# Patient Record
Sex: MALE | Race: WHITE | NOT HISPANIC OR LATINO | Employment: FULL TIME | URBAN - METROPOLITAN AREA
[De-identification: names, ages, dates, MRNs, and addresses within clinical notes are randomized per-mention and may not be internally consistent; named-entity substitution may affect disease eponyms.]

---

## 2017-01-12 RX ORDER — PRAVASTATIN SODIUM 40 MG/1
TABLET ORAL
Qty: 30 TABLET | Refills: 11 | Status: SHIPPED | OUTPATIENT
Start: 2017-01-12 | End: 2017-07-17 | Stop reason: SDUPTHER

## 2017-02-13 RX ORDER — BENAZEPRIL HYDROCHLORIDE 40 MG/1
TABLET ORAL
Qty: 30 TABLET | Refills: 5 | Status: SHIPPED | OUTPATIENT
Start: 2017-02-13 | End: 2017-07-17 | Stop reason: SDUPTHER

## 2017-04-25 ENCOUNTER — LAB VISIT (OUTPATIENT)
Dept: LAB | Facility: HOSPITAL | Age: 67
End: 2017-04-25
Attending: UROLOGY
Payer: MEDICARE

## 2017-04-25 DIAGNOSIS — R97.20 ELEVATED PSA: ICD-10-CM

## 2017-04-25 LAB — COMPLEXED PSA SERPL-MCNC: 6.1 NG/ML

## 2017-04-25 PROCEDURE — 36415 COLL VENOUS BLD VENIPUNCTURE: CPT

## 2017-04-25 PROCEDURE — 84153 ASSAY OF PSA TOTAL: CPT

## 2017-05-03 ENCOUNTER — OFFICE VISIT (OUTPATIENT)
Dept: UROLOGY | Facility: CLINIC | Age: 67
End: 2017-05-03
Payer: MEDICARE

## 2017-05-03 VITALS
HEIGHT: 72 IN | DIASTOLIC BLOOD PRESSURE: 80 MMHG | HEART RATE: 70 BPM | BODY MASS INDEX: 25.33 KG/M2 | SYSTOLIC BLOOD PRESSURE: 121 MMHG | TEMPERATURE: 98 F | WEIGHT: 187 LBS

## 2017-05-03 DIAGNOSIS — R97.20 ELEVATED PSA: Primary | ICD-10-CM

## 2017-05-03 LAB
BILIRUB SERPL-MCNC: ABNORMAL MG/DL
BLOOD URINE, POC: ABNORMAL
COLOR, POC UA: ABNORMAL
GLUCOSE UR QL STRIP: ABNORMAL
KETONES UR QL STRIP: ABNORMAL
LEUKOCYTE ESTERASE URINE, POC: ABNORMAL
NITRITE, POC UA: ABNORMAL
PH, POC UA: 5
PROTEIN, POC: ABNORMAL
SPECIFIC GRAVITY, POC UA: 1.02
UROBILINOGEN, POC UA: ABNORMAL

## 2017-05-03 PROCEDURE — 1160F RVW MEDS BY RX/DR IN RCRD: CPT | Mod: S$GLB,,, | Performed by: UROLOGY

## 2017-05-03 PROCEDURE — 99999 PR PBB SHADOW E&M-EST. PATIENT-LVL III: CPT | Mod: PBBFAC,,, | Performed by: UROLOGY

## 2017-05-03 PROCEDURE — 99213 OFFICE O/P EST LOW 20 MIN: CPT | Mod: 25,S$GLB,, | Performed by: UROLOGY

## 2017-05-03 PROCEDURE — 3074F SYST BP LT 130 MM HG: CPT | Mod: S$GLB,,, | Performed by: UROLOGY

## 2017-05-03 PROCEDURE — 3079F DIAST BP 80-89 MM HG: CPT | Mod: S$GLB,,, | Performed by: UROLOGY

## 2017-05-03 PROCEDURE — 81002 URINALYSIS NONAUTO W/O SCOPE: CPT | Mod: S$GLB,,, | Performed by: UROLOGY

## 2017-05-03 NOTE — MR AVS SNAPSHOT
[facility]  [address]  [place] [address]  Phone: [phone]                  Kulwinder Hogue JrAlex   5/3/2017 8:00 AM   Office Visit    Description:  Male : 1950   Provider:  Nguyễn Mancia MD   Department:  [facility]           Reason for Visit     6 mth recheck           Diagnoses this Visit        Comments    Elevated PSA    -  Primary            To Do List           Future Appointments        Provider Department Dept Phone    7/10/2017 10:00 AM [facility] [phone]    2017 8:00 AM [facility] [phone]    2017 7:40 AM Henrique Lugo MD [facility] [phone]      Goals (5 Years of Data)     None      [facility]     [facility] -  Assistance  Unless otherwise directed by your provider, please contact [facility], our nurse care line that is available for  assistance.     Registered nurses in the [facility] provide: appointment scheduling, clinical advisement, health education, and other advisory services.  Call: [phone] (toll free)               Medications           Message regarding Medications     Verify the changes and/or additions to your medication regime listed below are the same as discussed with your clinician today.  If any of these changes or additions are incorrect, please notify your healthcare provider.        STOP taking these medications     fluticasone (FLONASE) 50 mcg/actuation nasal spray 2 sprays by Each Nare route once daily.           Verify that the below list of medications is an accurate representation of the medications you are currently taking.  If none reported, the list may be blank. If incorrect, please contact your healthcare provider. Carry this list with you in case of emergency.           Current Medications     amlodipine (NORVASC) 10 MG tablet take 1 tablet by mouth every  evening    aspirin 81 mg Tab Take 81 mg by mouth once daily. Every day    benazepril (LOTENSIN) 40 MG tablet take 1 tablet by mouth once daily    carvedilol (COREG) 12.5 MG tablet Take 1 tablet (12.5 mg total) by mouth 2 (two) times daily with meals.    fish oil-omega-3 fatty acids 300-1,000 mg capsule Take 2 g by mouth once daily.    hydrochlorothiazide (HYDRODIURIL) 12.5 MG Tab Take 1 tablet (12.5 mg total) by mouth once daily.    multivitamin capsule Take 1 capsule by mouth once daily.      omeprazole (PRILOSEC) 20 MG capsule Take 1 capsule (20 mg total) by mouth once daily.    pravastatin (PRAVACHOL) 40 MG tablet take 1 tablet by mouth once daily           Clinical Reference Information           Your Vitals Were     BP Pulse Temp Height Weight BMI    121/80 (BP Location: Right arm, Patient Position: Sitting, BP Method: Automatic) 70 97.9 °F (36.6 °C) (Oral) 6' (1.829 m) 84.8 kg (187 lb) 25.36 kg/m2      Blood Pressure          Most Recent Value    BP  121/80      Allergies as of 5/3/2017     Percocet [Oxycodone-acetaminophen]      Immunizations Administered on Date of Encounter - 5/3/2017     None      Orders Placed During Today's Visit      Normal Orders This Visit    POCT URINE DIPSTICK WITHOUT MICROSCOPE          5/3/2017  8:27 AM - Marianela Barnett MA      Component Results     Component    Color, UA    yellow clear    Spec Grav UA    1.020    pH, UA    5    WBC, UA    trace    Nitrite, UA    n    Protein    trace    Glucose, UA    n    Ketones, UA    n    Urobilinogen, UA    n    Bilirubin    n    Blood, UA    trace            Language Assistance Services     ATTENTION: Language assistance services are available, free of charge. Please call [phone].      ATENCIÓN: Si habla español, tiene a jerry disposición servicios gratuitos de asistencia lingüística. Llame al [phone].     SRINIVASA Ý: N?u b?n nói Ti?ng Vi?t, có các d?ch v? h? tr? ngôn ng? mi?n phí dành cho b?n. G?i s? [phone].         [facility] complies with applicable Federal civil rights laws and does not discriminate on the basis of race, color, national origin, age, disability, or sex.

## 2017-05-03 NOTE — PROGRESS NOTES
OFFICE NOTE    CHIEF COMPLAINT:  Elevated PSA.    HISTORY OF PRESENT ILLNESS:  Mr. Hogue is a 66-year-old male who in 2016,   underwent a transrectal prostate ultrasound and biopsies and cystoscopy.  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  The biopsies done last year failed to show   evidence of any carcinoma and we recommended to keep testing his PSA and the   last PSA was performed on 04/25/2017 at 6.1.  The patient has been informed of   that finding and looking back at his PSA levels, we can see that this is a   steady raise since seven months ago was 5.2, a year ago was 4.2, and now is 6.1.    I suggested that we probably need to proceed with an MRI of the prostate to   see with that imaging modality, we can see any focal lesions that we can target   for biopsy if the PSA keeps elevating.  The patient agreed to proceed with that.    He is going to undergo DIS in [place], [place] and I am going to call him   with the information of the result.  The patient is free of any other symptoms   at the present time and his urinalysis today is within normal limits.  All the   questions were answered at his satisfaction.  He left the office in satisfactory   condition. The next step is to review the MRI and make a disposition for more   biopsies or not.      EAGLE/GABRIEL  dd: 05/03/2017 13:27:31 (CDT)  td: 05/04/2017 00:07:39 (CDT)  Doc ID   #[id number]  Job ID #[id number]    CC:

## 2017-05-18 ENCOUNTER — TELEPHONE (OUTPATIENT)
Dept: CARDIOLOGY | Facility: CLINIC | Age: 67
End: 2017-05-18

## 2017-05-18 NOTE — TELEPHONE ENCOUNTER
----- Message from Sintia De Leon sent at 5/18/2017 10:21 AM CDT -----  Contact: Tim/[facility]  Please call Tim at [phone]. Need to know if patient can be given capsules instead of tablets of Hydrochlorothiazide 12.5 mg? Last seen Dr Vital 08/08/16    Thank you

## 2017-05-18 NOTE — TELEPHONE ENCOUNTER
Tim Pharmacist at [facility] said that the HCTZ 12.5 mg tablets are on backorder but they do have the 12.5 mg capsules,asked if this would be ok to dispense.Instructed pharmacist that this is fine as long as the pt is ok with it.He reports understanding of these instructions.

## 2017-05-31 ENCOUNTER — TELEPHONE (OUTPATIENT)
Dept: UROLOGY | Facility: CLINIC | Age: 67
End: 2017-05-31

## 2017-05-31 DIAGNOSIS — R97.20 ELEVATED PSA: Primary | ICD-10-CM

## 2017-05-31 RX ORDER — SULFAMETHOXAZOLE AND TRIMETHOPRIM 800; 160 MG/1; MG/1
1 TABLET ORAL DAILY
Qty: 30 TABLET | Refills: 5 | Status: SHIPPED | OUTPATIENT
Start: 2017-05-31 | End: 2017-06-20

## 2017-05-31 NOTE — TELEPHONE ENCOUNTER
Prostate MRI results received from ANDREA JONES reviewed and recommends patient take daily antibiotics for 6 months, then repeat PSA.  Patient advised.

## 2017-06-23 ENCOUNTER — OFFICE VISIT (OUTPATIENT)
Dept: GASTROENTEROLOGY | Facility: CLINIC | Age: 67
End: 2017-06-23
Payer: MEDICARE

## 2017-06-23 VITALS
DIASTOLIC BLOOD PRESSURE: 75 MMHG | BODY MASS INDEX: 25.38 KG/M2 | HEART RATE: 70 BPM | SYSTOLIC BLOOD PRESSURE: 120 MMHG | RESPIRATION RATE: 20 BRPM | HEIGHT: 72 IN | WEIGHT: 187.38 LBS

## 2017-06-23 DIAGNOSIS — Z86.010 HISTORY OF COLON POLYPS: ICD-10-CM

## 2017-06-23 DIAGNOSIS — K21.9 GASTROESOPHAGEAL REFLUX DISEASE WITHOUT ESOPHAGITIS: Primary | ICD-10-CM

## 2017-06-23 PROCEDURE — 99999 PR PBB SHADOW E&M-EST. PATIENT-LVL III: CPT | Mod: PBBFAC,,, | Performed by: INTERNAL MEDICINE

## 2017-06-23 PROCEDURE — 99214 OFFICE O/P EST MOD 30 MIN: CPT | Mod: S$GLB,,, | Performed by: INTERNAL MEDICINE

## 2017-06-23 NOTE — PROGRESS NOTES
[facility]    CC: GERD    HPI 66 y.o. male is here to follow up for moderate GERD associated with heartburn and indigestion.  Symptoms controlled adequately on omeprazole 20 mg daily and with food trigger avoidance.  No dysphagia or GI bleeding.  EGD in 2013 with a small hiatal hernia - no esophagitis.      Past Medical History:   Diagnosis Date    Elevated PSA     Hyperlipemia     Hypertension     Wears glasses          Review of Systems  General ROS: negative for - chills, fever or weight loss  Cardiovascular ROS: no chest pain or dyspnea on exertion  Gastrointestinal ROS: +GERD; no abdominal pain or nausea    Physical Examination  /75   Pulse 70   Resp 20   Ht 6' (1.829 m)   Wt 85 kg (187 lb 6.3 oz)   BMI 25.41 kg/m²   General appearance: alert, cooperative, no distress  HENT: Normocephalic, atraumatic, neck symmetrical, no nasal discharge   Lungs: clear to auscultation bilaterally, symmetric chest wall expansion bilaterally  Heart: regular rate and rhythm without rub; no displacement of the PMI   Abdomen: soft, NT ND BS present  Extremities: extremities symmetric; no clubbing, cyanosis, or edema    Labs:  H/H 15/43    Assessment:   1. GERD  2. History of colon polyps    Plan:  1. Continue omeprazole 20 mg daily  2. Repeat colonoscopy for surveillance due 3/2018    Jim Carvajal MD  [facility]  [address], [address]  [place] [address]  Office: [phone]  Fax: [phone]

## 2017-07-10 ENCOUNTER — LAB VISIT (OUTPATIENT)
Dept: LAB | Facility: HOSPITAL | Age: 67
End: 2017-07-10
Attending: FAMILY MEDICINE
Payer: MEDICARE

## 2017-07-10 DIAGNOSIS — E78.5 HYPERLIPIDEMIA, UNSPECIFIED HYPERLIPIDEMIA TYPE: Chronic | ICD-10-CM

## 2017-07-10 DIAGNOSIS — I10 ESSENTIAL HYPERTENSION: ICD-10-CM

## 2017-07-10 LAB
ALBUMIN SERPL BCP-MCNC: 3.6 G/DL
ALP SERPL-CCNC: 69 U/L
ALT SERPL W/O P-5'-P-CCNC: 28 U/L
ANION GAP SERPL CALC-SCNC: 9 MMOL/L
AST SERPL-CCNC: 28 U/L
BASOPHILS # BLD AUTO: 0.02 K/UL
BASOPHILS NFR BLD: 0.4 %
BILIRUB SERPL-MCNC: 1.4 MG/DL
BUN SERPL-MCNC: 16 MG/DL
CALCIUM SERPL-MCNC: 8.6 MG/DL
CHLORIDE SERPL-SCNC: 102 MMOL/L
CO2 SERPL-SCNC: 28 MMOL/L
CREAT SERPL-MCNC: 1.2 MG/DL
DIFFERENTIAL METHOD: ABNORMAL
EOSINOPHIL # BLD AUTO: 0.2 K/UL
EOSINOPHIL NFR BLD: 4.4 %
ERYTHROCYTE [DISTWIDTH] IN BLOOD BY AUTOMATED COUNT: 12.9 %
EST. GFR  (AFRICAN AMERICAN): >60 ML/MIN/1.73 M^2
EST. GFR  (NON AFRICAN AMERICAN): >60 ML/MIN/1.73 M^2
GLUCOSE SERPL-MCNC: 99 MG/DL
HCT VFR BLD AUTO: 42.7 %
HGB BLD-MCNC: 15 G/DL
LYMPHOCYTES # BLD AUTO: 1.8 K/UL
LYMPHOCYTES NFR BLD: 34.2 %
MCH RBC QN AUTO: 33.5 PG
MCHC RBC AUTO-ENTMCNC: 35.1 %
MCV RBC AUTO: 95 FL
MONOCYTES # BLD AUTO: 0.4 K/UL
MONOCYTES NFR BLD: 7.6 %
NEUTROPHILS # BLD AUTO: 2.8 K/UL
NEUTROPHILS NFR BLD: 53.2 %
PLATELET # BLD AUTO: 245 K/UL
PMV BLD AUTO: 9 FL
POTASSIUM SERPL-SCNC: 3.9 MMOL/L
PROT SERPL-MCNC: 6.6 G/DL
RBC # BLD AUTO: 4.48 M/UL
SODIUM SERPL-SCNC: 139 MMOL/L
WBC # BLD AUTO: 5.26 K/UL

## 2017-07-10 PROCEDURE — 36415 COLL VENOUS BLD VENIPUNCTURE: CPT | Mod: PO

## 2017-07-10 PROCEDURE — 85025 COMPLETE CBC W/AUTO DIFF WBC: CPT

## 2017-07-10 PROCEDURE — 80053 COMPREHEN METABOLIC PANEL: CPT

## 2017-07-10 RX ORDER — CARVEDILOL 12.5 MG/1
TABLET ORAL
Qty: 180 TABLET | Refills: 3 | Status: SHIPPED | OUTPATIENT
Start: 2017-07-10 | End: 2017-12-22 | Stop reason: SDUPTHER

## 2017-07-14 ENCOUNTER — DOCUMENTATION ONLY (OUTPATIENT)
Dept: FAMILY MEDICINE | Facility: CLINIC | Age: 67
End: 2017-07-14

## 2017-07-17 ENCOUNTER — OFFICE VISIT (OUTPATIENT)
Dept: FAMILY MEDICINE | Facility: CLINIC | Age: 67
End: 2017-07-17
Payer: MEDICARE

## 2017-07-17 VITALS
SYSTOLIC BLOOD PRESSURE: 113 MMHG | BODY MASS INDEX: 25.56 KG/M2 | HEART RATE: 69 BPM | WEIGHT: 188.69 LBS | HEIGHT: 72 IN | DIASTOLIC BLOOD PRESSURE: 72 MMHG | TEMPERATURE: 98 F

## 2017-07-17 DIAGNOSIS — D70.4 CYCLICAL NEUTROPENIA: Chronic | ICD-10-CM

## 2017-07-17 DIAGNOSIS — E78.5 HYPERLIPIDEMIA, UNSPECIFIED HYPERLIPIDEMIA TYPE: Chronic | ICD-10-CM

## 2017-07-17 DIAGNOSIS — I10 ESSENTIAL HYPERTENSION: Primary | ICD-10-CM

## 2017-07-17 DIAGNOSIS — E83.51 HYPOCALCEMIA: ICD-10-CM

## 2017-07-17 PROCEDURE — 99999 PR PBB SHADOW E&M-EST. PATIENT-LVL III: CPT | Mod: PBBFAC,,, | Performed by: FAMILY MEDICINE

## 2017-07-17 PROCEDURE — 99214 OFFICE O/P EST MOD 30 MIN: CPT | Mod: S$GLB,,, | Performed by: FAMILY MEDICINE

## 2017-07-17 RX ORDER — PRAVASTATIN SODIUM 40 MG/1
40 TABLET ORAL DAILY
Qty: 30 TABLET | Refills: 11 | Status: SHIPPED | OUTPATIENT
Start: 2017-07-17 | End: 2018-08-15 | Stop reason: SDUPTHER

## 2017-07-17 RX ORDER — BENAZEPRIL HYDROCHLORIDE 40 MG/1
40 TABLET ORAL DAILY
Qty: 30 TABLET | Refills: 5 | Status: SHIPPED | OUTPATIENT
Start: 2017-07-17 | End: 2017-12-22 | Stop reason: SDUPTHER

## 2017-07-17 RX ORDER — HYDROCHLOROTHIAZIDE 12.5 MG/1
12.5 TABLET ORAL DAILY
Qty: 90 TABLET | Refills: 3 | Status: SHIPPED | OUTPATIENT
Start: 2017-07-17 | End: 2018-05-16 | Stop reason: SDUPTHER

## 2017-07-17 NOTE — PROGRESS NOTES
Subjective:       Patient ID: Kulwinder Hogue Jr. is a 66 y.o. male.    Chief Complaint: Hypertension    Patient Active Problem List:     Hyperlipemia     Encounter for colorectal cancer screening     HBP (high blood pressure)     Essential hypertension     Hyperlipidemia     Liposarcoma     Abnormal PSA     Cyclical neutropenia          Hypertension   This is a chronic problem. The problem has been resolved since onset. The problem is controlled. Pertinent negatives include no anxiety, blurred vision, chest pain, headaches, malaise/fatigue, neck pain, orthopnea, palpitations, peripheral edema, PND, shortness of breath or sweats. Risk factors for coronary artery disease include stress (alcohol). Past treatments include diuretics, calcium channel blockers, beta blockers and lifestyle changes. The current treatment provides significant improvement. Compliance problems include exercise.  erectyle dis.     Review of Systems   Constitutional: Negative.  Negative for activity change, appetite change, chills, diaphoresis, fatigue, fever, malaise/fatigue and unexpected weight change.   HENT: Negative.  Negative for congestion, drooling, ear discharge, ear pain, hearing loss, mouth sores, nosebleeds, postnasal drip, rhinorrhea, sinus pressure, sore throat, tinnitus, trouble swallowing and voice change.    Eyes: Negative.  Negative for blurred vision, pain, discharge, redness, itching and visual disturbance.   Respiratory: Negative.  Negative for apnea, cough, choking, chest tightness and shortness of breath.    Cardiovascular: Negative.  Negative for chest pain, palpitations, orthopnea, leg swelling and PND.   Gastrointestinal: Negative.  Negative for abdominal distention, abdominal pain and anal bleeding.   Endocrine: Negative.  Negative for cold intolerance, heat intolerance, polydipsia, polyphagia and polyuria.   Genitourinary: Negative.  Negative for difficulty urinating, dysuria, enuresis, flank pain, frequency,  hematuria, scrotal swelling, testicular pain and urgency.   Musculoskeletal: Negative.  Negative for arthralgias, back pain, gait problem, neck pain and neck stiffness.   Skin: Negative.  Negative for color change, pallor and rash.   Allergic/Immunologic: Negative.  Negative for environmental allergies and food allergies.   Neurological: Negative.  Negative for dizziness, tremors, syncope, facial asymmetry, speech difficulty, light-headedness, numbness and headaches.   Hematological: Negative for adenopathy. Does not bruise/bleed easily.   Psychiatric/Behavioral: Negative.  Negative for agitation, behavioral problems, confusion, decreased concentration, dysphoric mood and hallucinations. The patient is not hyperactive.        Objective:      Physical Exam   Constitutional: He is oriented to person, place, and time. He appears well-developed and well-nourished. No distress.   HENT:   Head: Normocephalic and atraumatic.   Right Ear: External ear normal.   Left Ear: External ear normal.   Nose: Nose normal.   Mouth/Throat: Oropharynx is clear and moist. No oropharyngeal exudate.   Eyes: Conjunctivae and EOM are normal. Pupils are equal, round, and reactive to light. Right eye exhibits no discharge. Left eye exhibits no discharge. No scleral icterus.   Neck: Normal range of motion. Neck supple. No JVD present. No tracheal deviation present. No thyromegaly present.   Cardiovascular: Normal rate, normal heart sounds and intact distal pulses.    No murmur heard.  Pulmonary/Chest: Effort normal and breath sounds normal. No respiratory distress. He has no wheezes. He has no rales.   Abdominal: Soft. Bowel sounds are normal. He exhibits no distension and no mass. There is no tenderness. There is no rebound and no guarding.   Musculoskeletal: He exhibits no edema or tenderness.          Lymphadenopathy:     He has no cervical adenopathy.   Neurological: He is alert and oriented to person, place, and time. No cranial nerve  deficit. Coordination normal.   Skin: Skin is warm and dry. No rash noted. He is not diaphoretic. No erythema. No pallor.   Psychiatric: He has a normal mood and affect. His behavior is normal. Judgment and thought content normal.   Vitals reviewed.    chronic prostatitis under Dr Mancia,  Mild Erectile Dysf.  Assessment:       1. Essential hypertension    2. Hyperlipidemia, unspecified hyperlipidemia type    3. Cyclical neutropenia    4. Hypocalcemia        Plan:       Essential hypertension  -     benazepril (LOTENSIN) 40 MG tablet; Take 1 tablet (40 mg total) by mouth once daily.  Dispense: 30 tablet; Refill: 5  -     hydrochlorothiazide (HYDRODIURIL) 12.5 MG Tab; Take 1 tablet (12.5 mg total) by mouth once daily.  Dispense: 90 tablet; Refill: 3  -     pravastatin (PRAVACHOL) 40 MG tablet; Take 1 tablet (40 mg total) by mouth once daily.  Dispense: 30 tablet; Refill: 11  -     Ambulatory referral to Cardiology  -     Comprehensive metabolic panel; Future; Expected date: 07/17/2017    Hyperlipidemia, unspecified hyperlipidemia type  -     pravastatin (PRAVACHOL) 40 MG tablet; Take 1 tablet (40 mg total) by mouth once daily.  Dispense: 30 tablet; Refill: 11  -     Lipid panel; Future; Expected date: 07/17/2017    Cyclical neutropenia  -     CBC auto differential; Future; Expected date: 07/17/2017    Hypocalcemia  -     PTH, intact; Future; Expected date: 07/17/2017  -     Calcium, ionized; Future; Expected date: 07/17/2017      Patient readiness: acceptance and barriers:readiness    During the course of the visit the patient was educated and counseled about the following:     Hypertension:   Screening for causes of secondary hypertension: GFR (chronic kidney disease).  Dietary sodium restriction.  Regular aerobic exercise.  Obesity:   General weight loss/lifestyle modification strategies discussed (elicit support from others; identify saboteurs; non-food rewards, etc).    Goals: Hypertension: Reduce Blood  Pressure    Did patient meet goals/outcomes: Yes    The following self management tools provided: blood pressure log  excercise log    Patient Instructions (the written plan) was given to the patient/family.     Time spent with patient: 30 minutes

## 2017-07-17 NOTE — PATIENT INSTRUCTIONS
Established High Blood Pressure    High blood pressure (hypertension) is a chronic disease. Often health care providers dont know what causes it. But it can be caused by certain health conditions and medicines.  If you have high blood pressure, you may not have any symptoms. If you do have symptoms, they may include headache, dizziness, changes in your vision, chest pain, and shortness of breath. But even without symptoms, high blood pressure thats not treated raises your risk for heart attack and stroke. High blood pressure is a serious health risk and shouldnt be ignored.  A blood pressure reading is made up of two numbers: a higher number over a lower number. The top number is the systolic pressure. The bottom number is the diastolic pressure. A normal blood pressure is less than 120 over less than 80.  High blood pressure is when either the top number is 140 or higher, or the bottom number is 90 or higher. This must be the result when taking your blood pressure a number of times. The blood pressures between normal and high are called prehypertension.  Home care  If you have high blood pressure, you should do what is listed below to lower your blood pressure. If you are taking medicines for high blood pressure, these methods may reduce or end your need for medicines in the future.  · Begin a weight-loss program if you are overweight.  · Cut back on how much salt you get in your diet. Heres how to do this:  ¨ Dont eat foods that have a lot of salt. These include olives, pickles, smoked meats, and salted potato chips.  ¨ Dont add salt to your food at the table.  ¨ Use only small amounts of salt when cooking.  · Begin an exercise program. Talk with your health care provider about the type of exercise program that would be best for you. It doesn't have to be hard. Even brisk walking for 20 minutes 3 times a week is a good form of exercise.  · Dont take medicines that have heart stimulants. This includes many  cold and sinus decongestant pills and sprays, as well as diet pills. Check the warnings about hypertension on the label. Stimulants such as amphetamine or cocaine could be lethal for someone with high blood pressure. Never take these.  · Limit how much caffeine you get in your diet. Switch to caffeine-free products.  · Stop smoking. If you are a long-time smoker, this can be hard. Enroll in a stop-smoking program to make it more likely that you will quit for good.  · Learn how to handle stress. This is an important part of any program to lower blood pressure. Learn about relaxation methods like meditation, yoga, or biofeedback.  · If your provider prescribed medicines, take them exactly as directed. Missing doses may cause your blood pressure get out of control.  · Consider buying an automatic blood pressure machine. You can get one of these at most pharmacies. Use this to watch your blood pressure at home. Give the results to your provider.  Follow-up care  You will need to make regular visits to your health care provider. This is to check your blood pressure and to make changes to your medicines. Make a follow-up appointment as directed.  When to seek medical advice  Call your health care provider right away if any of these occur:  · Chest pain or shortness of breath  · Severe headache  · Throbbing or rushing sound in the ears  · Nosebleed  · Sudden severe pain in your belly (abdomen)  · Extreme drowsiness, confusion, or fainting  · Dizziness or dizziness with a spinning sensation (vertigo)  · Weakness of an arm or leg or one side of the face  · You have problems speaking or seeing   Date Last Reviewed: 11/25/2014  © [address]. [address] [place] [address], [place] [address]. All rights reserved. This information is not intended as a substitute for professional medical care. Always follow your healthcare professional's instructions.

## 2017-08-01 ENCOUNTER — TELEPHONE (OUTPATIENT)
Dept: FAMILY MEDICINE | Facility: CLINIC | Age: 67
End: 2017-08-01

## 2017-08-01 NOTE — TELEPHONE ENCOUNTER
1. He could RTC for ear irrigation!  2.Use Debrox ( Wax softener)_ apply 4 drops x 3 a day for the next 2 days then use a water pick carefully.  3.Or appointment with ENT.

## 2017-08-01 NOTE — TELEPHONE ENCOUNTER
Spoke to patient who states at his last office visit Dr. Lugo mentioned he had a build up of wax in his left ear. States nothing was done regarding wax build up at that visit. Requesting to know if he should see ENT or should he schedule an appointment in office regarding this. Please advise.

## 2017-08-01 NOTE — TELEPHONE ENCOUNTER
----- Message from Danteemeka Dwayne sent at 8/1/2017  4:13 PM CDT -----  Contact: Patient  Patient states that he would like to talk to Dr Lugo about the wax that is in his ears knowing that it does need cleaned out but had more questions.  Can you please call the patient back at [phone].  Thank you

## 2017-08-02 NOTE — TELEPHONE ENCOUNTER
Patient notified of recommendations regarding ear wax build up. Appointment scheduled for 8-3-17. Patient agreed to appointment date and time.

## 2017-08-03 ENCOUNTER — OFFICE VISIT (OUTPATIENT)
Dept: FAMILY MEDICINE | Facility: CLINIC | Age: 67
End: 2017-08-03
Payer: MEDICARE

## 2017-08-03 VITALS
WEIGHT: 183.44 LBS | BODY MASS INDEX: 24.85 KG/M2 | TEMPERATURE: 98 F | HEIGHT: 72 IN | SYSTOLIC BLOOD PRESSURE: 115 MMHG | DIASTOLIC BLOOD PRESSURE: 70 MMHG | HEART RATE: 63 BPM

## 2017-08-03 DIAGNOSIS — I10 ESSENTIAL HYPERTENSION: Primary | ICD-10-CM

## 2017-08-03 DIAGNOSIS — H61.21 EXCESSIVE CERUMEN IN RIGHT EAR CANAL: Chronic | ICD-10-CM

## 2017-08-03 PROCEDURE — 99212 OFFICE O/P EST SF 10 MIN: CPT | Mod: S$GLB,,, | Performed by: FAMILY MEDICINE

## 2017-08-03 PROCEDURE — 99999 PR PBB SHADOW E&M-EST. PATIENT-LVL III: CPT | Mod: PBBFAC,,, | Performed by: FAMILY MEDICINE

## 2017-08-03 PROCEDURE — 3008F BODY MASS INDEX DOCD: CPT | Mod: S$GLB,,, | Performed by: FAMILY MEDICINE

## 2017-08-03 NOTE — PATIENT INSTRUCTIONS
Impacted Earwax  Impacted earwax is a buildup of the natural wax in the ear (cerumen). Impacted earwax is very common. It can cause symptoms such as hearing loss. It can also stop a doctor doing an exam of your ear.  Understanding earwax  Tiny glands in your ear make substances that combine with dead skin cells to form earwax. Earwax helps protect your ear canal from water, dirt, infection, and injury. Over time, earwax travels from the inner part of your ear canal to the entrance of the canal. Then it falls away naturally. But in some cases, it cant travel to the entrance of the canal. This may be because of a health condition or objects put in the ear. With age, earwax tends to become harder and less fluid. Older adults are more likely to have problems with earwax buildup.  What causes impacted earwax?  Earwax can build up because of many health conditions. Some cause a physical blockage. Others cause too much earwax to be made. Health conditions that can cause earwax buildup include:  · Bony blockage in the ear (osteoma or exostoses)  · Infections, such as swimmers ear (external otitis)  · Skin disease, such as eczema  · Autoimmune diseases, such as lupus  · A narrowed ear canal from birth, chronic inflammation, or injury  · Too much earwax because of injury  · Too much earwax because of  water in the ear canal  Objects repeatedly placed in the ear can also cause impacted earwax. For example, putting cotton swabs in the ear may push the wax deeper into the ear. Over time, this may cause blockage. Hearing aids, swimming plugs, and swim molds can cause the same problem when used again and again.  In some cases, the cause of impacted earwax is not known.  Symptoms of impacted earwax  Excess earwax usually does not cause any symptoms, unless there is a large amount of buildup. Then it may cause symptoms such as:  · Hearing loss  · Earache  · Sense of ear fullness  · Itching in the ear  · Dizziness  · Ringing in  the ears  · Cough  Treatment for impacted earwax  If you dont have symptoms, you may not need treatment. Often the earwax goes away on its own with time. If you have symptoms, you may have 1 or more treatments such as:  · Ear drops. These help to soften the earwax. This helps it leave the ear over time.  · Rinsing (irrigation) of the ear canal with water. This is done in a doctors office.  · Removal of the earwax with small tools. This is also done in a doctors office.  In rare cases, some treatments for earwax removal may cause complications such as:  · Swimmers ear (otitis external)  · Earache  · Short-term hearing loss  · Dizziness  · Water trapped in the ear canal  · Hole in the eardrum  · Ringing in the ears  · Bleeding from the ear  Talk with your health care provider about which risks apply most to you.  Dont use these at home  Health care providers do not advise use of ear candles or ear vacuum kits. These methods are not shown to work.   Preventing impacted earwax  You may not be able to prevent impacted earwax if you have a health condition that causes it, such as eczema. In other cases, you may be able to prevent earwax buildup by:  · Using ear drops once a week  · Having routine cleaning of the ear about every 6 months  · Not using cotton swabs in the ear  When to call the health care provider  Call your health care provider right away if you have severe symptoms after earwax removal. These may include bleeding or severe ear pain.   Date Last Reviewed: 3/19/2015  © 1393-4630 [employer]. [address] [place] [address], [place] [address]. All rights reserved. This information is not intended as a substitute for professional medical care. Always follow your healthcare professional's instructions.

## 2017-08-03 NOTE — PROGRESS NOTES
Subjective:       Patient ID: Kulwinder Hogue Jr. is a 66 y.o. male.    Chief Complaint: ear wax build up    Otalgia    There is pain in both ears. This is a new problem. The current episode started 1 to 4 weeks ago. The problem occurs every few hours. The problem has been unchanged. There has been no fever. The pain is at a severity of 2/10. The pain is mild. Pertinent negatives include no abdominal pain, coughing, diarrhea, drainage, ear discharge, headaches, hearing loss, neck pain, rash, rhinorrhea, sore throat or vomiting. He has tried nothing for the symptoms. There is no history of a chronic ear infection or hearing loss.     Review of Systems   HENT: Positive for ear pain. Negative for ear discharge, hearing loss, rhinorrhea and sore throat.    Respiratory: Negative for cough.    Gastrointestinal: Negative for abdominal pain, diarrhea and vomiting.   Musculoskeletal: Negative for neck pain.   Skin: Negative for rash.   Neurological: Negative for headaches.       Objective:      Physical Exam   Constitutional: He is oriented to person, place, and time. He appears well-developed and well-nourished. No distress.   HENT:   Head: Normocephalic and atraumatic.   Right Ear: External ear normal.   Left Ear: External ear normal.   Nose: Nose normal.   Mouth/Throat: Oropharynx is clear and moist. No oropharyngeal exudate.   Eyes: Conjunctivae and EOM are normal. Pupils are equal, round, and reactive to light. Right eye exhibits no discharge. Left eye exhibits no discharge. No scleral icterus.   Neck: Normal range of motion. Neck supple. No JVD present. No tracheal deviation present. No thyromegaly present.   Cardiovascular: Normal rate, normal heart sounds and intact distal pulses.    No murmur heard.  Pulmonary/Chest: Effort normal and breath sounds normal. No respiratory distress. He has no wheezes. He has no rales.   Abdominal: Soft. Bowel sounds are normal. He exhibits no distension and no mass. There is no  tenderness. There is no rebound and no guarding.   Musculoskeletal: He exhibits no edema or tenderness.          Lymphadenopathy:     He has no cervical adenopathy.   Neurological: He is alert and oriented to person, place, and time. No cranial nerve deficit. Coordination normal.   Skin: Skin is warm and dry. No rash noted. He is not diaphoretic. No erythema. No pallor.   Psychiatric: He has a normal mood and affect. His behavior is normal. Judgment and thought content normal.   Vitals reviewed.      Assessment:       1. Essential hypertension    2. Excessive cerumen in right ear canal        Plan:       Essential hypertension    Excessive cerumen in right ear canal      Patient readiness: acceptance and barriers:readiness    During the course of the visit the patient was educated and counseled about the following:     Hypertension:   Dietary sodium restriction.    Goals: Hypertension: Reduce Blood Pressure    Did patient meet goals/outcomes: Yes    The following self management tools provided: blood pressure log  excercise log    Patient Instructions (the written plan) was given to the patient/family.     Time spent with patient: 30 minutes

## 2017-08-07 ENCOUNTER — OFFICE VISIT (OUTPATIENT)
Dept: PODIATRY | Facility: CLINIC | Age: 67
End: 2017-08-07
Payer: MEDICARE

## 2017-08-07 VITALS — BODY MASS INDEX: 25.05 KG/M2 | HEIGHT: 72 IN | WEIGHT: 184.94 LBS

## 2017-08-07 DIAGNOSIS — L60.0 INGROWN NAIL: Primary | ICD-10-CM

## 2017-08-07 DIAGNOSIS — L84 CORN OR CALLUS: ICD-10-CM

## 2017-08-07 DIAGNOSIS — M79.675 TOE PAIN, BILATERAL: ICD-10-CM

## 2017-08-07 DIAGNOSIS — M79.674 TOE PAIN, BILATERAL: ICD-10-CM

## 2017-08-07 PROCEDURE — 99999 PR PBB SHADOW E&M-EST. PATIENT-LVL III: CPT | Mod: PBBFAC,,, | Performed by: PODIATRIST

## 2017-08-07 PROCEDURE — 17999 UNLISTD PX SKN MUC MEMB SUBQ: CPT | Mod: CSM,S$GLB,, | Performed by: PODIATRIST

## 2017-08-07 PROCEDURE — 99499 UNLISTED E&M SERVICE: CPT | Mod: S$GLB,,, | Performed by: PODIATRIST

## 2017-10-03 DIAGNOSIS — I10 ESSENTIAL HYPERTENSION: ICD-10-CM

## 2017-10-03 RX ORDER — AMLODIPINE BESYLATE 10 MG/1
TABLET ORAL
Qty: 90 TABLET | Refills: 3 | Status: SHIPPED | OUTPATIENT
Start: 2017-10-03 | End: 2018-10-08 | Stop reason: SDUPTHER

## 2017-10-18 ENCOUNTER — PATIENT MESSAGE (OUTPATIENT)
Dept: PODIATRY | Facility: CLINIC | Age: 67
End: 2017-10-18

## 2017-10-18 ENCOUNTER — TELEPHONE (OUTPATIENT)
Dept: PODIATRY | Facility: CLINIC | Age: 67
End: 2017-10-18

## 2017-10-18 NOTE — TELEPHONE ENCOUNTER
----- Message from Edel Petersen sent at 10/18/2017 11:06 AM CDT -----  Contact: self   Placed call to pod, patient missed a call from your office. Please call back at [phone] (vhwx)

## 2017-10-26 ENCOUNTER — OFFICE VISIT (OUTPATIENT)
Dept: PODIATRY | Facility: CLINIC | Age: 67
End: 2017-10-26
Payer: MEDICARE

## 2017-10-26 VITALS — WEIGHT: 186.31 LBS | HEIGHT: 72 IN | BODY MASS INDEX: 25.24 KG/M2

## 2017-10-26 DIAGNOSIS — L60.0 INGROWN NAIL: Primary | ICD-10-CM

## 2017-10-26 DIAGNOSIS — M79.675 TOE PAIN, BILATERAL: ICD-10-CM

## 2017-10-26 DIAGNOSIS — M79.674 TOE PAIN, BILATERAL: ICD-10-CM

## 2017-10-26 PROCEDURE — 99499 UNLISTED E&M SERVICE: CPT | Mod: S$GLB,,, | Performed by: PODIATRIST

## 2017-10-26 PROCEDURE — 99999 PR PBB SHADOW E&M-EST. PATIENT-LVL III: CPT | Mod: PBBFAC,,, | Performed by: PODIATRIST

## 2017-10-26 PROCEDURE — 11750 EXCISION NAIL&NAIL MATRIX: CPT | Mod: T5,S$GLB,, | Performed by: PODIATRIST

## 2017-10-26 RX ORDER — SULFAMETHOXAZOLE AND TRIMETHOPRIM 800; 160 MG/1; MG/1
TABLET ORAL
COMMUNITY
Start: 2017-10-25 | End: 2017-12-22

## 2017-10-26 RX ORDER — FLUOCINONIDE 0.5 MG/G
1 CREAM TOPICAL 2 TIMES DAILY
Refills: 0 | COMMUNITY
Start: 2017-09-18 | End: 2017-12-22

## 2017-10-26 RX ORDER — HYDROCHLOROTHIAZIDE 12.5 MG/1
CAPSULE ORAL
Refills: 0 | COMMUNITY
Start: 2017-08-14 | End: 2017-11-09 | Stop reason: SDUPTHER

## 2017-10-26 RX ORDER — TOBRAMYCIN 3 MG/ML
2 SOLUTION/ DROPS OPHTHALMIC 2 TIMES DAILY
Qty: 5 ML | Refills: 3 | Status: SHIPPED | OUTPATIENT
Start: 2017-10-26 | End: 2017-11-05

## 2017-10-26 NOTE — PROGRESS NOTES
Subjective:      Patient ID: Kulwinder Hogue Jr. is a 67 y.o. male.    Chief Complaint: Ingrown Toenail (procedure)    Kulwinder is a 67 y.o. male who presents to the clinic for evaluation and treatment of high risk feet. Kulwinder has a past medical history of Elevated PSA; Hyperlipemia; Hypertension; and Wears glasses. The patient's chief complaint is long, thick toenails with pain at skin borders both hallux and callus both big toes also.  Both conditions, Gradual onset, worsening over past several weeks, aggravated by increased weight bearing, shoe gear, pressure.  Periodic debridement helps symptoms.  Denies trauma, signs infection.    PCP: Henrique Lugo MD    Date Last Seen by PCP:   Chief Complaint   Patient presents with    Ingrown Toenail     procedure         Current shoe gear:  Affected Foot: Casual shoes     Unaffected Foot: Casual shoes    Last encounter in this department: Visit date not found    No results found for: HGBA1C      Review of Systems   Constitution: Negative for chills, diaphoresis, fever, malaise/fatigue and night sweats.   Cardiovascular: Negative for claudication, cyanosis, leg swelling and syncope.   Skin: Positive for nail changes. Negative for color change, dry skin, rash and unusual hair distribution.   Musculoskeletal: Negative for falls, joint pain, joint swelling, muscle cramps, muscle weakness and stiffness.   Gastrointestinal: Negative for constipation, diarrhea, nausea and vomiting.   Neurological: Negative for brief paralysis, disturbances in coordination, focal weakness, numbness, paresthesias, sensory change and tremors.           Objective:      Physical Exam   Constitutional: He appears well-developed and well-nourished. He is cooperative. No distress.   Cardiovascular:   Pulses:       Popliteal pulses are 2+ on the right side, and 2+ on the left side.        Dorsalis pedis pulses are 2+ on the right side, and 2+ on the left side.        Posterior tibial pulses are 2+ on the  right side, and 2+ on the left side.   Capillary refill 3 seconds all toes/distal feet, all toes/both feet warm to touch.      Negative lymphadenopathy bilateral popliteal fossa and tarsal tunnel.      Negavie lower extremity edema bilateral.     Musculoskeletal:        Right ankle: Normal. He exhibits normal range of motion, no swelling, no ecchymosis, no deformity, no laceration and normal pulse. Achilles tendon normal. Achilles tendon exhibits no pain, no defect and normal Aparicio's test results.   Normal angle, base, station of gait. All ten toes without clubbing, cyanosis, or signs of ischemia.  No pain to palpation bilateral lower extremities.  Range of motion, stability, muscle strength, and muscle tone normal bilateral feet and legs.     Lymphadenopathy: No inguinal adenopathy noted on the right or left side.   Negative lymphadenopathy bilateral popliteal fossa and tarsal tunnel.   Neurological: He is alert. He has normal strength. He displays no atrophy and no tremor. No sensory deficit. He exhibits normal muscle tone. He displays no seizure activity. Gait normal.   Reflex Scores:       Patellar reflexes are 2+ on the right side and 2+ on the left side.       Achilles reflexes are 2+ on the right side and 2+ on the left side.  Negative tinel sign to percussion sural, superficial peroneal, deep peroneal, saphenous, and posterior tibial nerves right and left ankles and feet.     Skin: Skin is warm, dry and intact. No abrasion, no bruising, no burn, no ecchymosis, no laceration, no lesion and no rash noted. He is not diaphoretic. No cyanosis or erythema. No pallor. Nails show no clubbing.     Visible and palpable ingrowth of toenail lateral and medial border right and left hallux with pain to palpation,  without ulceration, drainage, pus, tracking, fluctuance, malodor, or cardinal signs infection.        Otherwise, Skin is normal age and health appropriate color, turgor, texture, and temperature bilateral  lower extremities without ulceration, hyperpigmentation, discoloration, masses nodules or cords palpated.  No ecchymosis, erythema, edema, or cardinal signs of infection bilateral lower extremities.               Assessment:       No diagnosis found.      Plan:       There are no diagnoses linked to this encounter.  I counseled the patient on his conditions, their implications and medical management.      PREOPERATIVE DIAGNOSIS Ingrown toenail, medial and lateral borders right and left hallux.    POSTOPERATIVE DIAGNOSIS: same    NAME OF THE PROCEDURE: Permanent matrixectomy, medial and lateral borders of   the right and left hallux.     SURGEON: Dr. Ashu Church.   No surgical assist.     ESTIMATED BLOOD LOSS: Minimal, being less than 1 mL.     HEMOSTASIS: Anatomic dissection, direct pressure manually.     ANESTHESIA: 1% lidocaine plain.     PROCEDURE IN DETAIL: After that time-out procedure and all the patient   identifiers and site markings being in agreement, I anesthetized the surgical toe(s) with a total of 3 mL of 1% lidocaine plain per digit. After verifying anesthesia, I   used a spatula  to undermine the medial and lateral borders of the right and left  hallux, bring them from their soft tissue   attachments. I used an English Anvil nail splitter to split the nail and   propagated the split into the nail matrix of the medial and lateral side   approximately 3 mm central from the offending borders with a sterile #15 blade. Using a   curved sterile hemostat to remove the offending portions of nail from the offending portions of the digit and discarded them into medical waste red bag .     At this time, the medial and lateral most portions of the nail matrix of the   right and left hallux and permanently destroyed with three consecutive 30-second   applications of 90% phenol, which was then neutralized with isopropyl alcohol   and rinsed with sterile normal saline, blotted dry and dressed with a thin  layer   of antibiotic cream and a dry sterile dressing of 4 x 4s, Isaac and light   compression with Coban.     The patient was dispensed a surgical shoe today   and a prescription for tobramycin drops 0.3% for twice daily application to the   wound and keep it covered at all times. Follow in this office in two weeks for   reevaluation, sooner p.r.n. if he experiences fever, chills, night sweats,   nausea, vomiting, redness, pain out of proportion, drainage, pus or malodor of the surgical toe.              Return in about 2 weeks (around 11/9/2017) for both hallux both borders matrixectomy post op.

## 2017-11-09 ENCOUNTER — TELEPHONE (OUTPATIENT)
Dept: PODIATRY | Facility: CLINIC | Age: 67
End: 2017-11-09

## 2017-11-09 ENCOUNTER — OFFICE VISIT (OUTPATIENT)
Dept: CARDIOLOGY | Facility: CLINIC | Age: 67
End: 2017-11-09
Payer: MEDICARE

## 2017-11-09 VITALS
RESPIRATION RATE: 16 BRPM | DIASTOLIC BLOOD PRESSURE: 89 MMHG | HEIGHT: 72 IN | OXYGEN SATURATION: 99 % | BODY MASS INDEX: 25.53 KG/M2 | WEIGHT: 188.5 LBS | SYSTOLIC BLOOD PRESSURE: 145 MMHG | HEART RATE: 67 BPM

## 2017-11-09 DIAGNOSIS — E78.2 MIXED HYPERLIPIDEMIA: Primary | ICD-10-CM

## 2017-11-09 DIAGNOSIS — I10 ESSENTIAL HYPERTENSION: ICD-10-CM

## 2017-11-09 DIAGNOSIS — I10 ESSENTIAL HYPERTENSION: Primary | ICD-10-CM

## 2017-11-09 PROCEDURE — 99999 PR PBB SHADOW E&M-EST. PATIENT-LVL IV: CPT | Mod: PBBFAC,,, | Performed by: INTERNAL MEDICINE

## 2017-11-09 PROCEDURE — 93000 ELECTROCARDIOGRAM COMPLETE: CPT | Mod: S$GLB,,, | Performed by: INTERNAL MEDICINE

## 2017-11-09 PROCEDURE — 99215 OFFICE O/P EST HI 40 MIN: CPT | Mod: S$GLB,,, | Performed by: INTERNAL MEDICINE

## 2017-11-09 NOTE — LETTER
November 9, 2017      Henrique Lugo MD  [address]  [place] [address]           [facility]  [address]  [place] [address]  Phone: [phone]          Patient: Kulwinder Hogue Jr.   MR Number: [id number]   YOB: 1950   Date of Visit: 11/9/2017       Dear Dr. Henrique Lugo:    Thank you for referring Kulwinder Hogue to me for evaluation. Attached you will find relevant portions of my assessment and plan of care.    If you have questions, please do not hesitate to call me. I look forward to following Kulwinder Hogue along with you.    Sincerely,    Harley Villatoro MD    Enclosure  CC:  No Recipients    If you would like to receive this communication electronically, please contact [email or web address] or [phone] to request more information on [employer] access.    For providers and/or their staff who would like to refer a patient to [facility], please contact us through our one-stop-shop provider referral line, [facility], at [phone].    If you feel you have received this communication in error or would no longer like to receive these types of communications, please e-mail [email or web address]

## 2017-11-09 NOTE — PATIENT INSTRUCTIONS
Continue current medication regimen  Pt to keep log of blood pressure/heart rate and bring in next visit  Follow up in 6 months

## 2017-11-09 NOTE — PROGRESS NOTES
[facility]    CC:   Chief Complaint   Patient presents with    Consult     Dr. Lugo for HTN       Patient ID: Kulwinder Hogue Jr. is a 67 y.o. male with a past medical history of HTN, HLD, who presents for an initial appointment.  Pertinent history/events are as follows:     -Pt followed by Dr. Vital at [facility] in the past.    Per Dr. Vtial's note on 8/8/2016:  He was referred to me by Dr. Rocky Khoury.    He has had hypertension for at least 10 years.    He is taking medicine for lipids over 10 years.      There is no actual history of heart problems. He has concerns about his coronary risk.    He has no symptoms that would suggest angina, congestive heart failure, dysrhythmia, claudication, or syncope.    There is no family history of coronary disease. He is not a diabetic. He is not a smoker.    He does have some concerns about some ankle swelling at the end of the day; he is on his feet all day and is taking amlodipine 10 mg daily. He now is seeing Dr. Lugo for PCP in [place] who changed his amlodipine to qpm and this has largely resolved the problem  Home bp's mostly >140 -I have reviewed a blood pressure log and he is usually well controlled.     At [facility] 2014 passed out - bp too low.  A similar episode occurred on August 6, 2016 after doing lots of yard work in the heat and humidity.     Results of study 8-7-13:  Your calcium score is 0 which puts you in the 10th (lowest) percentile for your gender and age bracket.     He works on [employer] for [employer].    HPI:  Mr. Hogue reports doing well.  He has no chest pain, SOB, LE edema, palpitations, TIA symptoms or syncope.  Staying active by mowing his grass regularly and walking.  EKG today shows normal sinus rhythm with no ischemic ST/T wave changes.  Pt reports taking all medications as prescribed with no issues.  Blood pressure today is 145/89.  Pt states blood pressures usually 120's systolic at home.      Past Medical  History:   Diagnosis Date    Elevated PSA     Hyperlipemia     Hypertension     Wears glasses      Past Surgical History:   Procedure Laterality Date    COLONOSCOPY W/ POLYPECTOMY  3/2013    Tubular adenoma    CYSTOSCOPY  10/25/2016    Dr. Mancia    LIPOMA RESECTION  8-    lipoma posterior neck    PROSTATE BIOPSY  10/25/2016    Dr. Mancia    VASECTOMY       Social History     Social History    Marital status:      Spouse name: N/A    Number of children: N/A    Years of education: N/A     Occupational History    Not on file.     Social History Main Topics    Smoking status: Never Smoker    Smokeless tobacco: Never Used    Alcohol use Yes      Comment: WEEKEND    Drug use: No    Sexual activity: Not on file     Other Topics Concern    Not on file     Social History Narrative    No narrative on file     Family History   Problem Relation Age of Onset    Cancer Mother      breast    Kidney cancer Neg Hx     Prostate cancer Neg Hx        Review of patient's allergies indicates:   Allergen Reactions    Percocet [oxycodone-acetaminophen] Itching       Medication List with Changes/Refills   Current Medications    AMLODIPINE (NORVASC) 10 MG TABLET    take 1 tablet by mouth every evening    ASPIRIN 81 MG TAB    Take 81 mg by mouth once daily. Every day    BENAZEPRIL (LOTENSIN) 40 MG TABLET    Take 1 tablet (40 mg total) by mouth once daily.    CARVEDILOL (COREG) 12.5 MG TABLET    take 1 tablet by mouth twice a day with food    FISH OIL-OMEGA-3 FATTY ACIDS 300-1,000 MG CAPSULE    Take 2 g by mouth once daily.    FLUARIX QUAD 5206-1690, PF, 60 MCG (15 MCG X 4)/0.5 ML VACCINE    inject 0.5 milliliter intramuscularly    FLUOCINONIDE 0.05% (LIDEX) 0.05 % CREAM    1 application 2 (two) times daily. Apply to affected area    HYDROCHLOROTHIAZIDE (HYDRODIURIL) 12.5 MG TAB    Take 1 tablet (12.5 mg total) by mouth once daily.    MULTIVITAMIN CAPSULE    Take 1 capsule by mouth once daily.       OMEPRAZOLE (PRILOSEC) 20 MG CAPSULE    Take 1 capsule (20 mg total) by mouth once daily.    PRAVASTATIN (PRAVACHOL) 40 MG TABLET    Take 1 tablet (40 mg total) by mouth once daily.    SULFAMETHOXAZOLE-TRIMETHOPRIM 800-160MG (BACTRIM DS) 800-160 MG TAB       Discontinued Medications    HYDROCHLOROTHIAZIDE (MICROZIDE) 12.5 MG CAPSULE           Review of Systems  Constitution: Denies chills, fever, and sweats.  HENT: Denies headaches or blurry vision.  Cardiovascular: Denies chest pain or irregular heart beat.  Respiratory: Denies cough or shortness of breath.  Gastrointestinal: Denies abdominal pain, nausea, or vomiting.  Musculoskeletal: Denies muscle cramps.  Neurological: Denies dizziness or focal weakness.  Psychiatric/Behavioral: Normal mental status.  Hematologic/Lymphatic: Denies bleeding problem or easy bruising/bleeding.  Skin: Denies rash or suspicious lesions    Physical Examination  BP (!) 145/89 (BP Location: Right arm, Patient Position: Sitting)   Pulse 67   Resp 16   Ht 6' (1.829 m)   Wt 85.5 kg (188 lb 7.9 oz)   SpO2 99%   BMI 25.56 kg/m²     Constitutional: No acute distress, conversant  HEENT: Sclera anicteric, Pupils equal, round and reactive to light, extraocular motions intact, Oropharynx clear  Neck: No JVD, no carotid bruits  Cardiovascular: regular rate and rhythm, no murmur, rubs or gallops, normal S1/S2  Pulmonary: Clear to auscultation bilaterally  Abdominal: Abdomen soft, nontender, nondistended, positive bowel sounds  Extremities: No lower extremity edema,   Pulses:  Carotid pulses are 2+ on the right side, and 2+ on the left side.  Radial pulses are 2+ on the right side, and 2+ on the left side.   Femoral pulses are 2+ on the right side, and 2+ on the left side.  Skin: No ecchymosis, erythema, or ulcers  Psych: Alert and oriented x 3, appropriate affect  Neuro: CNII-XII intact, no focal deficits    Labs:  Most Recent Data  CBC:   Lab Results   Component Value Date    WBC 5.26  07/10/2017    HGB 15.0 07/10/2017    HCT 42.7 07/10/2017     07/10/2017    MCV 95 07/10/2017    RDW 12.9 07/10/2017     BMP:   Lab Results   Component Value Date     07/10/2017    K 3.9 07/10/2017     07/10/2017    CO2 28 07/10/2017    BUN 16 07/10/2017    CREATININE 1.2 07/10/2017    GLU 99 07/10/2017    CALCIUM 8.6 (L) 07/10/2017     LFTS;   Lab Results   Component Value Date    PROT 6.6 07/10/2017    ALBUMIN 3.6 07/10/2017    BILITOT 1.4 (H) 07/10/2017    AST 28 07/10/2017    ALKPHOS 69 07/10/2017    ALT 28 07/10/2017     COAGS: No results found for: INR, PROTIME, PTT  FLP:   Lab Results   Component Value Date    CHOL 159 11/16/2016    HDL 55 11/16/2016    LDLCALC 89.0 11/16/2016    TRIG 75 11/16/2016    CHOLHDL 34.6 11/16/2016       EKG 11/9/2017:  Normal sinus rhythm  No ischemic ST/T wave changes    Coronary Calcium Score 8/7/2013:  TEST DESCRIPTION   [place] RISK SCORE  Based on standard risk factors, your estimated risk for developing a coronary heart disease event in the next 10 years is 16%,  indicating a relatively intermediate level of risk. A Risk Score of 16% means that 16 of 100 people with this level of risk   will have a heart attack in the next 10 years.     C-T CORONARY CALCIUM SCORE    Your calcium score is 0 which puts you in the 10th (lowest) percentile for your gender and age bracket.  This score indicates no identifiable atherosclerotic plaque and a very low coronary heart disease risk. The examination was negative. This would suggest an absence for significant coronary heart disease.    CONCLUSIONS   RECOMMENDATIONS BASED ON [place] RISK SCORE  Intermediate level of coronary heart disease risk based on standard risk factors. Follow up with physician to address risk factor control.      Assessment/Plan:  Kulwinder Castroloud  is a 67 y.o. male with a past medical history of HTN, HLD, who presents for an initial appointment.  Pt is doing well with no symptoms at this  time.     1. HTN- Pt to keep log of blood pressure/heart rate and bring in next visit.  Continue current medication regimen.      2. HLD- Continue pravastatin 40 mg daily and fish oil.  Monitor lipids and LFT's    Follow up in 6 months    Total duration of face to face visit time 30 minutes.  Total time spent counseling greater than fifty percent of total visit time.  Counseling included discussion regarding imaging findings, diagnosis, possibilities, treatment options, risks and benefits.  The patient had many questions regarding the options and long-term effects.    Harley Villatoro MD, PhD  Interventional Cardiology

## 2017-11-09 NOTE — TELEPHONE ENCOUNTER
----- Message from Renuka Mcwilliams sent at 11/9/2017  8:20 AM CST -----  Contact: self  Call placed to pod. Returning your call. Please call back at [phone] (axrw)

## 2017-11-22 ENCOUNTER — OFFICE VISIT (OUTPATIENT)
Dept: PODIATRY | Facility: CLINIC | Age: 67
End: 2017-11-22
Payer: MEDICARE

## 2017-11-22 VITALS — WEIGHT: 188 LBS | BODY MASS INDEX: 25.47 KG/M2 | HEIGHT: 72 IN

## 2017-11-22 DIAGNOSIS — Z98.890 POST-OPERATIVE STATE: Primary | ICD-10-CM

## 2017-11-22 PROCEDURE — 99499 UNLISTED E&M SERVICE: CPT | Mod: S$GLB,,, | Performed by: PODIATRIST

## 2017-11-22 PROCEDURE — 99999 PR PBB SHADOW E&M-EST. PATIENT-LVL III: CPT | Mod: PBBFAC,,, | Performed by: PODIATRIST

## 2017-11-22 NOTE — PROGRESS NOTES
Subjective:      Patient ID: Kulwinder Hogue Jr. is a 67 y.o. male.    Chief Complaint: Follow-up (bilateral nails)    2 week s/p matrixectomy medial and lateral borders right and left hallux dressing with band aids daily and using topical antiboitics.  No current pain or signs infection both hallux.    Review of Systems   Constitution: Negative for chills, diaphoresis, fever, malaise/fatigue and night sweats.   Cardiovascular: Negative for claudication, cyanosis, leg swelling and syncope.   Skin: Positive for nail changes. Negative for color change, dry skin, rash, suspicious lesions and unusual hair distribution.   Musculoskeletal: Negative for falls, joint pain, joint swelling, muscle cramps, muscle weakness and stiffness.   Gastrointestinal: Negative for constipation, diarrhea, nausea and vomiting.   Neurological: Negative for brief paralysis, disturbances in coordination, focal weakness, numbness, paresthesias, sensory change and tremors.           Objective:      Physical Exam   Constitutional: He appears well-developed and well-nourished. He is cooperative. No distress.   Cardiovascular:   Pulses:       Popliteal pulses are 2+ on the right side, and 2+ on the left side.        Dorsalis pedis pulses are 2+ on the right side, and 2+ on the left side.        Posterior tibial pulses are 2+ on the right side, and 2+ on the left side.   Capillary refill 3 seconds all toes/distal feet, all toes/both feet warm to touch.      Negative lymphadenopathy bilateral popliteal fossa and tarsal tunnel.      Negavie lower extremity edema bilateral.     Musculoskeletal:        Right ankle: Normal. He exhibits normal range of motion, no swelling, no ecchymosis, no deformity, no laceration and normal pulse. Achilles tendon normal. Achilles tendon exhibits no pain, no defect and normal Aparicio's test results.   Normal angle, base, station of gait. All ten toes without clubbing, cyanosis, or signs of ischemia.  No pain to  palpation bilateral lower extremities.  Range of motion, stability, muscle strength, and muscle tone normal bilateral feet and legs.     Lymphadenopathy: No inguinal adenopathy noted on the right or left side.   Negative lymphadenopathy bilateral popliteal fossa and tarsal tunnel.   Neurological: He is alert. He has normal strength. He displays no atrophy and no tremor. No sensory deficit. He exhibits normal muscle tone. He displays no seizure activity. Gait normal.   Reflex Scores:       Patellar reflexes are 2+ on the right side and 2+ on the left side.       Achilles reflexes are 2+ on the right side and 2+ on the left side.  Negative tinel sign to percussion sural, superficial peroneal, deep peroneal, saphenous, and posterior tibial nerves right and left ankles and feet.     Skin: Skin is warm, dry and intact. No abrasion, no bruising, no burn, no ecchymosis, no laceration, no lesion and no rash noted. He is not diaphoretic. No cyanosis or erythema. No pallor. Nails show no clubbing.     Wounds both sides of both hallux pink, granular,  without drainage, pus, tracking, fluctuance, malodor, or cardinal signs infection.         Otherwise, Skin is normal age and health appropriate color, turgor, texture, and temperature bilateral lower extremities without ulceration, hyperpigmentation, discoloration, masses nodules or cords palpated.  No ecchymosis, erythema, edema, or cardinal signs of infection bilateral lower extremities.               Assessment:       Encounter Diagnosis   Name Primary?    Post-operative state Yes         Plan:       Kulwinder was seen today for follow-up.    Diagnoses and all orders for this visit:    Post-operative state      I counseled the patient on his conditions, their implications and medical management.        Discussed conservative treatment with shoes of adequate dimensions, material, and style to alleviate symptoms and delay or prevent future surgical intervention.       continue  band aids and topical antibiotic until completely healed.    Return if symptoms worsen or fail to improve.

## 2017-12-06 ENCOUNTER — PATIENT MESSAGE (OUTPATIENT)
Dept: PODIATRY | Facility: CLINIC | Age: 67
End: 2017-12-06

## 2017-12-07 ENCOUNTER — OFFICE VISIT (OUTPATIENT)
Dept: PODIATRY | Facility: CLINIC | Age: 67
End: 2017-12-07
Payer: MEDICARE

## 2017-12-07 VITALS — WEIGHT: 188.5 LBS | BODY MASS INDEX: 25.53 KG/M2 | HEIGHT: 72 IN

## 2017-12-07 DIAGNOSIS — L84 CORN OR CALLUS: Primary | ICD-10-CM

## 2017-12-07 DIAGNOSIS — Z98.890 POST-OPERATIVE STATE: ICD-10-CM

## 2017-12-07 PROCEDURE — 99999 PR PBB SHADOW E&M-EST. PATIENT-LVL III: CPT | Mod: PBBFAC,,, | Performed by: PODIATRIST

## 2017-12-07 PROCEDURE — 99212 OFFICE O/P EST SF 10 MIN: CPT | Mod: S$GLB,,, | Performed by: PODIATRIST

## 2017-12-07 NOTE — PROGRESS NOTES
Subjective:      Patient ID: Kulwinder Hogue Jr. is a 67 y.o. male.    Chief Complaint: Foot Pain (right)    2 week s/p matrixectomy medial and lateral borders right and left hallux dressing with band aids daily and using topical antiboitics.  No current pain or signs infection both hallux.    Review of Systems   Constitution: Negative for chills, diaphoresis, fever, malaise/fatigue and night sweats.   Cardiovascular: Negative for claudication, cyanosis, leg swelling and syncope.   Skin: Positive for nail changes. Negative for color change, dry skin, rash, suspicious lesions and unusual hair distribution.   Musculoskeletal: Negative for falls, joint pain, joint swelling, muscle cramps, muscle weakness and stiffness.   Gastrointestinal: Negative for constipation, diarrhea, nausea and vomiting.   Neurological: Negative for brief paralysis, disturbances in coordination, focal weakness, numbness, paresthesias, sensory change and tremors.           Objective:      Physical Exam   Constitutional: He appears well-developed and well-nourished. He is cooperative. No distress.   Cardiovascular:   Pulses:       Popliteal pulses are 2+ on the right side, and 2+ on the left side.        Dorsalis pedis pulses are 2+ on the right side, and 2+ on the left side.        Posterior tibial pulses are 2+ on the right side, and 2+ on the left side.   Capillary refill 3 seconds all toes/distal feet, all toes/both feet warm to touch.      Negative lymphadenopathy bilateral popliteal fossa and tarsal tunnel.      Negavie lower extremity edema bilateral.     Musculoskeletal:        Right ankle: Normal. He exhibits normal range of motion, no swelling, no ecchymosis, no deformity, no laceration and normal pulse. Achilles tendon normal. Achilles tendon exhibits no pain, no defect and normal Aparicio's test results.   Normal angle, base, station of gait. All ten toes without clubbing, cyanosis, or signs of ischemia.  No pain to palpation  bilateral lower extremities.  Range of motion, stability, muscle strength, and muscle tone normal bilateral feet and legs.     Lymphadenopathy: No inguinal adenopathy noted on the right or left side.   Negative lymphadenopathy bilateral popliteal fossa and tarsal tunnel.   Neurological: He is alert. He has normal strength. He displays no atrophy and no tremor. No sensory deficit. He exhibits normal muscle tone. He displays no seizure activity. Gait normal.   Reflex Scores:       Patellar reflexes are 2+ on the right side and 2+ on the left side.       Achilles reflexes are 2+ on the right side and 2+ on the left side.  Negative tinel sign to percussion sural, superficial peroneal, deep peroneal, saphenous, and posterior tibial nerves right and left ankles and feet.     Skin: Skin is warm, dry and intact. No abrasion, no bruising, no burn, no ecchymosis, no laceration, no lesion and no rash noted. He is not diaphoretic. No cyanosis or erythema. No pallor. Nails show no clubbing.     Wounds both sides of both hallux pink, granular,  without drainage, pus, tracking, fluctuance, malodor, or cardinal signs infection.         Otherwise, Skin is normal age and health appropriate color, turgor, texture, and temperature bilateral lower extremities without ulceration, hyperpigmentation, discoloration, masses nodules or cords palpated.  No ecchymosis, erythema, edema, or cardinal signs of infection bilateral lower extremities.    Hyperkeratosis noted to right plantar hallux. No purulent drainage noted              Assessment:       Encounter Diagnoses   Name Primary?    Corn or callus Yes    Post-operative state          Plan:       Kulwinder was seen today for foot pain.    Diagnoses and all orders for this visit:    Corn or callus    Post-operative state      I counseled the patient on his conditions, their implications and medical management.      Discussed conservative treatment with shoes of adequate dimensions, material,  and style to alleviate symptoms and delay or prevent future surgical intervention.       Adequate healing noted to B/L hallux B/L border s/p ingrown toenail removal    India Robins DPM PGY-3

## 2017-12-15 ENCOUNTER — LAB VISIT (OUTPATIENT)
Dept: LAB | Facility: HOSPITAL | Age: 67
End: 2017-12-15
Attending: FAMILY MEDICINE
Payer: MEDICARE

## 2017-12-15 DIAGNOSIS — D70.4 CYCLICAL NEUTROPENIA: Chronic | ICD-10-CM

## 2017-12-15 DIAGNOSIS — I10 ESSENTIAL HYPERTENSION: ICD-10-CM

## 2017-12-15 DIAGNOSIS — E78.5 HYPERLIPIDEMIA, UNSPECIFIED HYPERLIPIDEMIA TYPE: Chronic | ICD-10-CM

## 2017-12-15 DIAGNOSIS — E83.51 HYPOCALCEMIA: ICD-10-CM

## 2017-12-15 DIAGNOSIS — R97.20 ELEVATED PSA: ICD-10-CM

## 2017-12-15 LAB
ALBUMIN SERPL BCP-MCNC: 3.7 G/DL
ALP SERPL-CCNC: 69 U/L
ALT SERPL W/O P-5'-P-CCNC: 29 U/L
ANION GAP SERPL CALC-SCNC: 8 MMOL/L
AST SERPL-CCNC: 31 U/L
BASOPHILS # BLD AUTO: 0.02 K/UL
BASOPHILS NFR BLD: 0.4 %
BILIRUB SERPL-MCNC: 2.1 MG/DL
BUN SERPL-MCNC: 14 MG/DL
CA-I BLDV-SCNC: 1.22 MMOL/L
CALCIUM SERPL-MCNC: 9.2 MG/DL
CHLORIDE SERPL-SCNC: 104 MMOL/L
CHOLEST SERPL-MCNC: 157 MG/DL
CHOLEST/HDLC SERPL: 3.1 {RATIO}
CO2 SERPL-SCNC: 30 MMOL/L
COMPLEXED PSA SERPL-MCNC: 4.3 NG/ML
CREAT SERPL-MCNC: 0.9 MG/DL
DIFFERENTIAL METHOD: ABNORMAL
EOSINOPHIL # BLD AUTO: 0.2 K/UL
EOSINOPHIL NFR BLD: 4.7 %
ERYTHROCYTE [DISTWIDTH] IN BLOOD BY AUTOMATED COUNT: 12.4 %
EST. GFR  (AFRICAN AMERICAN): >60 ML/MIN/1.73 M^2
EST. GFR  (NON AFRICAN AMERICAN): >60 ML/MIN/1.73 M^2
GLUCOSE SERPL-MCNC: 101 MG/DL
HCT VFR BLD AUTO: 45.9 %
HDLC SERPL-MCNC: 51 MG/DL
HDLC SERPL: 32.5 %
HGB BLD-MCNC: 15.9 G/DL
IMM GRANULOCYTES # BLD AUTO: 0.01 K/UL
IMM GRANULOCYTES NFR BLD AUTO: 0.2 %
LDLC SERPL CALC-MCNC: 91.8 MG/DL
LYMPHOCYTES # BLD AUTO: 1.7 K/UL
LYMPHOCYTES NFR BLD: 32.9 %
MCH RBC QN AUTO: 33.5 PG
MCHC RBC AUTO-ENTMCNC: 34.6 G/DL
MCV RBC AUTO: 97 FL
MONOCYTES # BLD AUTO: 0.5 K/UL
MONOCYTES NFR BLD: 8.8 %
NEUTROPHILS # BLD AUTO: 2.7 K/UL
NEUTROPHILS NFR BLD: 53 %
NONHDLC SERPL-MCNC: 106 MG/DL
NRBC BLD-RTO: 0 /100 WBC
PLATELET # BLD AUTO: 252 K/UL
PMV BLD AUTO: 9.1 FL
POTASSIUM SERPL-SCNC: 4.3 MMOL/L
PROT SERPL-MCNC: 7 G/DL
PTH-INTACT SERPL-MCNC: 48 PG/ML
RBC # BLD AUTO: 4.75 M/UL
SODIUM SERPL-SCNC: 142 MMOL/L
TRIGL SERPL-MCNC: 71 MG/DL
WBC # BLD AUTO: 5.1 K/UL

## 2017-12-15 PROCEDURE — 82330 ASSAY OF CALCIUM: CPT

## 2017-12-15 PROCEDURE — 80053 COMPREHEN METABOLIC PANEL: CPT

## 2017-12-15 PROCEDURE — 84153 ASSAY OF PSA TOTAL: CPT

## 2017-12-15 PROCEDURE — 36415 COLL VENOUS BLD VENIPUNCTURE: CPT | Mod: PO

## 2017-12-15 PROCEDURE — 80061 LIPID PANEL: CPT

## 2017-12-15 PROCEDURE — 85025 COMPLETE CBC W/AUTO DIFF WBC: CPT

## 2017-12-15 PROCEDURE — 83970 ASSAY OF PARATHORMONE: CPT

## 2017-12-21 DIAGNOSIS — R97.20 ELEVATED PSA: Primary | ICD-10-CM

## 2017-12-22 ENCOUNTER — OFFICE VISIT (OUTPATIENT)
Dept: FAMILY MEDICINE | Facility: CLINIC | Age: 67
End: 2017-12-22
Payer: MEDICARE

## 2017-12-22 VITALS
DIASTOLIC BLOOD PRESSURE: 74 MMHG | WEIGHT: 191.56 LBS | HEIGHT: 72 IN | TEMPERATURE: 99 F | BODY MASS INDEX: 25.95 KG/M2 | SYSTOLIC BLOOD PRESSURE: 118 MMHG | HEART RATE: 72 BPM

## 2017-12-22 DIAGNOSIS — I15.0 RENOVASCULAR HYPERTENSION: Primary | ICD-10-CM

## 2017-12-22 DIAGNOSIS — I10 ESSENTIAL HYPERTENSION: ICD-10-CM

## 2017-12-22 PROCEDURE — 99214 OFFICE O/P EST MOD 30 MIN: CPT | Mod: S$GLB,,, | Performed by: FAMILY MEDICINE

## 2017-12-22 PROCEDURE — 99999 PR PBB SHADOW E&M-EST. PATIENT-LVL III: CPT | Mod: PBBFAC,,, | Performed by: FAMILY MEDICINE

## 2017-12-22 RX ORDER — BENAZEPRIL HYDROCHLORIDE 40 MG/1
40 TABLET ORAL DAILY
Qty: 30 TABLET | Refills: 5 | Status: SHIPPED | OUTPATIENT
Start: 2017-12-22 | End: 2018-07-19 | Stop reason: SDUPTHER

## 2017-12-22 RX ORDER — CARVEDILOL 25 MG/1
25 TABLET ORAL 2 TIMES DAILY WITH MEALS
Qty: 60 TABLET | Refills: 3 | Status: SHIPPED | OUTPATIENT
Start: 2017-12-22 | End: 2018-02-02 | Stop reason: SDUPTHER

## 2017-12-22 NOTE — PROGRESS NOTES
Subjective:       Patient ID: Kulwinder Hogue Jr. is a 67 y.o. male.    Chief Complaint: Hypertension    Hypertension   This is a chronic problem. The current episode started more than 1 year ago. The problem has been waxing and waning since onset. The problem is uncontrolled. Pertinent negatives include no chest pain, headaches, neck pain or palpitations.     Review of Systems   Constitutional: Negative for activity change and unexpected weight change.   HENT: Negative for hearing loss, rhinorrhea and trouble swallowing.    Eyes: Negative for discharge and visual disturbance.   Respiratory: Negative.  Negative for chest tightness and wheezing.    Cardiovascular: Negative for chest pain and palpitations.   Gastrointestinal: Negative for blood in stool, constipation, diarrhea and vomiting.   Endocrine: Negative for polydipsia and polyuria.   Genitourinary: Negative for difficulty urinating, hematuria and urgency.   Musculoskeletal: Negative for arthralgias, joint swelling and neck pain.   Neurological: Negative for weakness and headaches.   Psychiatric/Behavioral: Negative for confusion and dysphoric mood.       Patient Active Problem List   Diagnosis    Hyperlipemia    Encounter for colorectal cancer screening    HBP (high blood pressure)    Essential hypertension    Hyperlipidemia    Liposarcoma    Abnormal PSA    Cyclical neutropenia    Excessive cerumen in right ear canal       Objective:      Physical Exam   Constitutional: He is oriented to person, place, and time. He appears well-developed and well-nourished.   Cardiovascular: Normal rate, regular rhythm and normal heart sounds.    Pulmonary/Chest: Effort normal and breath sounds normal.   Musculoskeletal: He exhibits no edema.   Neurological: He is alert and oriented to person, place, and time.   Skin: Skin is warm and dry.   Psychiatric: He has a normal mood and affect.   Nursing note and vitals reviewed.      Lab Results   Component Value Date     WBC 5.10 12/15/2017    HGB 15.9 12/15/2017    HCT 45.9 12/15/2017     12/15/2017    CHOL 157 12/15/2017    TRIG 71 12/15/2017    HDL 51 12/15/2017    ALT 29 12/15/2017    AST 31 12/15/2017     12/15/2017    K 4.3 12/15/2017     12/15/2017    CREATININE 0.9 12/15/2017    BUN 14 12/15/2017    CO2 30 (H) 12/15/2017    PSA 4.5 (H) 04/16/2016     The 10-year ASCVD risk score (Fabrizio LAWLER Jr., et al., 2013) is: 11.7%    Values used to calculate the score:      Age: 67 years      Sex: Male      Is Non- : No      Diabetic: No      Tobacco smoker: No      Systolic Blood Pressure: 118 mmHg      Is BP treated: Yes      HDL Cholesterol: 51 mg/dL      Total Cholesterol: 157 mg/dL    Assessment:       1. Renovascular hypertension    2. Essential hypertension        Plan:       Renovascular hypertension  -     US Retroperitoneal Complete; Future; Expected date: 12/22/2017  -     2D Echo w/ Color Flow Doppler; Future  -     carvedilol (COREG) 25 MG tablet; Take 1 tablet (25 mg total) by mouth 2 (two) times daily with meals.  Dispense: 60 tablet; Refill: 3    Essential hypertension      Patient readiness: acceptance and barriers:none and readiness    During the course of the visit the patient was educated and counseled about the following:     Hypertension:   Screening for causes of secondary hypertension: GFR (chronic kidney disease) and renal artery doppler (renovascular hypertension).  Dietary sodium restriction.  Regular aerobic exercise.  Check blood pressures daily and record.  Follow up: 6 weeks and as needed.  Obesity:   General weight loss/lifestyle modification strategies discussed (elicit support from others; identify saboteurs; non-food rewards, etc).  Regular aerobic exercise program discussed.    Goals: Hypertension: Reduce Blood Pressure and Obesity: Reduce calorie intake and BMI    Did patient meet goals/outcomes: No    The following self management tools provided: blood  pressure log  excercise log    Patient Instructions (the written plan) was given to the patient/family.     Time spent with patient: 30 minutes    Barriers to medications present (no )    Adverse reactions to current medications (no)    Over the counter medications reviewed (No)        This has been fully explained to the patient, who indicates understanding.

## 2017-12-22 NOTE — PATIENT INSTRUCTIONS
Aerobic Exercise for a Healthy Heart  Exercise is a lot more than an energy booster and a stress reliever. It also strengthens your heart muscle, lowers your blood pressure and cholesterol, and burns calories. It can also improve your resting muscle tone, and your mood.     Remember, some activity is better than none.    Choose an aerobic activity  Choose an activity that makes your heart and lungs work harder than they do when you rest or walk normally. This aerobic exercise can improve the way your heart and other muscles use oxygen. Make it fun by exercising with a friend and choosing an activity you enjoy. Here are some ideas:  · Walking  · Swimming  · Bicycling  · Stair climbing  · Dancing  · Jogging  · Gardening  Exercise regularly  If you havent been exercising regularly,  get your doctors OK first. Then start slowly.  Here are some tips:  · Begin exercising 3 times a week for 5 to 10 minutes at a time.  · When you feel comfortable, add a few minutes each session.  · Slowly build up to exercising 3 to 4 times each week. Each session should last for 40 minutes, on average, and involve moderate- to vigorous-intensity physical activity.  · If you have been given nitroglycerin, be sure to carry it when you exercise.  · If you get chest pain (angina) when youre exercising, stop what youre doing, take your nitroglycerin, and call your doctor.  Date Last Reviewed: 6/2/2016 © 2000-2017 [employer]. [address] [place] [address]. All rights reserved. This information is not intended as a substitute for professional medical care. Always follow your healthcare professional's instructions.        Risk Factors for Heart Disease  A risk factor is something that increases your chance of having heart disease. Heart disease (also called coronary artery disease) involves damage to the heart arteries. These blood vessels need to work well to provide the oxygen your heart needs to pump blood to  "the rest of your body. Things like smoking or high cholesterol levels can damage arteries. You cant control some risk factors, such as age and a family history of heart disease. But there are many things you can control to reduce your risk for heart disease.    Unhealthy cholesterol levels  Cholesterol is a fat-like substance in your blood. It can build up along the artery walls. This is called plaque. Over time, plaque narrows the arteries and reduces blood flow to your heart or brain. If a blood clot forms or a piece of the plaque breaks off, it can completely block the artery and cause a heart attack or stroke. Your risk of heart disease goes up if you have high levels of LDL ("bad") cholesterol or triglycerides (another fatty substance that can build up). Youre also at risk if you have low HDL cholesterol ("good") cholesterol. HDL helps clear the bad cholesterol away. You're at risk if you have:  HDL cholesterol 50 mg/dL or lower; LDL cholesterol 100 mg/dL; or triglycerides of 150 mg/dL or higher.  Smoking  This is the most important risk factor you can change. Smoking causes inflammation and damages the smooth muscle that lines the arteries making them less flexible. It also raises your blood pressure, causing further damage to the artery lining. Smoking also increases your risk that your blood may clot, block an artery, and cause a heart attack or stroke. Smoking also damages your lungs, which affects the delivery of oxygen to the body. If you smoke, you are 2 to 4 times more likely to develop coronary artery disease. If you smoke, it's never too late to help your heart. Ask your doctor about nicotine replacement products and smoking cessation support.  High blood pressure  High blood pressure occurs when blood pushes too hard against artery walls. This causes damage to the artery walls and the formation of scar tissue as it heals. This makes the arteries stiff and weak. Plaque sticks to the scarred tissue " narrowing and hardening the arteries. High blood pressure also causes your heart to work harder to get blood out to the body. High blood pressure raises your risk of heart attack, also known as acute myocardial infarction, or AMI, and especially stroke. The brain tissue is especially sensitive to high blood pressure damage. You're at risk if your blood pressure is 120/80 or higher.  Negative emotions  Chronic stress, pent-up anger, and other negative emotions have been linked to heart disease. This occurs because stress increases the levels of a hormone that increase the demand on your heart. Over time, these emotions could raise your heart disease risk.  Metabolic syndrome  This is caused by a combination of certain risk factors. It puts you at extra high risk of heart disease, stroke, and diabetes. You have metabolic syndrome if you have 3 or more of the following: low HDL cholesterol; high triglycerides; high blood pressure; high blood sugar; extra weight around the waist.  Diabetes  Diabetes occurs when you have high levels of sugar (glucose) in your blood. This can damage arteries if not kept under control. Having diabetes also makes you more likely to have a silent heart attack--one without any symptoms.  Excess weight  Excess weight makes other risk factors, such as diabetes, more likely. Excess weight around the waist or stomach increases your heart disease risk the most.  Lack of physical activity  When youre not active, youre more likely to develop diabetes, high blood pressure, high cholesterol levels, and excess weight.     Most people with heart disease have more than one risk factor.   Date Last Reviewed: 3/28/2016  © 2362-4227 [employer]. [address] [place] [address], [place] [address]. All rights reserved. This information is not intended as a substitute for professional medical care. Always follow your healthcare professional's instructions.        Living with High Blood Pressure and  Kidney Disease  By lowering high blood pressure, you can reduce the amount of damage to your kidneys, and help slow any progression of kidney disease. Visit your healthcare provider as scheduled and follow the tips below.    Eat right  To control blood pressure and kidney disease:  · Limit salt (sodium) intake. Your sodium limit is 1,500 mg a day. Sodium makes up 40% of table salt, which is also called sodium chloride. So 1,500 mg of sodium equals 3,800 mg of salt. It's very important to read and understand this difference when reading food labels. The following guide will make it easy to understand how much sodium is in different amounts of salt:  ¨ 1/4 teaspoon salt = 600 mg sodium  ¨ 1/2 teaspoon salt = 1,200 mg sodium  ¨ 3/4 teaspoon salt = 1,800 mg sodium  ¨ 1 teaspoon salt = 2,400 mg sodium  · Cook with spices and herbs instead of salt.  · Eat fresh foods instead of canned or processed ones.  · Eat less fat. Avoid fats that come from animal sources.  · Choose low-fat dairy foods.  You may also need to  · Eat less protein.  · Drink less fluid.  · Eat foods that are low in phosphorus and potassium.  Stay active  Regular activity helps reduce high blood pressure. For best results:  · Talk with your healthcare provider before starting a fitness program. Your provider may be able to suggest activities that will help you feel your best.  · Ask your healthcare provider how often you should exercise and for how long.  · Try to exercise at the same time each day.  Date Last Reviewed: 2/1/2017  © 9930-0721 [employer]. [address] [place] [address], [place] [address]. All rights reserved. This information is not intended as a substitute for professional medical care. Always follow your healthcare professional's instructions.

## 2017-12-26 ENCOUNTER — TELEPHONE (OUTPATIENT)
Dept: FAMILY MEDICINE | Facility: CLINIC | Age: 67
End: 2017-12-26

## 2017-12-26 NOTE — TELEPHONE ENCOUNTER
Patient stated that his blood pressure medication had been increased at his Friday appointment ,he states that he had increased the medication as advised too, however it made him feel so bad that he has returned back to the old directions of the medication , states that he feel better but not back to 100 yet, please advise.

## 2017-12-26 NOTE — TELEPHONE ENCOUNTER
----- Message from Corrie Byrnes sent at 12/26/2017  9:59 AM CST -----  Contact: patient  Patient, Kulwinder Hogue, [phone] is calling.  Patient would like a callback regarding some effects he is having with the new medication he was given on Friday (carevodal).  Please advise.  Thanks!

## 2017-12-29 DIAGNOSIS — E80.6 HYPERBILIRUBINEMIA: Primary | ICD-10-CM

## 2018-01-03 ENCOUNTER — HOSPITAL ENCOUNTER (OUTPATIENT)
Dept: RADIOLOGY | Facility: CLINIC | Age: 68
Discharge: HOME | End: 2018-01-03
Attending: FAMILY MEDICINE
Payer: MEDICARE

## 2018-01-03 ENCOUNTER — CLINICAL SUPPORT (OUTPATIENT)
Dept: CARDIOLOGY | Facility: CLINIC | Age: 68
End: 2018-01-03
Attending: FAMILY MEDICINE
Payer: MEDICARE

## 2018-01-03 DIAGNOSIS — I15.0 RENOVASCULAR HYPERTENSION: ICD-10-CM

## 2018-01-03 LAB
AORTIC VALVE REGURGITATION: NORMAL
DIASTOLIC DYSFUNCTION: NO
ESTIMATED PA SYSTOLIC PRESSURE: 19.97
RETIRED EF AND QEF - SEE NOTES: 61 (ref 55–65)
TRICUSPID VALVE REGURGITATION: NORMAL

## 2018-01-03 PROCEDURE — 76770 US EXAM ABDO BACK WALL COMP: CPT | Mod: TC,PO

## 2018-01-03 PROCEDURE — 76770 US EXAM ABDO BACK WALL COMP: CPT | Mod: 26,,, | Performed by: RADIOLOGY

## 2018-01-03 PROCEDURE — 93306 TTE W/DOPPLER COMPLETE: CPT | Mod: S$GLB,,, | Performed by: INTERNAL MEDICINE

## 2018-01-06 ENCOUNTER — LAB VISIT (OUTPATIENT)
Dept: LAB | Facility: HOSPITAL | Age: 68
End: 2018-01-06
Attending: PHYSICIAN ASSISTANT
Payer: MEDICARE

## 2018-01-06 DIAGNOSIS — E80.6 HYPERBILIRUBINEMIA: ICD-10-CM

## 2018-01-06 LAB
BILIRUB DIRECT SERPL-MCNC: 0.7 MG/DL
BILIRUB SERPL-MCNC: 2.8 MG/DL

## 2018-01-06 PROCEDURE — 36415 COLL VENOUS BLD VENIPUNCTURE: CPT | Mod: PO

## 2018-01-06 PROCEDURE — 82247 BILIRUBIN TOTAL: CPT

## 2018-01-06 PROCEDURE — 82248 BILIRUBIN DIRECT: CPT

## 2018-01-10 ENCOUNTER — PATIENT MESSAGE (OUTPATIENT)
Dept: ADMINISTRATIVE | Facility: OTHER | Age: 68
End: 2018-01-10

## 2018-02-02 ENCOUNTER — OFFICE VISIT (OUTPATIENT)
Dept: FAMILY MEDICINE | Facility: CLINIC | Age: 68
End: 2018-02-02
Payer: MEDICARE

## 2018-02-02 ENCOUNTER — PATIENT MESSAGE (OUTPATIENT)
Dept: ADMINISTRATIVE | Facility: OTHER | Age: 68
End: 2018-02-02

## 2018-02-02 VITALS
SYSTOLIC BLOOD PRESSURE: 128 MMHG | TEMPERATURE: 98 F | WEIGHT: 189.38 LBS | HEIGHT: 72 IN | DIASTOLIC BLOOD PRESSURE: 75 MMHG | HEART RATE: 69 BPM | BODY MASS INDEX: 25.65 KG/M2

## 2018-02-02 DIAGNOSIS — I15.0 RENOVASCULAR HYPERTENSION: Primary | ICD-10-CM

## 2018-02-02 DIAGNOSIS — R12 HEARTBURN: ICD-10-CM

## 2018-02-02 PROCEDURE — 99999 PR PBB SHADOW E&M-EST. PATIENT-LVL IV: CPT | Mod: PBBFAC,,, | Performed by: NURSE PRACTITIONER

## 2018-02-02 PROCEDURE — 99213 OFFICE O/P EST LOW 20 MIN: CPT | Mod: S$GLB,,, | Performed by: NURSE PRACTITIONER

## 2018-02-02 PROCEDURE — 3008F BODY MASS INDEX DOCD: CPT | Mod: S$GLB,,, | Performed by: NURSE PRACTITIONER

## 2018-02-02 PROCEDURE — 1159F MED LIST DOCD IN RCRD: CPT | Mod: S$GLB,,, | Performed by: NURSE PRACTITIONER

## 2018-02-02 PROCEDURE — 1126F AMNT PAIN NOTED NONE PRSNT: CPT | Mod: S$GLB,,, | Performed by: NURSE PRACTITIONER

## 2018-02-02 RX ORDER — CARVEDILOL 25 MG/1
12.5 TABLET ORAL 2 TIMES DAILY WITH MEALS
Start: 2018-02-02 | End: 2018-07-05 | Stop reason: SDUPTHER

## 2018-02-02 NOTE — PROGRESS NOTES
Subjective:       Patient ID: Kulwinder Hogue Jr. is a 67 y.o. male.    Chief Complaint: No chief complaint on file.    Mr. Hogue presents to the clinic today for hypertension, follow up for results.  He had a normal echo.  He had renal ultrasound which shows multiple cysts and enlarged prostate.  Blood pressure is controlled on current medication.  No new complaints.  He wants to know if he should continue omeprazole.  He only takes this as needed occasionally before a greasy meal.      Review of Systems   Constitutional: Negative for activity change and unexpected weight change.   HENT: Negative for hearing loss, rhinorrhea and trouble swallowing.    Eyes: Negative for discharge and visual disturbance.   Respiratory: Negative for chest tightness and wheezing.    Cardiovascular: Negative for chest pain and palpitations.   Gastrointestinal: Negative for blood in stool, constipation, diarrhea and vomiting.   Endocrine: Negative for polydipsia and polyuria.   Genitourinary: Negative for difficulty urinating, hematuria and urgency.   Musculoskeletal: Negative for arthralgias, joint swelling and neck pain.   Neurological: Negative for weakness and headaches.   Psychiatric/Behavioral: Negative for confusion and dysphoric mood.       Objective:      Physical Exam   Constitutional: He is oriented to person, place, and time. He appears well-developed and well-nourished. No distress.   HENT:   Head: Normocephalic and atraumatic.   Right Ear: External ear normal.   Left Ear: External ear normal.   Mouth/Throat: Oropharynx is clear and moist. No oropharyngeal exudate.   Eyes: Pupils are equal, round, and reactive to light. Right eye exhibits no discharge. Left eye exhibits no discharge.   Neck: Neck supple.   Cardiovascular: Normal rate and regular rhythm.    Pulmonary/Chest: Effort normal and breath sounds normal.   Abdominal: Soft.   Neurological: He is alert and oriented to person, place, and time. Coordination normal.    Skin: Skin is warm and dry.   Psychiatric: He has a normal mood and affect. His behavior is normal. Thought content normal.   Vitals reviewed.          Current Outpatient Prescriptions:     amlodipine (NORVASC) 10 MG tablet, take 1 tablet by mouth every evening, Disp: 90 tablet, Rfl: 3    aspirin 81 mg Tab, Take 81 mg by mouth once daily. Every day, Disp: , Rfl:     benazepril (LOTENSIN) 40 MG tablet, Take 1 tablet (40 mg total) by mouth once daily., Disp: 30 tablet, Rfl: 5    carvedilol (COREG) 25 MG tablet, Take 0.5 tablets (12.5 mg total) by mouth 2 (two) times daily with meals., Disp: , Rfl:     fish oil-omega-3 fatty acids 300-1,000 mg capsule, Take 2 g by mouth once daily., Disp: , Rfl:     hydrochlorothiazide (HYDRODIURIL) 12.5 MG Tab, Take 1 tablet (12.5 mg total) by mouth once daily., Disp: 90 tablet, Rfl: 3    multivitamin capsule, Take 1 capsule by mouth once daily.  , Disp: , Rfl:     pravastatin (PRAVACHOL) 40 MG tablet, Take 1 tablet (40 mg total) by mouth once daily., Disp: 30 tablet, Rfl: 11    ranitidine (ZANTAC) 150 MG tablet, Take 1 tablet (150 mg total) by mouth 2 (two) times daily as needed for Heartburn., Disp: 60 tablet, Rfl: 11  Assessment:       1. Renovascular hypertension    2. Heartburn        Plan:       Renovascular hypertension  Continue current medication.  Will send Dr. Mancia a message to see if he needs a visit with Urology for cysts.  -     carvedilol (COREG) 25 MG tablet; Take 0.5 tablets (12.5 mg total) by mouth 2 (two) times daily with meals.    Heartburn  Avoid lying down until 3 hours after eating.  Avoid alcohol, caffeine, peppermint.  Greasy foods will aggravate symptoms.  -     ranitidine (ZANTAC) 150 MG tablet; Take 1 tablet (150 mg total) by mouth 2 (two) times daily as needed for Heartburn.  Dispense: 60 tablet; Refill: 11      Patient readiness: acceptance and barriers:none    During the course of the visit the patient was educated and counseled about the  following:     Hypertension:   Medication: no change.    Goals: Hypertension: Reduce Blood Pressure    Did patient meet goals/outcomes: Yes    The following self management tools provided: declined    Patient Instructions (the written plan) was given to the patient/family.     Time spent with patient: 15 minutes    Barriers to medications present (no )    Adverse reactions to current medications (no)    Over the counter medications reviewed (Yes)

## 2018-02-02 NOTE — PATIENT INSTRUCTIONS
Medicines for Acid Reflux  Your healthcare provider has told you that you have acid reflux. This condition causes stomach acid to wash up into your throat. For most people, acid reflux is troubling but not dangerous. But left untreated, acid reflux sometimes damages the esophagus. Medicines can help control acid reflux and limit your risk of future problems.  Medicines for acid reflux  Your healthcare provider may prescribe medicine to help treat your acid reflux. Medicine will be based on your symptoms and any test results. Your provider will explain how to take your medicine. You will also be told about possible side effects.  Reducing stomach acid  Your provider may suggest antacids that you can buy over the counter. Antacids can give fast relief. Or you may be told to take a type of medicine called H2 blockers. These are available over the counter and by prescription (for higher doses).  Blocking stomach acid  In more severe cases, your healthcare provider may suggest stronger medicines such as proton pump inhibitors (PPIs). These keep the stomach from making acid. They are often prescribed for long-term use.  Other medicines  In some cases medicines to reduce or block stomach acid may not work. Then you may be switched to another type of medicine that helps your stomach empty better.     Date Last Reviewed: 10/1/2016  © 3420-5044 [employer]. [address] [place] [address], [place] [address]. All rights reserved. This information is not intended as a substitute for professional medical care. Always follow your healthcare professional's instructions.

## 2018-02-05 ENCOUNTER — PATIENT OUTREACH (OUTPATIENT)
Dept: OTHER | Facility: OTHER | Age: 68
End: 2018-02-05

## 2018-02-05 NOTE — LETTER
Karen Alberto, PharmD  [address] [place] [address]     Dear Kulwinder Hogue Jr.,    Welcome to the [facility]!         My name is Karen Alberto PharmD and I am your dedicated Digital Medicine clinician.  As an expert in medication management, I will help ensure that the medications you are taking continue to provide you with the intended benefits.      I am Gómez Zayas and I will be your health  for the duration of the program.  My  job is to help you identify lifestyle changes to improve your blood pressure control.  We will talk about nutrition, exercise, and other ways that you may be able to adjust your current habits to better your health. Together, we will work to improve your overall health and encourage you to meet your goals for a healthier lifestyle.    What we expect from YOU:    You will need to take blood pressure readings multiple times a week and no less than one reading per week.   It is important that you take your measurements at different times during the day, when possible.     What you should expect from your Digital Medicine Care Team:   We will provide you with education about high blood pressure, including lifestyle changes that could help you to control your blood pressure.   We will review your weekly readings and provide you with monthly blood pressure progress reports after you have been in the program for more than 30 days.   We will send monthly progress reports on your blood pressure control to your physician so they can follow along with your progress as well.    You will be able to reach me by phone at [phone] or through your [facility] account by clicking my name under Care Team on the right side of the home screen.    I look forward to working with you to achieve your blood pressure goals!    Sincerely,    Karen Alberto PharmD  Your personal clinician    Please visit  [email or web address] to learn more about high blood pressure and what you can do lower your blood pressure.                                                                                           Kulwinder Hogue Jr.  [id number] [facility] [address]

## 2018-02-05 NOTE — PROGRESS NOTES
Mr. Kulwinder Hogue Jr. is a 67 y.o. male who is newly enrolled in the [facility].     The following information was reviewed/updated:  Preferred pharmacy   [facility] [facility] - [place], [place] - [address]  [facility] [address]  Phone: [phone] Fax: [phone]    Patient prefers a 90 days supply    Review of patient's allergies indicates:   Allergen Reactions    Percocet [oxycodone-acetaminophen] Itching     Current Outpatient Prescriptions on File Prior to Visit   Medication Sig Dispense Refill    amlodipine (NORVASC) 10 MG tablet take 1 tablet by mouth every evening 90 tablet 3    aspirin 81 mg Tab Take 81 mg by mouth once daily. Every day      benazepril (LOTENSIN) 40 MG tablet Take 1 tablet (40 mg total) by mouth once daily. 30 tablet 5    carvedilol (COREG) 25 MG tablet Take 0.5 tablets (12.5 mg total) by mouth 2 (two) times daily with meals.      fish oil-omega-3 fatty acids 300-1,000 mg capsule Take 2 g by mouth once daily.      hydrochlorothiazide (HYDRODIURIL) 12.5 MG Tab Take 1 tablet (12.5 mg total) by mouth once daily. 90 tablet 3    multivitamin capsule Take 1 capsule by mouth once daily.        pravastatin (PRAVACHOL) 40 MG tablet Take 1 tablet (40 mg total) by mouth once daily. 30 tablet 11    ranitidine (ZANTAC) 150 MG tablet Take 1 tablet (150 mg total) by mouth 2 (two) times daily as needed for Heartburn. 60 tablet 11     No current facility-administered medications on file prior to visit.        GINO screening results for this patient suggest a high likelihood of sleep apnea, which can contribute to hypertension. Patient has not been previously diagnosed with sleep apnea and is interested in referral at this time.    Henrique Lugo MD indicated he would like to see patient / have patient  referred to a specialist for further evaluation.     Reviewed non-pharmacologic therapies and impact on BP:    1.  Low-sodium diet- 11 mmHg reduction 2-4 weeks. I have reviewed D.A.S.[facility] diet sodium restrictions (<2000mg/day) Does not add table salt and tries to avoid processed foods  2. Exercise- 7 mmHg reduction 4-12 weeks. I have recommended patient engage in exercise as tolerated at least 30 minutes 5x per week to improve cardiovascular health.  Has access to a gym through medicare but has not yet joined. Walks 30 minutes daily in the morning when weather is warmer (limited exercise recently)  3. Alcohol intake- 3 mmHg reduction 4-12 weeks. I have discussed with patient the maximum recommended number of 2 drink(s) per day for men. Alcohol intake in moderation (typically does not drink during the week. Usually weekend intake is limited to 2 drinks/day)    Explained that we expect patient to obtain several blood pressures per week at random times of day.   Our goal is to get  BP to consistently below 130/80mmHg and make the process convenient so patient can avoid extra trips to the office. Getting your blood pressure below 130/80mmHg (definition of control) will reduce your risk for heart attack, kidney failure, stroke and death (as well as kidney failure, eye disease, & dementia).     Patient is not meeting the goal already.   When asked what the patient thinks is causing BP to be elevated, he states: stress     Instructed patient not to allow anyone else to use phone and BP cuff.   I'm not available for emergencies. Patient will call [facility] on-call ([phone] or [phone]) or [phone] if needed.     Discussed appropriate BP measuring technique:  Before taking your blood pressure  Find a quiet place. You will need to listen for your heartbeat.  Roll up the sleeve on your left arm or remove any tight-sleeved clothing, if needed. (It's best to take your blood pressure from your left arm if you are right-handed.You can use the other arm if you have been told by your health care provider to do so.)  Rest in a chair next to a  table for 5 to 10 minutes. (Your left arm should rest comfortably at heart level.)  Sit up straight with your back against the chair, legs uncrossed and on the ground.  Rest your forearm on the table with the palm of your hand facing up.  You should not talk, read the newspaper, or watch television during this process.      Patient and I agreed that he will continue to monitor blood pressure and sodium intake, and continue to remain adherent to medications.     I will plan to follow-up with the patient in 3 weeks.   Emailed patient link to [facility] webpage and my contact information in case he has any questions.       Last 5 Patient Entered Readings                                      Current 30 Day Average: 140/87     Recent Readings 2/5/2018 2/4/2018 2/3/2018 2/2/2018    SBP (mmHg) 137 139 144 139    DBP (mmHg) 86 87 89 85    Pulse 65 66 68 66          Patient has carvedilol 12.5mg tablet that he has continued taking 1 tablet BID.

## 2018-02-07 ENCOUNTER — PATIENT OUTREACH (OUTPATIENT)
Dept: OTHER | Facility: OTHER | Age: 68
End: 2018-02-07

## 2018-02-26 ENCOUNTER — PATIENT OUTREACH (OUTPATIENT)
Dept: OTHER | Facility: OTHER | Age: 68
End: 2018-02-26

## 2018-02-26 NOTE — PROGRESS NOTES
Last 5 Patient Entered Readings                                      Current 30 Day Average: 126/82     Recent Readings 2/26/2018 2/25/2018 2/24/2018 2/23/2018 2/23/2018    SBP (mmHg) 124 117 110 120 138    DBP (mmHg) 84 74 77 78 94    Pulse 65 62 69 65 71          Patient's BP average is slightly above goal of <130/80.  BP average improved from last contact.    Patient denies s/s of hypotension (lightheadedness, dizziness, nausea, fatigue) associated with low readings. Instructed patient to inform me if this occurs, patient confirms understanding.      Patient denies s/s of hypertension (SOB, CP, severe headaches, changes in vision) associated with high readings. Instructed patient to go to the ED if BP > 180/110 and accompanied by hypertensive s/s, patient confirms understanding.    Will continue to monitor regularly. Will follow up in 4-6 weeks, sooner if BP begins to trend upward or downward.    Patient has my contact information and knows to call with any concerns or clinical changes.     Current HTN regimen:  Hypertension Medications             amlodipine (NORVASC) 10 MG tablet take 1 tablet by mouth every evening    benazepril (LOTENSIN) 40 MG tablet Take 1 tablet (40 mg total) by mouth once daily.    carvedilol (COREG) 12.5 MG tablet Take 1 tablet (12.5 mg total) by mouth 2 (two) times daily with meals.    hydrochlorothiazide (HYDRODIURIL) 12.5 MG Tab Take 1 tablet (12.5 mg total) by mouth once daily.        Patient relates higher BP readings to those taken after commute to work which is stressful.    4/9 - Health  recently spoke with patient. Push back follow up 1 week. Consider increasing HCTZ if DBP remains elevated on follow up.

## 2018-03-13 ENCOUNTER — PATIENT OUTREACH (OUTPATIENT)
Dept: OTHER | Facility: OTHER | Age: 68
End: 2018-03-13

## 2018-03-13 NOTE — PROGRESS NOTES
Last 5 Patient Entered Readings                                      Current 30 Day Average: 126/83     Recent Readings 3/13/2018 3/12/2018 3/5/2018 3/5/2018 3/3/2018    SBP (mmHg) 133 125 138 142 135    DBP (mmHg) 88 88 87 93 90    Pulse 67 68 64 67 64        3/13-LVM. Follow up attempt. Will call again on 3/20.     Hypertension Digital Medicine Program (HDMP): Health  Follow Up    Lifestyle Modifications:    1.Low sodium diet: no Patient reports his diet slipped as he was out of town. He states he will make more of an effort to eat healthier now that he has returned.    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 Patient states he plans on increasing his activity by doing yard work now that weather is warming up.    3.Hypotension/Hypertension symptoms: no Patient report no abnormal signs or symptoms with BP medication.   Frequency/Alleviating factors/Precipitating factors, etc.     4.Patient has been compliant with the medication regimen.     Follow up with Mr. Kulwinder Hogue Jr. completed. No further questions or concerns. I will follow up in a few weeks to assess progress.

## 2018-03-26 ENCOUNTER — TELEPHONE (OUTPATIENT)
Dept: PODIATRY | Facility: CLINIC | Age: 68
End: 2018-03-26

## 2018-03-26 NOTE — TELEPHONE ENCOUNTER
----- Message from Oneal Lei sent at 3/26/2018  9:32 AM CDT -----  Contact: Patient  Kulwinder, [phone]. Calling to speak with staff regarding some issues he's been having with his feet. Says he's not sure if it's related to his ingrown toe nails that had been removed. Please advise. Thanks.

## 2018-03-26 NOTE — TELEPHONE ENCOUNTER
C/O  Foot Pain, right, swelling. Explained that he would need to be examined to determine the reason. Appointment scheduled for 03/28/2018 3:15pm

## 2018-03-28 ENCOUNTER — OFFICE VISIT (OUTPATIENT)
Dept: PODIATRY | Facility: CLINIC | Age: 68
End: 2018-03-28
Payer: MEDICARE

## 2018-03-28 VITALS — BODY MASS INDEX: 25.24 KG/M2 | HEIGHT: 72 IN | WEIGHT: 186.31 LBS

## 2018-03-28 DIAGNOSIS — M25.80 SESAMOIDITIS: ICD-10-CM

## 2018-03-28 DIAGNOSIS — M24.573 EQUINUS CONTRACTURE OF ANKLE: ICD-10-CM

## 2018-03-28 DIAGNOSIS — M77.9 CAPSULITIS: Primary | ICD-10-CM

## 2018-03-28 DIAGNOSIS — M79.671 FOOT PAIN, RIGHT: ICD-10-CM

## 2018-03-28 PROCEDURE — 99999 PR PBB SHADOW E&M-EST. PATIENT-LVL III: CPT | Mod: PBBFAC,,, | Performed by: PODIATRIST

## 2018-03-28 PROCEDURE — 99213 OFFICE O/P EST LOW 20 MIN: CPT | Mod: 25,S$GLB,, | Performed by: PODIATRIST

## 2018-03-28 PROCEDURE — 29540 STRAPPING ANKLE &/FOOT: CPT | Mod: RT,S$GLB,, | Performed by: PODIATRIST

## 2018-03-28 RX ORDER — LIDOCAINE HYDROCHLORIDE 20 MG/ML
JELLY TOPICAL
Qty: 30 ML | Refills: 2 | Status: SHIPPED | OUTPATIENT
Start: 2018-03-28 | End: 2018-06-26

## 2018-03-28 NOTE — PROGRESS NOTES
Subjective:      Patient ID: Kulwinder Hogue Jr. is a 67 y.o. male.    Chief Complaint: Foot Pain (shooting burning pain bilateral ) and Foot Swelling (right foot only )    Deep aching sometimes sharp pain and fullness right 1st mtpj.  Gradual onset, worsening over past several weeks, aggravated by increased weight bearing, shoe gear, pressure.  No previous medical treatment.  OTC pain med not helping.  Denies known trauma, surgery right foot aside from ingrown nail.      Review of Systems   Constitution: Negative for chills, diaphoresis, fever, malaise/fatigue and night sweats.   Cardiovascular: Negative for claudication, cyanosis, leg swelling and syncope.   Skin: Negative for color change, dry skin, nail changes, rash, suspicious lesions and unusual hair distribution.   Musculoskeletal: Positive for joint pain and joint swelling. Negative for falls, muscle cramps, muscle weakness and stiffness.   Gastrointestinal: Negative for constipation, diarrhea, nausea and vomiting.   Neurological: Negative for brief paralysis, disturbances in coordination, focal weakness, numbness, paresthesias, sensory change and tremors.           Objective:      Physical Exam   Constitutional: He is oriented to person, place, and time. He appears well-developed and well-nourished. He is cooperative. No distress.   Cardiovascular:   Pulses:       Popliteal pulses are 2+ on the right side, and 2+ on the left side.        Dorsalis pedis pulses are 2+ on the right side, and 2+ on the left side.        Posterior tibial pulses are 2+ on the right side, and 2+ on the left side.   Capillary refill 3 seconds all toes/distal feet, all toes/both feet warm to touch.      Negative lymphadenopathy bilateral popliteal fossa and tarsal tunnel.      Negavie lower extremity edema bilateral.     Musculoskeletal:        Right ankle: He exhibits normal range of motion, no swelling, no ecchymosis, no deformity, no laceration and normal pulse. Achilles tendon  normal. Achilles tendon exhibits no pain, no defect and normal Aparicio's test results.   Pain to palpation inferior mtpj 1 right without evidence of trauma or infection.    Ankle dorsiflexion decreased at <10 degrees bilateral with moderate increase with knee flexion bilateral.    Otherwise, Normal angle, base, station of gait. All ten toes without clubbing, cyanosis, or signs of ischemia.  No pain to palpation bilateral lower extremities.  Range of motion, stability, muscle strength, and muscle tone normal bilateral feet and legs.       Lymphadenopathy: No inguinal adenopathy noted on the right or left side.   Negative lymphadenopathy bilateral popliteal fossa and tarsal tunnel.    Negative lymphangitic streaking bilateral feet/ankles/legs.   Neurological: He is alert and oriented to person, place, and time. He has normal strength. He displays no atrophy and no tremor. No sensory deficit. He exhibits normal muscle tone. Gait normal.   Reflex Scores:       Patellar reflexes are 2+ on the right side and 2+ on the left side.       Achilles reflexes are 2+ on the right side and 2+ on the left side.  Negative tinel sign to percussion sural, superficial peroneal, deep peroneal, saphenous, and posterior tibial nerves right and left ankles and feet.      Negative moulder sign/click bilateral all intermetatarsal spaces.     Skin: Skin is warm, dry and intact. Capillary refill takes 2 to 3 seconds. No abrasion, no bruising, no burn, no ecchymosis, no laceration, no lesion and no rash noted. He is not diaphoretic. No cyanosis or erythema. No pallor. Nails show no clubbing.     Skin is normal age and health appropriate color, turgor, texture, and temperature bilateral lower extremities without ulceration, hyperpigmentation, discoloration, masses nodules or cords palpated.  No ecchymosis, erythema, edema, or cardinal signs of infection bilateral lower extremities.     Psychiatric: He has a normal mood and affect.              Assessment:       Encounter Diagnoses   Name Primary?    Capsulitis Yes    Sesamoiditis     Foot pain, right     Equinus contracture of ankle          Plan:       Kulwinder was seen today for foot pain and foot swelling.    Diagnoses and all orders for this visit:    Capsulitis  -     X-Ray Foot Complete Right; Future    Sesamoiditis  -     X-Ray Foot Complete Right; Future    Foot pain, right  -     X-Ray Foot Complete Right; Future    Equinus contracture of ankle  -     X-Ray Foot Complete Right; Future    Other orders  -     lidocaine HCL 2% (XYLOCAINE) 2 % jelly; Apply topically as needed. Apply topically once nightly to affected part of foot/feet.      I counseled the patient on his conditions, their implications and medical management.    Patient will stretch the tendo achilles complex three times daily as demonstrated in the office.  Literature was dispensed illustrating proper stretching technique.    I applied a plantar rest strapping to the patient's right foot to offload symptomatic area, support the arch, and relieve pain.    Patient will obtain over the counter arch supports and wear them in shoes whenever possible.  Athletic shoes intended for walking or running are usually best.    The patient was advised that NSAID-type medications have two very important potential side effects: gastrointestinal irritation including hemorrhage and renal injuries. He was asked to take the medication with food and to stop if he experiences any GI upset. I asked him to call for vomiting, abdominal pain or black/bloody stools. The patient expresses understanding of these issues and questions were answered.    Discussed conservative treatment with shoes of adequate dimensions, material, and style to alleviate symptoms and delay or prevent surgical intervention.    Rx lidocaine gel, xrays.          Follow-up in about 1 month (around 4/28/2018).

## 2018-03-29 ENCOUNTER — HOSPITAL ENCOUNTER (OUTPATIENT)
Dept: RADIOLOGY | Facility: CLINIC | Age: 68
Discharge: HOME | End: 2018-03-29
Attending: PODIATRIST
Payer: MEDICARE

## 2018-03-29 DIAGNOSIS — M77.9 CAPSULITIS: ICD-10-CM

## 2018-03-29 DIAGNOSIS — M25.80 SESAMOIDITIS: ICD-10-CM

## 2018-03-29 DIAGNOSIS — M24.573 EQUINUS CONTRACTURE OF ANKLE: ICD-10-CM

## 2018-03-29 DIAGNOSIS — M79.671 FOOT PAIN, RIGHT: ICD-10-CM

## 2018-03-29 PROCEDURE — 73630 X-RAY EXAM OF FOOT: CPT | Mod: TC,FY,PO,RT

## 2018-03-29 PROCEDURE — 73630 X-RAY EXAM OF FOOT: CPT | Mod: 26,RT,S$GLB, | Performed by: RADIOLOGY

## 2018-04-04 ENCOUNTER — PATIENT OUTREACH (OUTPATIENT)
Dept: OTHER | Facility: OTHER | Age: 68
End: 2018-04-04

## 2018-04-04 NOTE — PROGRESS NOTES
Last 5 Patient Entered Readings                                      Current 30 Day Average: 132/85     Recent Readings 4/4/2018 4/2/2018 3/31/2018 3/30/2018 3/28/2018    SBP (mmHg) 127 142 138 124 129    DBP (mmHg) 85 88 83 85 84    Pulse 58 63 58 65 64        Hypertension Digital Medicine Program (HDMP): Health  Follow Up    Lifestyle Modifications:    1.Low sodium diet: no Patient reports drinking plenty of water. He states focusing on portion control by not eating that much. Patient reports he was treating himself to ham and turkey on Easter weekend. Patient states trying his best to decrease to 1-2 beers at night throughout the week. He states recently consuming crawfish. Patient reports consuming chocolate during the holiday weekend.  He reports wanting to return to a healthier diet soon. Encouraged patient to monitor the food labels. Will discuss a diet goal upon next encounter.    2.Physical activity: yes Patient reports not achieving as much exercise as he should due to recent food issues. 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 Encouraged patient to continue maintaining his high activity level.      3.Hypotension/Hypertension symptoms: no Patient reports no abnormal signs or symptoms with BP medication.   Frequency/Alleviating factors/Precipitating factors, etc.     4.Patient has been compliant with the medication regimen.     Follow up with Mr. Kulwinder Hogue Jr. completed. No further questions or concerns. I will follow up in a few weeks to assess progress.

## 2018-04-16 ENCOUNTER — TELEPHONE (OUTPATIENT)
Dept: GASTROENTEROLOGY | Facility: CLINIC | Age: 68
End: 2018-04-16

## 2018-04-16 ENCOUNTER — PATIENT OUTREACH (OUTPATIENT)
Dept: OTHER | Facility: OTHER | Age: 68
End: 2018-04-16

## 2018-04-16 NOTE — TELEPHONE ENCOUNTER
----- Message from Julio Ponce sent at 4/16/2018  2:24 PM CDT -----  Contact: self   Patient want to speak with a nurse regarding scheduling colonoscopy please call back at [phone]

## 2018-04-16 NOTE — PROGRESS NOTES
Last 5 Patient Entered Readings                                      Current 30 Day Average: 129/83     Recent Readings 4/16/2018 4/14/2018 4/13/2018 4/12/2018 4/11/2018    SBP (mmHg) 123 123 124 132 120    DBP (mmHg) 77 81 77 81 87    Pulse 64 61 63 61 70          Patient's BP average is slightly above goal of <130/80.     Patient denies s/s of hypotension (lightheadedness, dizziness, nausea, fatigue) associated with low readings. Instructed patient to inform me if this occurs, patient confirms understanding.      Patient denies s/s of hypertension (SOB, CP, severe headaches, changes in vision) associated with high readings. Instructed patient to go to the ED if BP > 180/110 and accompanied by hypertensive s/s, patient confirms understanding.    Will continue to monitor regularly. Will follow up in ~ 4 weeks, sooner if BP begins to trend upward or downward.    Patient has my contact information and knows to call with any concerns or clinical changes.     Current HTN regimen:  Hypertension Medications             amlodipine (NORVASC) 10 MG tablet take 1 tablet by mouth every evening    benazepril (LOTENSIN) 40 MG tablet Take 1 tablet (40 mg total) by mouth once daily.    carvedilol (COREG) 25 MG tablet Take 0.5 tablets (12.5 mg total) by mouth 2 (two) times daily with meals.    hydrochlorothiazide (HYDRODIURIL) 12.5 MG Tab Take 1 tablet (12.5 mg total) by mouth once daily.        DBP remains slightly above goal but improved since health  outreach. Patient does not add salt to foods but has eaten lower-sodium foods since [facility]. Encouraged continued awareness of sodium intake.

## 2018-04-18 ENCOUNTER — PATIENT OUTREACH (OUTPATIENT)
Dept: OTHER | Facility: OTHER | Age: 68
End: 2018-04-18

## 2018-04-18 DIAGNOSIS — Z86.010 HISTORY OF COLON POLYPS: Primary | ICD-10-CM

## 2018-04-18 NOTE — PROGRESS NOTES
Last 5 Patient Entered Readings                                      Current 30 Day Average: 128/82     Recent Readings 4/18/2018 4/17/2018 4/16/2018 4/14/2018 4/13/2018    SBP (mmHg) 132 106 123 123 124    DBP (mmHg) 82 68 77 81 77    Pulse 66 63 64 61 63          Patient's BP average is above goal of <130/80.     Patient denies s/s of hypotension (lightheadedness, dizziness, nausea, fatigue) associated with low readings. Instructed patient to inform me if this occurs, patient confirms understanding.      Patient denies s/s of hypertension (SOB, CP, severe headaches, changes in vision) associated with high readings. Instructed patient to go to the ED if BP > 180/110 and accompanied by hypertensive s/s, patient confirms understanding.    Will continue to monitor regularly. Will follow up in 2-3 weeks, sooner if BP begins to trend upward or downward.    Patient has my contact information and knows to call with any concerns or clinical changes.     Current HTN regimen:  Hypertension Medications             amlodipine (NORVASC) 10 MG tablet take 1 tablet by mouth every evening    benazepril (LOTENSIN) 40 MG tablet Take 1 tablet (40 mg total) by mouth once daily.    carvedilol (COREG) 25 MG tablet Take 0.5 tablets (12.5 mg total) by mouth 2 (two) times daily with meals.    hydrochlorothiazide (HYDRODIURIL) 12.5 MG Tab Take 1 tablet (12.5 mg total) by mouth once daily.        Received call from patient regarding lower BP reading 4/17; he denies s/s of hypotension. States he took melatonin 4/16 evening and slept better than usual.

## 2018-04-25 ENCOUNTER — PATIENT OUTREACH (OUTPATIENT)
Dept: OTHER | Facility: OTHER | Age: 68
End: 2018-04-25

## 2018-04-25 NOTE — PROGRESS NOTES
Last 5 Patient Entered Readings                                      Current 30 Day Average: 129/82     Recent Readings 4/25/2018 4/24/2018 4/23/2018 4/21/2018 4/19/2018    SBP (mmHg) 128 135 137 129 135    DBP (mmHg) 81 86 82 83 87    Pulse 62 65 68 61 62        4/25-LVM. Follow up attempt. BP well controlled.    Hypertension Digital Medicine Program (HDMP): Health  Follow Up    Lifestyle Modifications:    1.Low sodium diet: yes Patient states eating most meals at home. Patient reports he has increased his fruit intake. Patient states consuming oatmeal and fruits in the morning for breakfast. He states eating foods at work. Patient states consuming red beans and rice at work. He states being aware they may add salt to red beans. Patient states consuming salads often. Patient reports consuming baked chicken and tuna at home. Patient states he may skip dinner if he gets off work too late (i.e. After 7pm). Patient reports consuming grapes if he misses dinner. Encouraged patient to continue his low-sodium diet.     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     3.Hypotension/Hypertension symptoms: no   Frequency/Alleviating factors/Precipitating factors, etc.     4.Patient has been compliant with the medication regimen.     Follow up with Mr. Kulwinder Hogue Jr. completed. No further questions or concerns. I will follow up in a few weeks to assess progress.

## 2018-05-01 ENCOUNTER — TELEPHONE (OUTPATIENT)
Dept: GASTROENTEROLOGY | Facility: CLINIC | Age: 68
End: 2018-05-01

## 2018-05-01 NOTE — TELEPHONE ENCOUNTER
----- Message from Kathryn Hung sent at 5/1/2018  9:23 AM CDT -----  Please call patient in regards to colonoscopy questions he has for tomorrow, [phone]

## 2018-05-02 ENCOUNTER — HOSPITAL ENCOUNTER (OUTPATIENT)
Facility: HOSPITAL | Age: 68
Discharge: HOME | End: 2018-05-02
Attending: INTERNAL MEDICINE | Admitting: INTERNAL MEDICINE
Payer: MEDICARE

## 2018-05-02 ENCOUNTER — ANESTHESIA EVENT (OUTPATIENT)
Dept: ENDOSCOPY | Facility: HOSPITAL | Age: 68
End: 2018-05-02
Payer: MEDICARE

## 2018-05-02 ENCOUNTER — SURGERY (OUTPATIENT)
Age: 68
End: 2018-05-02

## 2018-05-02 ENCOUNTER — ANESTHESIA (OUTPATIENT)
Dept: ENDOSCOPY | Facility: HOSPITAL | Age: 68
End: 2018-05-02
Payer: MEDICARE

## 2018-05-02 DIAGNOSIS — K63.5 POLYP OF COLON, UNSPECIFIED PART OF COLON, UNSPECIFIED TYPE: Primary | ICD-10-CM

## 2018-05-02 DIAGNOSIS — K57.30 DIVERTICULOSIS OF LARGE INTESTINE WITHOUT HEMORRHAGE: ICD-10-CM

## 2018-05-02 DIAGNOSIS — Z86.010 HX OF COLONIC POLYPS: ICD-10-CM

## 2018-05-02 DIAGNOSIS — K64.8 INTERNAL HEMORRHOIDS: ICD-10-CM

## 2018-05-02 PROBLEM — Z86.0100 HX OF COLONIC POLYPS: Status: ACTIVE | Noted: 2018-05-02

## 2018-05-02 PROCEDURE — 27201089 HC SNARE, DISP (ANY): Performed by: INTERNAL MEDICINE

## 2018-05-02 PROCEDURE — D9220A PRA ANESTHESIA: Mod: PT,ANES,, | Performed by: ANESTHESIOLOGY

## 2018-05-02 PROCEDURE — 37000008 HC ANESTHESIA 1ST 15 MINUTES: Performed by: INTERNAL MEDICINE

## 2018-05-02 PROCEDURE — D9220A PRA ANESTHESIA: Mod: PT,CRNA,, | Performed by: NURSE ANESTHETIST, CERTIFIED REGISTERED

## 2018-05-02 PROCEDURE — 63600175 PHARM REV CODE 636 W HCPCS: Performed by: NURSE ANESTHETIST, CERTIFIED REGISTERED

## 2018-05-02 PROCEDURE — 45385 COLONOSCOPY W/LESION REMOVAL: CPT | Mod: PT,,, | Performed by: INTERNAL MEDICINE

## 2018-05-02 PROCEDURE — 88305 TISSUE EXAM BY PATHOLOGIST: CPT | Performed by: PATHOLOGY

## 2018-05-02 PROCEDURE — 25000003 PHARM REV CODE 250: Performed by: INTERNAL MEDICINE

## 2018-05-02 PROCEDURE — 37000009 HC ANESTHESIA EA ADD 15 MINS: Performed by: INTERNAL MEDICINE

## 2018-05-02 PROCEDURE — 88305 TISSUE EXAM BY PATHOLOGIST: CPT | Mod: 26,,, | Performed by: PATHOLOGY

## 2018-05-02 PROCEDURE — 45385 COLONOSCOPY W/LESION REMOVAL: CPT | Performed by: INTERNAL MEDICINE

## 2018-05-02 RX ORDER — SODIUM CHLORIDE 9 MG/ML
INJECTION, SOLUTION INTRAVENOUS CONTINUOUS
Status: DISCONTINUED | OUTPATIENT
Start: 2018-05-02 | End: 2018-05-02 | Stop reason: HOSPADM

## 2018-05-02 RX ORDER — PROPOFOL 10 MG/ML
VIAL (ML) INTRAVENOUS
Status: DISCONTINUED | OUTPATIENT
Start: 2018-05-02 | End: 2018-05-02

## 2018-05-02 RX ADMIN — PROPOFOL 30 MG: 10 INJECTION, EMULSION INTRAVENOUS at 07:05

## 2018-05-02 RX ADMIN — PROPOFOL 120 MG: 10 INJECTION, EMULSION INTRAVENOUS at 07:05

## 2018-05-02 RX ADMIN — SODIUM CHLORIDE: 0.9 INJECTION, SOLUTION INTRAVENOUS at 07:05

## 2018-05-02 NOTE — OR NURSING
Have you had a colonoscopy LESS THAN 3 years ago?   * If YES, answer these questions*:   NO    1. Did patient have a prior colonic polyp in a previous surveillance/diagnostic colonoscopy and is 18 years or older on date of encounter?     YES   2. Documentation of < 3 year interval since the patients last colonoscopy due to medical reasons (eg., last colonoscopy incomplete, last colonoscopy had inadequate prep, piecemeal removal of adenomas, or last colonoscopy found > 10 adenomas) ?   LAST COLONOSCOPY 2013

## 2018-05-02 NOTE — ANESTHESIA POSTPROCEDURE EVALUATION
Anesthesia Post Evaluation    Patient: Kulwinder Hogue JrAlex    Procedure(s) Performed: Procedure(s) (LRB):  COLONOSCOPY (N/A)    Final Anesthesia Type: general  Patient location during evaluation: PACU  Patient participation: Yes- Able to Participate  Level of consciousness: awake and alert and oriented  Post-procedure vital signs: reviewed and stable  Pain management: adequate  Airway patency: patent  PONV status at discharge: No PONV  Anesthetic complications: no      Cardiovascular status: blood pressure returned to baseline  Respiratory status: unassisted, spontaneous ventilation and room air  Hydration status: euvolemic  Follow-up not needed.        Visit Vitals  BP (!) 87/55   Pulse 61   Temp 36.3 °C (97.3 °F)   Resp 18   Ht 6' (1.829 m)   Wt 80.7 kg (178 lb)   SpO2 (!) 94%   BMI 24.14 kg/m²       Pain/Roxane Score: Pain Assessment Performed: Yes (5/2/2018  8:01 AM)  Presence of Pain: denies (5/2/2018  7:01 AM)  Roxane Score: 8 (5/2/2018  8:11 AM)

## 2018-05-02 NOTE — PROVATION PATIENT INSTRUCTIONS
Discharge Summary/Instructions after an Endoscopic Procedure  Patient Name: Kulwinder Rehman  Patient MRN: [id number]  Patient YOB: 1950  Wednesday, May 02, 2018  Joe Cruz MD  RESTRICTIONS:  During your procedure today, you received medications for sedation.  These   medications may affect your judgment, balance and coordination.  Therefore,   for 24 hours, you have the following restrictions:   - DO NOT drive a car, operate machinery, make legal/financial decisions,   sign important papers or drink alcohol.    ACTIVITY:  The following day: return to full activity including work, except no heavy   lifting, straining or running for 3 days if polyps were removed.  DIET:  Eat and drink normally unless instructed otherwise.     TREATMENT FOR COMMON SIDE EFFECTS:  - Mild abdominal pain, nausea, belching, bloating or excessive gas:  rest,   eat lightly and use a heating pad.  - Sore Throat: treat with throat lozenges and/or gargle with warm salt   water.  - Because air was used during the procedure, expelling large amounts of air   from your rectum or belching is normal.  - If a bowel prep was taken, you may not have a bowel movement for 1-3 days.    This is normal.  SYMPTOMS TO WATCH FOR AND REPORT TO YOUR PHYSICIAN:  1. Abdominal pain or bloating, other than gas cramps.  2. Chest pain.  3. Back pain.  4. Signs of infection such as: chills or fever occurring within 24 hours   after the procedure.  5. Rectal bleeding, which would show as bright red, maroon, or black stools.   (A tablespoon of blood from the rectum is not serious, especially if   hemorrhoids are present.)  6. Vomiting.  7. Weakness or dizziness.  GO DIRECTLY TO THE NEAREST EMERGENCY ROOM IF YOU HAVE ANY OF THE FOLLOWING:      Difficulty breathing              Chills and/or fever over 101 F   Persistent vomiting and/or vomiting blood   Severe abdominal pain   Severe chest pain   Black, tarry stools   Bleeding- more than one  tablespoon   Any other symptom or condition that you feel may need urgent attention  Your doctor recommends these additional instructions:  If any biopsies were taken, your doctors clinic will contact you in 1 to 2   weeks with any results.  - Patient has a contact number available for emergencies.  The signs and   symptoms of potential delayed complications were discussed with the   patient.  Return to normal activities tomorrow.  Written discharge   instructions were provided to the patient.   - High fiber diet.   - Continue present medications.   - Await pathology results.   - Repeat colonoscopy in 5 years for surveillance.   - Discharge patient to home (ambulatory).   - Return to my office PRN.  For questions, problems or results please call your physician - Joe Cruz MD at Work:  [phone].  [facility], EMERGENCY ROOM PHONE NUMBER: [phone]  IF A COMPLICATION OR EMERGENCY SITUATION ARISES AND YOU ARE UNABLE TO REACH   YOUR PHYSICIAN - GO DIRECTLY TO THE EMERGENCY ROOM.  Joe Cruz MD  5/2/2018 8:01:58 AM  This report has been verified and signed electronically.

## 2018-05-02 NOTE — H&P
CC: History of adenoma - last scope 5 years ago    67 year old male with above. States that symptoms are absent, no alleviating/exacerbating factors. No family history of CA. Positive personal history of polyps. No bleeding or weight loss.     ROS:  No headache, no fever/chills, no chest pain/SOB, no nausea/vomiting/diarrhea/constipation/GI bleeding/abdominal pain, no dysuria/hematuria.    VSSAF   Exam:   Alert and oriented x 3; no apparent distress   PERRLA, sclera anicteric  CV: Regular rate/rhythm, normal PMI   Lungs: Clear bilaterally with no wheeze/rales   Abdomen: Soft, NT/ND, normal bowel sounds   Ext: No cyanosis, clubbing     Impression:   As above    Plan:   Proceed with endoscopy. Further recs to follow.

## 2018-05-02 NOTE — DISCHARGE INSTRUCTIONS
Discharge Instructions: Eating a High-Fiber Diet  Your health care provider has prescribed a high-fiber diet for you. Fiber is what gives strength and structure to plants. Most grains, beans, vegetables, and fruits contain fiber. Foods rich in fiber are often low in calories and fat, but they fill you up more. These foods may also reduce the risk of certain health problems.  There are two types of fiber:  · Insoluble fiber. This is found in whole grains, cereals, and certain fruits and vegetables (such as apple skins, corn, and beans). Insoluble fiber is made up mainly of plant cell walls. It may prevent constipation and reduce the risk of certain types of cancer.  · Soluble fiber. This type of fiber is found in oats, beans, nuts, and certain fruits and vegetables (such as strawberries and peas). Soluble fiber turns to gel in the digestive system, slowing the movement of the digestive tract. It helps control blood sugar levels and can reduce cholesterol, which may help lower the risk of heart disease. Soluble fiber can also help control appetite.     Home care  · Know how much fiber you need a day. The recommended daily amount of fiber is 25 grams for women and 38 grams for men. After age 50, daily fiber needs drop to 21 grams for women and 30 grams for men.  · Ask your doctor about a fiber supplement. (Always take fiber supplements with a large glass of water.)  · Keep track of how much fiber you eat.  · Eat a variety of foods high in fiber.  · Learn to read and understand food labels.  · Ask your healthcare provider how much water you should be drinking.  · Look for these high-fiber foods:  ¨ Whole-grain breads and cereals  § 6 ounces a day give you about 18 grams of fiber (1 ounce is equal to 1 slice of bread, 1 cup of dry cereal, or 1/2 cup of cooked rice).  § Include wheat and oat bran cereals, whole-wheat muffins or toast, and corn tortillas in your meals.  ¨ Fruits   § 2 cups a day give you about 8 grams of  fiber.  § Apples, oranges, strawberries, pears, and bananas are good sources.  § Fruit juice does not contain as much fiber as the fruit it was made from.  ¨ Vegetables  § 2½ cups a day give you about 11 grams of fiber. Add asparagus, carrots, broccoli, peas, and corn to your meals.  ¨ Legumes  § 1/4 cup a day (in place of meat) gives you about 4 grams of fiber. Try navy beans, lentils, chickpeas, and soybeans.  ¨ Seeds   § A small handful of seeds gives you about 3 grams of fiber. Try sunflower seeds.  Follow-up  Make a follow-up appointment with a nutritionist as directed by our staff.  Date Last Reviewed: 6/1/2015 © 2000-2017 [employer]. [address] [place] [address]. All rights reserved. This information is not intended as a substitute for professional medical care. Always follow your healthcare professional's instructions.        Diverticulosis    Diverticulosis means that small pouches have formed in the wall of your large intestine (colon). Most often, this problem causes no symptoms and is common as people age. But the pouches in the colon are at risk of becoming infected. When this happens, the condition is called diverticulitis. Although most people with diverticulosis never develop diverticulitis, it is still not uncommon. Rectal bleeding can also occur and in less common situations, a type of colon inflammation called colitis.  While most people do not have symptoms, some people with diverticulosis may have:  · Abdominal cramps and pain  · Bloating  · Constipation  · Change in bowel habits  Causes  The exact cause of diverticulosis (and diverticulitis) has not been proved, but a few things are associated with the condition:  · Low-fiber diet  · Constipation  · Lack of exercise  Your healthcare provider will talk with you about how to manage your condition. Diet changes may be all that are needed to help control diverticulosis and prevent progression to diverticulitis. If you  develop diverticulitis, you will likely need other treatments.  Home care  You may be told to take fiber supplements daily. Fiber adds bulk to the stool so that it passes through the colon more easily. Stool softeners may be recommended. You may also be given medications for pain relief. Be sure to take all medications as directed.  In the past, people were told to avoid corn, nuts, and seeds. This is no longer necessary.  Follow these guidelines when caring for yourself at home:  · Eat unprocessed foods that are high in fiber. Whole grains, fruits, and vegetables are good choices.  · Drink 6 to 8 glasses of water every day unless your healthcare provider has you limit how much fluid you should have.  · Watch for changes in your bowel movements. Tell your provider if you notice any changes.  · Begin an exercise program. Ask your provider how to get started. Generally, walking is the best.  · Get plenty of rest and sleep.  Follow-up care  Follow up with your healthcare provider, or as advised. Regular visits may be needed to check on your health. Sometimes special procedures such as colonoscopy, are needed after an episode of diverticulitis or blooding. Be sure to keep all your appointments.  If a stool sample was taken, or cultures were done, you should be told if they are positive, or if your treatment needs to be changed. You can call as directed for the results.  If X-rays were done, a radiologist will look at them. You will be told if there is a change in your treatment.  If antibiotics were prescribed, be sure to finish them all.  When to seek medical advice  Call your healthcare provider right away if any of these occur:  · Fever of 100.4°F (38°C) or higher, or as directed by your healthcare provider  · Severe cramps in the lower left side of the abdomen or pain that is getting worse  · Tenderness in the lower left side of the abdomen or worsening pain throughout the abdomen  · Diarrhea or constipation that  doesn't get better within 24 hours  · Nausea and vomiting  · Bleeding from the rectum  Call 911  Call emergency services if any of the following occur:  · Trouble breathing  · Confusion  · Very drowsy or trouble awakening  · Fainting or loss of consciousness  · Rapid heart rate  · Chest pain  Date Last Reviewed: 12/30/2015 © 2000-2017 [employer]. [address] [place] [address], [place] [address]. All rights reserved. This information is not intended as a substitute for professional medical care. Always follow your healthcare professional's instructions.        Hemorrhoids    Hemorrhoids are swollen and inflamed veins inside the rectum and near the anus. The rectum is the last several inches of the colon. The anus is the passage between the rectum and the outside of the body.  Causes  The veins can become swollen due to increased pressure in them. This is most often caused by:  · Chronic constipation or diarrhea  · Straining when having a bowel movement  · Sitting too long on the toilet  · A low-fiber diet  · Pregnancy  Symptoms  · Bleeding from the rectum (this may be noticeable after bowel movements)  · Lump near the anus  · Itching around the anus  · Pain around the anus  There are different types of hemorrhoids. Depending on the type you have and the severity, you may be able to treat yourself at home. In some cases, a procedure may be the best treatment option. Your healthcare provider can tell you more about this, if needed.  Home care  General care  · To get relief from pain or itching, try:  ¨ Topical products. Your healthcare provider may prescribe or recommend creams, ointments, or pads that can be applied to the hemorrhoid. Use these exactly as directed.  ¨ Medicines. Your healthcare provider may recommend stool softeners, suppositories, or laxatives to help manage constipation. Use these exactly as directed.  ¨ Sitz baths. A sitz bath involves sitting in a few inches of warm bath water. Be careful  not to make the water so hot that you burn yourself--test it before sitting in it. Soak for about 10 to 15 minutes a few times a day. This may help relieve pain.  Tips to help prevent hemorrhoids  · Eat more fiber. Fiber adds bulk to stool and absorbs water as it moves through your colon. This makes stool softer and easier to pass.  ¨ Increase the fiber in your diet with more fiber-rich foods. These include fresh fruit, vegetables, and whole grains.  ¨ Take a fiber supplement or bulking agent, if advised to by your provider. These include products such as psyllium or methylcellulose.  · Drink plenty of water, if directed to by your provider. This can help keep stool soft.  · Be more active. Frequent exercise aids digestion and helps prevent constipation. It may also help make bowel movements more regular.  · Dont strain during bowel movements. This can make hemorrhoids more likely. Also, dont sit on the toilet for long periods of time.  Follow-up care  Follow up with your healthcare provider, or as advised. If a culture or imaging tests were done, you will be notified of the results when they are ready. This may take a few days or longer.  When to seek medical advice  Call your healthcare provider right away if any of these occur:  · Increased bleeding from the rectum  · Increased pain around the rectum or anus  · Weakness or dizziness  Call 911  Call 911 or return to the emergency department right away if any of these occur:  · Trouble breathing or swallowing  · Fainting or loss of consciousness  · Unusually fast heart rate  · Vomiting blood  · Large amounts of blood in stool  Date Last Reviewed: 6/22/2015 © 2000-2017 [employer]. [address] [place] [address], [place] [address]. All rights reserved. This information is not intended as a substitute for professional medical care. Always follow your healthcare professional's instructions.        Understanding Colon and Rectal Polyps    The colon (also  called the large intestine) is a muscular tube that forms the last part of the digestive tract. It absorbs water and stores food waste. The colon is about 4 to 6 feet long. The rectum is the last 6 inches of the colon. The colon and rectum have a smooth lining composed of millions of cells. Changes in these cells can lead to growths in the colon that can become cancerous and should be removed. Multiple tests are available to screen for colon cancer, but the colonoscopy is the most recommended test. During colonoscopy, these polyps can be removed. How often you need this test depends on many things including your condition, your family history, symptoms, and what the findings were at the previous colonoscopy.   When the colon lining changes  Changes that happen in the cells that line the colon or rectum can lead to growths called polyps. Over a period of years, polyps can turn cancerous. Removing polyps early may prevent cancer from ever forming.  Polyps  Polyps are fleshy clumps of tissue that form on the lining of the colon or rectum. Small polyps are usually benign (not cancerous). However, over time, cells in a polyp can change and become cancerous. Certain types of polyps known as adenomatous polyps are premalignant. The risk for invasive cancer increases with the size of the polyp and certain cell and gene features. This means that they can become cancerous if they're not removed. Hyperplastic polyps are benign. They can grow quite large and not turn cancerous.   Cancer  Almost all colorectal cancers start when polyp cells begin growing abnormally. As a cancerous tumor grows, it may involve more and more of the colon or rectum. In time, cancer can also grow beyond the colon or rectum and spread to nearby organs or to glands called lymph nodes. The cells can also travel to other parts of the body. This is known as metastasis. The earlier a cancerous tumor is removed, the better the chance of preventing its  spread.    Date Last Reviewed: 8/1/2016  © 4559-1724 [employer]. [address] [place] [address], [place] [address]. All rights reserved. This information is not intended as a substitute for professional medical care. Always follow your healthcare professional's instructions.        Colonoscopy     A camera attached to a flexible tube with a viewing lens is used to take video pictures.     Colonoscopy is a test to view the inside of your lower digestive tract (colon and rectum). Sometimes it can show the last part of the small intestine (ileum). During the test, small pieces of tissue may be removed for testing. This is called a biopsy. Small growths, such as polyps, may also be removed.   Why is colonoscopy done?  The test is done to help look for colon cancer. And it can help find the source of abdominal pain, bleeding, and changes in bowel habits. It may be needed once a year, depending on factors such as your:  · Age  · Health history  · Family health history  · Symptoms  · Results from any prior colonoscopy  Risks and possible complications  These include:  · Bleeding               · A puncture or tear in the colon   · Risks of anesthesia  · A cancer lesion not being seen  Getting ready   To prepare for the test:  · Talk with your healthcare provider about the risks of the test (see below). Also ask your healthcare provider about alternatives to the test.  · Tell your healthcare provider about any medicines you take. Also tell him or her about any health conditions you may have.  · Make sure your rectum and colon are empty for the test. Follow the diet and bowel prep instructions exactly. If you dont, the test may need to be rescheduled.  · Plan for a friend or family member to drive you home after the test.     Colonoscopy provides an inside view of the entire colon.     You may discuss the results with your doctor right away or at a future visit.  During the test   The test is usually done in the  hospital on an outpatient basis. This means you go home the same day. The procedure takes about 30 minutes. During that time:  · You are given relaxing (sedating) medicine through an IV line. You may be drowsy, or fully asleep.  · The healthcare provider will first give you a physical exam to check for anal and rectal problems.  · Then the anus is lubricated and the scope inserted.  · If you are awake, you may have a feeling similar to needing to have a bowel movement. You may also feel pressure as air is pumped into the colon. Its OK to pass gas during the procedure.  · Biopsy, polyp removal, or other treatments may be done during the test.  After the test   You may have gas right after the test. It can help to try to pass it to help prevent later bloating. Your healthcare provider may discuss the results with you right away. Or you may need to schedule a follow-up visit to talk about the results. After the test, you can go back to your normal eating and other activities. You may be tired from the sedation and need to rest for a few hours.  Date Last Reviewed: 11/1/2016  © 2859-7851 [employer]. [address] [place] [address], [place] [address]. All rights reserved. This information is not intended as a substitute for professional medical care. Always follow your healthcare professional's instructions.

## 2018-05-02 NOTE — TRANSFER OF CARE
Anesthesia Transfer of Care Note    Patient: Kulwinder Hogue Jr.    Procedure(s) Performed: Procedure(s) (LRB):  COLONOSCOPY (N/A)    Patient location: GI    Anesthesia Type: general    Transport from OR: Transported from OR on 2-3 L/min O2 by NC with adequate spontaneous ventilation    Post pain: adequate analgesia    Post assessment: no apparent anesthetic complications    Post vital signs: stable    Level of consciousness: awake    Complications: none    Transfer of care protocol was followed      Last vitals:   Visit Vitals  /81 (BP Location: Left arm, Patient Position: Lying)   Pulse 69   Temp 36.2 °C (97.1 °F) (Oral)   Resp 18   Ht 6' (1.829 m)   Wt 80.7 kg (178 lb)   SpO2 99%   BMI 24.14 kg/m²

## 2018-05-03 VITALS
HEIGHT: 72 IN | TEMPERATURE: 98 F | BODY MASS INDEX: 24.11 KG/M2 | SYSTOLIC BLOOD PRESSURE: 102 MMHG | WEIGHT: 178 LBS | DIASTOLIC BLOOD PRESSURE: 62 MMHG | RESPIRATION RATE: 16 BRPM | HEART RATE: 58 BPM | OXYGEN SATURATION: 98 %

## 2018-05-09 ENCOUNTER — PATIENT MESSAGE (OUTPATIENT)
Dept: GASTROENTEROLOGY | Facility: CLINIC | Age: 68
End: 2018-05-09

## 2018-05-14 ENCOUNTER — PATIENT OUTREACH (OUTPATIENT)
Dept: OTHER | Facility: OTHER | Age: 68
End: 2018-05-14

## 2018-05-14 DIAGNOSIS — I10 ESSENTIAL HYPERTENSION: ICD-10-CM

## 2018-05-14 NOTE — PROGRESS NOTES
Last 5 Patient Entered Readings                                      Current 30 Day Average: 127/81     Recent Readings 5/14/2018 5/13/2018 5/11/2018 5/10/2018 5/9/2018    SBP (mmHg) 127 112 136 129 123    DBP (mmHg) 80 71 86 85 77    Pulse 63 64 59 62 62          Patient's BP average is slightly above goal of <130/80.     Patient denies s/s of hypotension (lightheadedness, dizziness, nausea, fatigue) associated with low readings. Instructed patient to inform me if this occurs, patient confirms understanding.      Patient denies s/s of hypertension (SOB, CP, severe headaches, changes in vision) associated with high readings. Instructed patient to go to the ED if BP > 180/110 and accompanied by hypertensive s/s, patient confirms understanding.    Will continue to monitor regularly. Will follow up in 3-4 weeks, sooner if BP begins to trend upward or downward.    Patient has my contact information and knows to call with any concerns or clinical changes.     Current HTN regimen:  Hypertension Medications             amlodipine (NORVASC) 10 MG tablet take 1 tablet by mouth every evening    benazepril (LOTENSIN) 40 MG tablet Take 1 tablet (40 mg total) by mouth once daily.    carvedilol (COREG) 25 MG tablet Take 0.5 tablets (12.5 mg total) by mouth 2 (two) times daily with meals.    hydrochlorothiazide (HYDRODIURIL) 12.5 MG Tab Take 1 tablet (12.5 mg total) by mouth once daily.

## 2018-05-16 RX ORDER — HYDROCHLOROTHIAZIDE 12.5 MG/1
12.5 TABLET ORAL DAILY
Qty: 90 TABLET | Refills: 1 | Status: SHIPPED | OUTPATIENT
Start: 2018-05-16 | End: 2018-06-26 | Stop reason: ALTCHOICE

## 2018-05-23 ENCOUNTER — PATIENT OUTREACH (OUTPATIENT)
Dept: OTHER | Facility: OTHER | Age: 68
End: 2018-05-23

## 2018-05-23 NOTE — PROGRESS NOTES
"Last 5 Patient Entered Readings                                      Current 30 Day Average: 127/81     Recent Readings 5/23/2018 5/22/2018 5/20/2018 5/18/2018 5/17/2018    SBP (mmHg) 131 139 113 106 140    DBP (mmHg) 81 81 77 75 84    Pulse 62 64 66 61 60        Very short encounter. Patient states everything is pretty much the same.     Encounter also consisted of patient asking about an advertisement on local news stations concerning "the benefits of eating all your meals within six hours after waking up." Encouraged patient to be mindful of what information is given over the news. Informed patient every persons, body is different. He states he understands my statement.     [facility]: Health  Follow Up    Lifestyle Modifications:    1.Dietary Modifications (Sodium intake <2,000mg/day, food labels, dining out): Patient reports maintaining the same diet since previous encounter. He states not adding salt to meals. Patient reports restricting his crawfish. He states staying away from salty meals.     2.Physical Activity: He states maintaining the same level of activity since previous encounter.     3.Medication Therapy: Patient has been compliant with the medication regimen.    4.Patient has the following medication side effects/concerns:   (Frequency/Alleviating factors/Precipitating factors, etc.)     Follow up with Mr. Kulwinder Hogue Jr. completed. No further questions or concerns. Will continue follow up to achieve health goals.  "

## 2018-06-01 ENCOUNTER — PATIENT MESSAGE (OUTPATIENT)
Dept: UROLOGY | Facility: CLINIC | Age: 68
End: 2018-06-01

## 2018-06-11 ENCOUNTER — PATIENT OUTREACH (OUTPATIENT)
Dept: OTHER | Facility: OTHER | Age: 68
End: 2018-06-11

## 2018-06-11 NOTE — PROGRESS NOTES
Last 5 Patient Entered Readings                                      Current 30 Day Average: 126/81     Recent Readings 6/11/2018 6/10/2018 6/9/2018 6/8/2018 6/6/2018    SBP (mmHg) 121 123 128 122 112    DBP (mmHg) 78 81 80 81 78    Pulse 60 59 60 67 65        Sent [facility] message to follow up with Mr. Kulwinder Hogue Jr..    Per 30 day average, blood pressure is slightly above goal at 126/81 mmHg. Encouraged adherence to low sodium diet and physical activity guidelines. Advised patient to call or message with questions or concerns. Continue to monitor.

## 2018-06-15 ENCOUNTER — LAB VISIT (OUTPATIENT)
Dept: LAB | Facility: HOSPITAL | Age: 68
End: 2018-06-15
Attending: UROLOGY
Payer: MEDICARE

## 2018-06-15 DIAGNOSIS — R97.20 ELEVATED PSA: ICD-10-CM

## 2018-06-15 LAB — COMPLEXED PSA SERPL-MCNC: 5.4 NG/ML

## 2018-06-15 PROCEDURE — 36415 COLL VENOUS BLD VENIPUNCTURE: CPT | Mod: PO

## 2018-06-15 PROCEDURE — 84153 ASSAY OF PSA TOTAL: CPT

## 2018-06-19 ENCOUNTER — TELEPHONE (OUTPATIENT)
Dept: FAMILY MEDICINE | Facility: CLINIC | Age: 68
End: 2018-06-19

## 2018-06-19 NOTE — TELEPHONE ENCOUNTER
Called pt regarding below message. Pt states he had labs drawn on 6/15/18. Pt thought he had orders placed per Dr. Lugo. Informed pt the only orders placed were per Dr. Mancia. Informed pt that he does have a scheduled appt with Dr. Lugo 6/26/18 and labs will likely be ordered at that visit. Pt verbalized understanding with no further questions.     ----- Message from Ana María Calloway sent at 6/19/2018  2:13 PM CDT -----  Contact: PT  Type:  Test Results    Who Called:  Kulwinder   Name of Test (Lab/Mammo/Etc):  lab  Date of Test:  6/15  Ordering Provider:  williams  Where the test was performed:  [facility]  Best Call Back Number:    Additional Information:

## 2018-06-21 ENCOUNTER — PATIENT OUTREACH (OUTPATIENT)
Dept: OTHER | Facility: OTHER | Age: 68
End: 2018-06-21

## 2018-06-21 NOTE — PROGRESS NOTES
Last 5 Patient Entered Readings                                      Current 30 Day Average: 127/81     Recent Readings 6/18/2018 6/15/2018 6/11/2018 6/10/2018 6/9/2018    SBP (mmHg) 126 114 121 123 128    DBP (mmHg) 80 77 78 81 80    Pulse 63 62 60 59 60        6/21-patient answered stating he was driving and will return call when he is available.

## 2018-06-21 NOTE — PROGRESS NOTES
"Last 5 Patient Entered Readings                                      Current 30 Day Average: 127/81     Recent Readings 6/18/2018 6/15/2018 6/11/2018 6/10/2018 6/9/2018    SBP (mmHg) 126 114 121 123 128    DBP (mmHg) 80 77 78 81 80    Pulse 63 62 60 59 60        Encounter also consisted of recent prostate DR appointment. Patient also discussed his upcoming PCP appointment.     Patient asked about alkaline water and it's anticarcinogenic properties. Encouraged patient to discuss at upcoming PCP appointment.      Digital Medicine: Health  Follow Up    Lifestyle Modifications:    1.Dietary Modifications (Sodium intake <2,000mg/day, food labels, dining out): Patient reports consuming a salad for lunch. He states he diet is "fine overall." He states consuming avocado and blueberries in his oatmeal for breakfast. He states consuming water often. Encouraged patient to continue pursuing a low-sodium diet and read food labels.     2.Physical Activity: Patient reports walking at least every other day for a couple of miles and cutting grass on the weekend. Encouraged patient to continue his current exercise regimen.     3.Medication Therapy: Patient has been compliant with the medication regimen.    4.Patient has the following medication side effects/concerns:   (Frequency/Alleviating factors/Precipitating factors, etc.)     Follow up with Mr. Kulwinder Hogue Jr. completed. No further questions or concerns. Will continue follow up to achieve health goals.  "

## 2018-06-21 NOTE — PROGRESS NOTES
Last 5 Patient Entered Readings                                      Current 30 Day Average: 127/81     Recent Readings 6/18/2018 6/15/2018 6/11/2018 6/10/2018 6/9/2018    SBP (mmHg) 126 114 121 123 128    DBP (mmHg) 80 77 78 81 80    Pulse 63 62 60 59 60        6/21-Returned patient VM left on HC phone.

## 2018-06-26 ENCOUNTER — OFFICE VISIT (OUTPATIENT)
Dept: FAMILY MEDICINE | Facility: CLINIC | Age: 68
End: 2018-06-26
Payer: MEDICARE

## 2018-06-26 VITALS
DIASTOLIC BLOOD PRESSURE: 69 MMHG | SYSTOLIC BLOOD PRESSURE: 104 MMHG | HEIGHT: 72 IN | WEIGHT: 180.75 LBS | HEART RATE: 66 BPM | TEMPERATURE: 98 F | BODY MASS INDEX: 24.48 KG/M2

## 2018-06-26 DIAGNOSIS — E78.2 HYPERLIPIDEMIA, MIXED: Primary | ICD-10-CM

## 2018-06-26 DIAGNOSIS — Z00.00 ANNUAL PHYSICAL EXAM: ICD-10-CM

## 2018-06-26 DIAGNOSIS — I10 ESSENTIAL HYPERTENSION: ICD-10-CM

## 2018-06-26 PROCEDURE — 3078F DIAST BP <80 MM HG: CPT | Mod: CPTII,S$GLB,, | Performed by: FAMILY MEDICINE

## 2018-06-26 PROCEDURE — 99999 PR PBB SHADOW E&M-EST. PATIENT-LVL III: CPT | Mod: PBBFAC,,, | Performed by: FAMILY MEDICINE

## 2018-06-26 PROCEDURE — 99214 OFFICE O/P EST MOD 30 MIN: CPT | Mod: S$GLB,,, | Performed by: FAMILY MEDICINE

## 2018-06-26 PROCEDURE — 3074F SYST BP LT 130 MM HG: CPT | Mod: CPTII,S$GLB,, | Performed by: FAMILY MEDICINE

## 2018-06-26 NOTE — PROGRESS NOTES
Subjective:       Patient ID: Kulwinder Hogue Jr. is a 67 y.o. male.    Chief Complaint: Hypertension    Since systolic below 100 and should decreased medications.      Hypertension   This is a chronic problem. The problem has been resolved since onset. The problem is controlled. Pertinent negatives include no anxiety, chest pain, headaches, neck pain or palpitations. There are no associated agents to hypertension. Risk factors for coronary artery disease include male gender and obesity. Past treatments include beta blockers, diuretics and ACE inhibitors. There are no compliance problems.  Hypertensive end-organ damage includes CAD/MI.     Review of Systems   Constitutional: Negative for activity change and unexpected weight change.   HENT: Negative for hearing loss, rhinorrhea and trouble swallowing.    Eyes: Negative for discharge and visual disturbance.   Respiratory: Negative for chest tightness and wheezing.    Cardiovascular: Negative for chest pain and palpitations.   Gastrointestinal: Negative for blood in stool, constipation, diarrhea and vomiting.   Endocrine: Negative for polydipsia and polyuria.   Genitourinary: Negative for difficulty urinating, hematuria and urgency.   Musculoskeletal: Negative for arthralgias, joint swelling and neck pain.   Neurological: Negative for weakness and headaches.   Psychiatric/Behavioral: Negative for confusion and dysphoric mood.       Patient Active Problem List   Diagnosis    Hyperlipemia    HBP (high blood pressure)    Essential hypertension    Hyperlipidemia    Liposarcoma    Abnormal PSA    Cyclical neutropenia    Excessive cerumen in right ear canal    Hx of colonic polyps       Objective:      Physical Exam   Constitutional: He is oriented to person, place, and time. He appears well-developed and well-nourished.   Cardiovascular: Normal rate, regular rhythm and normal heart sounds.    Pulmonary/Chest: Effort normal and breath sounds normal.    Musculoskeletal: He exhibits no edema.   Neurological: He is alert and oriented to person, place, and time.   Skin: Skin is warm and dry.   Psychiatric: He has a normal mood and affect.   Nursing note and vitals reviewed.      Lab Results   Component Value Date    WBC 5.10 12/15/2017    HGB 15.9 12/15/2017    HCT 45.9 12/15/2017     12/15/2017    CHOL 157 12/15/2017    TRIG 71 12/15/2017    HDL 51 12/15/2017    ALT 29 12/15/2017    AST 31 12/15/2017     12/15/2017    K 4.3 12/15/2017     12/15/2017    CREATININE 0.9 12/15/2017    BUN 14 12/15/2017    CO2 30 (H) 12/15/2017    PSA 4.5 (H) 04/16/2016     The 10-year ASCVD risk score (Fabrizio LAWLER Jr., et al., 2013) is: 9.5%    Values used to calculate the score:      Age: 67 years      Sex: Male      Is Non- : No      Diabetic: No      Tobacco smoker: No      Systolic Blood Pressure: 104 mmHg      Is BP treated: Yes      HDL Cholesterol: 51 mg/dL      Total Cholesterol: 157 mg/dL    Assessment:       1. Hyperlipidemia, mixed    2. Essential hypertension    3. Annual physical exam        Plan:       Hyperlipidemia, mixed  -     Lipid panel; Future; Expected date: 06/26/2018  -     Comprehensive metabolic panel; Future; Expected date: 06/26/2018  -     CBC auto differential; Future; Expected date: 06/26/2018    Essential hypertension    Annual physical exam  -     Hepatitis C antibody; Future; Expected date: 06/26/2018      Patient readiness: acceptance and barriers:readiness    During the course of the visit the patient was educated and counseled about the following:     Hypertension:   Regular aerobic exercise.  Check blood pressures daily and record.  Obesity:   General weight loss/lifestyle modification strategies discussed (elicit support from others; identify saboteurs; non-food rewards, etc).    Goals: Hypertension: Reduce Blood Pressure    Did patient meet goals/outcomes: Yes    The following self management tools provided:  blood pressure log    Patient Instructions (the written plan) was given to the patient/family.     Time spent with patient: 30 minutes    Barriers to medications present (no )    Adverse reactions to current medications (no)    Over the counter medications reviewed (Yes)        30-minute visit. 20 minutes spent counseling patient on diet, exercise, and weight loss.  This has been fully explained to the patient, who indicates understanding.

## 2018-06-26 NOTE — PATIENT INSTRUCTIONS
Exercise for a Healthier Heart  You may wonder how you can improve the health of your heart. If youre thinking about exercise, youre on the right track. You dont need to become an athlete, but you do need a certain amount of brisk exercise to help strengthen your heart. If you have been diagnosed with a heart condition, your doctor may recommend exercise to help stabilize your condition. To help make exercise a habit, choose safe, fun activities.     Exercise with a friend. When activity is fun, you're more likely to stick with it.     Be sure to check with your healthcare provider before starting an exercise program.   Why exercise?  Exercising regularly offers many healthy rewards. It can help you do all of the following:  · Improve your blood cholesterol level to help prevent further heart trouble  · Lower your blood pressure to help prevent a stroke or heart attack  · Control diabetes, or reduce your risk of getting this disease  · Improve your heart and lung function  · Reach and maintain a healthy weight  · Make your muscles stronger and more limber so you can stay active  · Prevent falls and fractures by slowing the loss of bone mass (osteoporosis)  · Manage stress better  · Reduce your blood pressure  · Improve your sense of self and your body image  Exercise tips  Ease into your routine. Set small goals. Then build on them.  Exercise on most days. Aim for a total of 150 or more minutes of moderate to  vigorous intensity activity each week. Consider 40 minutes, 3 to 4 times a week. For best results, activity should last for 40 minutes on average. It is OK to work up to the 40 minute period over time. Examples of moderate-intensity activity is walking 1 mile in 15 minutes or 30 to 45 minutes of yard work.  Step up your daily activity level. Along with your exercise program, try being more active throughout the day. Walk instead of drive. Do more household tasks or yard work.  Choose one or more  activities you enjoy. Walking is one of the easiest things you can do. You can also try swimming, riding a bike, dancing, or taking an exercise class.  Stop exercising and call your doctor if you:  · Have chest pain or feel dizzy or lightheaded  · Feel burning, tightness, pressure, or heaviness in your chest, neck, shoulders, back, or arms  · Have unusual shortness of breath  · Have increased joint or muscle pain  · Have palpitations or an irregular heartbeat   Date Last Reviewed: 5/1/2016 © 2000-2017 [employer]. [address] [place] [id number]. All rights reserved. This information is not intended as a substitute for professional medical care. Always follow your healthcare professional's instructions.        Finasteride (Proscar) tablets  What is this medicine?  FINASTERIDE (fi NINA piotr adam) is used to treat benign prostatic hyperplasia (BPH) in men. This is a condition that causes you to have an enlarged prostate. This medicine helps to control your symptoms, decrease urinary retention, and reduces your risk of needing surgery. When used in combination with certain other medicines, this drug can slow down the progression of your disease.  How should I use this medicine?  Take this medicine by mouth with a glass of water. Follow the directions on the prescription label. You can take this medicine with or without food. Take your doses at regular intervals. Do not take your medicine more often than directed. Do not stop taking except on the advice of your doctor or health care professional.  Talk to your pediatrician regarding the use of this medicine in children. Special care may be needed.  What side effects may I notice from receiving this medicine?  Side effects that you should report to your doctor or health care professional as soon as possible:  · any signs of an allergic reaction like rash, itching, hives or swelling of the lips or face  · changes in breast like lumps, pain or fluids  leaking from the nipple  · pain in the testicles  Side effects that usually do not require medical attention (report to your doctor or health care professional if they continue or are bothersome):  · sexual difficulties like decreased sexual desire or ability to get an erection  · small amount of semen released during sex  What may interact with this medicine?  · saw palmetto or other dietary supplements  What if I miss a dose?  If you miss a dose, take it as soon as you can. If it is almost time for your next dose, take only that dose. Do not take double or extra doses.  Where should I keep my medicine?  Keep out of the reach of children.  Store at room temperature below 30 degrees C (86 degrees F). Protect from light. Keep container tightly closed. Throw away any unused medicine after the expiration date.  What should I tell my health care provider before I take this medicine?  They need to know if you have any of these conditions:  · liver disease  · an unusual or allergic reaction to finasteride, other medicines, foods, dyes, or preservatives  · pregnant or trying to get pregnant  · breast-feeding  What should I watch for while using this medicine?  Do not donate blood while you are taking this medicine. This will prevent giving this medicine to a pregnant female through a blood transfusion. Ask your doctor or health care professional when it is safe to donate blood after you stop taking this medicine.  Women who are pregnant or may get pregnant must not handle broken or crushed finasteride tablets. The active ingredient could harm the unborn baby. If a pregnant woman comes into contact with broken or crushed tablets she should check with her doctor or health care professional. Exposure to whole tablets is not expected to cause harm as long as they are not swallowed.  Contact your doctor or health care professional if your symptoms do not start to get better. You may need to take this medicine for 6 to 12 months  to get the best results.  This medicine can interfere with PSA laboratory tests for prostate cancer. If you are scheduled to have a lab test for prostate cancer, tell your doctor or health care professional that you are taking this medicine.  This medicine may increase your risk of getting some cancers, like breast cancer. Talk with your doctor.  NOTE:This sheet is a summary. It may not cover all possible information. If you have questions about this medicine, talk to your doctor, pharmacist, or health care provider. Copyright© 2017 [employer]

## 2018-07-03 ENCOUNTER — PATIENT OUTREACH (OUTPATIENT)
Dept: OTHER | Facility: OTHER | Age: 68
End: 2018-07-03

## 2018-07-03 DIAGNOSIS — I15.0 RENOVASCULAR HYPERTENSION: ICD-10-CM

## 2018-07-03 NOTE — PROGRESS NOTES
Last 5 Patient Entered Readings                                      Current 30 Day Average: 121/79     Recent Readings 7/1/2018 6/30/2018 6/28/2018 6/25/2018 6/23/2018    SBP (mmHg) 116 113 115 121 114    DBP (mmHg) 75 76 80 76 72    Pulse 56 65 67 62 67        Patient's BP average is controlled based on 2017 ACC/AHA HTN guidelines of goal BP <130/80.      Patient denies frequent s/s of hypotension (lightheadedness, dizziness, nausea, fatigue) associated with low readings. Instructed patient to inform me if this occurs, patient confirms understanding.      Patient denies s/s of hypertension (SOB, CP, severe headaches, changes in vision) associated with high readings. Instructed patient to go to the ED if BP > 180/110 and accompanied by hypertensive s/s, patient confirms understanding.     Patient's health , Gómez Zayas, will be following up every 3-4 weeks. I will continue to monitor regularly and will follow up in 3-4 weeks, sooner if BP begins to trend upward or downward.    Patient has my contact information and knows to call with any concerns or clinical changes.     Current HTN regimen:  Hypertension Medications             amlodipine (NORVASC) 10 MG tablet take 1 tablet by mouth every evening    benazepril (LOTENSIN) 40 MG tablet Take 1 tablet (40 mg total) by mouth once daily.    carvedilol (COREG) 25 MG tablet Take 0.5 tablets (12.5 mg total) by mouth 2 (two) times daily with meals.        Patient reports hctz stopped by Dr. Lugo at 6/26 appointment. Patient reports occasional lightheadedness that has not improved since discontinuation of hctz, he attributes more to his body adjusted to controlled BP readings. BP at office visit 104/69 mmHg. Advised patient to monitor BP 3 times/week.    7/5 - patient reports some fatigue with lower BP reading today. Continue to monitor. He requested refill of carvedilol.

## 2018-07-05 RX ORDER — CARVEDILOL 12.5 MG/1
12.5 TABLET ORAL 2 TIMES DAILY WITH MEALS
Qty: 180 TABLET | Refills: 1 | Status: SHIPPED | OUTPATIENT
Start: 2018-07-05 | End: 2018-12-18 | Stop reason: SDUPTHER

## 2018-07-09 ENCOUNTER — PATIENT MESSAGE (OUTPATIENT)
Dept: ADMINISTRATIVE | Facility: OTHER | Age: 68
End: 2018-07-09

## 2018-07-12 ENCOUNTER — PATIENT OUTREACH (OUTPATIENT)
Dept: OTHER | Facility: OTHER | Age: 68
End: 2018-07-12

## 2018-07-12 NOTE — PROGRESS NOTES
Last 5 Patient Entered Readings                                      Current 30 Day Average: 118/77     Recent Readings 7/10/2018 7/9/2018 7/8/2018 7/6/2018 7/5/2018    SBP (mmHg) 131 119 136 133 92    DBP (mmHg) 85 77 86 85 63    Pulse 63 62 55 63 64        Assessed elevated diastolic readings. He states he might have had something salty or might have been rushing around to get to work, he is not sure.     Digital Medicine: Health  Follow Up    Lifestyle Modifications:    1.Dietary Modifications (Sodium intake <2,000mg/day, food labels, dining out): Patient reports maintaining the same diet since previous encounter but he may treat himself to kimble on Sundays. He states having whole wheat waffle and blueberries for breakfast and salads for lunch often. Encouraged patient to limit the kimble and continue his low-sodium approach.      2.Physical Activity: Patient reports walking 1-2 miles, 3x/week in the morning. He states working on cars and cutting grass often also. Encouraged patient to drink lots of water and be mindful of heat.     3.Medication Therapy: Patient has been compliant with the medication regimen.    4.Patient has the following medication side effects/concerns:   (Frequency/Alleviating factors/Precipitating factors, etc.)     Follow up with Mr. Kulwinder Hogue Jr. completed. No further questions or concerns. Will continue follow up to achieve health goals.

## 2018-07-19 DIAGNOSIS — I10 ESSENTIAL HYPERTENSION: ICD-10-CM

## 2018-07-22 RX ORDER — BENAZEPRIL HYDROCHLORIDE 40 MG/1
TABLET ORAL
Qty: 90 TABLET | Refills: 5 | Status: SHIPPED | OUTPATIENT
Start: 2018-07-22 | End: 2018-12-19 | Stop reason: ALTCHOICE

## 2018-08-06 ENCOUNTER — PATIENT OUTREACH (OUTPATIENT)
Dept: OTHER | Facility: OTHER | Age: 68
End: 2018-08-06

## 2018-08-06 NOTE — PROGRESS NOTES
Last 5 Patient Entered Readings                                      Current 30 Day Average: 124/81     Recent Readings 8/2/2018 8/1/2018 8/1/2018 7/28/2018 7/27/2018    SBP (mmHg) 120 119 146 111 116    DBP (mmHg) 82 80 91 73 82    Pulse 70 65 64 68 69          Patient's BP average is slightly above goal of <130/80.     Patient denies s/s of hypotension (lightheadedness, dizziness, nausea, fatigue) associated with low readings. Instructed patient to inform me if this occurs, patient confirms understanding.      Patient denies s/s of hypertension (SOB, CP, severe headaches, changes in vision) associated with high readings. Instructed patient to go to the ED if BP > 180/110 and accompanied by hypertensive s/s, patient confirms understanding.    Will continue to monitor regularly. Will follow up in 4-6 weeks, sooner if BP begins to trend upward or downward.    Patient has my contact information and knows to call with any concerns or clinical changes.     Current HTN regimen:  Hypertension Medications             amlodipine (NORVASC) 10 MG tablet take 1 tablet by mouth every evening    benazepril (LOTENSIN) 40 MG tablet take 1 tablet by mouth once daily    carvedilol (COREG) 12.5 MG tablet Take 1 tablet (12.5 mg total) by mouth 2 (two) times daily with meals.        Reinforced importance of low sodium diet with prior discontinuation of hctz.

## 2018-08-09 ENCOUNTER — PATIENT OUTREACH (OUTPATIENT)
Dept: OTHER | Facility: OTHER | Age: 68
End: 2018-08-09

## 2018-08-09 NOTE — PROGRESS NOTES
Last 5 Patient Entered Readings                                      Current 30 Day Average: 123/81     Recent Readings 8/8/2018 8/7/2018 8/2/2018 8/1/2018 8/1/2018    SBP (mmHg) 117 131 120 119 146    DBP (mmHg) 79 85 82 80 91    Pulse 59 65 70 65 64          8/9-Patient answered stating he will return call when available later today.

## 2018-08-09 NOTE — PROGRESS NOTES
"Last 5 Patient Entered Readings                                      Current 30 Day Average: 123/81     Recent Readings 8/8/2018 8/7/2018 8/2/2018 8/1/2018 8/1/2018    SBP (mmHg) 117 131 120 119 146    DBP (mmHg) 79 85 82 80 91    Pulse 59 65 70 65 64        When asking patient "is there any goals you would like to work towards in regards to diet and activity" he responds with "no, not really. jail." He states feeling good with what he is doing.     Digital Medicine: Health  Follow Up    Lifestyle Modifications:    1.Dietary Modifications (Sodium intake <2,000mg/day, food labels, dining out): Patient reports maintaining the same diet. He states consuming oatmeal, blueberries and avocado. He states making most meals at home. He states trying to keep everything fresh. Encouraged patient to continue limiting the sodium and reading labels. He states he will try.     2.Physical Activity: Patient reports he is busy with yard work and working on cars frequently. Encouraged patient to continue increasing his activity by accomplishing said hobbies. He states he is trying.     3.Medication Therapy: Patient has been compliant with the medication regimen.    4.Patient has the following medication side effects/concerns:   (Frequency/Alleviating factors/Precipitating factors, etc.)     Follow up with Mr. Kulwinder Hogue Jr. completed. No further questions or concerns. Will continue follow up to achieve health goals.  "

## 2018-08-15 ENCOUNTER — PATIENT OUTREACH (OUTPATIENT)
Dept: OTHER | Facility: OTHER | Age: 68
End: 2018-08-15

## 2018-08-15 DIAGNOSIS — E78.5 HYPERLIPIDEMIA, UNSPECIFIED HYPERLIPIDEMIA TYPE: Chronic | ICD-10-CM

## 2018-08-15 DIAGNOSIS — I10 ESSENTIAL HYPERTENSION: ICD-10-CM

## 2018-08-15 RX ORDER — PRAVASTATIN SODIUM 40 MG/1
40 TABLET ORAL DAILY
Qty: 90 TABLET | Refills: 0 | Status: SHIPPED | OUTPATIENT
Start: 2018-08-15 | End: 2018-11-11 | Stop reason: SDUPTHER

## 2018-08-15 NOTE — PROGRESS NOTES
Last 5 Patient Entered Readings                                      Current 30 Day Average: 121/79     Recent Readings 8/13/2018 8/11/2018 8/8/2018 8/7/2018 8/2/2018    SBP (mmHg) 122 107 117 131 120    DBP (mmHg) 78 72 79 85 82    Pulse 64 63 59 65 70          Returned patient's call. Sent message to Dr. Lugo's staff requesting pravastatin refill.    BP remains at goals. He denies questions or concerns. Follow up in 4-6 weeks, sooner if BP trends up or down. Patient has my contact information.    Hypertension Medications             amlodipine (NORVASC) 10 MG tablet take 1 tablet by mouth every evening    benazepril (LOTENSIN) 40 MG tablet take 1 tablet by mouth once daily    carvedilol (COREG) 12.5 MG tablet Take 1 tablet (12.5 mg total) by mouth 2 (two) times daily with meals.

## 2018-08-15 NOTE — TELEPHONE ENCOUNTER
----- Message from Karen Alberto PharmD sent at 8/15/2018 11:57 AM CDT -----  Patient requesting prescription renewal for pravastatin. 90 day supply. To González in chart (Diane and Chelsey)

## 2018-08-21 ENCOUNTER — TELEPHONE (OUTPATIENT)
Dept: FAMILY MEDICINE | Facility: CLINIC | Age: 68
End: 2018-08-21

## 2018-08-21 DIAGNOSIS — L60.2 ONYCHOGRYPOSIS OF TOENAIL: Primary | ICD-10-CM

## 2018-08-21 DIAGNOSIS — L60.0 ONYCHOMYCOSIS WITH INGROWN TOENAIL: ICD-10-CM

## 2018-08-21 DIAGNOSIS — R97.20 ABNORMAL PSA: ICD-10-CM

## 2018-08-21 DIAGNOSIS — B35.1 ONYCHOMYCOSIS WITH INGROWN TOENAIL: ICD-10-CM

## 2018-08-21 NOTE — TELEPHONE ENCOUNTER
Please see attached message from patient. Patient has 2 concerns.   1. Requesting Urology Referral--advised patient message would be forwarded to Dr. Lugo regarding.     2. Patient complaining of burning sensation to the sole of his feet. States earlier this year he had ingrown toe nails removed by Dr. Church. States burning sensation started shortly thereafter. States he follow up with Dr. Church regarding and was advised ingrown toenail removal was not the cause of this sensation, states he was also advised he was not a diabetic. Patient advised appointment for evaluation was needed. Appointment scheduled for 9-5-18.  Earlier appointment offered and declined by patient due to work schedule.

## 2018-08-21 NOTE — TELEPHONE ENCOUNTER
----- Message from Leonardo Turcios sent at 8/21/2018  3:39 PM CDT -----  Contact: pt  Pt is calling to see if he can get a referral for a Urologist and also wants to talk about the burring sensation at the bottom of his feet,pls call pt back and advise  Call Back#[phone]  Thanks

## 2018-08-28 NOTE — TELEPHONE ENCOUNTER
Podiatry and Urology referrals placed by Dr. Lugo. Patient notified & verbalized understanding. Patient declined to schedule at this time. States he has to look at his calendar first. States he will call back to schedule.

## 2018-08-31 ENCOUNTER — DOCUMENTATION ONLY (OUTPATIENT)
Dept: FAMILY MEDICINE | Facility: CLINIC | Age: 68
End: 2018-08-31

## 2018-08-31 NOTE — PROGRESS NOTES
Pre-Visit Chart Review  For Appointment Scheduled on 9/5/18    Health Maintenance Due   Topic Date Due    Influenza Vaccine  08/01/2018

## 2018-09-05 ENCOUNTER — OFFICE VISIT (OUTPATIENT)
Dept: FAMILY MEDICINE | Facility: CLINIC | Age: 68
End: 2018-09-05
Payer: MEDICARE

## 2018-09-05 VITALS
BODY MASS INDEX: 24.18 KG/M2 | DIASTOLIC BLOOD PRESSURE: 77 MMHG | HEART RATE: 67 BPM | TEMPERATURE: 98 F | SYSTOLIC BLOOD PRESSURE: 121 MMHG | WEIGHT: 178.56 LBS | HEIGHT: 72 IN

## 2018-09-05 DIAGNOSIS — R20.8 BURNING SENSATION OF FEET: Primary | ICD-10-CM

## 2018-09-05 DIAGNOSIS — I10 ESSENTIAL HYPERTENSION: ICD-10-CM

## 2018-09-05 DIAGNOSIS — Z23 FLU VACCINE NEED: ICD-10-CM

## 2018-09-05 PROCEDURE — 99214 OFFICE O/P EST MOD 30 MIN: CPT | Mod: S$PBB,,, | Performed by: PHYSICIAN ASSISTANT

## 2018-09-05 PROCEDURE — 99214 OFFICE O/P EST MOD 30 MIN: CPT | Mod: PBBFAC,25,PO | Performed by: PHYSICIAN ASSISTANT

## 2018-09-05 PROCEDURE — 3074F SYST BP LT 130 MM HG: CPT | Mod: CPTII,,, | Performed by: PHYSICIAN ASSISTANT

## 2018-09-05 PROCEDURE — 1101F PT FALLS ASSESS-DOCD LE1/YR: CPT | Mod: CPTII,,, | Performed by: PHYSICIAN ASSISTANT

## 2018-09-05 PROCEDURE — 3078F DIAST BP <80 MM HG: CPT | Mod: CPTII,,, | Performed by: PHYSICIAN ASSISTANT

## 2018-09-05 PROCEDURE — 99999 PR PBB SHADOW E&M-EST. PATIENT-LVL IV: CPT | Mod: PBBFAC,,, | Performed by: PHYSICIAN ASSISTANT

## 2018-09-05 PROCEDURE — 90662 IIV NO PRSV INCREASED AG IM: CPT | Mod: PBBFAC,PO

## 2018-09-05 NOTE — PROGRESS NOTES
Subjective:       Patient ID: Kulwinder Hogue Jr. is a 67 y.o. male.    Chief Complaint: burning sentation sole both feet (started couples months ago)    Mr. Hogue is a 67 year old male who presents to clinic for evaluation of burning pain in his feet. He reports symptoms have been ongoing for several months and only occur when he is weight bearing. He denies pain is worse with prolonged standing. Worse in the L>R. He denies worsening symptoms at night. He denies numbness or tingling. He denies lower ext weakness, claudication sx, or back pain. He does have hx of plantar fasciitis, but this is stable. He states that symptoms started after he had ingrown toenail removed, but he has been evaluated by podiatry since then and he has no explanation for his symptoms. He does not have hx of hyperglycemia or type 2 diabetes. He is due for flu vaccine today.       Review of Systems   Constitutional: Negative for activity change and unexpected weight change.   HENT: Negative for hearing loss, rhinorrhea and trouble swallowing.    Eyes: Negative for discharge.   Respiratory: Negative for chest tightness and wheezing.    Cardiovascular: Negative for chest pain and palpitations.   Gastrointestinal: Negative for blood in stool, constipation, diarrhea and vomiting.   Endocrine: Negative for polydipsia and polyuria.   Genitourinary: Negative for difficulty urinating, hematuria and urgency.   Musculoskeletal: Negative for arthralgias and joint swelling.   Neurological: Negative for headaches.        Burning sensation of feet   Psychiatric/Behavioral: Negative for confusion and dysphoric mood.       Objective:      Vitals:    09/05/18 0817   BP: 121/77   Pulse: 67   Temp: 98 °F (36.7 °C)     Physical Exam   Constitutional: He is oriented to person, place, and time. He appears well-developed and well-nourished.   HENT:   Head: Normocephalic and atraumatic.   Eyes: Conjunctivae and EOM are normal. Pupils are equal, round, and  reactive to light.   Cardiovascular: Normal rate, regular rhythm, normal heart sounds and intact distal pulses.   Pulses:       Dorsalis pedis pulses are 2+ on the left side.        Posterior tibial pulses are 2+ on the left side.   Pulmonary/Chest: Effort normal and breath sounds normal.   Musculoskeletal:        Right foot: There is normal range of motion and no deformity.        Left foot: There is normal range of motion and no deformity.   Feet:   Right Foot:   Protective Sensation: 5 sites tested. 5 sites sensed.   Skin Integrity: Positive for callus. Negative for ulcer, blister or skin breakdown.   Left Foot:   Protective Sensation: 5 sites tested. 3 sites sensed.   Skin Integrity: Positive for callus. Negative for ulcer, blister or skin breakdown.   Neurological: He is alert and oriented to person, place, and time.   Skin: Skin is warm and dry.       Assessment:       1. Burning sensation of feet    2. Essential hypertension    3. Flu vaccine need        Plan:       Burning sensation of feet       Decreased sensation noted on foot exam today.        Obtain fasting labs below:  -     CBC auto differential; Future; Expected date: 09/05/2018  -     Comprehensive metabolic panel; Future; Expected date: 09/05/2018  -     TSH; Future; Expected date: 09/05/2018  -     Methylmalonic acid, serum; Future; Expected date: 09/05/2018  -     Sedimentation rate; Future; Expected date: 09/05/2018  Recommended referral to Neurology if above work up is unrevealing  Offered gabapentin trial for pain, pt declined.    Essential hypertension       - Controlled on current medication    Flu vaccine need  -     Influenza - High Dose (65+) (PF) (IM)      Patient readiness: acceptance and barriers:none    During the course of the visit the patient was educated and counseled about the following:     Hypertension:   Medication: no change.    Goals: Hypertension: Reduce Blood Pressure    Did patient meet goals/outcomes: No    The  following self management tools provided: declined    Patient Instructions (the written plan) was given to the patient/family.     Time spent with patient: 15 minutes     Follow up with PCP as scheduled

## 2018-09-05 NOTE — PATIENT INSTRUCTIONS
Established High Blood Pressure    High blood pressure (hypertension) is a chronic disease. Often, healthcare providers dont know what causes it. But it can be caused by certain health conditions and medicines.  If you have high blood pressure, you may not have any symptoms. If you do have symptoms, they may include headache, dizziness, changes in your vision, chest pain, and shortness of breath. But even without symptoms, high blood pressure thats not treated raises your risk for heart attack and stroke. High blood pressure is a serious health risk and shouldnt be ignored.  A blood pressure reading is made up of two numbers: a higher number over a lower number. The top number is the systolic pressure. The bottom number is the diastolic pressure. A normal blood pressure is a systolic pressure of  less than 120 over a diastolic pressure of less than 80. You will see your blood pressure readings written together. For example, a person with a systolic pressure of 188 and a diastolic pressure of 78 will have 118/78 written in the medical record.  High blood pressure is when either the top number is 140 or higher, or the bottom number is 90 or higher. This must be the result when taking your blood pressure a number of times. The blood pressures between normal and high are called prehypertension.  Home care  If you have high blood pressure, you should do what is listed below to lower your blood pressure. If you are taking medicines for high blood pressure, these methods may reduce or end your need for medicines in the future.  · Begin a weight-loss program if you are overweight.  · Cut back on how much salt you get in your diet. Heres how to do this:  ¨ Dont eat foods that have a lot of salt. These include olives, pickles, smoked meats, and salted potato chips.  ¨ Dont add salt to your food at the table.  ¨ Use only small amounts of salt when cooking.  · Start an exercise program. Talk with your healthcare  provider about the type of exercise program that would be best for you. It doesn't have to be hard. Even brisk walking for 20 minutes 3 times a week is a good form of exercise.  · Dont take medicines that stimulate the heart. This includes many over-the-counter cold and sinus decongestant pills and sprays, as well as diet pills. Check the warnings about hypertension on the label. Before buying any over-the-counter medicines or supplements, always ask the pharmacist about the product's potential interaction with your high blood pressure and your high blood pressure medicines.  · Stimulants such as amphetamine or cocaine could be deadly for someone with high blood pressure. Never take these.  · Limit how much caffeine you get in your diet. Switch to caffeine-free products.  · Stop smoking. If you are a long-time smoker, this can be hard. Talk to your healthcare provider about medicines and nicotine replacement options to help you. Also, enroll in a stop-smoking program to make it more likely that you will quit for good.  · Learn how to handle stress. This is an important part of any program to lower blood pressure. Learn about relaxation methods like meditation, yoga, or biofeedback.  · If your provider prescribed medicines, take them exactly as directed. Missing doses may cause your blood pressure get out of control.  · If you miss a dose or doses, check with your healthcare provider or pharmacist about what to do.  · Consider buying an automatic blood pressure machine. Ask your provider for a recommendation. You can get one of these at most pharmacies.     The American Heart Association recommends the following guidelines for home blood pressure monitoring:  · Don't smoke or drink coffee for 30 minutes before taking your blood pressure.  · Go to the bathroom before the test.  · Relax for 5 minutes before taking the measurement.  · Sit with your back supported (don't sit on a couch or soft chair); keep your feet on  the floor uncrossed. Place your arm on a solid flat surface (like a table) with the upper part of the arm at heart level. Place the middle of the cuff directly above the eye of the elbow. Check the monitor's instruction manual for an illustration.  · Take multiple readings. When you measure, take 2 to 3 readings one minute apart and record all of the results.  · Take your blood pressure at the same time every day, or as your healthcare provider recommends.  · Record the date, time, and blood pressure reading.  · Take the record with you to your next medical appointment. If your blood pressure monitor has a built-in memory, simply take the monitor with you to your next appointment.  · Call your provider if you have several high readings. Don't be frightened by a single high blood pressure reading, but if you get several high readings, check in with your healthcare provider.  · Note: When blood pressure reaches a systolic (top number) of 180 or higher OR diastolic (bottom number) of 110 or higher, seek emergency medical treatment.  Follow-up care  You will need to see your healthcare provider regularly. This is to check your blood pressure and to make changes to your medicines. Make a follow-up appointment as directed. Bring the record of your home blood pressure readings to the appointment.  When to seek medical advice  Call your healthcare provider right away if any of these occur:  · Blood pressure reaches a systolic (upper number) of 180 or higher OR a diastolic (bottom number) of 110 or higher  · Chest pain or shortness of breath  · Severe headache  · Throbbing or rushing sound in the ears  · Nosebleed  · Sudden severe pain in your belly (abdomen)  · Extreme drowsiness, confusion, or fainting  · Dizziness or spinning sensation (vertigo)  · Weakness of an arm or leg or one side of the face  · You have problems speaking or seeing   Date Last Reviewed: 12/1/2016  © 3773-8668 [employer]. [address] [place] [address]  [place] [address]. All rights reserved. This information is not intended as a substitute for professional medical care. Always follow your healthcare professional's instructions.

## 2018-09-06 ENCOUNTER — PATIENT OUTREACH (OUTPATIENT)
Dept: OTHER | Facility: OTHER | Age: 68
End: 2018-09-06

## 2018-09-06 ENCOUNTER — LAB VISIT (OUTPATIENT)
Dept: LAB | Facility: HOSPITAL | Age: 68
End: 2018-09-06
Attending: PHYSICIAN ASSISTANT
Payer: MEDICARE

## 2018-09-06 DIAGNOSIS — R20.8 BURNING SENSATION OF FEET: ICD-10-CM

## 2018-09-06 LAB
ALBUMIN SERPL BCP-MCNC: 3.9 G/DL
ALP SERPL-CCNC: 86 U/L
ALT SERPL W/O P-5'-P-CCNC: 27 U/L
ANION GAP SERPL CALC-SCNC: 6 MMOL/L
AST SERPL-CCNC: 30 U/L
BASOPHILS # BLD AUTO: 0.03 K/UL
BASOPHILS NFR BLD: 0.5 %
BILIRUB SERPL-MCNC: 2.5 MG/DL
BUN SERPL-MCNC: 13 MG/DL
CALCIUM SERPL-MCNC: 9.1 MG/DL
CHLORIDE SERPL-SCNC: 104 MMOL/L
CO2 SERPL-SCNC: 30 MMOL/L
CREAT SERPL-MCNC: 1 MG/DL
DIFFERENTIAL METHOD: ABNORMAL
EOSINOPHIL # BLD AUTO: 0.2 K/UL
EOSINOPHIL NFR BLD: 4.3 %
ERYTHROCYTE [DISTWIDTH] IN BLOOD BY AUTOMATED COUNT: 12.4 %
ERYTHROCYTE [SEDIMENTATION RATE] IN BLOOD BY WESTERGREN METHOD: 3 MM/HR
EST. GFR  (AFRICAN AMERICAN): >60 ML/MIN/1.73 M^2
EST. GFR  (NON AFRICAN AMERICAN): >60 ML/MIN/1.73 M^2
GLUCOSE SERPL-MCNC: 101 MG/DL
HCT VFR BLD AUTO: 46.8 %
HGB BLD-MCNC: 15.9 G/DL
IMM GRANULOCYTES # BLD AUTO: 0.01 K/UL
IMM GRANULOCYTES NFR BLD AUTO: 0.2 %
LYMPHOCYTES # BLD AUTO: 1.6 K/UL
LYMPHOCYTES NFR BLD: 27.8 %
MCH RBC QN AUTO: 32.8 PG
MCHC RBC AUTO-ENTMCNC: 34 G/DL
MCV RBC AUTO: 97 FL
MONOCYTES # BLD AUTO: 0.6 K/UL
MONOCYTES NFR BLD: 9.9 %
NEUTROPHILS # BLD AUTO: 3.2 K/UL
NEUTROPHILS NFR BLD: 57.3 %
NRBC BLD-RTO: 0 /100 WBC
PLATELET # BLD AUTO: 265 K/UL
PMV BLD AUTO: 9.1 FL
POTASSIUM SERPL-SCNC: 4.6 MMOL/L
PROT SERPL-MCNC: 7 G/DL
RBC # BLD AUTO: 4.85 M/UL
SODIUM SERPL-SCNC: 140 MMOL/L
TSH SERPL DL<=0.005 MIU/L-ACNC: 3.21 UIU/ML
WBC # BLD AUTO: 5.64 K/UL

## 2018-09-06 PROCEDURE — 85025 COMPLETE CBC W/AUTO DIFF WBC: CPT

## 2018-09-06 PROCEDURE — 80053 COMPREHEN METABOLIC PANEL: CPT

## 2018-09-06 PROCEDURE — 85651 RBC SED RATE NONAUTOMATED: CPT | Mod: PO

## 2018-09-06 PROCEDURE — 83921 ORGANIC ACID SINGLE QUANT: CPT

## 2018-09-06 PROCEDURE — 84443 ASSAY THYROID STIM HORMONE: CPT

## 2018-09-06 NOTE — PROGRESS NOTES
"Last 5 Patient Entered Readings                                      Current 30 Day Average: 121/79     Recent Readings 9/2/2018 8/29/2018 8/28/2018 8/28/2018 8/26/2018    SBP (mmHg) 131 113 117 146 115    DBP (mmHg) 81 79 75 83 73    Pulse 65 65 63 61 65        Very brief encounter. Patient reports he is "wrapping it up at work."     Patient reports doing "pretty much the same thing."     Encounter also consisted of patient discussing his burning sensation in feet. He also discussed going out of town next week for 1 week. Placed on hiatus.     BP is well controlled.     [facility]: Health  Follow Up    Lifestyle Modifications:    1.Dietary Modifications (Sodium intake <2,000mg/day, food labels, dining out): Patient states his diet is the same since previous encounter.     2.Physical Activity: Patient reports the physical activity is about the same since previous encounter.      3.Medication Therapy: Patient has been compliant with the medication regimen.    4.Patient has the following medication side effects/concerns:   (Frequency/Alleviating factors/Precipitating factors, etc.)     Follow up with Mr. Kulwinder Hogue Jr. completed. No further questions or concerns. Will continue follow up to achieve health goals.      "

## 2018-09-11 LAB — METHYLMALONATE SERPL-SCNC: 0.25 UMOL/L

## 2018-09-19 ENCOUNTER — PATIENT MESSAGE (OUTPATIENT)
Dept: FAMILY MEDICINE | Facility: CLINIC | Age: 68
End: 2018-09-19

## 2018-09-27 NOTE — PROGRESS NOTES
Last 5 Patient Entered Readings                                      Current 30 Day Average: 115/78     Recent Readings 9/17/2018 9/16/2018 9/9/2018 9/8/2018 9/8/2018    SBP (mmHg) 119 106 117 99 131    DBP (mmHg) 77 76 86 70 81    Pulse 68 67 68 70 65        Received call from patient that he is traveling and does not have BP cuff. Placed on hiatus through 10/5.

## 2018-10-05 ENCOUNTER — PATIENT OUTREACH (OUTPATIENT)
Dept: OTHER | Facility: OTHER | Age: 68
End: 2018-10-05

## 2018-10-05 NOTE — PROGRESS NOTES
Last 5 Patient Entered Readings                                      Current 30 Day Average: 110/77     Recent Readings 9/17/2018 9/16/2018 9/9/2018 9/8/2018 9/8/2018    SBP (mmHg) 119 106 117 99 131    DBP (mmHg) 77 76 86 70 81    Pulse 68 67 68 70 65        Brief encounter. Patient mostly discussed recent trip. Requested patient to sent BP reading when he is able. He states he will.     Will call in 2 weeks to discuss lifestyle factors.     Digital Medicine: Health  Follow Up    Lifestyle Modifications:    1.Dietary Modifications (Sodium intake <2,000mg/day, food labels, dining out): Deferred    2.Physical Activity: Deferred     3.Medication Therapy: Patient has been compliant with the medication regimen.    4.Patient has the following medication side effects/concerns:   (Frequency/Alleviating factors/Precipitating factors, etc.)     Follow up with Mr. Kulwinder Hogue Jr. completed. No further questions or concerns. Will continue to follow up to achieve health goals.

## 2018-10-08 ENCOUNTER — PATIENT OUTREACH (OUTPATIENT)
Dept: OTHER | Facility: OTHER | Age: 68
End: 2018-10-08

## 2018-10-08 DIAGNOSIS — I10 ESSENTIAL HYPERTENSION: ICD-10-CM

## 2018-10-08 RX ORDER — AMLODIPINE BESYLATE 10 MG/1
10 TABLET ORAL NIGHTLY
Qty: 90 TABLET | Refills: 1 | Status: SHIPPED | OUTPATIENT
Start: 2018-10-08 | End: 2019-04-05 | Stop reason: SDUPTHER

## 2018-10-08 NOTE — PROGRESS NOTES
Last 5 Patient Entered Readings                                      Current 30 Day Average: 119/81     Recent Readings 10/7/2018 10/6/2018 9/17/2018 9/16/2018 9/9/2018    SBP (mmHg) 128 142 119 106 117    DBP (mmHg) 83 93 77 76 86    Pulse 64 64 68 67 68          Patient's BP average is slightly above goal of <130/80.     Patient denies s/s of hypotension (lightheadedness, dizziness, nausea, fatigue) associated with low readings. Instructed patient to inform me if this occurs, patient confirms understanding.      Patient denies s/s of hypertension (SOB, CP, severe headaches, changes in vision) associated with high readings. Instructed patient to go to the ED if BP > 180/110 and accompanied by hypertensive s/s, patient confirms understanding.    Will continue to monitor regularly. Will follow up in 4-6 weeks, sooner if BP begins to trend upward or downward.    Patient has my contact information and knows to call with any concerns or clinical changes.     Current HTN regimen:  Hypertension Medications             amlodipine (NORVASC) 10 MG tablet take 1 tablet by mouth every evening    benazepril (LOTENSIN) 40 MG tablet take 1 tablet by mouth once daily    carvedilol (COREG) 12.5 MG tablet Take 1 tablet (12.5 mg total) by mouth 2 (two) times daily with meals.

## 2018-10-10 DIAGNOSIS — R97.20 ELEVATED PSA: Primary | ICD-10-CM

## 2018-10-12 ENCOUNTER — LAB VISIT (OUTPATIENT)
Dept: LAB | Facility: HOSPITAL | Age: 68
End: 2018-10-12
Attending: UROLOGY
Payer: MEDICARE

## 2018-10-12 DIAGNOSIS — R97.20 ELEVATED PSA: ICD-10-CM

## 2018-10-12 LAB
PROSTATE SPECIFIC ANTIGEN, TOTAL: 4.3 NG/ML
PSA FREE MFR SERPL: 21.16 %
PSA FREE SERPL-MCNC: 0.91 NG/ML

## 2018-10-12 PROCEDURE — 36415 COLL VENOUS BLD VENIPUNCTURE: CPT | Mod: PO

## 2018-10-12 PROCEDURE — 84154 ASSAY OF PSA FREE: CPT

## 2018-10-14 NOTE — PROGRESS NOTES
[facility] Urology:    Kulwinder Hogue Jr. is a 68 y.o. male who presents for follow up of elevated psa, transitioning care from Dr Mancia    He was last seen by Dr Mancia on 5/5/17 and was noted to have undergone prostate biopsy and cystoscopy 10/25/16 with 65.7cc gland and evidence of GUILLORY from lateral lobes with slight median lobe and grade 3 trabecs in bladder.   12 core biopsy negative. PSA was 5.2 at that time (up from 4.2 year prior). Repeat on 04/25/2017 was 6.1. He was recommended to have MRI prostate to see if any index lesions to target on biopsy.  PSA was 3.56 in April 2013. As of 8/2014 noted to have been treated in past with Sherry but had since DCed it.  In July 2011 he has pa 5.0 with free psa 13.40% and had prostate biopsy 8 core negative on 7/14/11 with 56.6cc gland and was having nocturia so started on Sherry, which he stopped in July 2012    3T MRI at [facility] 5/25/17:  Prostate size 63 mL.  Intravesical protrusion of the prostate 10 mm.  Peripheral zone:  No index lesion.  Diffuse hyperemia consistent with prostatitis.  No areas of restricted diffusion.  PI-RAD 2  Transition zone:  Circumscribed and capsulated nodules of mixed signal intensity in the background of heterogeneous mildly enlarged central gland corresponding with mild prostatic hyperplasia including findings typical for BPH nodules.  PI-RAD 2  No S VI, no lymphadenopathy, no focal aggressive appearing osseous lesions.    He has not followed up since MRI as above and returns today with repeat psa 6/15/18 of 5.4 and most recent psa on 10/12/18 of 4.3 (21.16% free)    PSA HISTORY   Ref. Range 2/28/2008 08:41 4/2/2009 08:10 4/8/2010 08:35 11/30/2010 08:07 4/8/2011 08:47 1/17/2012 09:36 6/29/2012 09:22 8/14/2013 07:38 3/2/2016 07:39 4/16/2016 09:17   PSA, SCREEN Latest Ref Range: 0.00 - 4.00 ng/mL 2.2 2.8 3.8 4.0 4.3 (H) 3.59 2.11 3.56 4.3 (H) 4.5 (H)      Ref. Range 9/11/2014 07:56 4/21/2016 08:34 10/3/2016 07:46 4/25/2017 08:19 12/15/2017 08:14  6/15/2018 07:23   PSA DIAGNOSTIC Latest Ref Range: 0.00 - 4.00 ng/mL 3.4 4.5 (H) 5.2 (H) 6.1 (H) 4.3 (H) 5.4 (H)     PSA FREE AND TOTAL:   4.3 (21.16% free) - 10/21/18      Was on a diuretic for a while which recently was stopped  When on it, was urinating a lot in the morning after the dose and in evening was better as far as frequency  And now back to routine of urinating at least 1x at night and sometimes x2, even though limits fluid intake after 6pm.  No hesitancy, rare intermittency, + PV dribble. 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  PVR 13cc  Udip trc blood only     AUA SS 4/3 mixed - 2: nocturia, 1: freq, int  Interested in minimally invasive BPH therapy  In following psa as above, took antibiotics for months and came down and went up  All the while, denies any perineal, perirectal, testicular, ejaculatory pain.   Denies any dysuria or hematuria  No Family history  malignancy or prostate cancer  father passed in mid 40s        Past Medical History:   Diagnosis Date    Elevated PSA     Hyperlipemia     Hypertension     Wears glasses        Past Surgical History:   Procedure Laterality Date    COLONOSCOPY N/A 5/2/2018    Procedure: COLONOSCOPY;  Surgeon: Joe Millan MD;  Location: [facility];  Service: Endoscopy;  Laterality: N/A;    COLONOSCOPY N/A 5/2/2018    Performed by [facility]an [facility] at [facility] ENDO    COLONOSCOPY N/A 3/25/2013    Performed by [facility]an [facility] at [facility] ENDO    COLONOSCOPY W/ POLYPECTOMY  3/2013    Tubular adenoma    CYSTOSCOPY  10/25/2016    Dr. Mancia    CYSTOSCOPY N/A 10/25/2016    Performed by Nguyễn Mancia MD at [facility] OR    EGD (ESOPHAGOGASTRODUODENOSCOPY) N/A 8/23/2013    Performed by Jamal Cortez MD at [facility] ENDO    EXCISION, MASS, NECK N/A 8/17/2012    Performed by Randy Jacques MD at [facility] OR    LIPOMA RESECTION  8-    lipoma posterior neck    PROSTATE BIOPSY   10/25/2016    Dr. Mnacia    TRANSRECTAL ULTRASOUND GUIDED PROSTATE BIOPSY Bilateral 10/25/2016    Performed by [facility]n Mancia MD at [facility] OR    VASECTOMY         Family History   Problem Relation Age of Onset    Cancer Mother         breast    Kidney cancer Neg Hx     Prostate cancer Neg Hx        Social History     Socioeconomic History    Marital status:      Spouse name: Not on file    Number of children: Not on file    Years of education: Not on file    Highest education level: Not on file   Social Needs    Financial resource strain: Not on file    Food insecurity - worry: Not on file    Food insecurity - inability: Not on file    Transportation needs - medical: Not on file    Transportation needs - non-medical: Not on file   Occupational History    Not on file   Tobacco Use    Smoking status: Never Smoker    Smokeless tobacco: Never Used   Substance and Sexual Activity    Alcohol use: Yes     Comment: WEEKEND    Drug use: No    Sexual activity: Not on file   Other Topics Concern    Not on file   Social History Narrative    Not on file       Review of patient's allergies indicates:   Allergen Reactions    Percocet [oxycodone-acetaminophen] Itching       Medications Reviewed: see MAR    ROS:    Constitutional: denies fevers, chills, night sweats, fatigue, malaise  Respiratory: negative for cough, shortness of breath, wheezing, dyspnea.  Cardiovascular: + for high blood pressure, negative for chest pain, varicose veins, ankle swelling, palpitations, syncope.  GI: negative for abdominal pain, heartburn, indigestion, nausea, vomiting, constipation, diarrhea, blood in stool.   Urology: as noted above in HPI  Endocrinology: negative for cold intolerance, excessive thirst, not feeling tired/sluggish, no heat intolerance.   Hematology/Lymph: negative for easy bleeding, easy bruising, swollen glands.  Musculoskeletal: negative for back pain, joint pain, joint swelling, neck  pain.  Allergy-Immunology: negative for seasonal allergies, negative for unusual infections.   Skin: negative for boils, breast lumps, hives, itching, rash.   Neurology: negative for, dizziness, headache, tingling/numbness, tremors.   Psych: satisfied with life; negative for, anxiety, depression, suicidal thoughts.     PHYSICAL EXAM:    Vitals:    10/15/18 0947   BP: (!) 146/81   Pulse: 60   Resp: 18   Temp: 98.2 °F (36.8 °C)     Body mass index is 23.92 kg/m². Weight: 80 kg (176 lb 5.9 oz) Height: 6' (182.9 cm)       General: Alert, cooperative, no distress, appears stated age  Head: Normocephalic, without obvious abnormality, atraumatic  Neck: no masses, no thyromegaly, no lymphadenopathy  Eyes: PERRL, conjunctiva/corneas clear  Lungs: Respirations unlabored, normal effort, no accessory muscle use  CV: Warm and well perfused extremities  Abdomen: Soft, non-tender, no CVA tenderness, no hepatosplenomegaly, no hernia  Penis: phallus normal, well cared for, no plaques or lesions.   Scrotum: no cysts, no lesions, no rash, no hydrocele.   Epididymes: normal, nontender, symmetrical, no masses or cysts.   Testes: normal, both descended, no masses.   Urethra: palpably normal with orthotopic meatus of normal size    ALMA: normal sphincter tone, no masses, no hemmorrhoids   PROSTATE: 40g, no nodules, non-tender, symmetrical.   Extremities: Extremities normal, atraumatic, no cyanosis or edema  Skin: Normal color, texture, and turgor, no rashes or lesions  Psych: Appropriate, well oriented, normal affect, normal mood  Neuro: Non-focal    Lab Results   Component Value Date    PSA 4.5 (H) 04/16/2016    PSA 4.3 (H) 03/02/2016    PSA 3.56 08/14/2013       LABS:    Recent Results (from the past 336 hour(s))   PSA, total and free    Collection Time: 10/12/18  7:16 AM   Result Value Ref Range    PSA Total 4.3 (H) 0.00 - 4.00 ng/mL    PSA, Free 0.91 0.01 - 1.50 ng/mL    PSA, Free Pct 21.16 Not established %   POCT URINE DIPSTICK  WITHOUT MICROSCOPE    Collection Time: 10/15/18  9:49 AM   Result Value Ref Range    Color, UA yellow     Spec Grav UA 1.020     pH, UA 5     WBC, UA neg     Nitrite, UA neg     Protein neg     Glucose, UA neg     Ketones, UA neg     Urobilinogen, UA neg     Bilirubin neg     Blood, UA trace          Assessment/Diagnosis:    1. Elevated prostate specific antigen (PSA)  POCT URINE DIPSTICK WITHOUT MICROSCOPE    Miscellaneous Sendout Test Other (Specify) (prostate)   2. BPH with urinary obstruction  Case Request Operating Room: CYSTOSCOPY, ULTRASOUND, RECTAL APPROACH    Place in Outpatient   3. BPH with obstruction/lower urinary tract symptoms         Plans:  I had a long discussion with the patient regarding the natural history of cancer in men as well as when diagnostics are indicated. We also discussed differential for elevated psa which also includes benign enlargement and prostatitis.  We did discuss that an elevated PSA is considered a PSA greater than 4 because statistically 20% of people in this value range are found to have prostate cancer, however we also discussed a bit about PSA velocity and trends and age specific psa elevations.   His PSA has had essentially a stable elevation for a long time with some fluctuation.  He did have a biopsy at the time when his PSA was 5.2 and this biopsy was negative.  He does have a 3 T MRI that is also negative.  He has a prostate volume of approximately 62 g and therefore his PSA density is low and PSA is normal for his prostate volume.  His most recent PSA was 4.3, which is the same PSA he had in 2011.  Given stability of PSA over time which is most likely related to BPH in this patient given normal PSA relative to prostate volume with previous biopsy findings of BPH only as well as MRI findings consistent with this as well, in addition to relatively normal free PSA percentage, no current concern regarding his PSA values.  We did discuss the natural history of prostate  cancer in men with aging in the possibility that some clinically insignificant prostate cancer may be present, though did not advise further procedural investigation at this time.  We did discuss however that his previous prostate biopsy pathology can be sent for genetic analysis to [facility] to have confirm MD X evaluation of MRN a and methylation markers known to be consistent with prostate cancer.  If his confirm MD X is negative, in the setting of stable PSA, negative MRI, negative biopsy, would not pursue, and would continue annual PSA screening    He does however have an enlarged prostate with persistent bothersome lower urinary tract symptoms.  Though symptoms are mild, he is quite bothered by them and is interested in minimally invasive strategies for BPH management, such as Urolift.  We did discuss that prior to such an intervention, would need formal lower urinary tract evaluation, and therefore described in scheduled cystourethroscopy and transrectal ultrasound guided volumetric measurement of prostate for 12/4/18.  We did also discuss other management strategies for BPH such as medical therapy.  He was on Margaret previously in the remote past and did not experience any symptomatic relief and therefore discontinued medications.  As well, some of the decline in PSA around 2012 to 2013 was likely due to the margaret use secondary to presence of 5 alpha reductase inhibitor.    40 min spent in encounter, over half in counseling,   TRUS/cysto scheduled at [facility] on 12/4/18.  We did discuss that if his confirm MD X is positive in any cores, could have further discussion about repeat investigation into his PSA

## 2018-10-15 ENCOUNTER — OFFICE VISIT (OUTPATIENT)
Dept: UROLOGY | Facility: CLINIC | Age: 68
End: 2018-10-15
Payer: MEDICARE

## 2018-10-15 VITALS
WEIGHT: 176.38 LBS | DIASTOLIC BLOOD PRESSURE: 81 MMHG | HEART RATE: 60 BPM | RESPIRATION RATE: 18 BRPM | TEMPERATURE: 98 F | SYSTOLIC BLOOD PRESSURE: 146 MMHG | BODY MASS INDEX: 23.89 KG/M2 | HEIGHT: 72 IN

## 2018-10-15 DIAGNOSIS — N40.1 BPH WITH OBSTRUCTION/LOWER URINARY TRACT SYMPTOMS: ICD-10-CM

## 2018-10-15 DIAGNOSIS — N40.1 BPH WITH URINARY OBSTRUCTION: ICD-10-CM

## 2018-10-15 DIAGNOSIS — R97.20 ELEVATED PROSTATE SPECIFIC ANTIGEN (PSA): Primary | ICD-10-CM

## 2018-10-15 DIAGNOSIS — N13.8 BPH WITH OBSTRUCTION/LOWER URINARY TRACT SYMPTOMS: ICD-10-CM

## 2018-10-15 DIAGNOSIS — N13.8 BPH WITH URINARY OBSTRUCTION: ICD-10-CM

## 2018-10-15 LAB
BILIRUB SERPL-MCNC: ABNORMAL MG/DL
BLOOD URINE, POC: ABNORMAL
COLOR, POC UA: YELLOW
GLUCOSE UR QL STRIP: ABNORMAL
KETONES UR QL STRIP: ABNORMAL
LEUKOCYTE ESTERASE URINE, POC: ABNORMAL
NITRITE, POC UA: ABNORMAL
PH, POC UA: 5
PROTEIN, POC: ABNORMAL
SPECIFIC GRAVITY, POC UA: 1.02
UROBILINOGEN, POC UA: ABNORMAL

## 2018-10-15 PROCEDURE — 81002 URINALYSIS NONAUTO W/O SCOPE: CPT | Mod: PBBFAC,PN | Performed by: UROLOGY

## 2018-10-15 PROCEDURE — 99999 PR PBB SHADOW E&M-EST. PATIENT-LVL IV: CPT | Mod: PBBFAC,,, | Performed by: UROLOGY

## 2018-10-15 PROCEDURE — 3077F SYST BP >= 140 MM HG: CPT | Mod: CPTII,,, | Performed by: UROLOGY

## 2018-10-15 PROCEDURE — 99215 OFFICE O/P EST HI 40 MIN: CPT | Mod: S$PBB,,, | Performed by: UROLOGY

## 2018-10-15 PROCEDURE — 1101F PT FALLS ASSESS-DOCD LE1/YR: CPT | Mod: CPTII,,, | Performed by: UROLOGY

## 2018-10-15 PROCEDURE — 3079F DIAST BP 80-89 MM HG: CPT | Mod: CPTII,,, | Performed by: UROLOGY

## 2018-10-15 PROCEDURE — 99214 OFFICE O/P EST MOD 30 MIN: CPT | Mod: PBBFAC,PN | Performed by: UROLOGY

## 2018-10-15 RX ORDER — LIDOCAINE HYDROCHLORIDE 20 MG/ML
JELLY TOPICAL ONCE
Status: CANCELLED | OUTPATIENT
Start: 2018-10-15 | End: 2018-10-15

## 2018-10-15 NOTE — Clinical Note
Please follow up with confirm [facility]dx that they are processing request as can only do so 2 years from last biopsy and his biopsy we have submitted for is 10/25/16.

## 2018-10-15 NOTE — LETTER
October 20, 2018      Henrique Lugo MD  [address]  [place] [address]           [place] [facility]  [address] [place] [address] [address]  [place] [address]  Phone: [phone]  Fax: [phone]          Patient: Kulwinder Hogue Jr.   MR Number: [id number]   YOB: 1950   Date of Visit: 10/15/2018       Dear Dr. Henrique Lugo:    Thank you for referring Kulwinder Hogue to me for evaluation. Attached you will find relevant portions of my assessment and plan of care.    If you have questions, please do not hesitate to call me. I look forward to following Kulwinder Hogue along with you.    Sincerely,    Jamal Jain MD    Enclosure  CC:  No Recipients    If you would like to receive this communication electronically, please contact [email or web address] or [phone] to request more information on [employer] access.    For providers and/or their staff who would like to refer a patient to [facility], please contact us through our one-stop-shop provider referral line, [facility], at [phone].    If you feel you have received this communication in error or would no longer like to receive these types of communications, please e-mail [email or web address]

## 2018-10-19 ENCOUNTER — PATIENT OUTREACH (OUTPATIENT)
Dept: OTHER | Facility: OTHER | Age: 68
End: 2018-10-19

## 2018-10-19 NOTE — PROGRESS NOTES
Last 5 Patient Entered Readings                                      Current 30 Day Average: 133/86     Recent Readings 10/19/2018 10/17/2018 10/12/2018 10/11/2018 10/7/2018    SBP (mmHg) 137 134 116 142 128    DBP (mmHg) 88 87 83 82 83    Pulse 66 59 65 66 64        Assessed elevated readings. He states having stress here and there. He states gaining weight the previous weeks and diet has not been the best.     Digital Medicine: Health  Follow Up    Lifestyle Modifications:    1.Dietary Modifications (Sodium intake <2,000mg/day, food labels, dining out): He reports he was eating at [employer] stop during vacation. He reports he is trying to return to previous low-sodium diet. He reports returning to his oatmeal with blueberries. He states he will try to increase salad consumption.     2.Physical Activity: He states needing to increase walking. Encouraged patient to increase his walking when able.     3.Medication Therapy: Patient has been compliant with the medication regimen.    4.Patient has the following medication side effects/concerns:   (Frequency/Alleviating factors/Precipitating factors, etc.)     Follow up with Mr. Kulwinder Hogue Jr. completed. No further questions or concerns. Will continue to follow up to achieve health goals.

## 2018-10-26 ENCOUNTER — PATIENT OUTREACH (OUTPATIENT)
Dept: OTHER | Facility: OTHER | Age: 68
End: 2018-10-26

## 2018-10-26 ENCOUNTER — LAB VISIT (OUTPATIENT)
Dept: LAB | Facility: HOSPITAL | Age: 68
End: 2018-10-26
Attending: FAMILY MEDICINE
Payer: MEDICARE

## 2018-10-26 DIAGNOSIS — Z00.00 ANNUAL PHYSICAL EXAM: ICD-10-CM

## 2018-10-26 DIAGNOSIS — E78.2 HYPERLIPIDEMIA, MIXED: ICD-10-CM

## 2018-10-26 LAB
ALBUMIN SERPL BCP-MCNC: 3.9 G/DL
ALP SERPL-CCNC: 95 U/L
ALT SERPL W/O P-5'-P-CCNC: 29 U/L
ANION GAP SERPL CALC-SCNC: 5 MMOL/L
AST SERPL-CCNC: 29 U/L
BASOPHILS # BLD AUTO: 0.02 K/UL
BASOPHILS NFR BLD: 0.3 %
BILIRUB SERPL-MCNC: 2.7 MG/DL
BUN SERPL-MCNC: 9 MG/DL
CALCIUM SERPL-MCNC: 9.3 MG/DL
CHLORIDE SERPL-SCNC: 103 MMOL/L
CHOLEST SERPL-MCNC: 163 MG/DL
CHOLEST/HDLC SERPL: 2.8 {RATIO}
CO2 SERPL-SCNC: 32 MMOL/L
CREAT SERPL-MCNC: 1 MG/DL
DIFFERENTIAL METHOD: ABNORMAL
EOSINOPHIL # BLD AUTO: 0.2 K/UL
EOSINOPHIL NFR BLD: 2.7 %
ERYTHROCYTE [DISTWIDTH] IN BLOOD BY AUTOMATED COUNT: 12.6 %
EST. GFR  (AFRICAN AMERICAN): >60 ML/MIN/1.73 M^2
EST. GFR  (NON AFRICAN AMERICAN): >60 ML/MIN/1.73 M^2
GLUCOSE SERPL-MCNC: 104 MG/DL
HCT VFR BLD AUTO: 49.3 %
HDLC SERPL-MCNC: 58 MG/DL
HDLC SERPL: 35.6 %
HGB BLD-MCNC: 16.6 G/DL
IMM GRANULOCYTES # BLD AUTO: 0.01 K/UL
IMM GRANULOCYTES NFR BLD AUTO: 0.2 %
LDLC SERPL CALC-MCNC: 91.6 MG/DL
LYMPHOCYTES # BLD AUTO: 1.9 K/UL
LYMPHOCYTES NFR BLD: 30.3 %
MCH RBC QN AUTO: 32.6 PG
MCHC RBC AUTO-ENTMCNC: 33.7 G/DL
MCV RBC AUTO: 97 FL
MONOCYTES # BLD AUTO: 0.5 K/UL
MONOCYTES NFR BLD: 7.9 %
NEUTROPHILS # BLD AUTO: 3.6 K/UL
NEUTROPHILS NFR BLD: 58.6 %
NONHDLC SERPL-MCNC: 105 MG/DL
NRBC BLD-RTO: 0 /100 WBC
PLATELET # BLD AUTO: 280 K/UL
PMV BLD AUTO: 9.2 FL
POTASSIUM SERPL-SCNC: 3.7 MMOL/L
PROT SERPL-MCNC: 7.2 G/DL
RBC # BLD AUTO: 5.09 M/UL
SODIUM SERPL-SCNC: 140 MMOL/L
TRIGL SERPL-MCNC: 67 MG/DL
WBC # BLD AUTO: 6.2 K/UL

## 2018-10-26 PROCEDURE — 80053 COMPREHEN METABOLIC PANEL: CPT

## 2018-10-26 PROCEDURE — 36415 COLL VENOUS BLD VENIPUNCTURE: CPT | Mod: PO

## 2018-10-26 PROCEDURE — 80061 LIPID PANEL: CPT

## 2018-10-26 PROCEDURE — 86803 HEPATITIS C AB TEST: CPT

## 2018-10-26 PROCEDURE — 85025 COMPLETE CBC W/AUTO DIFF WBC: CPT

## 2018-10-26 NOTE — PROGRESS NOTES
Last 5 Patient Entered Readings                                      Current 30 Day Average: 133/86     Recent Readings 10/24/2018 10/19/2018 10/17/2018 10/12/2018 10/11/2018    SBP (mmHg) 135 137 134 116 142    DBP (mmHg) 88 88 87 83 82    Pulse 62 66 59 65 66          Patient's BP average is above goal of <130/80.     Patient denies s/s of hypotension (lightheadedness, dizziness, nausea, fatigue) associated with low readings. Instructed patient to inform me if this occurs, patient confirms understanding.      Patient denies s/s of hypertension (SOB, CP, severe headaches, changes in vision) associated with high readings. Instructed patient to go to the ED if BP > 180/110 and accompanied by hypertensive s/s, patient confirms understanding.    Returned patient's call regarding pain medications (hydrocodone-apap) s/p root canal and BP medications. Reviewed no DDIs identified, however, hydrocodone-apap can decrease BP. Reviewed driving precautions and to take as needed only. He verbalized understanding. He confirmed as per health  note that he is trying to resume healthier diet since returning to [facility].    Will continue to monitor regularly. Will follow up in 2-3 weeks, sooner if BP begins to trend upward or downward.    Patient has my contact information and knows to call with any concerns or clinical changes.     Current HTN regimen:  Hypertension Medications             amLODIPine (NORVASC) 10 MG tablet Take 1 tablet (10 mg total) by mouth every evening.    benazepril (LOTENSIN) 40 MG tablet take 1 tablet by mouth once daily    carvedilol (COREG) 12.5 MG tablet Take 1 tablet (12.5 mg total) by mouth 2 (two) times daily with meals.

## 2018-10-29 LAB — HCV AB SERPL QL IA: NEGATIVE

## 2018-11-03 ENCOUNTER — PATIENT MESSAGE (OUTPATIENT)
Dept: FAMILY MEDICINE | Facility: CLINIC | Age: 68
End: 2018-11-03

## 2018-11-03 DIAGNOSIS — R17 CHRONIC UNCONJUGATED HYPERBILIRUBINEMIA: Primary | ICD-10-CM

## 2018-11-03 NOTE — TELEPHONE ENCOUNTER
Since hx of Fatty liver than should have another US and further liver test to r/o autoimmune diseases and gastro/hepatology consult. 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

## 2018-11-05 ENCOUNTER — TELEPHONE (OUTPATIENT)
Dept: FAMILY MEDICINE | Facility: CLINIC | Age: 68
End: 2018-11-05

## 2018-11-05 NOTE — TELEPHONE ENCOUNTER
Patient was given results. But no order for Ultrasound in system.  Patient would like to schedule the ultrasound of the liver. Please put order in.

## 2018-11-11 DIAGNOSIS — E78.5 HYPERLIPIDEMIA, UNSPECIFIED HYPERLIPIDEMIA TYPE: Chronic | ICD-10-CM

## 2018-11-11 DIAGNOSIS — I10 ESSENTIAL HYPERTENSION: ICD-10-CM

## 2018-11-12 ENCOUNTER — PATIENT OUTREACH (OUTPATIENT)
Dept: OTHER | Facility: OTHER | Age: 68
End: 2018-11-12

## 2018-11-12 ENCOUNTER — HOSPITAL ENCOUNTER (OUTPATIENT)
Dept: RADIOLOGY | Facility: CLINIC | Age: 68
Discharge: HOME | End: 2018-11-12
Attending: FAMILY MEDICINE
Payer: MEDICARE

## 2018-11-12 ENCOUNTER — TELEPHONE (OUTPATIENT)
Dept: UROLOGY | Facility: CLINIC | Age: 68
End: 2018-11-12

## 2018-11-12 DIAGNOSIS — R17 CHRONIC UNCONJUGATED HYPERBILIRUBINEMIA: ICD-10-CM

## 2018-11-12 PROCEDURE — 76700 US EXAM ABDOM COMPLETE: CPT | Mod: 26,,, | Performed by: RADIOLOGY

## 2018-11-12 PROCEDURE — 76700 US EXAM ABDOM COMPLETE: CPT | Mod: TC,PO

## 2018-11-12 RX ORDER — PRAVASTATIN SODIUM 40 MG/1
TABLET ORAL
Qty: 90 TABLET | Refills: 0 | Status: SHIPPED | OUTPATIENT
Start: 2018-11-12 | End: 2019-01-21 | Stop reason: SDUPTHER

## 2018-11-12 NOTE — PROGRESS NOTES
Last 5 Patient Entered Readings                                      Current 30 Day Average: 137/87     Recent Readings 11/11/2018 11/10/2018 11/7/2018 11/4/2018 10/31/2018    SBP (mmHg) 148 158 136 136 139    DBP (mmHg) 89 97 86 82 88    Pulse 69 61 69 66 63          Patient's BP average is above goal of <130/80.     Patient denies s/s of hypotension (lightheadedness, dizziness, nausea, fatigue) associated with low readings. Instructed patient to inform me if this occurs, patient confirms understanding.      Patient denies s/s of hypertension (SOB, CP, severe headaches, changes in vision) associated with high readings. Instructed patient to go to the ED if BP > 180/110 and accompanied by hypertensive s/s, patient confirms understanding.    Patient reports 11/10 reading taken soon after waking from nap. Attributes 11/11 reading in part to diet choices and not sleeping well the night before after watching football game. He also has some anxiety about ultrasound and blood work done today. He has not charged his BP cuff recently and agreed to do so. Continue to monitor; consider changing benazepril to valsartan if BP remains elevated on follow up.    Will continue to monitor regularly. Will follow up in 2-3 weeks, sooner if BP begins to trend upward or downward.    Patient has my contact information and knows to call with any concerns or clinical changes.     Current HTN regimen:  Hypertension Medications             amLODIPine (NORVASC) 10 MG tablet Take 1 tablet (10 mg total) by mouth every evening.    benazepril (LOTENSIN) 40 MG tablet take 1 tablet by mouth once daily    carvedilol (COREG) 12.5 MG tablet Take 1 tablet (12.5 mg total) by mouth 2 (two) times daily with meals.

## 2018-11-13 NOTE — TELEPHONE ENCOUNTER
Can notify patient Confirm MD[facility] genetic analysis of prostate biopsy was negative for any methylation markers consistent with prostate cancer. Will proceed as planned with cysto/trus only

## 2018-11-14 NOTE — PROGRESS NOTES
Last 5 Patient Entered Readings                                      Current 30 Day Average: 137/87     Recent Readings 11/11/2018 11/10/2018 11/7/2018 11/4/2018 10/31/2018    SBP (mmHg) 148 158 136 136 139    DBP (mmHg) 89 97 86 82 88    Pulse 69 61 69 66 63          11/14 - Pharmacist spoke to pt on 11/12. Will f/u with pt and introduce myself as his new health  next week so as to not overwhelm with frequent calls.

## 2018-11-20 ENCOUNTER — PATIENT OUTREACH (OUTPATIENT)
Dept: OTHER | Facility: OTHER | Age: 68
End: 2018-11-20

## 2018-11-20 NOTE — PROGRESS NOTES
"Last 5 Patient Entered Readings                                      Current 30 Day Average: 136/86     Recent Readings 11/19/2018 11/17/2018 11/11/2018 11/10/2018 11/7/2018    SBP (mmHg) 133 124 148 158 136    DBP (mmHg) 83 82 89 97 86    Pulse 66 67 69 61 69          Digital Medicine: Health  Follow Up    Lifestyle Modifications:    1.Dietary Modifications (Sodium intake <2,000mg/day, food labels, dining out): Pt reports no changes since last encounter with previous HC. States that he is continuing to "maintain a healthy diet, but is not sure how that is going to go with the holidays." Will send [facility] holiday eating guide to email.    2.Physical Activity: Pt reports no changes in activity since last encounter with previous HC.     3.Medication Therapy: Patient has been compliant with the medication regimen.    4.Patient has the following medication side effects/concerns: None.  (Frequency/Alleviating factors/Precipitating factors, etc.)     Follow up with Mr. Kulwinder Hogue Jr. completed. No further questions or concerns. Will continue to follow up to achieve health goals.    "

## 2018-11-26 ENCOUNTER — PATIENT OUTREACH (OUTPATIENT)
Dept: OTHER | Facility: OTHER | Age: 68
End: 2018-11-26

## 2018-11-26 NOTE — PROGRESS NOTES
Last 5 Patient Entered Readings                                      Current 30 Day Average: 136/85     Recent Readings 11/19/2018 11/17/2018 11/11/2018 11/10/2018 11/7/2018    SBP (mmHg) 133 124 148 158 136    DBP (mmHg) 83 82 89 97 86    Pulse 66 67 69 61 69          Left voicemail. Consider increasing carvedilol, resuming hctz, or changing benazepril to ARB.

## 2018-11-26 NOTE — PROGRESS NOTES
Last 5 Patient Entered Readings                                      Current 30 Day Average: 136/85     Recent Readings 11/19/2018 11/17/2018 11/11/2018 11/10/2018 11/7/2018    SBP (mmHg) 133 124 148 158 136    DBP (mmHg) 83 82 89 97 86    Pulse 66 67 69 61 69        Patient's BP average is above goal of <130/80.     Patient denies s/s of hypotension (lightheadedness, dizziness, nausea, fatigue) associated with low readings. Instructed patient to inform me if this occurs, patient confirms understanding.      Patient denies s/s of hypertension (SOB, CP, severe headaches, changes in vision) associated with high readings. Instructed patient to go to the ED if BP > 180/110 and accompanied by hypertensive s/s, patient confirms understanding.    Patient unable to explain improvement of recent BP readings. Requested at least 3 BP readings per week to better assess control. Consider increasing carvedilol, resuming hctz, or changing benazepril to ARB.    Will continue to monitor regularly. Will follow up in 2-3 weeks, sooner if BP begins to trend upward or downward.    Patient has my contact information and knows to call with any concerns or clinical changes.     Current HTN regimen:  Hypertension Medications             amLODIPine (NORVASC) 10 MG tablet Take 1 tablet (10 mg total) by mouth every evening.    benazepril (LOTENSIN) 40 MG tablet take 1 tablet by mouth once daily    carvedilol (COREG) 12.5 MG tablet Take 1 tablet (12.5 mg total) by mouth 2 (two) times daily with meals.

## 2018-12-04 ENCOUNTER — HOSPITAL ENCOUNTER (OUTPATIENT)
Facility: AMBULARY SURGERY CENTER | Age: 68
Discharge: HOME | End: 2018-12-04
Attending: UROLOGY | Admitting: UROLOGY
Payer: MEDICARE

## 2018-12-04 DIAGNOSIS — N13.8 BPH WITH OBSTRUCTION/LOWER URINARY TRACT SYMPTOMS: ICD-10-CM

## 2018-12-04 DIAGNOSIS — R58 BLEEDING: ICD-10-CM

## 2018-12-04 DIAGNOSIS — N40.1 BPH WITH OBSTRUCTION/LOWER URINARY TRACT SYMPTOMS: Primary | ICD-10-CM

## 2018-12-04 DIAGNOSIS — N40.1 BPH WITH URINARY OBSTRUCTION: ICD-10-CM

## 2018-12-04 DIAGNOSIS — N13.8 BPH WITH URINARY OBSTRUCTION: ICD-10-CM

## 2018-12-04 DIAGNOSIS — N13.8 BPH WITH OBSTRUCTION/LOWER URINARY TRACT SYMPTOMS: Primary | ICD-10-CM

## 2018-12-04 DIAGNOSIS — N40.1 BPH WITH OBSTRUCTION/LOWER URINARY TRACT SYMPTOMS: ICD-10-CM

## 2018-12-04 PROCEDURE — 52000 CYSTOURETHROSCOPY: CPT | Mod: ,,, | Performed by: UROLOGY

## 2018-12-04 PROCEDURE — 52000 CYSTOURETHROSCOPY: CPT | Performed by: UROLOGY

## 2018-12-04 PROCEDURE — 76872 US TRANSRECTAL: CPT | Performed by: UROLOGY

## 2018-12-04 PROCEDURE — 76872 US TRANSRECTAL: CPT | Mod: 26,,, | Performed by: UROLOGY

## 2018-12-04 RX ORDER — WATER 1 ML/ML
IRRIGANT IRRIGATION
Status: DISCONTINUED | OUTPATIENT
Start: 2018-12-04 | End: 2018-12-04 | Stop reason: HOSPADM

## 2018-12-04 RX ORDER — LIDOCAINE HYDROCHLORIDE 20 MG/ML
JELLY TOPICAL
Status: DISCONTINUED
Start: 2018-12-04 | End: 2018-12-04 | Stop reason: HOSPADM

## 2018-12-04 RX ORDER — LIDOCAINE HYDROCHLORIDE 20 MG/ML
JELLY TOPICAL ONCE
Status: COMPLETED | OUTPATIENT
Start: 2018-12-04 | End: 2018-12-04

## 2018-12-04 RX ORDER — CIPROFLOXACIN 500 MG/1
500 TABLET ORAL 2 TIMES DAILY
Qty: 4 TABLET | Refills: 0 | Status: SHIPPED | OUTPATIENT
Start: 2018-12-04 | End: 2018-12-13

## 2018-12-04 RX ORDER — TAMSULOSIN HYDROCHLORIDE 0.4 MG/1
0.4 CAPSULE ORAL DAILY
Qty: 30 CAPSULE | Refills: 11 | Status: SHIPPED | OUTPATIENT
Start: 2018-12-04 | End: 2019-03-19 | Stop reason: ALTCHOICE

## 2018-12-04 RX ADMIN — LIDOCAINE HYDROCHLORIDE 5 ML: 20 JELLY TOPICAL at 02:12

## 2018-12-04 NOTE — H&P
[facility]:     Kulwinder Hogue Jr. is a 68 y.o. male who presents for follow up of elevated psa, transitioning care from Dr Mancia     He was last seen by Dr Mancia on 5/5/17 and was noted to have undergone prostate biopsy and cystoscopy 10/25/16 with 65.7cc gland and evidence of GUILLORY from lateral lobes with slight median lobe and grade 3 trabecs in bladder.   12 core biopsy negative. PSA was 5.2 at that time (up from 4.2 year prior). Repeat on 04/25/2017 was 6.1. He was recommended to have MRI prostate to see if any index lesions to target on biopsy.  PSA was 3.56 in April 2013. As of 8/2014 noted to have been treated in past with Sherry but had since DCed it.  In July 2011 he has pa 5.0 with free psa 13.40% and had prostate biopsy 8 core negative on 7/14/11 with 56.6cc gland and was having nocturia so started on Sherry, which he stopped in July 2012     3T MRI at [facility] 5/25/17:  Prostate size 63 mL.  Intravesical protrusion of the prostate 10 mm.  Peripheral zone:  No index lesion.  Diffuse hyperemia consistent with prostatitis.  No areas of restricted diffusion.  PI-RAD 2  Transition zone:  Circumscribed and capsulated nodules of mixed signal intensity in the background of heterogeneous mildly enlarged central gland corresponding with mild prostatic hyperplasia including findings typical for BPH nodules.  PI-RAD 2  No S VI, no lymphadenopathy, no focal aggressive appearing osseous lesions.     He has not followed up since MRI as above and returns today with repeat psa 6/15/18 of 5.4 and most recent psa on 10/12/18 of 4.3 (21.16% free)     PSA HISTORY    Ref. Range 2/28/2008 08:41 4/2/2009 08:10 4/8/2010 08:35 11/30/2010 08:07 4/8/2011 08:47 1/17/2012 09:36 6/29/2012 09:22 8/14/2013 07:38 3/2/2016 07:39 4/16/2016 09:17   PSA, SCREEN Latest Ref Range: 0.00 - 4.00 ng/mL 2.2 2.8 3.8 4.0 4.3 (H) 3.59 2.11 3.56 4.3 (H) 4.5 (H)        Ref. Range 9/11/2014 07:56 4/21/2016 08:34 10/3/2016 07:46 4/25/2017 08:19 12/15/2017  08:14 6/15/2018 07:23   PSA DIAGNOSTIC Latest Ref Range: 0.00 - 4.00 ng/mL 3.4 4.5 (H) 5.2 (H) 6.1 (H) 4.3 (H) 5.4 (H)      PSA FREE AND TOTAL:   4.3 (21.16% free) - 10/21/18        Was on a diuretic for a while which recently was stopped  When on it, was urinating a lot in the morning after the dose and in evening was better as far as frequency  And now back to routine of urinating at least 1x at night and sometimes x2, even though limits fluid intake after 6pm.  No hesitancy, rare intermittency, + PV dribble. 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  PVR 13cc  Udip trc blood only      AUA SS 4/3 mixed - 2: nocturia, 1: freq, int  Interested in minimally invasive BPH therapy  In following psa as above, took antibiotics for months and came down and went up  All the while, denies any perineal, perirectal, testicular, ejaculatory pain.   Denies any dysuria or hematuria  No Family history  malignancy or prostate cancer  father passed in mid 40s                Past Medical History:   Diagnosis Date    Elevated PSA      Hyperlipemia      Hypertension      Wears glasses                 Past Surgical History:   Procedure Laterality Date    COLONOSCOPY N/A 5/2/2018     Procedure: COLONOSCOPY;  Surgeon: Joe Millan MD;  Location: [facility];  Service: Endoscopy;  Laterality: N/A;    COLONOSCOPY N/A 5/2/2018     Performed by [facility] at [facility]    COLONOSCOPY N/A 3/25/2013     [facility] by [facility] at [facility]    COLONOSCOPY W/ POLYPECTOMY   3/2013     Tubular adenoma    CYSTOSCOPY   10/25/2016     Dr. Mancia    CYSTOSCOPY N/A 10/25/2016     Performed by [facility]ễn [facility]ancia [facility] at [facility] OR    EGD (ESOPHAGOGASTRODUODENOSCOPY) N/A 8/23/2013     Performed by Jamal Cortez MD at [facility] ENDO    EXCISION, MASS, NECK N/A 8/17/2012     Performed by Randy Jacques [facility] at [facility] OR    LIPOMA RESECTION   8-     lipoma  posterior neck    PROSTATE BIOPSY   10/25/2016     Dr. Mancia    TRANSRECTAL ULTRASOUND GUIDED PROSTATE BIOPSY Bilateral 10/25/2016     Performed by [facility]n Mancia MD at [facility] OR    VASECTOMY                   Family History   Problem Relation Age of Onset    Cancer Mother           breast    Kidney cancer Neg Hx      Prostate cancer Neg Hx           Social History               Socioeconomic History    Marital status:        Spouse name: Not on file    Number of children: Not on file    Years of education: Not on file    Highest education level: Not on file   Social Needs    Financial resource strain: Not on file    Food insecurity - worry: Not on file    Food insecurity - inability: Not on file    Transportation needs - medical: Not on file    Transportation needs - non-medical: Not on file   Occupational History    Not on file   Tobacco Use    Smoking status: Never Smoker    Smokeless tobacco: Never Used   Substance and Sexual Activity    Alcohol use: Yes       Comment: WEEKEND    Drug use: No    Sexual activity: Not on file   Other Topics Concern    Not on file   Social History Narrative    Not on file                 Review of patient's allergies indicates:   Allergen Reactions    Percocet [oxycodone-acetaminophen] Itching         Medications Reviewed: see MAR     ROS:     Constitutional: denies fevers, chills, night sweats, fatigue, malaise  Respiratory: negative for cough, shortness of breath, wheezing, dyspnea.  Cardiovascular: + for high blood pressure, negative for chest pain, varicose veins, ankle swelling, palpitations, syncope.  GI: negative for abdominal pain, heartburn, indigestion, nausea, vomiting, constipation, diarrhea, blood in stool.   Urology: as noted above in HPI  Endocrinology: negative for cold intolerance, excessive thirst, not feeling tired/sluggish, no heat intolerance.   Hematology/Lymph: negative for easy bleeding, easy bruising, swollen  glands.  Musculoskeletal: negative for back pain, joint pain, joint swelling, neck pain.  Allergy-Immunology: negative for seasonal allergies, negative for unusual infections.   Skin: negative for boils, breast lumps, hives, itching, rash.   Neurology: negative for, dizziness, headache, tingling/numbness, tremors.   Psych: satisfied with life; negative for, anxiety, depression, suicidal thoughts.      PHYSICAL EXAM:         Vitals:     10/15/18 0947   BP: (!) 146/81   Pulse: 60   Resp: 18   Temp: 98.2 °F (36.8 °C)      Body mass index is 23.92 kg/m². Weight: 80 kg (176 lb 5.9 oz) Height: 6' (182.9 cm)         General: Alert, cooperative, no distress, appears stated age  Head: Normocephalic, without obvious abnormality, atraumatic  Neck: no masses, no thyromegaly, no lymphadenopathy  Eyes: PERRL, conjunctiva/corneas clear  Lungs: Respirations unlabored, normal effort, no accessory muscle use  CV: Warm and well perfused extremities  Abdomen: Soft, non-tender, no CVA tenderness, no hepatosplenomegaly, no hernia  Penis: phallus normal, well cared for, no plaques or lesions.   Scrotum: no cysts, no lesions, no rash, no hydrocele.   Epididymes: normal, nontender, symmetrical, no masses or cysts.   Testes: normal, both descended, no masses.   Urethra: palpably normal with orthotopic meatus of normal size    ALMA: normal sphincter tone, no masses, no hemmorrhoids   PROSTATE: 40g, no nodules, non-tender, symmetrical.   Extremities: Extremities normal, atraumatic, no cyanosis or edema  Skin: Normal color, texture, and turgor, no rashes or lesions  Psych: Appropriate, well oriented, normal affect, normal mood  Neuro: Non-focal           Lab Results   Component Value Date     PSA 4.5 (H) 04/16/2016     PSA 4.3 (H) 03/02/2016     PSA 3.56 08/14/2013         LABS:     Recent Results         Recent Results (from the past 336 hour(s))   PSA, total and free     Collection Time: 10/12/18  7:16 AM   Result Value Ref Range     PSA Total  4.3 (H) 0.00 - 4.00 ng/mL     PSA, Free 0.91 0.01 - 1.50 ng/mL     PSA, Free Pct 21.16 Not established %   POCT URINE DIPSTICK WITHOUT MICROSCOPE     Collection Time: 10/15/18  9:49 AM   Result Value Ref Range     Color, UA yellow       Spec Grav UA 1.020       pH, UA 5       WBC, UA neg       Nitrite, UA neg       Protein neg       Glucose, UA neg       Ketones, UA neg       Urobilinogen, UA neg       Bilirubin neg       Blood, UA trace                 Assessment/Diagnosis:     1. Elevated prostate specific antigen (PSA)  POCT URINE DIPSTICK WITHOUT MICROSCOPE     Miscellaneous Sendout Test Other (Specify) (prostate)   2. BPH with urinary obstruction  Case Request Operating Room: CYSTOSCOPY, ULTRASOUND, RECTAL APPROACH     Place in Outpatient   3. BPH with obstruction/lower urinary tract symptoms            Plans:  I had a long discussion with the patient regarding the natural history of cancer in men as well as when diagnostics are indicated. We also discussed differential for elevated psa which also includes benign enlargement and prostatitis.  We did discuss that an elevated PSA is considered a PSA greater than 4 because statistically 20% of people in this value range are found to have prostate cancer, however we also discussed a bit about PSA velocity and trends and age specific psa elevations.   His PSA has had essentially a stable elevation for a long time with some fluctuation.  He did have a biopsy at the time when his PSA was 5.2 and this biopsy was negative.  He does have a 3 T MRI that is also negative.  He has a prostate volume of approximately 62 g and therefore his PSA density is low and PSA is normal for his prostate volume.  His most recent PSA was 4.3, which is the same PSA he had in 2011.  Given stability of PSA over time which is most likely related to BPH in this patient given normal PSA relative to prostate volume with previous biopsy findings of BPH only as well as MRI findings consistent with  this as well, in addition to relatively normal free PSA percentage, no current concern regarding his PSA values.  We did discuss the natural history of prostate cancer in men with aging in the possibility that some clinically insignificant prostate cancer may be present, though did not advise further procedural investigation at this time.  We did discuss however that his previous prostate biopsy pathology can be sent for genetic analysis to [facility] to have confirm MD X evaluation of MRN a and methylation markers known to be consistent with prostate cancer.  If his confirm MD X is negative, in the setting of stable PSA, negative MRI, negative biopsy, would not pursue, and would continue annual PSA screening     He does however have an enlarged prostate with persistent bothersome lower urinary tract symptoms.  Though symptoms are mild, he is quite bothered by them and is interested in minimally invasive strategies for BPH management, such as Urolift.  We did discuss that prior to such an intervention, would need formal lower urinary tract evaluation, and therefore described in scheduled cystourethroscopy and transrectal ultrasound guided volumetric measurement of prostate for 12/4/18.  We did also discuss other management strategies for BPH such as medical therapy.  He was on Margaret previously in the remote past and did not experience any symptomatic relief and therefore discontinued medications.  As well, some of the decline in PSA around 2012 to 2013 was likely due to the margaret use secondary to presence of 5 alpha reductase inhibitor.     40 min spent in encounter, over half in counseling,   TRUS/cysto scheduled at [facility] on 12/4/18.  We did discuss that if his confirm MD X is positive in any cores, could have further discussion about repeat investigation into his PSA      Confirm mdx negative  Proceed with cysto/trus  Pt is acceptable candidate for procedure at [facility]

## 2018-12-05 ENCOUNTER — TELEPHONE (OUTPATIENT)
Dept: FAMILY MEDICINE | Facility: CLINIC | Age: 68
End: 2018-12-05

## 2018-12-05 VITALS
HEIGHT: 72 IN | RESPIRATION RATE: 20 BRPM | TEMPERATURE: 98 F | HEART RATE: 68 BPM | DIASTOLIC BLOOD PRESSURE: 88 MMHG | SYSTOLIC BLOOD PRESSURE: 134 MMHG | WEIGHT: 176.38 LBS | OXYGEN SATURATION: 100 % | BODY MASS INDEX: 23.89 KG/M2

## 2018-12-05 NOTE — TELEPHONE ENCOUNTER
----- Message from Jamal Jain MD sent at 12/4/2018 10:10 PM CST -----  Regarding: preop clearance  Has routine follow up in 2 weeks  Scheduled for urolift in OR on 2/14/19   Please eval.clear for procedure  If you feel a preop cardiac eval is warranted to clear please arrange

## 2018-12-05 NOTE — TELEPHONE ENCOUNTER
Please see attached message from Dr. Jain. Patient scheduled for Urolift on 2-14-19. Requesting clearance related to procedure. Patient has existing future appointment with Dr. Lugo on 12-13-18. Please advise.

## 2018-12-05 NOTE — OP NOTE
[facility] Operative Report/Brief Discharge Note     Date: 12/4/18     Staff Surgeon: Jamal Jain MD     Pre-Op Diagnosis:   BPH with LUTS     Post-Op Diagnosis: same     Procedure(s) Performed:   1. Cystoscopy, flexible  2. Transrectal ultrasound with volumetric prostate measurement     Specimen(s): none     Anesthesia: Local: 2% xylocaine jelly per urethra (urojet)     Findings:   US: volume 91.5cm3 (H: 44.7mm, W 57.3mm, 68.2mm), ++median lobe, mild PVR  Cysto: Bilateral lateral lobe ingrowth causing significant obstruction especially when viewed from verumontanum, with kissing lobes centrally. Signicant median lobe growing in from posterior bladder neck essentially completely obstructing it with encroachment and obstruction from middle lobe leaving only slit like opening at bladder neck, also seen when withdrawing scope into prostatic urethra  Moderate trabeculations    Estimated Blood Loss: none     Drains: none     Complications: none      Indications for procedure:   Mr Hogue is a 67 yo M with history of elevated psa in 4-5 range with past negative biopsy of which confirm MDX negative and had 3T mri prior to biopsy in 2017 with 66g prostate with 1cm intravesical extension. Most recent psa 4.3 (21.6% free) refractory obstructive LUTS, BPH. Reported only AUA SS 4/3 but subjectively noted more bothersome and expressed interest in BPH interventions. Here today for cysto/trus for further evaluation and recommendations.     Procedure in detail:  After informed consent, the patient was placed in left lateral decubitus position. Transrectal ultrasound probe was passed into the rectum and the prostate was visualized on the screen.  Three-dimensional measurements taken as above with a total prostate volume of 91.5g.  On sagittal view there was moderate median lobe intravesical extension.  Images captured. No ultrasonic abnormalities of prostate visualized.  The ultrasound probe was removed       He was then  placed in supine position and prepped and drapped in standard cystoscopic fashion and 2% xylocaine jelly was instilled into the urethra. A flexible cystoscope was passed into the bladder via the urethra.      Anterior urethra appeared normal without any lesions or narrowings. The prostatic urethra demonstrated significant obstructing ingrowth of bilateral lateral lobes with visual obstruction, especially when viewed from verumontanum.     Bladder otherwise systematically inspected and no mucosal lesions or tumors seen. He did have mild trabeculations and was difficult to flex down to trigone due to median lobe seen to be bulging out from bladder neck. On retroflexion, significant obstruction from median lobe as noted above but no trigonal obstruction and UOs could be seen in orthotopic position with clear efflux.     Patient tolerated the procedure well. No complications      Disposition  Given the significant trilobar obstruction with extensive obstructing median lobe in large 91g prostate, we did discuss that urolift is not an option. We discussed medical management either with alpha blocker alone or combination therapy with 5ari. He is not enthusiastic about taking medication and given his severe anatomic obstruction, is likely to fail. We did discuss further surgical management of his large obstructing prostate with median lobe with transurethral resection of prostate.     We did discuss risks associated with transurethral resection of the prostate including pain, infection, bleeding, incontinence, retrograde ejaculation, stricture, need for a staged procedure, incidental finding of malignancy, postoperative transient urgency and urge incontinence. We also discussed general postop management noting he would need a Carreon catheter for up to 5-7 days.   We discussed the incidence of retrograde ejaculation.  We also discussed about long-term durability, which I noted may see symptomatic benefit for at least 10-15  years, if not life time.  We did have a jacques discussion that without treatment his prostae will only continue to grow and become more obstructing, and there is almost complete obstruction at bladder neck at this time.  We discussed the risks of untreated BPH as well such as further incomplete emptying, urinary retention, renal dysfunction, bladder stones, urinary tract infections.     After extensive discussion of risks and benefits of TURP, all questions were answered and appropriate informed consent was obtained.    TURP scheduled 2/14/19 due to commitments in early 2019 preceding.  Given level of obstruction, and interim, I advised he take flomax in interim to decrease risk of retention    He is due to see Dr Lugo on 12/12/18 and will CC him and his staff pool to evaluate for medical clearance for TURP under general anesthesia. If he feels that cardiac eval/clearance needed prior, will ask that he arrange   Will do PAT with Ucx 2 weeks prior   .     Discharge home today status post uncomplicated procedure as above  Diet - resume home diet  Follow up: TURP 2/14/18  - med clear [place], PAT with Ucx 2 weeks prior  Instructions: drink plenty of water, may see blood in urine, take abx as directed  Meds       Medication List      START taking these medications    ciprofloxacin HCl 500 MG tablet  Commonly known as:  CIPRO  Take 1 tablet (500 mg total) by mouth 2 (two) times daily.     tamsulosin 0.4 mg Cap  Commonly known as:  FLOMAX  Take 1 capsule (0.4 mg total) by mouth once daily.        CONTINUE taking these medications    amLODIPine 10 MG tablet  Commonly known as:  NORVASC  Take 1 tablet (10 mg total) by mouth every evening.     aspirin 81 mg Tab     benazepril 40 MG tablet  Commonly known as:  LOTENSIN  take 1 tablet by mouth once daily     carvedilol 12.5 MG tablet  Commonly known as:  COREG  Take 1 tablet (12.5 mg total) by mouth 2 (two) times daily with meals.     fish oil-omega-3 fatty acids 300-1,000 mg  capsule     multivitamin capsule     pravastatin 40 MG tablet  Commonly known as:  PRAVACHOL  TAKE 1 TABLET(40 MG) BY MOUTH EVERY DAY     ranitidine 150 MG tablet  Commonly known as:  ZANTAC  Take 1 tablet (150 mg total) by mouth 2 (two) times daily as needed for Heartburn.           Where to Get Your Medications      These medications were sent to [id number] - [place] - [address] AT [facility] [address]  [address] [address]    Phone:  [phone]   · ciprofloxacin HCl 500 MG tablet  · tamsulosin 0.4 mg Cap

## 2018-12-11 NOTE — TELEPHONE ENCOUNTER
12/11. Chart review. Labs done last Oct are stable. He has labs pending by Dr Jain in Feb/2019. No other testing before MY visit 12/17.

## 2018-12-13 ENCOUNTER — OFFICE VISIT (OUTPATIENT)
Dept: FAMILY MEDICINE | Facility: CLINIC | Age: 68
End: 2018-12-13
Payer: MEDICARE

## 2018-12-13 VITALS
DIASTOLIC BLOOD PRESSURE: 86 MMHG | BODY MASS INDEX: 24.79 KG/M2 | HEIGHT: 72 IN | TEMPERATURE: 98 F | SYSTOLIC BLOOD PRESSURE: 138 MMHG | WEIGHT: 183 LBS | HEART RATE: 71 BPM

## 2018-12-13 DIAGNOSIS — N40.1 BPH WITH OBSTRUCTION/LOWER URINARY TRACT SYMPTOMS: ICD-10-CM

## 2018-12-13 DIAGNOSIS — E80.4 GILBERT SYNDROME: ICD-10-CM

## 2018-12-13 DIAGNOSIS — N13.8 BPH WITH OBSTRUCTION/LOWER URINARY TRACT SYMPTOMS: ICD-10-CM

## 2018-12-13 DIAGNOSIS — J30.9 CHRONIC ALLERGIC RHINITIS: Primary | ICD-10-CM

## 2018-12-13 DIAGNOSIS — I10 ESSENTIAL HYPERTENSION: ICD-10-CM

## 2018-12-13 DIAGNOSIS — E78.2 MIXED HYPERLIPIDEMIA: ICD-10-CM

## 2018-12-13 PROCEDURE — 3075F SYST BP GE 130 - 139MM HG: CPT | Mod: CPTII,S$GLB,, | Performed by: FAMILY MEDICINE

## 2018-12-13 PROCEDURE — 1101F PT FALLS ASSESS-DOCD LE1/YR: CPT | Mod: CPTII,S$GLB,, | Performed by: FAMILY MEDICINE

## 2018-12-13 PROCEDURE — 99999 PR PBB SHADOW E&M-EST. PATIENT-LVL III: CPT | Mod: PBBFAC,,, | Performed by: FAMILY MEDICINE

## 2018-12-13 PROCEDURE — 3079F DIAST BP 80-89 MM HG: CPT | Mod: CPTII,S$GLB,, | Performed by: FAMILY MEDICINE

## 2018-12-13 PROCEDURE — 99214 OFFICE O/P EST MOD 30 MIN: CPT | Mod: S$GLB,,, | Performed by: FAMILY MEDICINE

## 2018-12-13 RX ORDER — AZELASTINE 1 MG/ML
1 SPRAY, METERED NASAL 2 TIMES DAILY
Qty: 30 ML | Refills: 1 | Status: SHIPPED | OUTPATIENT
Start: 2018-12-13 | End: 2019-01-16 | Stop reason: ALTCHOICE

## 2018-12-13 NOTE — PATIENT INSTRUCTIONS
Low-Salt Diet  This diet removes foods that are high in salt. It also limits the amount of salt you use when cooking. It is most often used for people with high blood pressure, edema (fluid retention), and kidney, liver, or heart disease.  Table salt contains the mineral sodium. Your body needs sodium to work normally. But too much sodium can make your health problems worse. Your healthcare provider is recommending a low-salt (also called low-sodium) diet for you. Your total daily allowance of salt is 1,500 to 2,300 milligrams (mg). It is less than 1 teaspoon of table salt. This means you can have only about 500 to 700 mg of sodium at each meal. People with certain health problems should limit salt intake to the lower end of the recommended range.    When you cook, dont add much salt. If you can cook without using salt, even better. Dont add salt to your food at the table.  When shopping, read food labels. Salt is often called sodium on the label. Choose foods that are salt-free, low salt, or very low salt. Note that foods with reduced salt may not lower your salt intake enough.    Beans, potatoes, and pasta  Ok: Dry beans, split peas, lentils, potatoes, rice, macaroni, pasta, spaghetti without added salt  Avoid: Potato chips, tortilla chips, and similar products  Breads and cereals  Ok: Low-sodium breads, rolls, cereals, and cakes; low-salt crackers, matzo crackers  Avoid: Salted crackers, pretzels, popcorn, Urdu toast, pancakes, muffins  Dairy  Ok: Milk, chocolate milk, hot chocolate mix, low-salt cheeses, and yogurt  Avoid: Processed cheese and cheese spreads; Roquefort, Camembert, and cottage cheese; buttermilk, instant breakfast drink  Desserts  Ok: Ice cream, frozen yogurt, juice bars, gelatin, cookies and pies, sugar, honey, jelly, hard candy  Avoid: Most pies, cakes and cookies prepared or processed with salt; instant pudding  Drinks  Ok: Tea, coffee, fizzy (carbonated) drinks, juices  Avoid: Flavored  coffees, electrolyte replacement drinks, sports drinks  Meats  Ok: All fresh meat, fish, poultry, low-salt tuna, eggs, egg substitute  Avoid: Smoked, pickled, brine-cured, or salted meats and fish. This includes kimble, chipped beef, corned beef, hot dogs, deli meats, ham, kosher meats, salt pork, sausage, canned tuna, salted codfish, smoked salmon, herring, sardines, or anchovies.  Seasonings and spices  Ok: Most seasonings are okay. Good substitutes for salt include: fresh herb blends, hot sauce, lemon, garlic, torres, vinegar, dry mustard, parsley, cilantro, horseradish, tomato paste, regular margarine, mayonnaise, unsalted butter, cream cheese, vegetable oil, cream, low-salt salad dressing and gravy.  Avoid: Regular ketchup, relishes, pickles, soy sauce, teriyaki sauce, Worcestershire sauce, BBQ sauce, tartar sauce, meat tenderizer, chili sauce, regular gravy, regular salad dressing, salted butter  Soups  Ok: Low-salt soups and broths made with allowed foods  Avoid: Bouillon cubes, soups with smoked or salted meats, regular soup and broth  Vegetables  Ok: Most vegetables are okay; also low-salt tomato and vegetable juices  Avoid: Sauerkraut and other brine-soaked vegetables; pickles and other pickled vegetables; tomato juice, olives  Date Last Reviewed: 8/1/2016 © 2000-2017 [employer]. [address] [place] [address]. All rights reserved. This information is not intended as a substitute for professional medical care. Always follow your healthcare professional's instructions.        Transurethral Resection of the Prostate (TURP)     Excess prostate tissue is removed during a TURP to let urine flow freely through the urethra.   TURP is surgery to treat a benign enlargement of the prostate, or BPH (benign prostatic hyperplasia). This surgery removes prostate tissue to relieve pressure on the urethra. This helps relieve symptoms, such as:  · Urinary obstruction  · Frequent  urination  · Decreased urinary stream  TURP is the most common procedure for the treatment of BPH. But certain other procedures also help relieve BPH symptoms. Your health care provider may do one of these instead of TURP. They include TUIP (transurethral incision of the prostate), TUNA (transurethral needle ablation), or laser ablation. If you will have one of these procedures, your healthcare provider can tell you more about it. Your preparation and experience during surgery will be similar to TURP.   Preparing for surgery  Your healthcare provider will tell you how to prepare for your procedure. For instance, you may be asked to stop taking certain medicines a few days before the procedure. You may be asked not to eat or drink anything after the midnight before surgery. Be sure to follow any special instructions youre given.  During the TURP procedure  · You will be given medicine (anesthesia) to keep you from feeling pain during the procedure. It may be given into your spine (epidural). This is not meant to put you to sleep, but it will numb the area where the surgery is being done. In some cases, general anesthesia is used. This is to keep you sleeping throughout the surgery. The anesthesia provider (anesthesiologist or nurse anesthetist) will talk to you about the pain medicine that is best for you.  · The surgeon inserts a cystoscope (a thin, telescope-like device) into your urethra. This device lets him or her see the blocked part of the urethra.  · A cutting device is inserted through the cystoscope. This is used to remove the excess prostate tissue. The cut pieces of tissue collect in the bladder. These pieces are continuously washed away with fluids during the procedure.   · The tissue pieces are sent to the lab to be sure they are free of cancer.   Possible risks and complications of prostate procedures  · Bleeding  · Infection  · Scarring of the urethra  · Retrograde ejaculation  · Erectile dysfunction  (rare)  · Absorption of fluid during the procedure (TURP syndrome)  · Permanent incontinence (very rare)   Retrograde ejaculation  After some surgical treatments, semen may travel into the bladder instead of out of the penis during ejaculation. This side effect is called retrograde ejaculation. As a result, there may be little or no semen when you ejaculate, which can result in infertility. If you are planning to have children, talk to your healthcare provider before having the TURP procedure. Otherwise, this is not harmful to your bladder, and the feeling or orgasm and your erection won't change. Retrograde ejaculation can also be a side effect of certain medicines.  Date Last Reviewed: 1/1/2017  © 0699-3424 [employer]. [address] [place] [address], [place] [address]. All rights reserved. This information is not intended as a substitute for professional medical care. Always follow your healthcare professional's instructions.        Transurethral Resection of the Prostate (TURP): Home Recovery  Take it easy for the first month or so while you heal after transurethral resection of the prostate. During the first few weeks, you may feel burning when you pass urine. You may also feel like you have to urinate often. These sensations will go away. If your urine becomes bright red, it means that the treated area is bleeding. This may happen on and off for a month or so after a TURP. If this occurs, rest and drink plenty of fluids until the bleeding stops.    While you are healing  To help prevent problems during the first month after your surgery, follow these tips:  · Drink plenty of fluids.  · Avoid strenuous exercise.  · Dont lift anything over 10 pounds.  · Avoid sexual activity and strenuous exercise.  · Avoid straining at stool. If you are constipated, take stool softeners or laxatives for a few days.  · Talk to your doctor about when you can return to work.  · Ask your doctor when you can start driving  again.  · Dont sit for more than 60 minutes without getting up.  · Check with your doctor before taking over-the-counter pain relievers. These include aspirin, ibuprofen, and naproxen.  Follow-up care  You will visit your doctor to make sure you are healing without problems. If tests were done on your prostate tissue your doctor will discuss the results with you.     When to call your healthcare provider  Call your healthcare provider right away if any of these occur:  · Youre not able to urinate, or notice a decrease in urine flow  · You have a fever of 100.4°F (38°C) or higher, or as directed by your doctor  · You have severe pain that is not relieved by prescription pain medicine  · You have bleeding that doesnt stop within 12 hours  · You have bleeding with clots, or blood plugs up the catheter. (A little blood in the urine is normal.)  · The catheter falls out   Getting back to sex  BPH and its treatments rarely cause problems with sex. Even if you have retrograde ejaculation, your erection or orgasm shouldnt feel any different than it used to. Retrograde ejaculation can result in infertility, as the semen will not come out of the penis. If you notice any problems with sex, talk to your doctor. Help may be available.  Trouble controlling your urine  Some men will have trouble controlling their urine (urinary incontinence) after this surgery. This may last for a few days, weeks, or months, but it will improve with time. You may also pass your urine more often (urinary frequency), like you did before the surgery. This will also improve as you start to heal.  Date Last Reviewed: 1/1/2017 © 2000-2017 [employer]. [address] [place] [address], [place] [address]. All rights reserved. This information is not intended as a substitute for professional medical care. Always follow your healthcare professional's instructions.        Endoscopic Sinus Surgery  The sinuses are hollow areas formed by the bones of  the face. Normally, a thin layer of mucus drains from the sinuses into the nose. If the drainage path is blocked, problems such as infection can result. Endoscopic sinus surgery can be done to help clear blockages. The surgeon uses a thin, lighted tube (endoscope) that is put into your nose. The tube lets the doctor see and operate inside your nose and sinuses.     Straightening the septum       Removing polyps         Opening the ethmoid sinuses       Clearing the outflow pathway      Straightening the septum  The septum is a piece of cartilage and bone that runs straight down the inside of the nose. It divides the nose into two sides.  A deviated septum is crooked instead of straight. A crooked septum can cause breathing problems. To fix a deviated septum, the doctor reshapes or trims the cartilage and bone. There is enough septum left for the nose to hold its shape. But the air has more space to move in and out of the nose. This improves your breathing.  Removing polyps  Polyps are small growths. They can grow in both the nose and sinuses. The surgeon may use different methods to remove them. Often, the surgeon uses special tools to remove polyps without harming nearby tissues.  Opening the ethmoid sinuses  The ethmoid sinuses are made up of many small air spaces, like a honeycomb. Like the other sinuses, the ethmoids have a lining that makes mucus. In some cases the drainage path is blocked. The doctor may open the thin walls of bone that separate the air spaces. This creates a passage for mucus to drain more easily.  Clearing the major outflow pathway of the sinuses  The osteomeatal complex is a term for a major outflow tract of your sinuses. Similar to a traffic jam, when this area becomes blocked, you may get symptoms in your maxillary, ethmoid, and frontal sinuses. Opening this area is a primary step in most sinus surgeries. The uncinate process is a small piece of bone and tissue in the sinuses. It forms an  outlet for part of the sinuses. If this tissue is swollen (inflamed), it will block drainage of mucus. The doctor may remove the uncinate process so that mucus can drain.  Date Last Reviewed: 10/1/2016  © 0575-1081 [employer]. [address] [place] [address]. All rights reserved. This information is not intended as a substitute for professional medical care. Always follow your healthcare professional's instructions.        Taking Your Blood Pressure  Blood pressure is the force of blood against the artery wall as it moves from the heart through the blood vessels. You can take your own blood pressure reading using a digital monitor. Take your readings the same each time, using the same arm. Take readings as often as your healthcare provider instructs.  About blood pressure monitors  Blood pressure monitors are designed for certain ages and cases. You can find monitors for older adults, for pregnant women, and for children. Make sure the one you choose is the right one for your age and situation.  The American Heart Association recommends an automatic cuff monitor that fits on your upper arm (bicep). The cuff should fit your arm size. A cuff thats too large or too small will not give an accurate reading. Measure around your upper arm to find your size.  Monitors that attach to your finger or wrist are not as accurate as monitors for your upper arm.  Ask your healthcare provider for help in choosing a monitor. Bring your monitor to your next provider visit if you need help in using it the correct way.  The steps below are general instructions for using an automatic digital monitor.  Step 1. Relax    · Take your blood pressure at the same time every day, such as in the morning or evening, or at the time your healthcare provider recommends.  · Wait at least a half-hour after smoking, eating, or exercising. Don't drink coffee, tea, soda, or other caffeinated beverages before checking your blood  pressure.  · Sit comfortably at a table with both feet on the floor. Do not cross your legs or feet. Place the monitor near you.  · Rest for a few minutes before you begin.  Step 2. Wrap the cuff    · Place your arm on the table, palm up. Your arm should be at the level of your heart. Wrap the cuff around your upper arm, just above your elbow. Its best done on bare skin, not over clothing. Most cuffs will indicate where the brachial artery (the blood vessel in the middle of the arm at the inner side of the elbow) should line up with the cuff. Look in your monitor's instruction booklet for an illustration. You can also bring your cuff to your healthcare provider and have them show you how to correctly place the cuff.  Step 3. Inflate the cuff    · Push the button that starts the pump.  · The cuff will tighten, then loosen.  · The numbers will change. When they stop changing, your blood pressure reading will appear.  · Take 2 or 3 readings one minute apart.  Step 4. Write down the results of each reading    · Write down your blood pressure numbers for each reading. Note the date and time. Keep your results in one place, such as a notebook. Even if your monitor has a built-in memory, keep a hard copy of the readings.  · Remove the cuff from your arm. Turn off the machine.  · Bring your blood pressure records with your healthcare providers at each visit.  · If you start a new blood pressure medicine, note the day you started the new medicine. Also note the day if you change the dose of your medicine. This information goes on your blood pressure recording sheet. This will help your healthcare provider monitor how well the medicine changes are working.  · Ask your healthcare provider what numbers should prompt you to call him or her. Also ask what numbers should prompt you to get help right away.  Date Last Reviewed: 11/1/2016  © 4324-9091 [employer]. [address] [place] [address], [place] [address]. All rights  reserved. This information is not intended as a substitute for professional medical care. Always follow your healthcare professional's instructions.

## 2018-12-13 NOTE — PROGRESS NOTES
Subjective:       Patient ID: Kulwinder Hogue Jr. is a 68 y.o. male.    Chief Complaint: Hypertension and Hyperlipidemia    Chronic allergic rhinitis  (primary encounter diagnosis)  Essential hypertension  Mixed hyperlipidemia  Bph with obstruction/lower urinary tract symptoms  Gilbert syndrome      He has been contemplating TURP surgery. He has nocturia and poor response to Alpha 1 blockers.    He has frequent rhinits, nocturnal left sinus congestion. He has loss of smell, but denied pain, nor discharge nor fevers.    He has excellent exercise capacity, no dyspnea. Normal Echo. BP well controlled. Chronic bili, due to Gilbert.    Kidney cyst  Gallbladder polpyps.      Review of Systems   Constitutional: Negative for activity change and unexpected weight change.   HENT: Positive for rhinorrhea. Negative for hearing loss and trouble swallowing.    Eyes: Negative for discharge and visual disturbance.   Respiratory: Negative for chest tightness and wheezing.    Cardiovascular: Negative for chest pain and palpitations.   Gastrointestinal: Negative for blood in stool, constipation, diarrhea and vomiting.   Endocrine: Negative for polydipsia and polyuria.   Genitourinary: Negative for difficulty urinating, hematuria and urgency.   Musculoskeletal: Negative for arthralgias, joint swelling and neck pain.   Neurological: Negative for weakness and headaches.   Psychiatric/Behavioral: Negative for confusion and dysphoric mood.       Patient Active Problem List   Diagnosis    Hyperlipemia    HBP (high blood pressure)    Essential hypertension    Hyperlipidemia    Liposarcoma    Abnormal PSA    Cyclical neutropenia    Excessive cerumen in right ear canal    Hx of colonic polyps    BPH with obstruction/lower urinary tract symptoms       Objective:      Physical Exam   Constitutional: He appears well-developed and well-nourished.   HENT:   Right Ear: Tympanic membrane and ear canal normal.   Left Ear: Tympanic membrane  and ear canal normal.   Nose: Mucosal edema and rhinorrhea present. Right sinus exhibits no maxillary sinus tenderness and no frontal sinus tenderness. Left sinus exhibits no maxillary sinus tenderness and no frontal sinus tenderness.   Mouth/Throat: Posterior oropharyngeal erythema present. No oropharyngeal exudate, posterior oropharyngeal edema or tonsillar abscesses.   Cardiovascular: Normal rate, regular rhythm and normal heart sounds.   Pulmonary/Chest: Effort normal and breath sounds normal.   Skin: Skin is warm and dry.   Psychiatric: He has a normal mood and affect.   Nursing note and vitals reviewed.      Lab Results   Component Value Date    WBC 6.20 10/26/2018    HGB 16.6 10/26/2018    HCT 49.3 10/26/2018     10/26/2018    CHOL 163 10/26/2018    TRIG 67 10/26/2018    HDL 58 10/26/2018    ALT 29 10/26/2018    AST 29 10/26/2018     10/26/2018    K 3.7 10/26/2018     10/26/2018    CREATININE 1.0 10/26/2018    BUN 9 10/26/2018    CO2 32 (H) 10/26/2018    TSH 3.208 09/06/2018    PSA 4.5 (H) 04/16/2016     The 10-year ASCVD risk score ([place] NURIS Jr., et al., 2013) is: 15.8%    Values used to calculate the score:      Age: 68 years      Sex: Male      Is Non- : No      Diabetic: No      Tobacco smoker: No      Systolic Blood Pressure: 138 mmHg      Is BP treated: Yes      HDL Cholesterol: 58 mg/dL      Total Cholesterol: 163 mg/dL    Assessment:       1. Chronic allergic rhinitis    2. Essential hypertension    3. Mixed hyperlipidemia    4. BPH with obstruction/lower urinary tract symptoms    5. Gilbert syndrome        Plan:       Chronic allergic rhinitis  -     azelastine (ASTELIN) 137 mcg (0.1 %) nasal spray; 1 spray (137 mcg total) by Nasal route 2 (two) times daily.  Dispense: 30 mL; Refill: 1    Essential hypertension    Mixed hyperlipidemia    BPH with obstruction/lower urinary tract symptoms    Gilbert syndrome      Low risk for cardiac events. Medical clear for  surgery and anesthesia.

## 2018-12-18 DIAGNOSIS — I15.0 RENOVASCULAR HYPERTENSION: ICD-10-CM

## 2018-12-19 ENCOUNTER — PATIENT OUTREACH (OUTPATIENT)
Dept: OTHER | Facility: OTHER | Age: 68
End: 2018-12-19

## 2018-12-19 DIAGNOSIS — I10 ESSENTIAL HYPERTENSION: Primary | ICD-10-CM

## 2018-12-19 RX ORDER — CANDESARTAN 32 MG/1
32 TABLET ORAL DAILY
Qty: 90 TABLET | Refills: 1 | Status: SHIPPED | OUTPATIENT
Start: 2018-12-19 | End: 2019-01-03

## 2018-12-19 NOTE — PROGRESS NOTES
Last 5 Patient Entered Readings                                      Current 30 Day Average: 128/83     Recent Readings 12/19/2018 12/15/2018 12/12/2018 12/10/2018 12/6/2018    SBP (mmHg) 124 117 139 114 123    DBP (mmHg) 84 83 86 77 81    Pulse 70 67 65 67 65          Patient's BP average is slightly above goal of <130/80.     Patient denies s/s of hypotension (lightheadedness, dizziness, nausea, fatigue) associated with low readings. Instructed patient to inform me if this occurs, patient confirms understanding.      Patient denies s/s of hypertension (SOB, CP, severe headaches, changes in vision) associated with high readings. Instructed patient to go to the ED if BP > 180/110 and accompanied by hypertensive s/s, patient confirms understanding.    Notified patient of recent study linking increased risk of lung cancer with use of an ACE Inhibitor.  Patient would like to switch to an ARB but may complete his supply of benazepril. He understands that he will be taking candesartan 32 mg daily in place of the benazepril when he makes the change.    Will continue to monitor regularly. Will follow up in 4-6 weeks, sooner if BP begins to trend upward or downward.    Patient has my contact information and knows to call with any concerns or clinical changes.     Current HTN regimen:  Hypertension Medications             amLODIPine (NORVASC) 10 MG tablet Take 1 tablet (10 mg total) by mouth every evening.    benazepril (LOTENSIN) 40 MG tablet take 1 tablet by mouth once daily    carvedilol (COREG) 12.5 MG tablet Take 1 tablet (12.5 mg total) by mouth 2 (two) times daily with meals.

## 2018-12-19 NOTE — PROGRESS NOTES
Last 5 Patient Entered Readings                                      Current 30 Day Average: 128/83     Recent Readings 12/19/2018 12/15/2018 12/12/2018 12/10/2018 12/6/2018    SBP (mmHg) 124 117 139 114 123    DBP (mmHg) 84 83 86 77 81    Pulse 70 67 65 67 65          Patient answered but states he will call back. Discuss changing benazepril to ARB (ie. Candesartan 32 mg daily or olmesartan 40 mg daily)

## 2018-12-20 RX ORDER — CARVEDILOL 12.5 MG/1
TABLET ORAL
Qty: 180 TABLET | Refills: 4 | Status: SHIPPED | OUTPATIENT
Start: 2018-12-20 | End: 2019-11-21 | Stop reason: SDUPTHER

## 2018-12-20 NOTE — PROGRESS NOTES
Last 5 Patient Entered Readings                                      Current 30 Day Average: 128/83     Recent Readings 12/19/2018 12/15/2018 12/12/2018 12/10/2018 12/6/2018    SBP (mmHg) 124 117 139 114 123    DBP (mmHg) 84 83 86 77 81    Pulse 70 67 65 67 65          Patient reports large co-pay with candesartan, however, after discussion with pharmacy, may be related to doughnut-hole. He will continue benazepril at this time, and we will re-assess cost of candesartan after Jan 1. He is in agreement with plan.

## 2019-01-03 ENCOUNTER — TELEPHONE (OUTPATIENT)
Dept: FAMILY MEDICINE | Facility: CLINIC | Age: 69
End: 2019-01-03

## 2019-01-03 RX ORDER — OLMESARTAN MEDOXOMIL 40 MG/1
40 TABLET ORAL DAILY
Qty: 90 TABLET | Refills: 1 | Status: SHIPPED | OUTPATIENT
Start: 2019-01-03 | End: 2019-07-02 | Stop reason: SDUPTHER

## 2019-01-03 NOTE — TELEPHONE ENCOUNTER
----- Message from Patti Hurd sent at 1/3/2019 12:16 PM CST -----  Contact: pt  Pt states that he received a call and believes it was a nurse from the office calling concerning US results. Pt can be reached at..[phone]

## 2019-01-03 NOTE — PROGRESS NOTES
I note some minor lab abnormalities that have been stable over time, these are of doubtful clinical significance. US abdomen is fairly stable. Polyps are incidental finding.

## 2019-01-03 NOTE — PROGRESS NOTES
Last 5 Patient Entered Readings                                      Current 30 Day Average: 127/84     Recent Readings 12/29/2018 12/28/2018 12/23/2018 12/22/2018 12/19/2018    SBP (mmHg) 144 125 126 128 124    DBP (mmHg) 87 83 88 86 84    Pulse 64 69 70 65 70          Co-pay $137 for 90 days of candesartan per [facility] at [facility]. Prescription changed to olmesartan 40 mg daily. [facility] message sent to patient to advise.

## 2019-01-03 NOTE — TELEPHONE ENCOUNTER
Called pt regarding below message. Informed of results.  Pt verbalized understanding with no further questions.

## 2019-01-04 ENCOUNTER — PATIENT OUTREACH (OUTPATIENT)
Dept: OTHER | Facility: OTHER | Age: 69
End: 2019-01-04

## 2019-01-04 NOTE — PROGRESS NOTES
Last 5 Patient Entered Readings                                      Current 30 Day Average: 127/84     Recent Readings 12/29/2018 12/28/2018 12/23/2018 12/22/2018 12/19/2018    SBP (mmHg) 144 125 126 128 124    DBP (mmHg) 87 83 88 86 84    Pulse 64 69 70 65 70          Digital Medicine: Health  Follow Up    Lifestyle Modifications:    1.Dietary Modifications (Sodium intake <2,000mg/day, food labels, dining out): Pt states that since the holidays he has been working on getting back to his normal routine and has been monitoring sodium intake, reading nutrition labels and overall just eating healthier. Encouraged to continue.    2.Physical Activity: Pt states that since the holidays he has been working on getting back into his normal routine, but with the bad weather has not been able to resume activity. States that he plans to resume this weekend since the weather will be better. Provided encouragement.    3.Medication Therapy: Patient has been compliant with the medication regimen.    4.Patient has the following medication side effects/concerns: None.   (Frequency/Alleviating factors/Precipitating factors, etc.)     Follow up with Mr. Kulwinder Hogue Jr. completed. No further questions or concerns. Will continue to follow up to achieve health goals.

## 2019-01-16 ENCOUNTER — OFFICE VISIT (OUTPATIENT)
Dept: FAMILY MEDICINE | Facility: CLINIC | Age: 69
End: 2019-01-16
Payer: MEDICARE

## 2019-01-16 ENCOUNTER — DOCUMENTATION ONLY (OUTPATIENT)
Dept: FAMILY MEDICINE | Facility: CLINIC | Age: 69
End: 2019-01-16

## 2019-01-16 VITALS
BODY MASS INDEX: 25.08 KG/M2 | HEART RATE: 84 BPM | SYSTOLIC BLOOD PRESSURE: 129 MMHG | WEIGHT: 185.19 LBS | TEMPERATURE: 98 F | HEIGHT: 72 IN | DIASTOLIC BLOOD PRESSURE: 80 MMHG

## 2019-01-16 DIAGNOSIS — J01.00 ACUTE MAXILLARY SINUSITIS, RECURRENCE NOT SPECIFIED: Primary | ICD-10-CM

## 2019-01-16 DIAGNOSIS — I10 ESSENTIAL HYPERTENSION: ICD-10-CM

## 2019-01-16 PROCEDURE — 99999 PR PBB SHADOW E&M-EST. PATIENT-LVL III: ICD-10-PCS | Mod: PBBFAC,,, | Performed by: PHYSICIAN ASSISTANT

## 2019-01-16 PROCEDURE — 99214 PR OFFICE/OUTPT VISIT, EST, LEVL IV, 30-39 MIN: ICD-10-PCS | Mod: S$GLB,,, | Performed by: PHYSICIAN ASSISTANT

## 2019-01-16 PROCEDURE — 99999 PR PBB SHADOW E&M-EST. PATIENT-LVL III: CPT | Mod: PBBFAC,,, | Performed by: PHYSICIAN ASSISTANT

## 2019-01-16 PROCEDURE — 3079F DIAST BP 80-89 MM HG: CPT | Mod: CPTII,S$GLB,, | Performed by: PHYSICIAN ASSISTANT

## 2019-01-16 PROCEDURE — 1101F PT FALLS ASSESS-DOCD LE1/YR: CPT | Mod: CPTII,S$GLB,, | Performed by: PHYSICIAN ASSISTANT

## 2019-01-16 PROCEDURE — 99214 OFFICE O/P EST MOD 30 MIN: CPT | Mod: S$GLB,,, | Performed by: PHYSICIAN ASSISTANT

## 2019-01-16 PROCEDURE — 1101F PR PT FALLS ASSESS DOC 0-1 FALLS W/OUT INJ PAST YR: ICD-10-PCS | Mod: CPTII,S$GLB,, | Performed by: PHYSICIAN ASSISTANT

## 2019-01-16 PROCEDURE — 3074F PR MOST RECENT SYSTOLIC BLOOD PRESSURE < 130 MM HG: ICD-10-PCS | Mod: CPTII,S$GLB,, | Performed by: PHYSICIAN ASSISTANT

## 2019-01-16 PROCEDURE — 3074F SYST BP LT 130 MM HG: CPT | Mod: CPTII,S$GLB,, | Performed by: PHYSICIAN ASSISTANT

## 2019-01-16 PROCEDURE — 3079F PR MOST RECENT DIASTOLIC BLOOD PRESSURE 80-89 MM HG: ICD-10-PCS | Mod: CPTII,S$GLB,, | Performed by: PHYSICIAN ASSISTANT

## 2019-01-16 RX ORDER — MONTELUKAST SODIUM 10 MG/1
10 TABLET ORAL NIGHTLY
Qty: 30 TABLET | Refills: 0 | Status: SHIPPED | OUTPATIENT
Start: 2019-01-16 | End: 2019-02-08

## 2019-01-16 RX ORDER — CEFDINIR 300 MG/1
300 CAPSULE ORAL 2 TIMES DAILY
Qty: 20 CAPSULE | Refills: 0 | Status: SHIPPED | OUTPATIENT
Start: 2019-01-16 | End: 2019-01-26

## 2019-01-16 RX ORDER — IPRATROPIUM BROMIDE 21 UG/1
2 SPRAY, METERED NASAL 3 TIMES DAILY
Qty: 30 ML | Refills: 0 | Status: SHIPPED | OUTPATIENT
Start: 2019-01-16 | End: 2019-02-10 | Stop reason: SDUPTHER

## 2019-01-16 NOTE — PROGRESS NOTES
Subjective:       Patient ID: Kulwinder Hogue Jr. is a 68 y.o. male.    Chief Complaint: Cough and Head congestion    Sinusitis   This is a new problem. The current episode started yesterday. Maximum temperature: subjective. Associated symptoms include congestion, coughing, headaches, sinus pressure, sneezing and a sore throat. Pertinent negatives include no chills, diaphoresis, ear pain, hoarse voice, neck pain, shortness of breath or swollen glands. (Post-nasal drip) Past treatments include nothing. The treatment provided no relief.     Review of Systems   Constitutional: Negative for chills and diaphoresis.   HENT: Positive for congestion, sinus pressure, sneezing and sore throat. Negative for ear pain and hoarse voice.    Respiratory: Positive for cough. Negative for shortness of breath and wheezing.    Cardiovascular: Negative for chest pain and palpitations.   Musculoskeletal: Negative for neck pain.   Neurological: Positive for headaches.       Objective:      Physical Exam   Constitutional: Vital signs are normal. He appears well-developed and well-nourished. No distress.   HENT:   Head: Normocephalic and atraumatic.   Right Ear: Hearing normal. Tympanic membrane is bulging. Tympanic membrane mobility is abnormal.   Left Ear: Hearing normal. Tympanic membrane is bulging. Tympanic membrane mobility is abnormal.   Nose: Mucosal edema and rhinorrhea present. Right sinus exhibits maxillary sinus tenderness and frontal sinus tenderness. Left sinus exhibits maxillary sinus tenderness and frontal sinus tenderness.   Mouth/Throat: Uvula is midline, oropharynx is clear and moist and mucous membranes are normal.   Cardiovascular: Normal rate, regular rhythm, S1 normal, S2 normal and normal heart sounds. Exam reveals no gallop.   No murmur heard.  Pulses:       Radial pulses are 2+ on the right side, and 2+ on the left side.   <2sec cap refill fingers bilat     Pulmonary/Chest: Effort normal and breath sounds normal.  No respiratory distress. He has no wheezes. He has no rhonchi.   Skin: Skin is warm and dry. He is not diaphoretic.   Appropriate skin turgor   Psychiatric: He has a normal mood and affect. His speech is normal and behavior is normal. Judgment and thought content normal. Cognition and memory are normal.       Assessment:       1. Acute maxillary sinusitis, recurrence not specified    2. Essential hypertension        Plan:       Kulwinder was seen today for cough and head congestion.    Diagnoses and all orders for this visit:    Acute maxillary sinusitis, recurrence not specified  -     montelukast (SINGULAIR) 10 mg tablet; Take 1 tablet (10 mg total) by mouth every evening.  -     cefdinir (OMNICEF) 300 MG capsule; Take 1 capsule (300 mg total) by mouth 2 (two) times daily. for 10 days  -     ipratropium (ATROVENT) 0.03 % nasal spray; 2 sprays by Nasal route 3 (three) times daily.  Take antibiotics with food.  Increase fluid intake.  Call the clinic if symptoms worsen, new symptoms develop or if you are not any better after completion of your antibiotics.        Essential hypertension  Well controlled; no medication changes needed    Patient readiness: acceptance and barriers:none    During the course of the visit the patient was educated and counseled about the following:     Hypertension:   Medication: no change.    Goals: Hypertension: Reduce Blood Pressure    Did patient meet goals/outcomes: No    The following self management tools provided: declined    Patient Instructions (the written plan) was given to the patient/family.     Time spent with patient: 20 minutes    Barriers to medications present (no )    Adverse reactions to current medications (no)    Over the counter medications reviewed (Yes)

## 2019-01-16 NOTE — PROGRESS NOTES
Pre-Visit Chart Review  For Appointment Scheduled on 01/16/19    There are no preventive care reminders to display for this patient.

## 2019-01-21 ENCOUNTER — PATIENT OUTREACH (OUTPATIENT)
Dept: OTHER | Facility: OTHER | Age: 69
End: 2019-01-21

## 2019-01-21 ENCOUNTER — TELEPHONE (OUTPATIENT)
Dept: FAMILY MEDICINE | Facility: CLINIC | Age: 69
End: 2019-01-21

## 2019-01-21 DIAGNOSIS — E78.5 HYPERLIPIDEMIA, UNSPECIFIED HYPERLIPIDEMIA TYPE: Chronic | ICD-10-CM

## 2019-01-21 DIAGNOSIS — I10 ESSENTIAL HYPERTENSION: ICD-10-CM

## 2019-01-21 RX ORDER — PRAVASTATIN SODIUM 40 MG/1
TABLET ORAL
Qty: 90 TABLET | Refills: 0 | Status: SHIPPED | OUTPATIENT
Start: 2019-01-21 | End: 2019-04-24 | Stop reason: SDUPTHER

## 2019-01-21 NOTE — PROGRESS NOTES
Last 5 Patient Entered Readings                                      Current 30 Day Average: 131/86     Recent Readings 1/19/2019 1/13/2019 1/11/2019 1/9/2019 1/6/2019    SBP (mmHg) 123 144 132 119 146    DBP (mmHg) 80 88 90 82 85    Pulse 65 66 69 69 66          HPI:  Called patient to follow up. Patient endorses adherence to medication regimen, reports he is tolerating olmesartan. States he currently has a cold. Patient denies hypotensive s/sx (lightheadedness, dizziness, nausea, fatigue); patient denies hypertensive s/sx (SOB, CP, severe headaches, changes in vision, dizziness, fatigue, confusion, anxiety, nosebleeds).     Assessment:  Reviewed recent readings. Per 2017 ACC/ AHA HTN guidelines (goal of BP < 130/80), current 30-day average needs to be addressed more thoroughly today; recent reading at goal.     Plan:  Continue current medication regimen. I will continue to monitor regularly and will follow-up in 3 to 4 weeks, sooner if blood pressure begins to trend upward or downward.     Current medication regimen:  Hypertension Medications             amLODIPine (NORVASC) 10 MG tablet Take 1 tablet (10 mg total) by mouth every evening.    carvedilol (COREG) 12.5 MG tablet TAKE 1 TABLET BY MOUTH TWICE A DAY WITH MEALS    olmesartan (BENICAR) 40 MG tablet Take 1 tablet (40 mg total) by mouth once daily. (replaces benazepril)          Patient denies having questions or concerns. Patient has my contact information and knows to call with any concerns or clinical changes.

## 2019-01-21 NOTE — TELEPHONE ENCOUNTER
----- Message from Anderson Harding sent at 1/21/2019 10:40 AM CST -----  Contact: Patient  Type:  RX Refill Request    Patient also has a cough and is requesting advice for medication. Please call patient  Who Called:  Patient  Refill or New Rx:  Refill  RX Name and Strength:  pravastatin (PRAVACHOL) 40 MG tablet - TAKE 1 TABLET(40 MG) BY MOUTH EVERY DAY  Is this a 30 day or 90 day RX:  90  Preferred Pharmacy with phone number:    [id number] - [place] - [address] AT [facility] [address]  [address] [place] [address]  Phone: [phone] Fax: [phone]  Local or Mail Order:  Local  Ordering Provider:  Parish Swanson Call Back Number:  [phone]    Patient also has a cough and is requesting advice for medication. Please call patient

## 2019-01-21 NOTE — TELEPHONE ENCOUNTER
Refill request handled in a separate encounter. Patient advised to speak to pharmacist for recommendations of what OTC medication to use for cough. Offered patient an appointment in office, patient declined states he was recently seen in office.

## 2019-01-21 NOTE — TELEPHONE ENCOUNTER
Call placed to pharmacy regarding patient's request for refill of Pravastatin. Advised patient picked up prescription on 11-13-18; not due again until 2-13-19. Patient notified. Verbalized understanding.

## 2019-02-04 ENCOUNTER — PATIENT MESSAGE (OUTPATIENT)
Dept: ADMINISTRATIVE | Facility: OTHER | Age: 69
End: 2019-02-04

## 2019-02-08 ENCOUNTER — HOSPITAL ENCOUNTER (OUTPATIENT)
Dept: PREADMISSION TESTING | Facility: HOSPITAL | Age: 69
Discharge: HOME | End: 2019-02-08
Attending: UROLOGY
Payer: MEDICARE

## 2019-02-08 ENCOUNTER — HOSPITAL ENCOUNTER (OUTPATIENT)
Dept: RADIOLOGY | Facility: HOSPITAL | Age: 69
Discharge: HOME | End: 2019-02-08
Attending: UROLOGY
Payer: MEDICARE

## 2019-02-08 VITALS — WEIGHT: 178 LBS | HEIGHT: 72 IN | BODY MASS INDEX: 24.11 KG/M2

## 2019-02-08 DIAGNOSIS — N13.8 BPH WITH OBSTRUCTION/LOWER URINARY TRACT SYMPTOMS: ICD-10-CM

## 2019-02-08 DIAGNOSIS — N40.1 BPH WITH OBSTRUCTION/LOWER URINARY TRACT SYMPTOMS: ICD-10-CM

## 2019-02-08 DIAGNOSIS — R58 BLEEDING: ICD-10-CM

## 2019-02-08 LAB
ANION GAP SERPL CALC-SCNC: 7 MMOL/L
BASOPHILS # BLD AUTO: 0 K/UL
BASOPHILS NFR BLD: 0.4 %
BILIRUB UR QL STRIP: NEGATIVE
BUN SERPL-MCNC: 13 MG/DL
CALCIUM SERPL-MCNC: 9.4 MG/DL
CHLORIDE SERPL-SCNC: 103 MMOL/L
CLARITY UR: CLEAR
CO2 SERPL-SCNC: 30 MMOL/L
COLOR UR: YELLOW
CREAT SERPL-MCNC: 0.9 MG/DL
DIFFERENTIAL METHOD: ABNORMAL
EOSINOPHIL # BLD AUTO: 0.1 K/UL
EOSINOPHIL NFR BLD: 1.9 %
ERYTHROCYTE [DISTWIDTH] IN BLOOD BY AUTOMATED COUNT: 13.3 %
EST. GFR  (AFRICAN AMERICAN): >60 ML/MIN/1.73 M^2
EST. GFR  (NON AFRICAN AMERICAN): >60 ML/MIN/1.73 M^2
GLUCOSE SERPL-MCNC: 111 MG/DL
GLUCOSE UR QL STRIP: NEGATIVE
HCT VFR BLD AUTO: 46.8 %
HGB BLD-MCNC: 15.8 G/DL
HGB UR QL STRIP: ABNORMAL
INR PPP: 1
KETONES UR QL STRIP: NEGATIVE
LEUKOCYTE ESTERASE UR QL STRIP: NEGATIVE
LYMPHOCYTES # BLD AUTO: 2.2 K/UL
LYMPHOCYTES NFR BLD: 32.4 %
MCH RBC QN AUTO: 32.2 PG
MCHC RBC AUTO-ENTMCNC: 33.8 G/DL
MCV RBC AUTO: 95 FL
MONOCYTES # BLD AUTO: 0.6 K/UL
MONOCYTES NFR BLD: 8.9 %
NEUTROPHILS # BLD AUTO: 3.8 K/UL
NEUTROPHILS NFR BLD: 56.4 %
NITRITE UR QL STRIP: NEGATIVE
PH UR STRIP: 7 [PH] (ref 5–8)
PLATELET # BLD AUTO: 337 K/UL
PMV BLD AUTO: 6.8 FL
POTASSIUM SERPL-SCNC: 4 MMOL/L
PROT UR QL STRIP: ABNORMAL
PROTHROMBIN TIME: 10.2 SEC
RBC # BLD AUTO: 4.9 M/UL
SODIUM SERPL-SCNC: 140 MMOL/L
SP GR UR STRIP: 1.01 (ref 1–1.03)
URN SPEC COLLECT METH UR: ABNORMAL
UROBILINOGEN UR STRIP-ACNC: NEGATIVE EU/DL
WBC # BLD AUTO: 6.7 K/UL

## 2019-02-08 PROCEDURE — 80048 BASIC METABOLIC PNL TOTAL CA: CPT

## 2019-02-08 PROCEDURE — 99900103 DSU ONLY-NO CHARGE-INITIAL HR (STAT)

## 2019-02-08 PROCEDURE — 36415 COLL VENOUS BLD VENIPUNCTURE: CPT

## 2019-02-08 PROCEDURE — 81003 URINALYSIS AUTO W/O SCOPE: CPT

## 2019-02-08 PROCEDURE — 71046 XR CHEST PA AND LATERAL: ICD-10-PCS | Mod: 26,,, | Performed by: RADIOLOGY

## 2019-02-08 PROCEDURE — 71046 X-RAY EXAM CHEST 2 VIEWS: CPT | Mod: 26,,, | Performed by: RADIOLOGY

## 2019-02-08 PROCEDURE — 71046 X-RAY EXAM CHEST 2 VIEWS: CPT | Mod: TC,FY

## 2019-02-08 PROCEDURE — 93010 EKG 12-LEAD: ICD-10-PCS | Mod: ,,, | Performed by: INTERNAL MEDICINE

## 2019-02-08 PROCEDURE — 93010 ELECTROCARDIOGRAM REPORT: CPT | Mod: ,,, | Performed by: INTERNAL MEDICINE

## 2019-02-08 PROCEDURE — 99900104 DSU ONLY-NO CHARGE-EA ADD'L HR (STAT)

## 2019-02-08 PROCEDURE — 87086 URINE CULTURE/COLONY COUNT: CPT

## 2019-02-08 PROCEDURE — 85025 COMPLETE CBC W/AUTO DIFF WBC: CPT

## 2019-02-08 PROCEDURE — 93005 ELECTROCARDIOGRAM TRACING: CPT

## 2019-02-08 PROCEDURE — 85610 PROTHROMBIN TIME: CPT

## 2019-02-08 NOTE — DISCHARGE INSTRUCTIONS
To confirm, Your doctor has instructed you that surgery is scheduled for:     Please report to [facility], Registration the morning of surgery. You must check-in and receive a wristband before going to your procedure.    Pre-Op will call the afternoon prior to surgery between 1:00 and 6:00 PM with the final arrival time.  Phone number: [phone]    PLEASE NOTE:  The surgery schedule has many variables which may affect the time of your surgery case.  Family members should be available if your surgery time changes.  Plan to be here the day of your procedure between 4-6 hours.    MEDICATIONS:  TAKE ONLY THESE MEDICATIONS WITH A SMALL SIP OF WATER THE MORNING OF YOUR PROCEDURE:  Coreg, Flomax, Zantac    DO NOT TAKE THESE MEDICATIONS 5-7 DAYS PRIOR to your procedure or per your surgeon's request: ASPIRIN, ALEVE, ADVIL, IBUPROFEN, FISH OIL VITAMIN E, HERBALS. Last dose 2/7/19  (May take Tylenol)                                        No Benicar AM of surgery    ONLY if you are prescribed any types of blood thinners such as:  Aspirin, Coumadin, Plavix, Pradaxa, Xarelto, Aggrenox, Effient, Eliquis, Savasya, Brilinta, or any other, ask your surgeon whether you should stop taking them and how long before surgery you should stop.  You may also need to verify with the prescribing physician if it is ok to stop your medication.      INSTRUCTIONS IMPORTANT!!  · Do not eat or drink anything between midnight and the time of your procedure- this includes gum, mints, and candy.  · Do not smoke or drink alcoholic beverages 24 hours prior to your procedure.  · Shower the night before AND the morning of your procedure with a Chlorhexidine wash such as Hibiclens or Dial antibacterial soap from the neck down.  Do not get it on your face or in your eyes.  You may use your own shampoo and face wash. This helps your skin to be as bacteria free as possible.    · If you wear contact lenses, dentures, hearing aids or  glasses, bring a container to put them in during surgery and give to a family member for safe keeping.  Please leave all jewelry, piercing's and valuables at home.   · DO NOT remove hair from the surgery site.  Do not shave the incision site unless you are given specific instructions to do so.    · ONLY if you have been diagnosed with sleep apnea please bring your C-PAP machine.  · ONLY if you wear home oxygen please bring your portable oxygen tank the day of your procedure.  · ONLY if you have a history of OPEN HEART SURGERY you will need a clearance from your Cardiologist per Anesthesia.      · ONLY for patients requiring bowel prep, written instructions will be given by your doctor's office.  · ONLY if you have a neuro stimulator, please bring the controller with you the morning of surgery  · ONLY if a type and screen test is needed before surgery, please return:  · If your doctor has scheduled you for an overnight stay, bring a small overnight bag with any personal items you need.  · Make arrangements in advance for transportation home by a responsible adult.  It is not safe to drive a vehicle during the 24 hours after anesthesia.      · Visiting hours are 10:00AM to 8:30PM.  For the safety of all patients, visitors under the age of 12 are not allowed above the first floor of the hospital.    · All [facility] facilities and properties are tobacco free.  Smoking is NOT allowed.       If you have any questions about these instructions, call Pre-Op Admit  Nursing at [phone] or the Pre-Op Day Surgery Unit at [phone].

## 2019-02-10 DIAGNOSIS — J01.00 ACUTE MAXILLARY SINUSITIS, RECURRENCE NOT SPECIFIED: ICD-10-CM

## 2019-02-10 LAB — BACTERIA UR CULT: NORMAL

## 2019-02-10 RX ORDER — IPRATROPIUM BROMIDE 21 UG/1
SPRAY, METERED NASAL
Qty: 90 ML | Refills: 0 | Status: SHIPPED | OUTPATIENT
Start: 2019-02-10 | End: 2019-06-25

## 2019-02-13 ENCOUNTER — ANESTHESIA EVENT (OUTPATIENT)
Dept: SURGERY | Facility: HOSPITAL | Age: 69
End: 2019-02-13
Payer: MEDICARE

## 2019-02-14 ENCOUNTER — ANESTHESIA (OUTPATIENT)
Dept: SURGERY | Facility: HOSPITAL | Age: 69
End: 2019-02-14
Payer: MEDICARE

## 2019-02-14 ENCOUNTER — NURSE TRIAGE (OUTPATIENT)
Dept: ADMINISTRATIVE | Facility: CLINIC | Age: 69
End: 2019-02-14

## 2019-02-14 ENCOUNTER — TELEPHONE (OUTPATIENT)
Dept: UROLOGY | Facility: CLINIC | Age: 69
End: 2019-02-14

## 2019-02-14 ENCOUNTER — HOSPITAL ENCOUNTER (OUTPATIENT)
Facility: HOSPITAL | Age: 69
Discharge: HOME | End: 2019-02-15
Attending: EMERGENCY MEDICINE | Admitting: UROLOGY
Payer: MEDICARE

## 2019-02-14 DIAGNOSIS — R31.9 HEMATURIA: ICD-10-CM

## 2019-02-14 DIAGNOSIS — R33.9 URINARY RETENTION: Primary | ICD-10-CM

## 2019-02-14 LAB
ANION GAP SERPL CALC-SCNC: 8 MMOL/L
BASOPHILS # BLD AUTO: 0 K/UL
BASOPHILS NFR BLD: 0.1 %
BUN SERPL-MCNC: 12 MG/DL
CALCIUM SERPL-MCNC: 8.7 MG/DL
CHLORIDE SERPL-SCNC: 103 MMOL/L
CO2 SERPL-SCNC: 24 MMOL/L
CREAT SERPL-MCNC: 0.9 MG/DL
DIFFERENTIAL METHOD: ABNORMAL
EOSINOPHIL # BLD AUTO: 0 K/UL
EOSINOPHIL NFR BLD: 0 %
ERYTHROCYTE [DISTWIDTH] IN BLOOD BY AUTOMATED COUNT: 13.5 %
EST. GFR  (AFRICAN AMERICAN): >60 ML/MIN/1.73 M^2
EST. GFR  (NON AFRICAN AMERICAN): >60 ML/MIN/1.73 M^2
GLUCOSE SERPL-MCNC: 138 MG/DL
HCT VFR BLD AUTO: 43.6 %
HGB BLD-MCNC: 14.7 G/DL
LYMPHOCYTES # BLD AUTO: 0.8 K/UL
LYMPHOCYTES NFR BLD: 5.9 %
MCH RBC QN AUTO: 32.2 PG
MCHC RBC AUTO-ENTMCNC: 33.8 G/DL
MCV RBC AUTO: 95 FL
MONOCYTES # BLD AUTO: 0.5 K/UL
MONOCYTES NFR BLD: 3.3 %
NEUTROPHILS # BLD AUTO: 12.7 K/UL
NEUTROPHILS NFR BLD: 90.7 %
PLATELET # BLD AUTO: 266 K/UL
PMV BLD AUTO: 6.6 FL
POTASSIUM SERPL-SCNC: 3.6 MMOL/L
RBC # BLD AUTO: 4.58 M/UL
SODIUM SERPL-SCNC: 135 MMOL/L
WBC # BLD AUTO: 14 K/UL

## 2019-02-14 PROCEDURE — G0378 HOSPITAL OBSERVATION PER HR: HCPCS

## 2019-02-14 PROCEDURE — 63600175 PHARM REV CODE 636 W HCPCS: Performed by: NURSE ANESTHETIST, CERTIFIED REGISTERED

## 2019-02-14 PROCEDURE — 96374 THER/PROPH/DIAG INJ IV PUSH: CPT

## 2019-02-14 PROCEDURE — 25000003 PHARM REV CODE 250: Performed by: ANESTHESIOLOGY

## 2019-02-14 PROCEDURE — 99285 EMERGENCY DEPT VISIT HI MDM: CPT | Mod: 25

## 2019-02-14 PROCEDURE — D9220A PRA ANESTHESIA: Mod: ANES,,, | Performed by: ANESTHESIOLOGY

## 2019-02-14 PROCEDURE — 85025 COMPLETE CBC W/AUTO DIFF WBC: CPT

## 2019-02-14 PROCEDURE — D9220A PRA ANESTHESIA: ICD-10-PCS | Mod: ANES,,, | Performed by: ANESTHESIOLOGY

## 2019-02-14 PROCEDURE — 80048 BASIC METABOLIC PNL TOTAL CA: CPT

## 2019-02-14 PROCEDURE — 63600175 PHARM REV CODE 636 W HCPCS: Performed by: UROLOGY

## 2019-02-14 PROCEDURE — 25000003 PHARM REV CODE 250: Performed by: NURSE ANESTHETIST, CERTIFIED REGISTERED

## 2019-02-14 PROCEDURE — 63600175 PHARM REV CODE 636 W HCPCS: Performed by: EMERGENCY MEDICINE

## 2019-02-14 PROCEDURE — D9220A PRA ANESTHESIA: Mod: CRNA,,, | Performed by: NURSE ANESTHETIST, CERTIFIED REGISTERED

## 2019-02-14 PROCEDURE — 36415 COLL VENOUS BLD VENIPUNCTURE: CPT

## 2019-02-14 PROCEDURE — D9220A PRA ANESTHESIA: ICD-10-PCS | Mod: CRNA,,, | Performed by: NURSE ANESTHETIST, CERTIFIED REGISTERED

## 2019-02-14 PROCEDURE — 25000003 PHARM REV CODE 250: Performed by: EMERGENCY MEDICINE

## 2019-02-14 RX ORDER — MIDAZOLAM HYDROCHLORIDE 1 MG/ML
INJECTION, SOLUTION INTRAMUSCULAR; INTRAVENOUS
Status: DISCONTINUED | OUTPATIENT
Start: 2019-02-14 | End: 2019-02-14

## 2019-02-14 RX ORDER — GENTAMICIN SULFATE 80 MG/100ML
INJECTION, SOLUTION INTRAVENOUS
Status: DISCONTINUED | OUTPATIENT
Start: 2019-02-14 | End: 2019-02-14

## 2019-02-14 RX ORDER — ACETAMINOPHEN 10 MG/ML
INJECTION, SOLUTION INTRAVENOUS
Status: DISCONTINUED | OUTPATIENT
Start: 2019-02-14 | End: 2019-02-14

## 2019-02-14 RX ORDER — LIDOCAINE HCL/PF 100 MG/5ML
SYRINGE (ML) INTRAVENOUS
Status: DISCONTINUED | OUTPATIENT
Start: 2019-02-14 | End: 2019-02-14

## 2019-02-14 RX ORDER — LIDOCAINE HYDROCHLORIDE 20 MG/ML
JELLY TOPICAL
Status: DISPENSED
Start: 2019-02-14 | End: 2019-02-15

## 2019-02-14 RX ORDER — PHENYLEPHRINE HYDROCHLORIDE 10 MG/ML
INJECTION INTRAVENOUS
Status: DISCONTINUED | OUTPATIENT
Start: 2019-02-14 | End: 2019-02-14

## 2019-02-14 RX ORDER — ONDANSETRON 2 MG/ML
INJECTION INTRAMUSCULAR; INTRAVENOUS
Status: DISCONTINUED | OUTPATIENT
Start: 2019-02-14 | End: 2019-02-14

## 2019-02-14 RX ORDER — FENTANYL CITRATE 50 UG/ML
INJECTION, SOLUTION INTRAMUSCULAR; INTRAVENOUS
Status: DISCONTINUED | OUTPATIENT
Start: 2019-02-14 | End: 2019-02-14

## 2019-02-14 RX ORDER — FENTANYL CITRATE 50 UG/ML
50 INJECTION, SOLUTION INTRAMUSCULAR; INTRAVENOUS
Status: COMPLETED | OUTPATIENT
Start: 2019-02-14 | End: 2019-02-14

## 2019-02-14 RX ORDER — PROPOFOL 10 MG/ML
VIAL (ML) INTRAVENOUS
Status: DISCONTINUED | OUTPATIENT
Start: 2019-02-14 | End: 2019-02-14

## 2019-02-14 RX ORDER — DEXAMETHASONE SODIUM PHOSPHATE 4 MG/ML
INJECTION, SOLUTION INTRA-ARTICULAR; INTRALESIONAL; INTRAMUSCULAR; INTRAVENOUS; SOFT TISSUE
Status: DISCONTINUED | OUTPATIENT
Start: 2019-02-14 | End: 2019-02-14

## 2019-02-14 RX ORDER — EPHEDRINE SULFATE 50 MG/ML
INJECTION, SOLUTION INTRAVENOUS
Status: DISCONTINUED | OUTPATIENT
Start: 2019-02-14 | End: 2019-02-14

## 2019-02-14 RX ORDER — GLYCOPYRROLATE 0.2 MG/ML
INJECTION INTRAMUSCULAR; INTRAVENOUS
Status: DISCONTINUED | OUTPATIENT
Start: 2019-02-14 | End: 2019-02-14

## 2019-02-14 RX ADMIN — CEFAZOLIN SODIUM 2 G: 2 SOLUTION INTRAVENOUS at 09:02

## 2019-02-14 RX ADMIN — DEXAMETHASONE SODIUM PHOSPHATE 8 MG: 4 INJECTION, SOLUTION INTRAMUSCULAR; INTRAVENOUS at 09:02

## 2019-02-14 RX ADMIN — PROPOFOL 160 MG: 10 INJECTION, EMULSION INTRAVENOUS at 09:02

## 2019-02-14 RX ADMIN — GENTAMICIN SULFATE 80 MG: 80 INJECTION, SOLUTION INTRAVENOUS at 10:02

## 2019-02-14 RX ADMIN — ONDANSETRON 8 MG: 2 INJECTION, SOLUTION INTRAMUSCULAR; INTRAVENOUS at 09:02

## 2019-02-14 RX ADMIN — EPHEDRINE SULFATE 15 MG: 50 INJECTION, SOLUTION INTRAMUSCULAR; INTRAVENOUS; SUBCUTANEOUS at 10:02

## 2019-02-14 RX ADMIN — MIDAZOLAM 2 MG: 1 INJECTION INTRAMUSCULAR; INTRAVENOUS at 09:02

## 2019-02-14 RX ADMIN — FENTANYL CITRATE 50 MCG: 50 INJECTION INTRAMUSCULAR; INTRAVENOUS at 08:02

## 2019-02-14 RX ADMIN — FENTANYL CITRATE 50 MCG: 50 INJECTION, SOLUTION INTRAMUSCULAR; INTRAVENOUS at 09:02

## 2019-02-14 RX ADMIN — PHENYLEPHRINE HYDROCHLORIDE 100 MCG: 10 INJECTION INTRAVENOUS at 10:02

## 2019-02-14 RX ADMIN — EPHEDRINE SULFATE 25 MG: 50 INJECTION, SOLUTION INTRAMUSCULAR; INTRAVENOUS; SUBCUTANEOUS at 10:02

## 2019-02-14 RX ADMIN — EPHEDRINE SULFATE 10 MG: 50 INJECTION, SOLUTION INTRAMUSCULAR; INTRAVENOUS; SUBCUTANEOUS at 10:02

## 2019-02-14 RX ADMIN — ACETAMINOPHEN 1000 MG: 10 INJECTION, SOLUTION INTRAVENOUS at 10:02

## 2019-02-14 RX ADMIN — FENTANYL CITRATE 25 MCG: 50 INJECTION, SOLUTION INTRAMUSCULAR; INTRAVENOUS at 10:02

## 2019-02-14 RX ADMIN — GLYCOPYRROLATE 0.2 MG: 0.2 INJECTION, SOLUTION INTRAMUSCULAR; INTRAVENOUS at 09:02

## 2019-02-14 RX ADMIN — SODIUM CHLORIDE 1000 ML: 0.9 INJECTION, SOLUTION INTRAVENOUS at 10:02

## 2019-02-14 RX ADMIN — SODIUM CHLORIDE, SODIUM LACTATE, POTASSIUM CHLORIDE, AND CALCIUM CHLORIDE: .6; .31; .03; .02 INJECTION, SOLUTION INTRAVENOUS at 10:02

## 2019-02-14 RX ADMIN — LIDOCAINE HYDROCHLORIDE 100 MG: 20 INJECTION, SOLUTION INTRAVENOUS at 09:02

## 2019-02-14 NOTE — ANESTHESIA PREPROCEDURE EVALUATION
02/14/2019  Kulwinder Hogue Jr. is a 68 y.o., male.    Pre-op Assessment    I have reviewed the Patient Summary Reports.     I have reviewed the Nursing Notes.   I have reviewed the Medications.     Review of Systems  Anesthesia Hx:  No problems with previous Anesthesia  Denies Family Hx of Anesthesia complications.    Social:  Non-Smoker, Alcohol Use      Hematology/Oncology:  Hematology Normal        EENT/Dental:   chronic allergic rhinitis   Cardiovascular:   Hypertension Denies CAD.    Denies CABG/stent.  hyperlipidemia    Pulmonary:  Pulmonary Normal    Renal/:   BPH Elevated PSA   Hepatic/GI:  Hepatic/GI Normal    Musculoskeletal:  Musculoskeletal Normal    Neurological:  Neurology Normal    Endocrine:  Endocrine Normal    Dermatological:  Skin Normal    Psych:  Psychiatric Normal           Physical Exam  General:  Well nourished    Airway/Jaw/Neck:  Airway Findings: Mouth Opening: Normal Mallampati: II       Chest/Lungs:  Chest/Lungs Findings: Clear to auscultation, Normal Respiratory Rate     Heart/Vascular:  Heart Findings: Rate: Normal  Rhythm: Regular Rhythm  Sounds: Normal        Mental Status:  Mental Status Findings:  Cooperative, Alert and Oriented         Anesthesia Plan  Type of Anesthesia, risks & benefits discussed:  Anesthesia Type:  general  Patient's Preference:   Intra-op Monitoring Plan: standard ASA monitors  Intra-op Monitoring Plan Comments:   Post Op Pain Control Plan: IV/PO Opioids PRN and multimodal analgesia  Post Op Pain Control Plan Comments:   Induction:   IV  Beta Blocker:  Patient is on a Beta-Blocker and has received one dose within the past 24 hours (No further documentation required).       Informed Consent: Patient understands risks and agrees with Anesthesia plan.  Questions answered. Anesthesia consent signed with patient.  ASA Score: 2     Day of Surgery Review of  History & Physical: I have interviewed and examined the patient. I have reviewed the patient's H&P dated:  There are no significant changes.          Ready For Surgery From Anesthesia Perspective.

## 2019-02-14 NOTE — TELEPHONE ENCOUNTER
----- Message from Ashu Maynard sent at 2/14/2019  4:07 PM CST -----  Type: Needs Medical Advice  URGENT    Who Called:  Ashanti Hogue (Spouse)    Best Call Back Number: [phone]  Additional Information: Caller states that she would like a callback regarding clotting in the patient's bag.

## 2019-02-14 NOTE — TELEPHONE ENCOUNTER
Patient had TURP this morning.  There is a clot in his catheter bag.  Urine looks like Punch, not Skip Aid.    Tube seemed clogged.  Squeezed tube and began to drain after removal of stringy clot.  Catheter is draining at this time.    Advised patient's wife that this is ok.  Keep drinking water.  If catheter is not draining and bladder fills, patient will be uncomfortable.  If not draining got period of time, go to [facility] ED.

## 2019-02-14 NOTE — ANESTHESIA POSTPROCEDURE EVALUATION
Anesthesia Post Evaluation    Patient: Kulwinder Hogue JrAlex    Procedure(s) Performed: Procedure(s) (LRB):  TURP (TRANSURETHRAL RESECTION OF PROSTATE) (N/A)    Final Anesthesia Type: general  Patient location during evaluation: PACU  Patient participation: Yes- Able to Participate  Level of consciousness: awake and alert  Post-procedure vital signs: reviewed and stable  Pain management: adequate  Airway patency: patent  PONV status at discharge: No PONV  Anesthetic complications: no      Cardiovascular status: hemodynamically stable  Respiratory status: unassisted and room air  Hydration status: euvolemic  Follow-up not needed.        Visit Vitals  BP (!) 147/85   Pulse 74   Temp 36.7 °C (98 °F)   Resp 16   Ht 6' (1.829 m)   Wt 80.7 kg (178 lb)   SpO2 97%   BMI 24.14 kg/m²       Pain/Roxane Score: Pain Rating Prior to Med Admin: 4 (2/14/2019  1:48 PM)  Roxane Score: 10 (2/14/2019  1:15 PM)

## 2019-02-14 NOTE — TRANSFER OF CARE
Anesthesia Transfer of Care Note    Patient: Kulwinder Hogue Jr.    Procedure(s) Performed: Procedure(s) (LRB):  TURP (TRANSURETHRAL RESECTION OF PROSTATE) (N/A)    Patient location: PACU    Anesthesia Type: general    Transport from OR: Transported from OR on 2-3 L/min O2 by NC with adequate spontaneous ventilation    Post pain: adequate analgesia    Post assessment: no apparent anesthetic complications and tolerated procedure well    Post vital signs: stable    Level of consciousness: sedated    Nausea/Vomiting: no nausea/vomiting    Complications: none    Transfer of care protocol was followed      Last vitals:   Visit Vitals  BP (!) 140/87 (BP Location: Left arm, Patient Position: Sitting)   Pulse 66   Temp 36.8 °C (98.2 °F) (Skin)   Resp 18   Ht 6' (1.829 m)   Wt 80.7 kg (178 lb)   SpO2 100%   BMI 24.14 kg/m²

## 2019-02-15 VITALS
OXYGEN SATURATION: 99 % | HEART RATE: 73 BPM | HEIGHT: 60 IN | WEIGHT: 192.88 LBS | SYSTOLIC BLOOD PRESSURE: 118 MMHG | DIASTOLIC BLOOD PRESSURE: 72 MMHG | BODY MASS INDEX: 37.87 KG/M2 | TEMPERATURE: 97 F | RESPIRATION RATE: 18 BRPM

## 2019-02-15 LAB
ANION GAP SERPL CALC-SCNC: 6 MMOL/L
BASOPHILS # BLD AUTO: 0 K/UL
BASOPHILS NFR BLD: 0.1 %
BUN SERPL-MCNC: 9 MG/DL
CALCIUM SERPL-MCNC: 7.8 MG/DL
CHLORIDE SERPL-SCNC: 107 MMOL/L
CO2 SERPL-SCNC: 25 MMOL/L
CREAT SERPL-MCNC: 0.8 MG/DL
DIFFERENTIAL METHOD: ABNORMAL
EOSINOPHIL # BLD AUTO: 0 K/UL
EOSINOPHIL NFR BLD: 0.1 %
ERYTHROCYTE [DISTWIDTH] IN BLOOD BY AUTOMATED COUNT: 14 %
EST. GFR  (AFRICAN AMERICAN): >60 ML/MIN/1.73 M^2
EST. GFR  (NON AFRICAN AMERICAN): >60 ML/MIN/1.73 M^2
GLUCOSE SERPL-MCNC: 147 MG/DL
HCT VFR BLD AUTO: 34.8 %
HGB BLD-MCNC: 11.8 G/DL
LYMPHOCYTES # BLD AUTO: 1.3 K/UL
LYMPHOCYTES NFR BLD: 8.9 %
MCH RBC QN AUTO: 32.5 PG
MCHC RBC AUTO-ENTMCNC: 34 G/DL
MCV RBC AUTO: 96 FL
MONOCYTES # BLD AUTO: 1 K/UL
MONOCYTES NFR BLD: 6.8 %
NEUTROPHILS # BLD AUTO: 12 K/UL
NEUTROPHILS NFR BLD: 84.1 %
PLATELET # BLD AUTO: 226 K/UL
PMV BLD AUTO: 7.2 FL
POTASSIUM SERPL-SCNC: 3.7 MMOL/L
RBC # BLD AUTO: 3.63 M/UL
SODIUM SERPL-SCNC: 138 MMOL/L
WBC # BLD AUTO: 14.3 K/UL

## 2019-02-15 PROCEDURE — 25000003 PHARM REV CODE 250: Performed by: UROLOGY

## 2019-02-15 PROCEDURE — S5010 5% DEXTROSE AND 0.45% SALINE: HCPCS | Performed by: UROLOGY

## 2019-02-15 PROCEDURE — 63600175 PHARM REV CODE 636 W HCPCS: Performed by: UROLOGY

## 2019-02-15 PROCEDURE — S0028 INJECTION, FAMOTIDINE, 20 MG: HCPCS | Performed by: UROLOGY

## 2019-02-15 PROCEDURE — 85025 COMPLETE CBC W/AUTO DIFF WBC: CPT

## 2019-02-15 PROCEDURE — G0378 HOSPITAL OBSERVATION PER HR: HCPCS

## 2019-02-15 PROCEDURE — 96375 TX/PRO/DX INJ NEW DRUG ADDON: CPT

## 2019-02-15 PROCEDURE — 36415 COLL VENOUS BLD VENIPUNCTURE: CPT

## 2019-02-15 PROCEDURE — 80048 BASIC METABOLIC PNL TOTAL CA: CPT

## 2019-02-15 RX ORDER — DEXTROSE MONOHYDRATE AND SODIUM CHLORIDE 5; .45 G/100ML; G/100ML
INJECTION, SOLUTION INTRAVENOUS CONTINUOUS
Status: DISCONTINUED | OUTPATIENT
Start: 2019-02-15 | End: 2019-02-15

## 2019-02-15 RX ORDER — ACETAMINOPHEN 325 MG/1
650 TABLET ORAL EVERY 4 HOURS PRN
Status: DISCONTINUED | OUTPATIENT
Start: 2019-02-15 | End: 2019-02-15 | Stop reason: HOSPADM

## 2019-02-15 RX ORDER — MORPHINE SULFATE 2 MG/ML
2 INJECTION, SOLUTION INTRAMUSCULAR; INTRAVENOUS EVERY 4 HOURS PRN
Status: DISCONTINUED | OUTPATIENT
Start: 2019-02-15 | End: 2019-02-15 | Stop reason: HOSPADM

## 2019-02-15 RX ORDER — DOCUSATE SODIUM 100 MG/1
100 CAPSULE, LIQUID FILLED ORAL 2 TIMES DAILY
Refills: 0 | COMMUNITY
Start: 2019-02-15 | End: 2019-08-20

## 2019-02-15 RX ORDER — FAMOTIDINE 10 MG/ML
20 INJECTION INTRAVENOUS 2 TIMES DAILY
Status: DISCONTINUED | OUTPATIENT
Start: 2019-02-15 | End: 2019-02-15 | Stop reason: HOSPADM

## 2019-02-15 RX ORDER — TAMSULOSIN HYDROCHLORIDE 0.4 MG/1
0.4 CAPSULE ORAL NIGHTLY
Status: DISCONTINUED | OUTPATIENT
Start: 2019-02-15 | End: 2019-02-15 | Stop reason: HOSPADM

## 2019-02-15 RX ORDER — DOCUSATE SODIUM 100 MG/1
100 CAPSULE, LIQUID FILLED ORAL 2 TIMES DAILY
Status: DISCONTINUED | OUTPATIENT
Start: 2019-02-15 | End: 2019-02-15 | Stop reason: HOSPADM

## 2019-02-15 RX ORDER — TRAMADOL HYDROCHLORIDE 50 MG/1
50 TABLET ORAL EVERY 6 HOURS PRN
Status: DISCONTINUED | OUTPATIENT
Start: 2019-02-15 | End: 2019-02-15 | Stop reason: HOSPADM

## 2019-02-15 RX ORDER — DIPHENHYDRAMINE HYDROCHLORIDE 50 MG/ML
12.5 INJECTION INTRAMUSCULAR; INTRAVENOUS EVERY 8 HOURS PRN
Status: DISCONTINUED | OUTPATIENT
Start: 2019-02-15 | End: 2019-02-15 | Stop reason: HOSPADM

## 2019-02-15 RX ORDER — GENTAMICIN SULFATE 80 MG/100ML
80 INJECTION, SOLUTION INTRAVENOUS ONCE
Status: COMPLETED | OUTPATIENT
Start: 2019-02-15 | End: 2019-02-15

## 2019-02-15 RX ADMIN — GENTAMICIN SULFATE 80 MG: 80 INJECTION, SOLUTION INTRAVENOUS at 03:02

## 2019-02-15 RX ADMIN — DEXTROSE AND SODIUM CHLORIDE: 5; .45 INJECTION, SOLUTION INTRAVENOUS at 09:02

## 2019-02-15 RX ADMIN — CEFTRIAXONE 1 G: 1 INJECTION, SOLUTION INTRAVENOUS at 09:02

## 2019-02-15 RX ADMIN — FAMOTIDINE 20 MG: 10 INJECTION INTRAVENOUS at 09:02

## 2019-02-15 RX ADMIN — DOCUSATE SODIUM 100 MG: 100 CAPSULE, LIQUID FILLED ORAL at 09:02

## 2019-02-15 RX ADMIN — DEXTROSE AND SODIUM CHLORIDE: 5; .45 INJECTION, SOLUTION INTRAVENOUS at 02:02

## 2019-02-15 RX ADMIN — TAMSULOSIN HYDROCHLORIDE 0.4 MG: 0.4 CAPSULE ORAL at 02:02

## 2019-02-15 NOTE — H&P
[facility] Urology H&P:    Kulwinder Hogue Jr. is a 68 y.o. male who presents for gross hematuria/clot retention s/p TURP    He was last seen by Dr Mancia on 5/5/17 and was noted to have undergone prostate biopsy and cystoscopy 10/25/16 with 65.7cc gland and evidence of GUILLORY from lateral lobes with slight median lobe and grade 3 trabecs in bladder.    3T MRI at [facility] 5/25/17: Prostate size 63 mL.  Intravesical protrusion of the prostate 10 mm.  PIRAD 2 only, BPH, prostatitis   psa on 10/12/18 of 4.3 (21.16% free)  Confirm mdx of biopsy negative  Was on a diuretic for a while which recently was stopped  When on it, was urinating a lot in the morning after the dose and in evening was better as far as frequency  And now back to routine of urinating at least 1x at night and sometimes x2, even though limits fluid intake after 6pm.  No hesitancy, rare intermittency, + PV dribble. No urgency - though residential driving home from [place] has urge to void but can hold it until home.    Cysto/TRUS:  US: volume 91.5cm3 (H: 44.7mm, W 57.3mm, 68.2mm), ++median lobe, mild PVR  Cysto: Bilateral lateral lobe ingrowth causing significant obstruction especially when viewed from verumontanum, with kissing lobes centrally. Signicant median lobe growing in from posterior bladder neck essentially completely obstructing it with encroachment and obstruction from middle lobe leaving only slit like opening at bladder neck, also seen when withdrawing scope into prostatic urethra  Moderate trabeculations    Had TURP in AM 2/13 and case was uncomplicated.  Relatively dry case quite hemostatic at the end with clear urine.  Patient was observed on Carreon catheter traction for 1 hr with clear urine.  Off traction for greater than 1 hr he had clear yellow urine which was just slightly pink tinged at the time of discharge hours later.    He denies straining, attempt a bowel movement, has only gotten in out of his truck.  Did have blood clots in catheter,  ultimatelygot worse his abdomen became distended and he presented to the ER in clot retention.    In ER attempted to manually irrigate through his indwelling 22 Faroese Carreon catheter noting pure blood.  Did changed to a 22 Faroese coude Carreon catheter and once the balloon was taken down to 10 cc after the initial drainage of 700 cc and only irrigating pure blood, was then able to irrigate.  Manual irrigation efforts by nursing staff removed a significant amount of blood clots.    I did then spent approximately 2 hr manually irrigating with sterile water and 60 cc catheter tip syringe is using greater than 10 L of manual irrigation removing a significant clot burden.  Ultimately change the Carreon catheter using aseptic technique to a 24 Faroese coude-tip hematuria 3 way Carreon catheter and started continuous bladder irrigation with continued efforts at manual irrigation until the continuous bladder irrigation was draining, though still blood-tinged.    Past Medical History:   Diagnosis Date    Elevated PSA     Hyperlipemia     Hypertension     Wears glasses        Past Surgical History:   Procedure Laterality Date    COLONOSCOPY N/A 5/2/2018    [facility]    COLONOSCOPY N/A 3/25/2013    [facility] at [facility]    COLONOSCOPY W/ POLYPECTOMY  3/2013    Tubular adenoma    CYSTOSCOPY  10/25/2016    Dr. Mancia    CYSTOSCOPY N/A 12/4/2018    [facility]    CYSTOSCOPY N/A 10/25/2016    Performed by N[facility]ễn [facility]ancia MD at [facility] OR    EGD (ESOPHAGOGASTRODUODENOSCOPY) N/A 8/23/2013    Performed by Jamal Cortez MD at [facility] ENDO    EXCISION, MASS, NECK N/A 8/17/2012    Performed by Randy Jacques [facility] at [facility] OR    LIPOMA RESECTION  8-    lipoma posterior neck    PROSTATE BIOPSY  10/25/2016    Dr. Mancia    TRANSRECTAL ULTRASOUND GUIDED PROSTATE BIOPSY Bilateral 10/25/2016    [facility] OR    ULTRASOUND, RECTAL APPROACH N/A 12/4/2018    [facility] OR    VASECTOMY         Family History   Problem Relation Age of Onset    Cancer Mother         breast    Kidney cancer Neg Hx     Prostate cancer Neg Hx        Social History     Socioeconomic History    Marital status:      Spouse name: Not on file    Number of children: Not on file    Years of education: Not on file    Highest education level: Not on file   Social Needs    Financial resource strain: Not on file    Food insecurity - worry: Not on file    Food insecurity - inability: Not on file    Transportation needs - medical: Not on file    Transportation needs - non-medical: Not on file   Occupational History    Not on file   Tobacco Use    Smoking status: Never Smoker    Smokeless tobacco: Never Used   Substance and Sexual Activity    Alcohol use: Yes     Comment: WEEKEND    Drug use: No    Sexual activity: Not on file   Other Topics Concern    Not on file   Social History Narrative    Not on file       Review of patient's allergies indicates:   Allergen Reactions    Percocet [oxycodone-acetaminophen] Itching       Medications Reviewed: see MAR    ROS:    Constitutional: denies fevers, chills, night sweats, fatigue, malaise  Respiratory: negative for cough, shortness of breath, wheezing, dyspnea.  Cardiovascular: + for high blood pressure, negative for chest pain, varicose veins, ankle swelling, palpitations, syncope.  GI: negative for abdominal pain, heartburn, indigestion, nausea, vomiting, constipation, diarrhea, blood in stool.   Urology: as noted above in HPI  Endocrinology: negative for cold intolerance, excessive thirst, not feeling tired/sluggish, no heat intolerance.   Hematology/Lymph: negative for easy bleeding, easy bruising, swollen glands.  Musculoskeletal: negative for back pain, joint pain, joint swelling, neck pain.  Allergy-Immunology: negative for seasonal  allergies, negative for unusual infections.   Skin: negative for boils, breast lumps, hives, itching, rash.   Neurology: negative for, dizziness, headache, tingling/numbness, tremors.   Psych: satisfied with life; negative for, anxiety, depression, suicidal thoughts.     PHYSICAL EXAM:    Vitals:    02/15/19 0002   BP: (!) 153/89   Pulse: 94   Resp:    Temp:      Body mass index is 24.41 kg/m².       General: Alert, cooperative, no distress, appears stated age  Head: Normocephalic, without obvious abnormality, atraumatic  Neck: no masses, no thyromegaly, no lymphadenopathy  Eyes: PERRL, conjunctiva/corneas clear  Lungs: Respirations unlabored, normal effort, no accessory muscle use  CV: Warm and well perfused extremities  Abdomen: Soft, non-tender, no CVA tenderness, no hepatosplenomegaly, no hernia  :  Circumcised normal phallus with bilaterally descended normal testes with catheter and gross hematuria as above  Extremities: Extremities normal, atraumatic, no cyanosis or edema  Skin: Normal color, texture, and turgor, no rashes or lesions  Psych: Appropriate, well oriented, normal affect, normal mood  Neuro: Non-focal      LABS:    Recent Results (from the past 336 hour(s))   CBC auto differential    Collection Time: 02/08/19  9:06 AM   Result Value Ref Range    WBC 6.70 3.90 - 12.70 K/uL    RBC 4.90 4.60 - 6.20 M/uL    Hemoglobin 15.8 14.0 - 18.0 g/dL    Hematocrit 46.8 40.0 - 54.0 %    MCV 95 82 - 98 fL    MCH 32.2 (H) 27.0 - 31.0 pg    MCHC 33.8 32.0 - 36.0 g/dL    RDW 13.3 11.5 - 14.5 %    Platelets 337 150 - 350 K/uL    MPV 6.8 (L) 9.2 - 12.9 fL    Gran # (ANC) 3.8 1.8 - 7.7 K/uL    Lymph # 2.2 1.0 - 4.8 K/uL    Mono # 0.6 0.3 - 1.0 K/uL    Eos # 0.1 0.0 - 0.5 K/uL    Baso # 0.00 0.00 - 0.20 K/uL    Gran% 56.4 38.0 - 73.0 %    Lymph% 32.4 18.0 - 48.0 %    Mono% 8.9 4.0 - 15.0 %    Eosinophil% 1.9 0.0 - 8.0 %    Basophil% 0.4 0.0 - 1.9 %    Differential Method Automated    Protime-INR    Collection Time:  02/08/19  9:06 AM   Result Value Ref Range    Prothrombin Time 10.2 9.0 - 12.5 sec    INR 1.0 0.8 - 1.2   Basic metabolic panel    Collection Time: 02/08/19  9:06 AM   Result Value Ref Range    Sodium 140 136 - 145 mmol/L    Potassium 4.0 3.5 - 5.1 mmol/L    Chloride 103 95 - 110 mmol/L    CO2 30 (H) 23 - 29 mmol/L    Glucose 111 (H) 70 - 110 mg/dL    BUN, Bld 13 8 - 23 mg/dL    Creatinine 0.9 0.5 - 1.4 mg/dL    Calcium 9.4 8.7 - 10.5 mg/dL    Anion Gap 7 (L) 8 - 16 mmol/L    eGFR if African American >60 >60 mL/min/1.73 m^2    eGFR if non African American >60 >60 mL/min/1.73 m^2   Urine culture    Collection Time: 02/08/19  9:23 AM   Result Value Ref Range    Urine Culture, Routine No significant growth    Urinalysis    Collection Time: 02/08/19  9:23 AM   Result Value Ref Range    Specimen UA Urine, Clean CatchUrine, Illeal LoopUrin     Color, UA Yellow Yellow, Straw, Ciera    Appearance, UA Clear Clear    pH, UA 7.0 5.0 - 8.0    Specific Gravity, UA 1.010 1.005 - 1.030    Protein, UA Trace (A) Negative    Glucose, UA Negative Negative    Ketones, UA Negative Negative    Bilirubin (UA) Negative Negative    Occult Blood UA Trace (A) Negative    Nitrite, UA Negative Negative    Urobilinogen, UA Negative <2.0 EU/dL    Leukocytes, UA Negative Negative   CBC auto differential    Collection Time: 02/14/19 10:12 PM   Result Value Ref Range    WBC 14.00 (H) 3.90 - 12.70 K/uL    RBC 4.58 (L) 4.60 - 6.20 M/uL    Hemoglobin 14.7 14.0 - 18.0 g/dL    Hematocrit 43.6 40.0 - 54.0 %    MCV 95 82 - 98 fL    MCH 32.2 (H) 27.0 - 31.0 pg    MCHC 33.8 32.0 - 36.0 g/dL    RDW 13.5 11.5 - 14.5 %    Platelets 266 150 - 350 K/uL    MPV 6.6 (L) 9.2 - 12.9 fL    Gran # (ANC) 12.7 (H) 1.8 - 7.7 K/uL    Lymph # 0.8 (L) 1.0 - 4.8 K/uL    Mono # 0.5 0.3 - 1.0 K/uL    Eos # 0.0 0.0 - 0.5 K/uL    Baso # 0.00 0.00 - 0.20 K/uL    Gran% 90.7 (H) 38.0 - 73.0 %    Lymph% 5.9 (L) 18.0 - 48.0 %    Mono% 3.3 (L) 4.0 - 15.0 %    Eosinophil% 0.0 0.0 - 8.0 %     Basophil% 0.1 0.0 - 1.9 %    Differential Method Automated    Basic metabolic panel    Collection Time: 02/14/19 10:12 PM   Result Value Ref Range    Sodium 135 (L) 136 - 145 mmol/L    Potassium 3.6 3.5 - 5.1 mmol/L    Chloride 103 95 - 110 mmol/L    CO2 24 23 - 29 mmol/L    Glucose 138 (H) 70 - 110 mg/dL    BUN, Bld 12 8 - 23 mg/dL    Creatinine 0.9 0.5 - 1.4 mg/dL    Calcium 8.7 8.7 - 10.5 mg/dL    Anion Gap 8 8 - 16 mmol/L    eGFR if African American >60 >60 mL/min/1.73 m^2    eGFR if non African American >60 >60 mL/min/1.73 m^2         Assessment/Diagnosis:    Gross hematuria with clot retention, status post TURP    Plans:    Admit for observation on CBI.  Continue IV fluids.  Scheduled and p.r.n. manual irrigation with the CBI.  As urine clears, will attempt to decrease CBI for clamping trial.  Advised p.o. fluid intake at this time, and can eat and drink until 5:00 a.m. and then should remain NPO until further evaluation in case he needs operative management.  Empiric antibiotics will be given due to the significant manipulation.

## 2019-02-15 NOTE — NURSING
Pt arrived to the floor via stretcher accompanied by ER tech.  Pt was AAOx3  w/ 0/10 pain reported.  Pt w/ BCI in place wide open with fruit punch colored urine to DBL bryant catheter bag.  Pt's IVF were initiated, pt and spouse were oriented to the rm and call bell and instructed to please call for assistance.  Bed side rails raised x3 w/ call bell and bedside table within reach.

## 2019-02-15 NOTE — ED NOTES
Bladder irrigation attempted with 400cc sterile water. Multiple blood clots noted during irrigation. Pt requested to pause due to pain. Dr. Johnson at bedside.

## 2019-02-15 NOTE — PLAN OF CARE
Cm completed the assessment at pt's bedside.  Pt is independent in care.  PCP is Dr. Lugo.  Insurance verified as [employer]. Pharmacy is [facility].   Disposition:  Pt will discharged to home with family.  No needs verbalized at this time.       02/15/19 1355   Discharge Assessment   Assessment Type Discharge Planning Assessment   Confirmed/corrected address and phone number on facesheet? Yes   Assessment information obtained from? Patient   Prior to hospitilization cognitive status: Alert/Oriented   Prior to hospitalization functional status: Independent   Current cognitive status: Alert/Oriented   Current Functional Status: Independent   Facility Arrived From: home   Lives With spouse   Is patient able to care for self after discharge? Yes   Who are your caregiver(s) and their phone number(s)? wife- Ashanti - [phone]   Patient's perception of discharge disposition home or selfcare   Readmission Within the Last 30 Days no previous admission in last 30 days   Patient currently being followed by outpatient case management? Yes   If yes, name of outpatient case management following: insurance company assigned oupatient case management   Patient currently receives any other outside agency services? No   Equipment Currently Used at Home none   Do you have any problems affording any of your prescribed medications? No   Is the patient [facility] as [facility]? [facility]  [facility]   Does the patient have transportation home? Yes   Transportation Anticipated car, drives self;family or friend will provide   Dialysis Name and Scheduled days na   Does the patient receive services at the Coumadin Clinic? No   Discharge Plan A Home with family   DME Needed Upon Discharge  none   Patient/Family in Agreement with Plan yes   Does the patient have transportation to healthcare appointments? Yes

## 2019-02-15 NOTE — PROGRESS NOTES
I have evaluated and performed a medical screening assessment on this patient while awaiting a thorough evaluation and treatment. All of the emergency department beds are occupied at this time. When appropriate, laboratory studies will be ordered from triage. The patient has been advised of this process and care plan. Patient complains of decreased drainage into bryant catheter with bloody clots s/p TURP performed earlier today.    Orders pending:  Urinalysis  Disposition: stable

## 2019-02-15 NOTE — TELEPHONE ENCOUNTER
"    Reason for Disposition   Health Information question, no triage required and triager able to answer question    Answer Assessment - Initial Assessment Questions  1. REASON FOR CALL or QUESTION: "What is your reason for calling today?" or "How can I best help you?" or "What question do you have that I can help answer?"      On call back wife stated pt had a TURP today. They emptied his catheter at 1800 and noted a few strings of blood. Feels like he needs to void but it is stinging/burning. Since then no drainage in bag, they have tried repositioning it - he has been drinking "tons" of water per wife but no output so they are taking him to ER    Protocols used: ST INFORMATION ONLY CALL-A-AH      "

## 2019-02-15 NOTE — NURSING
Discharge instructions provided, patient verbalized understanding. PIV removed intact. Carreon education provided, patient demonstrated education back. Personal belongings packed per patient and wife. Transported off floor via wheelchair to personal vehicle home.

## 2019-02-15 NOTE — ED PROVIDER NOTES
"Encounter Date: 2/14/2019    SCRIBE #1 NOTE: I, Josie Quique, am scribing for, and in the presence of, Leroy Johnson MD.       History     Chief Complaint   Patient presents with    Bryant problem     pt had procedure today and now bryant catheter isn't draining; clots present       Time seen by provider: 8:24 PM on 02/14/2019    Kulwinder Hogue Jr. is a 68 y.o. male with HTN and HLD who presents to the ED with an onset of bloody urine output within urine bag. He has a TURP performed by Urologist Dr. Jamal Jain earlier today. The patient reports penile and lower abdominal pain with distention, "like I drink a whole bunch of water." He denies fevers, nausea, vomiting, or any other symptoms at this time. No additional pertinent SHx noted. Percocet drug allergy noted.      The history is provided by the patient.     Review of patient's allergies indicates:   Allergen Reactions    Percocet [oxycodone-acetaminophen] Itching     Past Medical History:   Diagnosis Date    Elevated PSA     Hyperlipemia     Hypertension     Wears glasses      Past Surgical History:   Procedure Laterality Date    COLONOSCOPY N/A 5/2/2018    [facility] ENDO    COLONOSCOPY N/A 3/25/2013    [facility]    COLONOSCOPY W/ POLYPECTOMY  3/2013    Tubular adenoma    CYSTOSCOPY  10/25/2016    Dr. Mancia    CYSTOSCOPY N/A 12/4/2018    [facility]    CYSTOSCOPY N/A 10/25/2016    [facility] at [facility] OR    EGD (ESOPHAGOGASTRODUODENOSCOPY) N/A 8/23/2013    [facility] Cortez [facility] at [facility] ENDO    EXCISION, MASS, NECK N/A 8/17/2012    [facility] Jacques [facility] at [facility] OR    LIPOMA RESECTION  8-    lipoma posterior neck    PROSTATE BIOPSY  10/25/2016    Dr. Mancia    TRANSRECTAL ULTRASOUND GUIDED PROSTATE BIOPSY Bilateral 10/25/2016    [facility] OR    " ULTRASOUND, RECTAL APPROACH N/A 12/4/2018    [facility]    VASECTOMY       Family History   Problem Relation Age of Onset    Cancer Mother         breast    Kidney cancer Neg Hx     Prostate cancer Neg Hx      Social History     Tobacco Use    Smoking status: Never Smoker    Smokeless tobacco: Never Used   Substance Use Topics    Alcohol use: Yes     Comment: WEEKEND    Drug use: No     Review of Systems   Constitutional: Negative for fever.   HENT: Negative for nosebleeds.    Eyes: Negative for visual disturbance.   Respiratory: Negative for cough and shortness of breath.    Cardiovascular: Negative for chest pain and palpitations.   Gastrointestinal: Positive for abdominal distention and abdominal pain (suprapubic). Negative for nausea and vomiting.   Genitourinary: Positive for hematuria and penile pain.   Musculoskeletal: Negative for back pain and neck pain.   Skin: Negative for rash.   Neurological: Negative for seizures, syncope and headaches.     Physical Exam     Initial Vitals [02/14/19 1953]   BP Pulse Resp Temp SpO2   133/84 108 16 97.5 °F (36.4 °C) 99 %      MAP       --         Physical Exam    Nursing note and vitals reviewed.  Constitutional: He appears well-developed and well-nourished. No distress.   HENT:   Head: Normocephalic and atraumatic.   Eyes: Conjunctivae and EOM are normal. Pupils are equal, round, and reactive to light.   Neck: Neck supple.   Cardiovascular: Normal rate, regular rhythm and normal heart sounds. Exam reveals no gallop and no friction rub.    No murmur heard.  Pulmonary/Chest: Breath sounds normal. No respiratory distress. He has no wheezes. He has no rhonchi. He has no rales.   Abdominal: Soft. Bowel sounds are normal. He exhibits distension. There is no tenderness.   Bladder distention.   Genitourinary:   Genitourinary Comments: Carreon catheter in place with straight blood with clots within urine bag.    Musculoskeletal: Normal  range of motion. He exhibits no edema or tenderness.   Neurological: He is alert and oriented to person, place, and time.   Skin: Skin is warm and dry.   Psychiatric: He has a normal mood and affect.       ED Course   Procedures  Labs Reviewed   URINALYSIS, REFLEX TO URINE CULTURE   CBC W/ AUTO DIFFERENTIAL   BASIC METABOLIC PANEL         Imaging Results          US Bladder (In process)                  Medical Decision Making:   History:   Old Medical Records: I decided to obtain old medical records.  Clinical Tests:   Lab Tests: Ordered and Reviewed  Radiological Study: Reviewed and Ordered  ED Management:  Patient headache urinary retention with blood clot in the Carreon.  Urologist at the bedside irrigating currently.  Labs are stable.  Reactive leukocytosis secondary to procedure today.  Vital signs are stable. He does not appear to be septic.  Disposition per Dr. Jain.             Scribe Attestation:   Scribe #1: I performed the above scribed service and the documentation accurately describes the services I performed. I attest to the accuracy of the note.    I, Dr. Leroy Johnson personally performed the services described in this documentation. All medical record entries made by the scribe were at my direction and in my presence.  I have reviewed the chart and agree that the record reflects my personal performance and is accurate and complete. Leroy Johnson MD.  9:24 PM 03/04/2019    DISCLAIMER: This note was prepared with Dragon NaturallySpeaking voice recognition transcription software. Garbled syntax, mangled pronouns, and other bizarre constructions may be attributed to that software system            Clinical Impression:       ICD-10-CM ICD-9-CM   1. Urinary retention R33.9 788.20   2. Hematuria R31.9 599.70                                Leroy Johnson MD  03/04/19 3421

## 2019-02-15 NOTE — PLAN OF CARE
Problem: Fall Injury Risk  Goal: Absence of Fall and Fall-Related Injury  Outcome: Ongoing (interventions implemented as appropriate)  Pt will be free of falls/injury- call for assist out of bed

## 2019-02-15 NOTE — DISCHARGE SUMMARY
[facility]  Urology  Discharge Summary      Patient Name: Kulwinder Hogue Jr.  MRN: [id number]  Admission Date: 2/14/2019  Hospital Length of Stay: 0 days  Discharge Date and Time: 2/15/2019  4:37 PM  Attending Physician: Jamal Jain MD  Discharging Provider: Jamal Jain MD  Primary Care Physician: Henrique Lugo MD    HPI:   67 yo M who had uncomplicated TURP and presented with GH/clot retention.  Manual irrigation effort significant in ER - admitted for obs with CBI    * No surgery found *     Indwelling Lines/Drains at time of discharge:   Lines/Drains/Airways          24fr coude tip hematuria 3-way bryant          Hospital Course   CBI wide open overnight with manual irrigation once.  Titrated a down slightly in the morning with manual irrigation with minimal to no clot  Throughout morning after Bryant placed on traction for 90 min, CBI titrated to off.  Patient tolerated clamping trial in ambulated.  IV fluids discontinued.  Encouraged p.o. hydration.  Patient tolerated clamping trial for multiple hours with p.o. hydration only, and by 3pm rounds, urine was draining a consistently clear light pink.  Patient was fitted with leg bag and ambulated with leg bag and urine remained clear pink and he was discharged home with 3rd port a Bryant catheter plugged in stable condition.      Pending Diagnostic Studies:     None          Final Active Diagnoses:    Diagnosis Date Noted POA    PRINCIPAL PROBLEM:  Hematuria [R31.9] 02/14/2019 Yes      Problems Resolved During this Admission:         Discharged Condition: good    Disposition: Home or Self Care    Follow Up:  Follow-up Information     Jamal Jain MD On 2/18/2019.    Specialty:  Urology  Why:  by 0900 as nurse visit for bryant removal, then MD appt 4-6 weeks - office will arrange  Contact information:  [address] [place] [address] [address]  [place] [address]  [phone]                 Patient Instructions:      Activity as tolerated    Order Comments: See all DC paperwork and instructions from initial discharge  No strenuous activity while bryant in place  Ok to shower  Stay on stool softeners until regular again - no pushing/straining  STAY WELL HYDRATED  Hold aspirin and fish oil 3 days     Medications:  Reconciled Home Medications:      Medication List      START taking these medications    docusate sodium 100 MG capsule  Commonly known as:  COLACE  Take 1 capsule (100 mg total) by mouth 2 (two) times daily.        CHANGE how you take these medications    ipratropium 0.03 % nasal spray  Commonly known as:  ATROVENT  USE 2 SPRAYS IN EACH NOSTRIL THREE TIMES DAILY  What changed:  See the new instructions.        CONTINUE taking these medications    amLODIPine 10 MG tablet  Commonly known as:  NORVASC  Take 1 tablet (10 mg total) by mouth every evening.     aspirin 81 mg Tab  Take 81 mg by mouth once daily. Every day     carvedilol 12.5 MG tablet  Commonly known as:  COREG  TAKE 1 TABLET BY MOUTH TWICE A DAY WITH MEALS     fish oil-omega-3 fatty acids 300-1,000 mg capsule  Take 2 g by mouth once daily.     methylcellulose oral powder  Take 3.4 g by mouth once daily.     multivitamin capsule  Take 1 capsule by mouth once daily.     olmesartan 40 MG tablet  Commonly known as:  BENICAR  Take 1 tablet (40 mg total) by mouth once daily. (replaces benazepril)     pravastatin 40 MG tablet  Commonly known as:  PRAVACHOL  TAKE 1 TABLET(40 MG) BY MOUTH EVERY DAY     * ranitidine 150 MG tablet  Commonly known as:  ZANTAC  Take 150 mg by mouth 2 (two) times daily as needed for Heartburn.     * ranitidine 150 MG tablet  Commonly known as:  ZANTAC  Take 1 tablet (150 mg total) by mouth 2 (two) times daily as needed for Heartburn.     sulfamethoxazole-trimethoprim 800-160mg 800-160 mg Tab  Commonly known as:  BACTRIM DS  Take 1 tablet by mouth 2 (two) times daily. for 5 days     tamsulosin 0.4 mg Cap  Commonly known as:  FLOMAX  Take 1 capsule (0.4 mg total) by  mouth once daily.     traMADol 50 mg tablet  Commonly known as:  ULTRAM  Take 1 tablet (50 mg total) by mouth every 6 (six) hours as needed (pain not relieved by otc agents).         * This list has 2 medication(s) that are the same as other medications prescribed for you. Read the directions carefully, and ask your doctor or other care provider to review them with you.                Time spent on the discharge of patient: 30 minutes    Jamal Jain MD  Urology  [facility]

## 2019-02-15 NOTE — ED NOTES
700cc dark red blood in catheter upon insertion.  Attempt to irrigate unsuccessful.  40cc in and 5 cc out, 40 cc in and 3 cc out with manual irrigation.

## 2019-02-15 NOTE — PLAN OF CARE
02/15/19 1622   Final Note   Assessment Type Final Discharge Note   Anticipated Discharge Disposition Home   Hospital Follow Up  Appt(s) scheduled? Yes

## 2019-02-16 ENCOUNTER — TELEPHONE (OUTPATIENT)
Dept: UROLOGY | Facility: CLINIC | Age: 69
End: 2019-02-16

## 2019-02-16 NOTE — TELEPHONE ENCOUNTER
Checked in on patient via phone  Doing well  Urine clearing up  No drainage issues  Some dark debris at bottom of bag  No BM yet  Advised on stool softeners and continued hydration efforts  Will continue as planned to have bryant removed Monday

## 2019-02-18 ENCOUNTER — HOSPITAL ENCOUNTER (OUTPATIENT)
Dept: RADIOLOGY | Facility: HOSPITAL | Age: 69
Discharge: HOME | End: 2019-02-18
Attending: UROLOGY
Payer: MEDICARE

## 2019-02-18 ENCOUNTER — CLINICAL SUPPORT (OUTPATIENT)
Dept: UROLOGY | Facility: CLINIC | Age: 69
End: 2019-02-18
Payer: MEDICARE

## 2019-02-18 DIAGNOSIS — N40.1 BPH WITH OBSTRUCTION/LOWER URINARY TRACT SYMPTOMS: ICD-10-CM

## 2019-02-18 DIAGNOSIS — R31.0 GROSS HEMATURIA: Primary | ICD-10-CM

## 2019-02-18 DIAGNOSIS — N13.8 BPH WITH OBSTRUCTION/LOWER URINARY TRACT SYMPTOMS: ICD-10-CM

## 2019-02-18 DIAGNOSIS — R31.0 GROSS HEMATURIA: ICD-10-CM

## 2019-02-18 PROCEDURE — 99211 OFF/OP EST MAY X REQ PHY/QHP: CPT | Mod: S$GLB,,, | Performed by: UROLOGY

## 2019-02-18 PROCEDURE — 76857 US BLADDER: ICD-10-PCS | Mod: 26,,, | Performed by: RADIOLOGY

## 2019-02-18 PROCEDURE — 99211 PR OFFICE/OUTPT VISIT, EST, LEVL I: ICD-10-PCS | Mod: S$GLB,,, | Performed by: UROLOGY

## 2019-02-18 PROCEDURE — 76857 US EXAM PELVIC LIMITED: CPT | Mod: TC

## 2019-02-18 PROCEDURE — 76857 US EXAM PELVIC LIMITED: CPT | Mod: 26,,, | Performed by: RADIOLOGY

## 2019-02-18 NOTE — PROGRESS NOTES
Patient here today to have his catheter removed post TURP 2/14 with post bleeding complications.    Urine in catheter bag is bright red/clear.  Patient reports that was not so red last night and this morning. He has seen some dark clot pieces occasionally.  Discussed with Dr. Jain.   Patient will go for STAT bladder ultrasound to be chart checked by MD prior to removing catheter.     Orders from Dr. Jain for FILL AND PULL.    30ml saline removed from catheter balloon.   Bladder filled with 300ml sterile water via catheter.  Catheter removed with no difficulty.   Leg bag contains 50ml of bright red/clear urine.     Patient voided 300ml pink/clear urine into urinal.     Patient sitting on exam table, starts to feel faint.  He has become pale and diaphoretic.    Patient placed in supine position with feet elevated.  Patient has not eaten anything this morning. Given candy to raise BS.   Lying- BP 96/52 Pulse 80  After 10 minutes observation, patient is regaining his coloring.  He is feeling some better.    Lying- /70 Pulse 80  Sitting- /70  Standing- BP 97/68  Patient reports feeling a little lightheaded.  Had patient sit in the chair for a few minutes.    Sitting- 110/74 P 81  Standing- 104/72 P 81  Patient reports that he is feeling better.    He is now discharged with his wife with instructions to call if any concerns.

## 2019-02-18 NOTE — PROGRESS NOTES
"Follow up call:  Patient is urinating well.  He says "it's yanet going everywhere, but seems to be clearing as well."  "

## 2019-02-18 NOTE — PROGRESS NOTES
Last 5 Patient Entered Readings                                      Current 30 Day Average: 126/81     Recent Readings 2/18/2019 2/13/2019 2/10/2019 2/7/2019 2/5/2019    SBP (mmHg) 124 120 121 135 131    DBP (mmHg) 88 80 79 84 72    Pulse 87 72 66 68 71          BP average improved and approaching goal.  Patient recently s/p TURP with bleeding complications.  No medication recommendations for BP at this time.

## 2019-02-22 ENCOUNTER — TELEPHONE (OUTPATIENT)
Dept: UROLOGY | Facility: CLINIC | Age: 69
End: 2019-02-22

## 2019-02-22 NOTE — TELEPHONE ENCOUNTER
----- Message from Josue Davidson sent at 2/22/2019  1:47 PM CST -----  Contact: pt  Type: Needs Medical Advice    Who Called:  pt  Best Call Back Number: [phone]  Additional Information: pt called regarding a procedure he recently had done by dr arreguin and has questions about the recovery

## 2019-02-22 NOTE — TELEPHONE ENCOUNTER
Amount of blood decreasing/clearing.  Still seeing some old black blood pieces.  Saw a red clot today, passed with some pain.  His urine comes out in a spray instead of a flow.    Patient encouraged to drink a lot of water.  Advised that with increased activity, may see some continued bleeding, but keep drinking. Advised that spray may be from blood pieces trying to get out, since he does have a flow at times.   Patient will be going out of town in about a week and is a little concerned about being away.

## 2019-02-26 ENCOUNTER — TELEPHONE (OUTPATIENT)
Dept: UROLOGY | Facility: CLINIC | Age: 69
End: 2019-02-26

## 2019-02-26 NOTE — TELEPHONE ENCOUNTER
----- Message from Kathryn Randhawa sent at 2/26/2019  4:34 PM CST -----  Type: Needs Medical Advice    Who Called:  Patient    Best Call Back Number: [phone] (home) [phone] (work)    Additional Information: Wanted to discuss previous conversation about coming in tomorrow at 8. Please contact to further discuss

## 2019-02-26 NOTE — TELEPHONE ENCOUNTER
----- Message from Darlin Chu sent at 2/26/2019  2:17 PM CST -----  Type: Needs Medical Advice    Who Called:  Patient  Best Call Back Number: [phone]  Additional Information: Had a procedure a few weeks ago and has a few questions for the office. Please call to advise.

## 2019-02-26 NOTE — TELEPHONE ENCOUNTER
Has to urinate every 20 minutes or so.  He still has some bleeding at the start of his flow then clear.  Says when he urinates he has burning the whole flow.   Lightheaded on Monday.  Orthostatic hypotension.    Patient is still taking Flomax.   Please advise.

## 2019-02-27 ENCOUNTER — CLINICAL SUPPORT (OUTPATIENT)
Dept: UROLOGY | Facility: CLINIC | Age: 69
End: 2019-02-27
Payer: MEDICARE

## 2019-02-27 DIAGNOSIS — N13.8 BPH WITH OBSTRUCTION/LOWER URINARY TRACT SYMPTOMS: Primary | ICD-10-CM

## 2019-02-27 DIAGNOSIS — N40.1 BPH WITH OBSTRUCTION/LOWER URINARY TRACT SYMPTOMS: Primary | ICD-10-CM

## 2019-02-27 DIAGNOSIS — R30.0 DYSURIA: ICD-10-CM

## 2019-02-27 DIAGNOSIS — R35.0 URINARY FREQUENCY: ICD-10-CM

## 2019-02-27 LAB
BILIRUB SERPL-MCNC: NEGATIVE MG/DL
BLOOD URINE, POC: ABNORMAL
COLOR, POC UA: ABNORMAL
GLUCOSE UR QL STRIP: NEGATIVE
KETONES UR QL STRIP: NEGATIVE
LEUKOCYTE ESTERASE URINE, POC: ABNORMAL
NITRITE, POC UA: NEGATIVE
PH, POC UA: 7
POC RESIDUAL URINE VOLUME: 81 ML (ref 0–100)
PROTEIN, POC: 100
SPECIFIC GRAVITY, POC UA: 1.01
UROBILINOGEN, POC UA: NEGATIVE

## 2019-02-27 PROCEDURE — 51798 US URINE CAPACITY MEASURE: CPT | Mod: S$GLB,,, | Performed by: UROLOGY

## 2019-02-27 PROCEDURE — 81002 URINALYSIS NONAUTO W/O SCOPE: CPT | Mod: S$GLB,,, | Performed by: UROLOGY

## 2019-02-27 PROCEDURE — 51798 POCT BLADDER SCAN: ICD-10-PCS | Mod: S$GLB,,, | Performed by: UROLOGY

## 2019-02-27 PROCEDURE — 99999 PR PBB SHADOW E&M-EST. PATIENT-LVL I: CPT | Mod: PBBFAC,,,

## 2019-02-27 PROCEDURE — 87086 URINE CULTURE/COLONY COUNT: CPT

## 2019-02-27 PROCEDURE — 81002 POCT URINE DIPSTICK WITHOUT MICROSCOPE: ICD-10-PCS | Mod: S$GLB,,, | Performed by: UROLOGY

## 2019-02-27 PROCEDURE — 99999 PR PBB SHADOW E&M-EST. PATIENT-LVL I: ICD-10-PCS | Mod: PBBFAC,,,

## 2019-02-27 NOTE — PROGRESS NOTES
Patient here with c/o urinary frequency, dysuria, and hematuria post TURP.     PVR 81ml    Urine sent for culture per VORB from Dr. Jain

## 2019-02-27 NOTE — PROGRESS NOTES
Examined patient. No gross evidence of meatal stenosis.  No more spraying of stream.  Patient notes good stream and can urinate farther than he ever could.  He is experiencing postoperative TURP dysuria, penile pain, and hematuria at the initiation of voiding which clears throughout his void.  We did discuss these are all relatively classic symptoms of the healing.  Of TURP within the 1st 4-6 weeks.  Again advised on conservative measures to decrease urgency and frequency.  Offered anticholinergic/OAB med.  He declined at this time.  Will follow-up as planned.  Will chart check urine culture results.

## 2019-02-28 LAB — BACTERIA UR CULT: NO GROWTH

## 2019-03-04 ENCOUNTER — PATIENT OUTREACH (OUTPATIENT)
Dept: OTHER | Facility: OTHER | Age: 69
End: 2019-03-04

## 2019-03-04 NOTE — PROGRESS NOTES
Last 5 Patient Entered Readings                                      Current 30 Day Average: 127/81     Recent Readings 3/3/2019 2/28/2019 2/27/2019 2/25/2019 2/22/2019    SBP (mmHg) 126 136 146 112 123    DBP (mmHg) 80 86 92 73 83    Pulse 68 66 80 78 72          Digital Medicine: Health  Follow Up    Lifestyle Modifications:    1.Dietary Modifications (Sodium intake <2,000mg/day, food labels, dining out): Pt reports getting back on track with reading nutrition labels and working on maintaining a low sodium diet. Reports some deviation with procedure he had in the middle of February, but that he is back to his normal eating pattern for the most part. Encouraged to continue.    2.Physical Activity: Pt states that 2/2 procedure that he has not been as active, but plans to resume when he gets home from his trip next week. Discussed benefits of regular physical activity on BP control and encouraged to follow through with his plan to resume walking as he was before.     3.Medication Therapy: Patient has been compliant with the medication regimen.    4.Patient has the following medication side effects/concerns: None.   (Frequency/Alleviating factors/Precipitating factors, etc.)     Follow up with Mr. Kulwinder Hogue Jr. completed. No further questions or concerns. Will continue to follow up to achieve health goals.

## 2019-03-19 ENCOUNTER — OFFICE VISIT (OUTPATIENT)
Dept: UROLOGY | Facility: CLINIC | Age: 69
End: 2019-03-19
Payer: MEDICARE

## 2019-03-19 VITALS
DIASTOLIC BLOOD PRESSURE: 85 MMHG | BODY MASS INDEX: 24.18 KG/M2 | HEART RATE: 65 BPM | WEIGHT: 178.56 LBS | HEIGHT: 72 IN | RESPIRATION RATE: 18 BRPM | SYSTOLIC BLOOD PRESSURE: 133 MMHG

## 2019-03-19 DIAGNOSIS — N40.0 BPH WITHOUT URINARY OBSTRUCTION: Primary | ICD-10-CM

## 2019-03-19 LAB
BILIRUB SERPL-MCNC: ABNORMAL MG/DL
BLOOD URINE, POC: ABNORMAL
COLOR, POC UA: ABNORMAL
GLUCOSE UR QL STRIP: ABNORMAL
KETONES UR QL STRIP: ABNORMAL
LEUKOCYTE ESTERASE URINE, POC: ABNORMAL
NITRITE, POC UA: ABNORMAL
PH, POC UA: 5.5
PROTEIN, POC: 300
SPECIFIC GRAVITY, POC UA: 1.03
UROBILINOGEN, POC UA: 0.2

## 2019-03-19 PROCEDURE — 99024 PR POST-OP FOLLOW-UP VISIT: ICD-10-PCS | Mod: S$GLB,,, | Performed by: UROLOGY

## 2019-03-19 PROCEDURE — 99999 PR PBB SHADOW E&M-EST. PATIENT-LVL III: ICD-10-PCS | Mod: PBBFAC,,, | Performed by: UROLOGY

## 2019-03-19 PROCEDURE — 81002 URINALYSIS NONAUTO W/O SCOPE: CPT | Mod: S$GLB,,, | Performed by: UROLOGY

## 2019-03-19 PROCEDURE — 81002 POCT URINE DIPSTICK WITHOUT MICROSCOPE: ICD-10-PCS | Mod: S$GLB,,, | Performed by: UROLOGY

## 2019-03-19 PROCEDURE — 99999 PR PBB SHADOW E&M-EST. PATIENT-LVL III: CPT | Mod: PBBFAC,,, | Performed by: UROLOGY

## 2019-03-19 PROCEDURE — 87086 URINE CULTURE/COLONY COUNT: CPT

## 2019-03-19 PROCEDURE — 99024 POSTOP FOLLOW-UP VISIT: CPT | Mod: S$GLB,,, | Performed by: UROLOGY

## 2019-03-19 NOTE — PATIENT INSTRUCTIONS
Discussed conservative measures to control urgency and frequency including   1. avoiding bladder irritants (see below) and increase water intake during day  - especially avoid in evening  - stop fluid intake 2hrs before bed    2. timed voiding - empty on a schedule (approx ~2-3 hours) in spite of need to urinate to get ahead of urge and help retrain bladder    3. dont postponing voiding - dont hold it on purpose     4. bowel regimen as distended bowel has extrinsic compressive effect on bladder.   - any or all of the following in any combination, titrate to soft daily bowel movement without pushing or straining  - colace/stool softener capsule - once to twice daily  - miralax - 1 capful daily to start, can increase to 2x daily (or decrease to 1/2 cap daily)  - increase dietary fibery  - fiber supplements, such as metamucil  - prunes. prune juice    Discussed bladder irritants include coffe (even decaf), tea, alcohol, soda, spicy foods, acidic juices (orange, tomato), vinegar, and artificial sweeteners/sugary beverages.

## 2019-03-20 LAB — BACTERIA UR CULT: NO GROWTH

## 2019-03-29 ENCOUNTER — TELEPHONE (OUTPATIENT)
Dept: UROLOGY | Facility: CLINIC | Age: 69
End: 2019-03-29

## 2019-03-29 NOTE — TELEPHONE ENCOUNTER
----- Message from Yared Greenberg sent at 3/29/2019  9:33 AM CDT -----  Type:  Patient Call Back    Who Called:pt  Does the patient know what this is regarding?: medical advice;wants to speak with margarito;pt did not disclose the issue.  Best Call Back Number:  [phone]  Additional Information:  Please call pt and leave a detailed message if there is no answer.

## 2019-03-29 NOTE — TELEPHONE ENCOUNTER
Woke to urinate during the night.  Flow started then stopped.  Only dribbled.  When straining he passed a hard object.  Did not save this.  May be a kidney stone.  No visible hematuria.    Please advise.

## 2019-03-29 NOTE — TELEPHONE ENCOUNTER
Possible  hows he urinating now that it has passed?  Remain well hydrated.  No evidence of stones on ultrasound in November but its not definitive.  If no further problems, continue as planned  If concern, can get CT to assess for stone presence.

## 2019-03-31 NOTE — PROGRESS NOTES
[facility] Urology Progress Note    Kulwinder Hogue Jr. is a 68 y.o. male who presents for bph fu s/p TURP    Hx patologic BPH since prostate biopsy and cystoscopy 10/25/16 with Dr Mancia: 65.7cc gland and evidence of GUILLORY from lateral lobes with slight median lobe and grade 3 trabecs in bladder.    3T MRI at [facility] 5/25/17: Prostate size 63 mL.  Intravesical protrusion of the prostate 10 mm. PIRAD 2 only, BPH, prostatitis. psa on 10/12/18 of 4.3 (21.16% free). Confirm mdx of biopsy negative    Cysto/TRUS 12/4/18:  US: volume 91.5cm3 (H: 44.7mm, W 57.3mm, 68.2mm), ++median lobe, mild PVR  Cysto: Bilateral lateral lobe ingrowth causing significant obstruction especially when viewed from verumontanum, with kissing lobes centrally. Signicant median lobe growing in from posterior bladder neck essentially completely obstructing it with encroachment and obstruction from middle lobe leaving only slit like opening at bladder neck, also seen when withdrawing scope into prostatic urethra  Moderate trabeculations     TURP 2/13 uncomplicated  Presented with a significant clot retention on 2/15/19 requiring extensive efforts at manual irrigation and overnight observation with continuous bladder irrigation was discharged home the next day.  Past fill and pull voiding trial 2/18/19 with Carreon removal  2/27/19 presented with urinary frequency and dysuria.  PVR 18 cc.  Urine culture negative.  No evidence of meatal stenosis.  Declined OAB meds    He returns today noting:  He is off his Flomax.  DT F 5-6 times, NTF 2-3 times.  Drinks water in the morning.  Urge isn't there to go.  On his feet all day.  Urgency is daytime mostly, and he did recently make it through a concert for a few hours without the need to urinate.  Hematuria.  Last week.  He is back on his aspirin and fish oil.  He is seen occasional blood at the beginning of his stream.  He thinks he may snore in thinks he may need a sleep study.  He has some constipation for which he  is taking Colace.  Also notes curvature with erections for about 1 year.  No pain.  Can get an erection.  Has been curving up 30° approximately.  Urinalysis dipstick has large blood and small leukocytes.  Postvoid residual is 0 cc.    ROS: A comprehensive 10 system review was performed and is negative except as noted above in HPI    PHYSICAL EXAM:    Vitals:    03/19/19 1017   BP: 133/85   Pulse: 65   Resp: 18     Body mass index is 24.22 kg/m². Weight: 81 kg (178 lb 9.2 oz) Height: 6' (182.9 cm)       General: Alert, cooperative, no distress, appears stated age   Head: Normocephalic, without obvious abnormality, atraumatic   Eyes: PERRL, conjunctiva/corneas clear   Lungs: Respirations unlabored   Heart: Warm and well perfused   Abdomen: soft NT ND  : nl circ phallus, no evidence meatal stenosis  Extremities: Extremities normal, atraumatic, no cyanosis or edema   Skin: Skin color, texture, turgor normal, no rashes or lesions   Psych: Appropriate   Neurologic: Non-focal       Recent Results (from the past 336 hour(s))   POCT URINE DIPSTICK WITHOUT MICROSCOPE    Collection Time: 03/19/19 10:20 AM   Result Value Ref Range    Color, UA reddish/cloudy     Spec Grav UA 1.030     pH, UA 5.5     WBC, UA small     Nitrite, UA neg     Protein 300     Glucose, UA neg     Ketones, UA trace     Urobilinogen, UA 0.2     Bilirubin neg     Blood, UA large    Urine culture    Collection Time: 03/19/19 10:55 AM   Result Value Ref Range    Urine Culture, Routine No growth        ASSESSMENT   1. BPH without urinary obstruction  POCT URINE DIPSTICK WITHOUT MICROSCOPE    POCT Bladder Scan    Urine culture       Plan    Continuing to improve status post TURP.  He did have initial setback with clot retention though his hematuria is resolving any is back on his aspirin and his fish oil.  He still has mild residual LUTS but is off of his Flomax, and his voiding is improving.  He is having some post TURP urgency frequency which is classic  upon relief of obstruction and we did review conservative measures to decrease urgency and frequency.  Discussed conservative measures to control urgency and frequency including avoiding bladder irritants, timed voiding, not postponing voiding, and bowel regimen (as distended bowel has extrinsic compressive effect on bladder. Discussed bladder irritants include coffe (even decaf), tea, alcohol, soda, spicy foods, acidic juices (orange, tomato), vinegar, and artificial sweeteners.  Again offered anticholinergics or OAB meds, but he deferred at this time noting theyre improving  Advised limiting fluid intake 2 hr before bed to limit nocturia, and will cc Dr. Lugo to discuss further evaluation for sleep apnea and sleep study  Briefly discussed peroneus disease as well.  Mild curvature which is not preclude intercourse and no ED or pain, will observe at this time.  RTC 3 months

## 2019-04-05 DIAGNOSIS — I10 ESSENTIAL HYPERTENSION: ICD-10-CM

## 2019-04-05 RX ORDER — AMLODIPINE BESYLATE 10 MG/1
TABLET ORAL
Qty: 90 TABLET | Refills: 1 | Status: SHIPPED | OUTPATIENT
Start: 2019-04-05 | End: 2019-09-30 | Stop reason: SDUPTHER

## 2019-04-11 ENCOUNTER — PATIENT OUTREACH (OUTPATIENT)
Dept: OTHER | Facility: OTHER | Age: 69
End: 2019-04-11

## 2019-04-11 NOTE — PROGRESS NOTES
Last 5 Patient Entered Readings                                      Current 30 Day Average: 138/89     Recent Readings 4/7/2019 4/6/2019 4/5/2019 4/3/2019 4/1/2019    SBP (mmHg) 140 122 142 148 140    DBP (mmHg) 84 87 93 93 90    Pulse 64 65 66 68 67          Digital Medicine: Health  Follow Up    Lifestyle Modifications:    1.Dietary Modifications (Sodium intake <2,000mg/day, food labels, dining out): Pt reports no major change in diet. Continues to try to follow a low sodium diet, but states that with recent life stress he may not have been as mindful as he once was. Encouraged to start reading nutrition labels and paying closer attention to sodium intake.    2.Physical Activity: Pt states that he has not resumed his normal activity 2/2 stressful life events. Discussed benefits of regular physical activity for BP control and stress relief. Recommended starting slow and gradually increasing.     3.Medication Therapy: Patient has been compliant with the medication regimen.    4.Patient has the following medication side effects/concerns: Pt attributes upward trend in BP to stress from complications with a procedure he had in February, along with the death of his sister a few weeks ago. Offered condolences and recommended to try mindful breathing to help with stress.  (Frequency/Alleviating factors/Precipitating factors, etc.)     Follow up with . Kulwinder Hogue Jr. completed. No further questions or concerns. Will continue to follow up to achieve health goals.

## 2019-04-14 ENCOUNTER — HOSPITAL ENCOUNTER (EMERGENCY)
Facility: HOSPITAL | Age: 69
Discharge: HOME | End: 2019-04-14
Payer: MEDICARE

## 2019-04-14 VITALS — HEIGHT: 72 IN | WEIGHT: 175 LBS | BODY MASS INDEX: 23.7 KG/M2

## 2019-04-14 LAB
ANION GAP SERPL CALC-SCNC: 8 MMOL/L (ref 8–16)
BACTERIA #/AREA URNS HPF: ABNORMAL /HPF
BASOPHILS # BLD AUTO: 0 K/UL (ref 0–0.2)
BASOPHILS NFR BLD: 0.6 % (ref 0–1.9)
BILIRUB UR QL STRIP: NEGATIVE
BUN SERPL-MCNC: 15 MG/DL (ref 8–23)
CALCIUM SERPL-MCNC: 9.2 MG/DL (ref 8.7–10.5)
CHLORIDE SERPL-SCNC: 106 MMOL/L (ref 95–110)
CLARITY UR: ABNORMAL
CO2 SERPL-SCNC: 27 MMOL/L (ref 23–29)
COLOR UR: YELLOW
CREAT SERPL-MCNC: 1 MG/DL (ref 0.5–1.4)
DIFFERENTIAL METHOD: ABNORMAL
EOSINOPHIL # BLD AUTO: 0.1 K/UL (ref 0–0.5)
EOSINOPHIL NFR BLD: 1.3 % (ref 0–8)
ERYTHROCYTE [DISTWIDTH] IN BLOOD BY AUTOMATED COUNT: 13.4 % (ref 11.5–14.5)
EST. GFR  (AFRICAN AMERICAN): >60 ML/MIN/1.73 M^2
EST. GFR  (NON AFRICAN AMERICAN): >60 ML/MIN/1.73 M^2
GLUCOSE SERPL-MCNC: 100 MG/DL (ref 70–110)
GLUCOSE UR QL STRIP: NEGATIVE
HCT VFR BLD AUTO: 44.1 % (ref 40–54)
HGB BLD-MCNC: 14.9 G/DL (ref 14–18)
HGB UR QL STRIP: ABNORMAL
HYALINE CASTS #/AREA URNS LPF: 6 /LPF
KETONES UR QL STRIP: ABNORMAL
LEUKOCYTE ESTERASE UR QL STRIP: ABNORMAL
LYMPHOCYTES # BLD AUTO: 1.7 K/UL (ref 1–4.8)
LYMPHOCYTES NFR BLD: 20.1 % (ref 18–48)
MCH RBC QN AUTO: 32.1 PG (ref 27–31)
MCHC RBC AUTO-ENTMCNC: 33.7 G/DL (ref 32–36)
MCV RBC AUTO: 95 FL (ref 82–98)
MICROSCOPIC COMMENT: ABNORMAL
MONOCYTES # BLD AUTO: 0.6 K/UL (ref 0.3–1)
MONOCYTES NFR BLD: 7.3 % (ref 4–15)
NEUTROPHILS # BLD AUTO: 5.9 K/UL (ref 1.8–7.7)
NEUTROPHILS NFR BLD: 70.7 % (ref 38–73)
NITRITE UR QL STRIP: NEGATIVE
PH UR STRIP: 7 [PH] (ref 5–8)
PLATELET # BLD AUTO: 273 K/UL (ref 150–350)
PMV BLD AUTO: 7.1 FL (ref 9.2–12.9)
POTASSIUM SERPL-SCNC: 3.6 MMOL/L (ref 3.5–5.1)
PROT UR QL STRIP: ABNORMAL
RBC # BLD AUTO: 4.64 M/UL (ref 4.6–6.2)
RBC #/AREA URNS HPF: 12 /HPF (ref 0–4)
SODIUM SERPL-SCNC: 141 MMOL/L (ref 136–145)
SP GR UR STRIP: 1.02 (ref 1–1.03)
URN SPEC COLLECT METH UR: ABNORMAL
UROBILINOGEN UR STRIP-ACNC: NEGATIVE EU/DL
WBC # BLD AUTO: 8.3 K/UL (ref 3.9–12.7)
WBC #/AREA URNS HPF: >100 /HPF (ref 0–5)

## 2019-04-14 PROCEDURE — 80048 BASIC METABOLIC PNL TOTAL CA: CPT

## 2019-04-14 PROCEDURE — 36415 COLL VENOUS BLD VENIPUNCTURE: CPT

## 2019-04-14 PROCEDURE — 99283 EMERGENCY DEPT VISIT LOW MDM: CPT

## 2019-04-14 PROCEDURE — 81000 URINALYSIS NONAUTO W/SCOPE: CPT

## 2019-04-14 PROCEDURE — 85025 COMPLETE CBC W/AUTO DIFF WBC: CPT

## 2019-04-15 ENCOUNTER — TELEPHONE (OUTPATIENT)
Dept: UROLOGY | Facility: CLINIC | Age: 69
End: 2019-04-15

## 2019-04-15 NOTE — TELEPHONE ENCOUNTER
----- Message from Janina Mahmood sent at 4/15/2019  7:42 AM CDT -----  Type: Needs Medical Advice    Who Called:  Patient  Symptoms (please be specific):  Passed a kidney stone  Best Call Back Number: [phone]  Additional Information: contact to advise when he can bring the stone in      Thank you

## 2019-04-16 ENCOUNTER — APPOINTMENT (OUTPATIENT)
Dept: LAB | Facility: HOSPITAL | Age: 69
End: 2019-04-16
Attending: UROLOGY
Payer: MEDICARE

## 2019-04-16 ENCOUNTER — CLINICAL SUPPORT (OUTPATIENT)
Dept: UROLOGY | Facility: CLINIC | Age: 69
End: 2019-04-16
Payer: MEDICARE

## 2019-04-16 DIAGNOSIS — N20.0 KIDNEY STONES: Primary | ICD-10-CM

## 2019-04-16 PROCEDURE — 82365 CALCULUS SPECTROSCOPY: CPT

## 2019-04-18 LAB
ANNOTATION COMMENT IMP: NORMAL
COMPN STONE: NORMAL
SPECIMEN SOURCE: NORMAL
STONE ANALYSIS IR-IMP: NORMAL

## 2019-04-23 ENCOUNTER — HOSPITAL ENCOUNTER (EMERGENCY)
Facility: HOSPITAL | Age: 69
Discharge: HOME | End: 2019-04-23
Attending: EMERGENCY MEDICINE
Payer: MEDICARE

## 2019-04-23 ENCOUNTER — TELEPHONE (OUTPATIENT)
Dept: UROLOGY | Facility: CLINIC | Age: 69
End: 2019-04-23

## 2019-04-23 VITALS
WEIGHT: 175 LBS | OXYGEN SATURATION: 100 % | TEMPERATURE: 98 F | HEART RATE: 60 BPM | SYSTOLIC BLOOD PRESSURE: 175 MMHG | DIASTOLIC BLOOD PRESSURE: 102 MMHG | HEIGHT: 72 IN | BODY MASS INDEX: 23.7 KG/M2 | RESPIRATION RATE: 20 BRPM

## 2019-04-23 DIAGNOSIS — N21.1 URETHRAL CALCULUS: Primary | ICD-10-CM

## 2019-04-23 LAB
ANION GAP SERPL CALC-SCNC: 6 MMOL/L (ref 8–16)
BACTERIA #/AREA URNS HPF: ABNORMAL /HPF
BILIRUB UR QL STRIP: NEGATIVE
BUN SERPL-MCNC: 9 MG/DL (ref 8–23)
CALCIUM SERPL-MCNC: 9.2 MG/DL (ref 8.7–10.5)
CHLORIDE SERPL-SCNC: 104 MMOL/L (ref 95–110)
CLARITY UR: CLEAR
CO2 SERPL-SCNC: 30 MMOL/L (ref 23–29)
COLOR UR: YELLOW
CREAT SERPL-MCNC: 1 MG/DL (ref 0.5–1.4)
EST. GFR  (AFRICAN AMERICAN): >60 ML/MIN/1.73 M^2
EST. GFR  (NON AFRICAN AMERICAN): >60 ML/MIN/1.73 M^2
GLUCOSE SERPL-MCNC: 105 MG/DL (ref 70–110)
GLUCOSE UR QL STRIP: NEGATIVE
HGB UR QL STRIP: ABNORMAL
HYALINE CASTS #/AREA URNS LPF: 0 /LPF
KETONES UR QL STRIP: NEGATIVE
LEUKOCYTE ESTERASE UR QL STRIP: ABNORMAL
MICROSCOPIC COMMENT: ABNORMAL
NITRITE UR QL STRIP: NEGATIVE
PH UR STRIP: 6 [PH] (ref 5–8)
POTASSIUM SERPL-SCNC: 3.5 MMOL/L (ref 3.5–5.1)
PROT UR QL STRIP: ABNORMAL
RBC #/AREA URNS HPF: 10 /HPF (ref 0–4)
SODIUM SERPL-SCNC: 140 MMOL/L (ref 136–145)
SP GR UR STRIP: 1.02 (ref 1–1.03)
SQUAMOUS #/AREA URNS HPF: 3 /HPF
URN SPEC COLLECT METH UR: ABNORMAL
UROBILINOGEN UR STRIP-ACNC: NEGATIVE EU/DL
WBC #/AREA URNS HPF: >100 /HPF (ref 0–5)

## 2019-04-23 PROCEDURE — 87086 URINE CULTURE/COLONY COUNT: CPT

## 2019-04-23 PROCEDURE — 99284 EMERGENCY DEPT VISIT MOD MDM: CPT

## 2019-04-23 PROCEDURE — 36415 COLL VENOUS BLD VENIPUNCTURE: CPT

## 2019-04-23 PROCEDURE — 81000 URINALYSIS NONAUTO W/SCOPE: CPT

## 2019-04-23 PROCEDURE — 25000003 PHARM REV CODE 250: Performed by: EMERGENCY MEDICINE

## 2019-04-23 PROCEDURE — 80048 BASIC METABOLIC PNL TOTAL CA: CPT

## 2019-04-23 RX ORDER — PHENAZOPYRIDINE HYDROCHLORIDE 200 MG/1
200 TABLET, FILM COATED ORAL 3 TIMES DAILY
Qty: 6 TABLET | Refills: 0 | Status: SHIPPED | OUTPATIENT
Start: 2019-04-23 | End: 2019-04-26 | Stop reason: ALTCHOICE

## 2019-04-23 RX ORDER — TAMSULOSIN HYDROCHLORIDE 0.4 MG/1
0.4 CAPSULE ORAL
Status: COMPLETED | OUTPATIENT
Start: 2019-04-23 | End: 2019-04-23

## 2019-04-23 RX ORDER — TAMSULOSIN HYDROCHLORIDE 0.4 MG/1
0.4 CAPSULE ORAL DAILY
Qty: 10 CAPSULE | Refills: 0 | Status: SHIPPED | OUTPATIENT
Start: 2019-04-23 | End: 2019-06-20

## 2019-04-23 RX ORDER — PHENAZOPYRIDINE HYDROCHLORIDE 100 MG/1
200 TABLET, FILM COATED ORAL
Status: COMPLETED | OUTPATIENT
Start: 2019-04-23 | End: 2019-04-23

## 2019-04-23 RX ADMIN — PHENAZOPYRIDINE HYDROCHLORIDE 200 MG: 100 TABLET ORAL at 06:04

## 2019-04-23 RX ADMIN — TAMSULOSIN HYDROCHLORIDE 0.4 MG: 0.4 CAPSULE ORAL at 06:04

## 2019-04-23 NOTE — TELEPHONE ENCOUNTER
Spoke with patient, states he has kidney stone that feels like it is slowing his urine coming out. States he is letting us know he will go to the ER. Informed we have MD on call who can be consulted for the care, patient verbally understood.

## 2019-04-23 NOTE — TELEPHONE ENCOUNTER
----- Message from Behzad Lezama sent at 4/23/2019 10:42 AM CDT -----  Type: Needs Medical Advice    Who Called:  Patient  Best Call Back Number: [phone] (home) [phone] (work)  Additional Information: Patient would like to speak with office regarding kidney stones

## 2019-04-23 NOTE — ED PROVIDER NOTES
Encounter Date: 4/23/2019    SCRIBE #1 NOTE: Theresa EMMANUEL, eulalia scribing for, and in the presence of, Dr. Jolley.       History     Chief Complaint   Patient presents with    Urinary Retention     Unable to urinate since this am. Recent hx of same problem with stones.     4/23/2019  3:52 PM     The patient is a 68 y.o. male  who presents with difficult urinating. Patient c/o gradual onset of intermittent difficulty urinating which started this morning. Pt states when he urinated 4 hours ago, he noticed two small black stones in the toilet. This is the last time he voided today. Patient complains of constant urgency to urinate. He has mild bladder fullness but denies any burning with urination, blood in urine, fevers, nausea or vomiting. Patient had a similar episode occur 7-8 days ago in which he had an obstructing renal stone. The stone was extracted in the ED and patient was referred to urology. PMHx of elevated PSA, HTN and HLD. SHx of vasectomy, lipoma resection, colonoscopy with polypectomy, prostate biopsy.    The history is provided by the patient.     Review of patient's allergies indicates:   Allergen Reactions    Percocet [oxycodone-acetaminophen] Itching     Past Medical History:   Diagnosis Date    Elevated PSA     Hyperlipemia     Hypertension     Wears glasses      Past Surgical History:   Procedure Laterality Date    COLONOSCOPY N/A 5/2/2018    Performed by [facility]an [facility] at [facility] ENDO    COLONOSCOPY N/A 3/25/2013    Performed by [facility]an [facility] at [facility]    COLONOSCOPY W/ POLYPECTOMY  3/2013    Tubular adenoma    CYSTOSCOPY  10/25/2016    Dr. Mancia    CYSTOSCOPY N/A 12/4/2018    [facility]    CYSTOSCOPY N/A 10/25/2016    Performed by Nguyễn [facility]ancia MD at [facility] OR    EGD (ESOPHAGOGASTRODUODENOSCOPY) N/A 8/23/2013    Performed by Jamal Cortez MD at [facility] ENDO    EXCISION, MASS, NECK N/A 8/17/2012    [facility] Randy MARS  [facility] Georgie [facility] OR    LIPOMA RESECTION  8-    lipoma posterior neck    PROSTATE BIOPSY  10/25/2016    Dr. Mancia    TRANSRECTAL ULTRASOUND GUIDED PROSTATE BIOPSY Bilateral 10/25/2016    Performed [facility]guy[facility]n [facility]ancia [facility] at [facility]    TURP (TRANSURETHRAL RESECTION OF PROSTATE) N/A 2/14/2019    [facility] at [facility] OR    ULTRASOUND, RECTAL APPROACH N/A 12/4/2018    [facility] at [facility] OR    VASECTOMY       Family History   Problem Relation Age of Onset    Cancer Mother         breast    Kidney cancer Neg Hx     Prostate cancer Neg Hx      Social History     Tobacco Use    Smoking status: Never Smoker    Smokeless tobacco: Never Used   Substance Use Topics    Alcohol use: Yes     Comment: WEEKEND    Drug use: No     Review of Systems   Constitutional: Negative for appetite change, chills and fever.   HENT: Negative for congestion, rhinorrhea and sore throat.    Respiratory: Negative for cough and shortness of breath.    Cardiovascular: Negative for chest pain.   Gastrointestinal: Positive for abdominal pain (fullness). Negative for diarrhea, nausea and vomiting.   Genitourinary: Positive for difficulty urinating and urgency. Negative for dysuria and hematuria.   Musculoskeletal: Negative for back pain and myalgias.   Skin: Negative for rash.   Neurological: Negative for weakness and numbness.   Hematological: Does not bruise/bleed easily.       Physical Exam     Initial Vitals [04/23/19 1538]   BP Pulse Resp Temp SpO2   (!) 175/84 64 12 97.5 °F (36.4 °C) 95 %      MAP       --         Physical Exam    Nursing note and vitals reviewed.  Constitutional: He appears well-developed and well-nourished. No distress.   HENT:   Head: Normocephalic and atraumatic.   Mouth/Throat: Mucous membranes are normal.   Eyes: EOM are normal. Pupils are equal, round, and reactive to light.   Neck: Normal range of motion.   Cardiovascular: Normal rate, regular rhythm,  normal heart sounds and intact distal pulses. Exam reveals no gallop and no friction rub.    No murmur heard.  Pulmonary/Chest: Breath sounds normal. He has no wheezes. He has no rhonchi. He has no rales.   Abdominal: Soft. He exhibits no distension. There is no tenderness.   No abdominal fullness.   Musculoskeletal: Normal range of motion. He exhibits no edema.   Neurological: He is alert and oriented to person, place, and time. He has normal strength.   Skin: Skin is dry. No rash noted.   Psychiatric: He has a normal mood and affect.         ED Course   Procedures  Labs Reviewed   URINALYSIS, REFLEX TO URINE CULTURE - Abnormal; Notable for the following components:       Result Value    Protein, UA 1+ (*)     Occult Blood UA 3+ (*)     Leukocytes, UA 3+ (*)     All other components within normal limits    Narrative:     Preferred Collection Type->Urine, Clean Catch   BASIC METABOLIC PANEL - Abnormal; Notable for the following components:    CO2 30 (*)     Anion Gap 6 (*)     All other components within normal limits   URINALYSIS MICROSCOPIC - Abnormal; Notable for the following components:    RBC, UA 10 (*)     WBC, UA >100 (*)     Bacteria, UA Few (*)     All other components within normal limits    Narrative:     Preferred Collection Type->Urine, Clean Catch   CULTURE, URINE   CULTURE, URINE          Imaging Results    None          Medical Decision Making:   History:   Old Medical Records: I decided to obtain old medical records.  Initial Assessment:   Patient is 68-year-old man with a history of urethral calculus who presents emergency department complaining of dribbling and feeling as though he has no obstruction in his urethra.  Initial inspection shows no obstruction at the urethral meatus.  Palpation of the penis revealed no abnormalities or hard masses over the course of urethra.  Laboratory evaluation was performed and patient has normal renal function. Postvoid residual was less than 50 cc excluding  urinary obstruction.  Urinalysis was nitrite negative with few bacteria, greater than 100 white blood cells and 10 RBCs.  Urine culture has been sent.  Discussed with Dr. Jain who recommends initiating Flomax.  I will also place patient on Pyridium for possible bladder irritation.  On re-evaluation patient prior to discharge patient states he was feeling like his penis was swollen and would like me to recheck his urethra.  On re-evaluation I did appreciate white urethral calculus/sediment over the distal penile urethra.  This was removed with sterile for sepsis and patient voided approximately 150 cc of clear yellow urine and felt significantly improved.  Patient is to follow up on Friday and Dr. Jain is office.  Clinical Tests:   Lab Tests: Reviewed and Ordered            Scribe Attestation:   Scribe #1: I performed the above scribed service and the documentation accurately describes the services I performed. I attest to the accuracy of the note.    Attending Attestation:             Attending ED Notes:   6:15 PM - I, Dr. Jolley, removed a small stone from the ureteral meatus. Pt was able to void 150 cc without any difficulty.  I, Shola Calloway, personally performed the services described in this documentation. All medical record entries made by the scribe were at my direction and in my presence.  I have reviewed the chart and agree that the record reflects my personal performance and is accurate and complete. Vega Jolley MD.  7:53 PM 04/23/2019       DISCLAIMER: This note was prepared with [employer] Direct voice recognition transcription software. Garbled syntax, mangled pronouns, and other bizarre constructions may be attributed to that software system.               Clinical Impression:       ICD-10-CM ICD-9-CM   1. Urethral calculus N21.1 594.2         Disposition:   Disposition: Discharged  Condition: Stable                        Vega Jolley MD  04/23/19 1953

## 2019-04-23 NOTE — ED NOTES
"Pt presents to ED POV from home with c/o difficulty urinating. He reports that he last urinated 4 hours ago and noted that he saw "two small black dots" in the toilet. Pt is AAOx4. Skin warm, dry to touch. Respirations even, nonlabored. NAD noted. Pt is calm, cooperative.   "

## 2019-04-24 DIAGNOSIS — I10 ESSENTIAL HYPERTENSION: ICD-10-CM

## 2019-04-24 DIAGNOSIS — E78.5 HYPERLIPIDEMIA, UNSPECIFIED HYPERLIPIDEMIA TYPE: Chronic | ICD-10-CM

## 2019-04-24 RX ORDER — PRAVASTATIN SODIUM 40 MG/1
TABLET ORAL
Qty: 90 TABLET | Refills: 0 | Status: SHIPPED | OUTPATIENT
Start: 2019-04-24 | End: 2019-07-25 | Stop reason: SDUPTHER

## 2019-04-24 NOTE — TELEPHONE ENCOUNTER
AS PER ER NOTE BELOW:  Please call patient and have him added on to see me Friday afternoon. Book onto schedule at 2pm.     Patient is 68-year-old man with a history of urethral calculus who presents emergency department complaining of dribbling and feeling as though he has an obstruction in his urethra.  Initial inspection shows no obstruction at the urethral meatus.  Palpation of the penis revealed no abnormalities or hard masses over the course of urethra.  Laboratory evaluation was performed and patient has normal renal function. Postvoid residual was less than 50 cc excluding urinary obstruction.  Urinalysis was nitrite negative with few bacteria, greater than 100 white blood cells and 10 RBCs.  Urine culture has been sent.  Discussed with Dr. Jain who recommends initiating Flomax.  I will also place patient on Pyridium for possible bladder irritation.  On re-evaluation patient prior to discharge patient states he was feeling like his penis was swollen and would like me to recheck his urethra.  On re-evaluation I did appreciate white urethral calculus/sediment over the distal penile urethra.  This was removed with sterile forceps and patient voided approximately 150 cc of clear yellow urine and felt significantly improved.  Patient is to follow up on Friday and Dr. Jain is office.

## 2019-04-25 LAB — BACTERIA UR CULT: NO GROWTH

## 2019-04-26 ENCOUNTER — OFFICE VISIT (OUTPATIENT)
Dept: UROLOGY | Facility: CLINIC | Age: 69
End: 2019-04-26
Payer: MEDICARE

## 2019-04-26 VITALS
WEIGHT: 182.13 LBS | HEART RATE: 67 BPM | HEIGHT: 72 IN | RESPIRATION RATE: 18 BRPM | DIASTOLIC BLOOD PRESSURE: 96 MMHG | BODY MASS INDEX: 24.67 KG/M2 | SYSTOLIC BLOOD PRESSURE: 146 MMHG

## 2019-04-26 DIAGNOSIS — N20.0 KIDNEY STONES: Primary | ICD-10-CM

## 2019-04-26 DIAGNOSIS — N99.110 POSTPROCEDURAL MALE URETHRAL MEATAL STRICTURE: ICD-10-CM

## 2019-04-26 DIAGNOSIS — N13.8 BPH WITH OBSTRUCTION/LOWER URINARY TRACT SYMPTOMS: ICD-10-CM

## 2019-04-26 DIAGNOSIS — N40.1 BPH WITH OBSTRUCTION/LOWER URINARY TRACT SYMPTOMS: ICD-10-CM

## 2019-04-26 LAB
BILIRUB SERPL-MCNC: ABNORMAL MG/DL
BLOOD URINE, POC: ABNORMAL
COLOR, POC UA: YELLOW
GLUCOSE UR QL STRIP: ABNORMAL
KETONES UR QL STRIP: ABNORMAL
LEUKOCYTE ESTERASE URINE, POC: ABNORMAL
NITRITE, POC UA: ABNORMAL
PH, POC UA: 5
PROTEIN, POC: ABNORMAL
SPECIFIC GRAVITY, POC UA: 1
UROBILINOGEN, POC UA: 0.2

## 2019-04-26 PROCEDURE — 81002 POCT URINE DIPSTICK WITHOUT MICROSCOPE: ICD-10-PCS | Mod: S$GLB,,, | Performed by: UROLOGY

## 2019-04-26 PROCEDURE — 99024 PR POST-OP FOLLOW-UP VISIT: ICD-10-PCS | Mod: S$GLB,,, | Performed by: UROLOGY

## 2019-04-26 PROCEDURE — 99999 PR PBB SHADOW E&M-EST. PATIENT-LVL IV: CPT | Mod: PBBFAC,,, | Performed by: UROLOGY

## 2019-04-26 PROCEDURE — 99024 POSTOP FOLLOW-UP VISIT: CPT | Mod: S$GLB,,, | Performed by: UROLOGY

## 2019-04-26 PROCEDURE — 87086 URINE CULTURE/COLONY COUNT: CPT

## 2019-04-26 PROCEDURE — 99999 PR PBB SHADOW E&M-EST. PATIENT-LVL IV: ICD-10-PCS | Mod: PBBFAC,,, | Performed by: UROLOGY

## 2019-04-26 PROCEDURE — 81002 URINALYSIS NONAUTO W/O SCOPE: CPT | Mod: S$GLB,,, | Performed by: UROLOGY

## 2019-04-26 NOTE — PATIENT INSTRUCTIONS
Meatal dilator use 2x/day x 3 weeks  Then daily x 2 weeks  Then 3 days per week x 2 weeks  Then f/u as planned.

## 2019-04-26 NOTE — Clinical Note
waldo - he has just a little upset because the 1st time he had a problem it was the day epic was completely down and no communication could be sent, he brought you his stone the next day.  The next time he had a problem he called into our clinic, you were out of town, Kimberli intercepted and just told him to go to the ER without ever notifying me though I was in clinic.I think it would help out for you to call him and check in on him something about how you heard of his problems while you are out of town, just wanted to check in on him, etc.  I think that would go a long way if you do not mind.See details of this note before calling

## 2019-04-28 LAB — BACTERIA UR CULT: NO GROWTH

## 2019-05-02 ENCOUNTER — HOSPITAL ENCOUNTER (OUTPATIENT)
Dept: RADIOLOGY | Facility: HOSPITAL | Age: 69
Discharge: HOME | End: 2019-05-02
Attending: UROLOGY
Payer: MEDICARE

## 2019-05-02 DIAGNOSIS — N20.0 KIDNEY STONES: ICD-10-CM

## 2019-05-02 PROCEDURE — 74176 CT RENAL STONE STUDY ABD PELVIS WO: ICD-10-PCS | Mod: 26,,, | Performed by: RADIOLOGY

## 2019-05-02 PROCEDURE — 74176 CT ABD & PELVIS W/O CONTRAST: CPT | Mod: TC

## 2019-05-02 PROCEDURE — 74176 CT ABD & PELVIS W/O CONTRAST: CPT | Mod: 26,,, | Performed by: RADIOLOGY

## 2019-05-02 NOTE — PROGRESS NOTES
[facility] Urology Progress Note    Kulwinder Hogue Jr. is a 68 y.o. male who presents for follow up evaluation of voiding difficulty post TURP with interim stone passage    Hx pathologic BPH since prostate biopsy and cystoscopy 10/25/16 with Dr Mancia: 65.7cc gland and evidence of GUILLORY from lateral lobes with slight median lobe and grade 3 trabecs in bladder.    3T MRI at [facility] 5/25/17: Prostate size 63 mL.  Intravesical protrusion of the prostate 10 mm. PIRAD 2 only, BPH, prostatitis. psa on 10/12/18 of 4.3 (21.16% free). Confirm mdx of biopsy negative     Cysto/TRUS 12/4/18:  US: volume 91.5cm3 (H: 44.7mm, W 57.3mm, 68.2mm), ++median lobe, mild PVR  Cysto: Bilateral lateral lobe ingrowth causing significant obstruction especially when viewed from verumontanum, with kissing lobes centrally. Signicant median lobe growing in from posterior bladder neck essentially completely obstructing it with encroachment and obstruction from middle lobe leaving only slit like opening at bladder neck, also seen when withdrawing scope into prostatic urethra  Moderate trabeculations     TURP 2/13 uncomplicated. Presented with a significant clot retention on 2/15/19 requiring extensive efforts at manual irrigation and overnight observation with continuous bladder irrigation was discharged home the next day.  Past fill and pull voiding trial 2/18/19 with Carreon removal. 2/27/19 presented with urinary frequency and dysuria.  PVR 18 cc.  Urine culture negative.  No evidence of meatal stenosis.  Declined OAB meds  3/19/19: Off his Flomax.  DT F 5-6 times, NTF 2-3 times.  Drinks water in the morning.  Urge isn't there to go.  On his feet all day.  Urgency is daytime mostly, and he did recently make it through a concert for a few hours without the need to urinate.  Hematuria.  Last week.  He is back on his aspirin and fish oil.  He is seen occasional blood at the beginning of his stream. He thinks he may snore in thinks he may need a sleep study.   He has some constipation for which he is taking Colace.  Also notes curvature with erections for about 1 year.  No pain.  Can get an erection.  Has been curving up 30° approximately. Urinalysis dipstick has large blood and small leukocytes.  Postvoid residual is 0 cc.     He did then call in 4/15 noting he passed a stone which he brought in for analysis.  Stone analysis reveals 80% calcium phosphate, 10% calcium oxalate dihydrate.  He then presented to the emergency department on 4/23/19 complaining of dribbling and feeling as though he had an obstruction in his urethra. Initial exam appeared normal and PVR <50cc. UA nitrite negative with few bacteria, >100 wbc, 10 rbc. Ucx neg. On re-evaluation patient prior to discharge patient states he was feeling like his penis was swollen and would like me to recheck his urethra.  On re-evaluation I did appreciate white urethral calculus/sediment over the distal penile urethra.  This was removed with sterile forceps and patient voided approximately 150 cc of clear yellow urine and felt significantly improved.  Patient is to follow up on Friday and Dr. Jain is office    He is here today reporting that he went to the emergency department on 04/14 (of which there is no documentation as this is the day epic was down all day) with a feeling as if he could not urinate, and then past the stone which he brought in for analysis as above.  He had the urge to urinate and could feel his penis swelling and the urine was dribbling out.  He does report that this happened after he cut grass.  It was again this week that he cut grass and presented to the emergency department with a similar presentation.  He has had some split streaming of his urination.  Urinalysis dipstick today has small blood, large leukocytes and is nitrite positive, however he has been taking Pyridium so this could be a false negative.  Has been voiding better since passage of this 2nd stone/sediment per  urethra.          ROS: A comprehensive 10 system review was performed and is negative except as noted above in HPI    PHYSICAL EXAM:    Vitals:    04/26/19 1427   BP: (!) 146/96   Pulse: 67   Resp: 18     Body mass index is 24.7 kg/m². Weight: 82.6 kg (182 lb 1.6 oz) Height: 6' (182.9 cm)     On  exam, meatus may be mildly narrow but does not have gross external meatal stenosis.  Given concern, secondary to distal urethral obstruction and split streaming of urination with recent TURP, I did get a small 10 Mongolian silicone in out catheter and passed per meatus after sterile prep and lubrication and noted to pass easily.  I did then get a blue meatal dilator, which did appreciate some fossa navicularis narrowing/meatal stenosis, of which the dilator passively dilated and unobstructed, feeling it breakthrough some scar tissue.      Recent Results (from the past 336 hour(s))   Urinalysis, Reflex to Urine Culture Urine, Clean Catch    Collection Time: 04/23/19  4:01 PM   Result Value Ref Range    Specimen UA Urine, Clean Catch     Color, UA Yellow Yellow, Straw, Ciera    Appearance, UA Clear Clear    pH, UA 6.0 5.0 - 8.0    Specific Gravity, UA 1.020 1.005 - 1.030    Protein, UA 1+ (A) Negative    Glucose, UA Negative Negative    Ketones, UA Negative Negative    Bilirubin (UA) Negative Negative    Occult Blood UA 3+ (A) Negative    Nitrite, UA Negative Negative    Urobilinogen, UA Negative <2.0 EU/dL    Leukocytes, UA 3+ (A) Negative   Urinalysis Microscopic    Collection Time: 04/23/19  4:01 PM   Result Value Ref Range    RBC, UA 10 (H) 0 - 4 /hpf    WBC, UA >100 (H) 0 - 5 /hpf    Bacteria Few (A) None-Occ /hpf    Squam Epithel, UA 3 /hpf    Hyaline Casts, UA 0 0-1/lpf /lpf    Microscopic Comment SEE COMMENT    Urine culture    Collection Time: 04/23/19  4:01 PM   Result Value Ref Range    Urine Culture, Routine No growth    Basic metabolic panel    Collection Time: 04/23/19  4:28 PM   Result Value Ref Range    Sodium  140 136 - 145 mmol/L    Potassium 3.5 3.5 - 5.1 mmol/L    Chloride 104 95 - 110 mmol/L    CO2 30 (H) 23 - 29 mmol/L    Glucose 105 70 - 110 mg/dL    BUN, Bld 9 8 - 23 mg/dL    Creatinine 1.0 0.5 - 1.4 mg/dL    Calcium 9.2 8.7 - 10.5 mg/dL    Anion Gap 6 (L) 8 - 16 mmol/L    eGFR if African American >60 >60 mL/min/1.73 m^2    eGFR if non African American >60 >60 mL/min/1.73 m^2   POCT URINE DIPSTICK WITHOUT MICROSCOPE    Collection Time: 04/26/19  2:30 PM   Result Value Ref Range    Color, UA yellow     Spec Grav UA 1.005     pH, UA 5     WBC, UA large     Nitrite, UA pos     Protein neg     Glucose, UA neg     Ketones, UA neg     Urobilinogen, UA 0.2     Bilirubin neg     Blood, UA small    Urine culture    Collection Time: 04/26/19  3:23 PM   Result Value Ref Range    Urine Culture, Routine No growth        ASSESSMENT   1. Kidney stones  POCT URINE DIPSTICK WITHOUT MICROSCOPE    POCT Bladder Scan    CT Renal Stone Study ABD Pelvis WO    Urine culture   2. BPH with obstruction/lower urinary tract symptoms     3. Postprocedural male urethral meatal stricture         Plan    He does appear to have some post TUR meatal stenosis/fossa navicularis narrowing, and have educated him on meatal dilator use.  We did discuss a tapering protocol as below.  Meatal dilator use 2x/day x 3 weeks  Then daily x 2 weeks  Then 3 days per week x 2 weeks  Then f/u as planned.    In the interim, given passage of stones, will get a CT stone protocol.  He does not have known prior kidney stones.  We did discuss this may be some prostatic duct calcifications as well, especially given the history of riding mower cutting grass, then urethral stone obstruction.  I have advised him to remain off of his riding lawnmower until further evaluation with CT stone protocol to evaluate if these are kidney stones he is passing and getting obstructed within his urethra or if he only has prostatic calcifications and therefore may be coming loose from a  healing tract in the prostate bed of resection.    Will chart check CT scan and advise of any other recommendations.  Can discontinue Flomax.

## 2019-05-09 ENCOUNTER — PATIENT OUTREACH (OUTPATIENT)
Dept: OTHER | Facility: OTHER | Age: 69
End: 2019-05-09

## 2019-05-09 NOTE — PROGRESS NOTES
Last 5 Patient Entered Readings                                      Current 30 Day Average: 131/86     Recent Readings 5/9/2019 5/4/2019 4/30/2019 4/28/2019 4/26/2019    SBP (mmHg) 139 132 146 124 133    DBP (mmHg) 87 90 90 87 95    Pulse 63 60 66 65 74          Digital Medicine: Health  Follow Up    Lifestyle Modifications:    1.Dietary Modifications (Sodium intake <2,000mg/day, food labels, dining out): Pt reports no change in diet. He eats out daily for lunch, but usually gets a salad or tries to choose a lower sodium option. Cautioned about high sodium content of salad toppings and dressing and recommended getting things on the side and choosing oil and vinegar for dressing.     2.Physical Activity: Pt reports little activity recently 2/2 urology issues and fear that activity would make it worse. Pt states that he is starting to feel better and that he plans to resume activity in the next couple of weeks. Encouraged to follow through with this.     3.Medication Therapy: Patient has been compliant with the medication regimen.    4.Patient has the following medication side effects/concerns: None.  (Frequency/Alleviating factors/Precipitating factors, etc.)     Follow up with Mr. Kulwinder Hogue Jr. completed. No further questions or concerns. Will continue to follow up to achieve health goals.

## 2019-06-20 ENCOUNTER — OFFICE VISIT (OUTPATIENT)
Dept: FAMILY MEDICINE | Facility: CLINIC | Age: 69
End: 2019-06-20
Payer: MEDICARE

## 2019-06-20 VITALS
DIASTOLIC BLOOD PRESSURE: 76 MMHG | OXYGEN SATURATION: 96 % | HEIGHT: 72 IN | TEMPERATURE: 98 F | BODY MASS INDEX: 24.85 KG/M2 | HEART RATE: 65 BPM | SYSTOLIC BLOOD PRESSURE: 120 MMHG | WEIGHT: 183.44 LBS

## 2019-06-20 DIAGNOSIS — H61.21 IMPACTED CERUMEN OF RIGHT EAR: ICD-10-CM

## 2019-06-20 DIAGNOSIS — N40.1 BPH WITH OBSTRUCTION/LOWER URINARY TRACT SYMPTOMS: ICD-10-CM

## 2019-06-20 DIAGNOSIS — H65.02 ACUTE SEROUS OTITIS MEDIA OF LEFT EAR, RECURRENCE NOT SPECIFIED: Primary | ICD-10-CM

## 2019-06-20 DIAGNOSIS — E78.2 MIXED HYPERLIPIDEMIA: ICD-10-CM

## 2019-06-20 DIAGNOSIS — E66.3 OVERWEIGHT (BMI 25.0-29.9): ICD-10-CM

## 2019-06-20 DIAGNOSIS — N13.8 BPH WITH OBSTRUCTION/LOWER URINARY TRACT SYMPTOMS: ICD-10-CM

## 2019-06-20 DIAGNOSIS — I10 ESSENTIAL HYPERTENSION: ICD-10-CM

## 2019-06-20 PROCEDURE — 99999 PR PBB SHADOW E&M-EST. PATIENT-LVL III: CPT | Mod: PBBFAC,,, | Performed by: NURSE PRACTITIONER

## 2019-06-20 PROCEDURE — 3074F PR MOST RECENT SYSTOLIC BLOOD PRESSURE < 130 MM HG: ICD-10-PCS | Mod: CPTII,S$GLB,, | Performed by: NURSE PRACTITIONER

## 2019-06-20 PROCEDURE — 99214 OFFICE O/P EST MOD 30 MIN: CPT | Mod: S$GLB,,, | Performed by: NURSE PRACTITIONER

## 2019-06-20 PROCEDURE — 1101F PR PT FALLS ASSESS DOC 0-1 FALLS W/OUT INJ PAST YR: ICD-10-PCS | Mod: CPTII,S$GLB,, | Performed by: NURSE PRACTITIONER

## 2019-06-20 PROCEDURE — 1101F PT FALLS ASSESS-DOCD LE1/YR: CPT | Mod: CPTII,S$GLB,, | Performed by: NURSE PRACTITIONER

## 2019-06-20 PROCEDURE — 3078F DIAST BP <80 MM HG: CPT | Mod: CPTII,S$GLB,, | Performed by: NURSE PRACTITIONER

## 2019-06-20 PROCEDURE — 99999 PR PBB SHADOW E&M-EST. PATIENT-LVL III: ICD-10-PCS | Mod: PBBFAC,,, | Performed by: NURSE PRACTITIONER

## 2019-06-20 PROCEDURE — 3074F SYST BP LT 130 MM HG: CPT | Mod: CPTII,S$GLB,, | Performed by: NURSE PRACTITIONER

## 2019-06-20 PROCEDURE — 3078F PR MOST RECENT DIASTOLIC BLOOD PRESSURE < 80 MM HG: ICD-10-PCS | Mod: CPTII,S$GLB,, | Performed by: NURSE PRACTITIONER

## 2019-06-20 PROCEDURE — 99214 PR OFFICE/OUTPT VISIT, EST, LEVL IV, 30-39 MIN: ICD-10-PCS | Mod: S$GLB,,, | Performed by: NURSE PRACTITIONER

## 2019-06-20 RX ORDER — CIPROFLOXACIN AND DEXAMETHASONE 3; 1 MG/ML; MG/ML
4 SUSPENSION/ DROPS AURICULAR (OTIC) 2 TIMES DAILY
Qty: 7.5 ML | Refills: 1 | Status: SHIPPED | OUTPATIENT
Start: 2019-06-20 | End: 2019-06-21

## 2019-06-20 NOTE — PROGRESS NOTES
Subjective:       Patient ID: Kulwinder Hogue Jr. is a 68 y.o. male.    Chief Complaint: follow up hyperlipidemia, hypertension    Kulwinder Hogue Jr. is a 68 y.o. male who presents today for six month follow up on chronic disease. 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  Previous labs reviewed with patient. Patient reports left ear pain for three days.    Hypertension   This is a chronic problem. The current episode started more than 1 year ago. The problem is unchanged. The problem is controlled. Pertinent negatives include no chest pain, headaches, neck pain, palpitations, peripheral edema or shortness of breath. There are no associated agents to hypertension. Risk factors for coronary artery disease include male gender and dyslipidemia.   Otalgia    There is pain in the left ear. This is a new problem. The current episode started in the past 7 days. The problem occurs every few hours. The problem has been unchanged. There has been no fever. The pain is at a severity of 1/10. The pain is mild. Pertinent negatives include no coughing, diarrhea, ear discharge, headaches, hearing loss, neck pain, rash, rhinorrhea, sore throat or vomiting. He has tried nothing for the symptoms. There is no history of a chronic ear infection.       Past Medical History:   Diagnosis Date    Elevated PSA     Hyperlipemia     Hypertension     Wears glasses        Review of patient's allergies indicates:   Allergen Reactions    Percocet [oxycodone-acetaminophen] Itching         Current Outpatient Medications:     amLODIPine (NORVASC) 10 MG tablet, TAKE 1 TABLET BY MOUTH EVERY EVENING, Disp: 90 tablet, Rfl: 1    aspirin 81 mg Tab, Take 81 mg by mouth once daily. Every day, Disp: , Rfl:     carvedilol (COREG) 12.5 MG tablet, TAKE 1 TABLET BY MOUTH TWICE A DAY WITH MEALS, Disp: 180 tablet, Rfl: 4    docusate sodium (COLACE)  100 MG capsule, Take 1 capsule (100 mg total) by mouth 2 (two) times daily., Disp: , Rfl: 0    fish oil-omega-3 fatty acids 300-1,000 mg capsule, Take 2 g by mouth once daily., Disp: , Rfl:     ipratropium (ATROVENT) 0.03 % nasal spray, USE 2 SPRAYS IN EACH NOSTRIL THREE TIMES DAILY (Patient taking differently: USE 2 SPRAYS IN EACH NOSTRIL THREE TIMES DAILYas needed rhinitis), Disp: 90 mL, Rfl: 0    methylcellulose oral powder, Take 3.4 g by mouth once daily., Disp: , Rfl:     multivitamin capsule, Take 1 capsule by mouth once daily.  , Disp: , Rfl:     olmesartan (BENICAR) 40 MG tablet, Take 1 tablet (40 mg total) by mouth once daily. (replaces benazepril), Disp: 90 tablet, Rfl: 1    pravastatin (PRAVACHOL) 40 MG tablet, TAKE 1 TABLET(40 MG) BY MOUTH EVERY DAY, Disp: 90 tablet, Rfl: 0    neomycin-polymyxin-hydrocortisone (CORTISPORIN) 3.5-10,000-1 mg/mL-unit/mL-% otic suspension, Place 3 drops into the left ear 4 (four) times daily. for 7 days, Disp: 6 mL, Rfl: 0    Review of Systems   Constitutional: Negative for activity change and unexpected weight change.   HENT: Positive for ear pain. Negative for ear discharge, hearing loss, rhinorrhea, sore throat and trouble swallowing.    Eyes: Negative for discharge and visual disturbance.   Respiratory: Negative for cough, chest tightness, shortness of breath and wheezing.    Cardiovascular: Negative for chest pain, palpitations and leg swelling.   Gastrointestinal: Negative for blood in stool, constipation, diarrhea and vomiting.   Endocrine: Negative for polydipsia and polyuria.   Genitourinary: Negative for difficulty urinating, hematuria and urgency.   Musculoskeletal: Negative for arthralgias, joint swelling and neck pain.   Skin: Negative for rash.   Allergic/Immunologic: Negative for immunocompromised state.   Neurological: Negative for weakness and headaches.   Hematological: Does not bruise/bleed easily.   Psychiatric/Behavioral: Negative for agitation,  confusion and dysphoric mood. The patient is not nervous/anxious.        Objective:      /76 (BP Location: Right arm, Patient Position: Sitting, BP Method: Medium (Manual))   Pulse 65   Temp 98.1 °F (36.7 °C) (Oral)   Ht 6' (1.829 m)   Wt 83.2 kg (183 lb 6.8 oz)   SpO2 96%   BMI 24.88 kg/m²   Physical Exam   Constitutional: He is oriented to person, place, and time. He appears well-developed and well-nourished.   HENT:   Left Ear: Tympanic membrane is retracted.   Ears:    Eyes: Pupils are equal, round, and reactive to light. Conjunctivae and EOM are normal.   Neck: Normal range of motion. Neck supple.   Cardiovascular: Normal rate, regular rhythm, normal heart sounds and intact distal pulses.   Pulmonary/Chest: Effort normal and breath sounds normal.   Abdominal: Soft. Bowel sounds are normal.   Musculoskeletal: Normal range of motion.   Neurological: He is alert and oriented to person, place, and time.   Skin: Skin is warm and dry.   Psychiatric: He has a normal mood and affect. His behavior is normal. Judgment and thought content normal.       Assessment:       1. Acute serous otitis media of left ear, recurrence not specified    2. Impacted cerumen of right ear    3. Essential hypertension    4. Mixed hyperlipidemia    5. BPH with obstruction/lower urinary tract symptoms    6. Overweight (BMI 25.0-29.9)        Plan:       Acute serous otitis media of left ear, recurrence not specified  -     Discontinue: ciprofloxacin-dexamethasone 0.3-0.1% (CIPRODEX) 0.3-0.1 % DrpS; Place 4 drops into the left ear 2 (two) times daily. for 5 days  Dispense: 7.5 mL; Refill: 1    Impacted cerumen of right ear  -     Ear wax removal    Essential hypertension  -     CBC auto differential; Future; Expected date: 06/20/2019  -     Comprehensive metabolic panel; Future; Expected date: 06/20/2019  -     TSH; Future; Expected date: 06/20/2019  -     T4, free; Future; Expected date: 06/20/2019  Good reading today  Low sodium  dier  BP Readings from Last 3 Encounters:   06/20/19 120/76   04/26/19 (!) 146/96   04/23/19 (!) 175/102     Mixed hyperlipidemia  -     Lipid panel; Future; Expected date: 06/20/2019  On pravastatin   The 10-year ASCVD risk score (Fabrizio LAWLER Jr., et al., 2013) is: 12.5%    Values used to calculate the score:      Age: 68 years      Sex: Male      Is Non- : No      Diabetic: No      Tobacco smoker: No      Systolic Blood Pressure: 120 mmHg      Is BP treated: Yes      HDL Cholesterol: 58 mg/dL      Total Cholesterol: 163 mg/dL     BPH with obstruction/lower urinary tract symptoms  Stable, followed by Urology-  Overweight (BMI 25.0-29.9)  Counseled patient on his ideal body weight, health consequences of being obese and current recommendations including weekly exercise and a heart healthy diet.  Current BMI is:Estimated body mass index is 24.88 kg/m² as calculated from the following:    Height as of this encounter: 6' (1.829 m).    Weight as of this encounter: 83.2 kg (183 lb 6.8 oz)..  Patient is aware that ideal BMI < 25 or Weight in (lb) to have BMI = 25: 183.9.            Patient readiness: acceptance and barriers:none    During the course of the visit the patient was educated and counseled about the following:     Hypertension:   Dietary sodium restriction.  Regular aerobic exercise.  Obesity:   General weight loss/lifestyle modification strategies discussed (elicit support from others; identify saboteurs; non-food rewards, etc).  Regular aerobic exercise program discussed.    Goals: Hypertension: Reduce Blood Pressure and Obesity: Reduce calorie intake and BMI    Did patient meet goals/outcomes: No    The following self management tools provided: declined    Patient Instructions (the written plan) was given to the patient/family.     Time spent with patient: 30 minutes    Barriers to medications present (no )    Adverse reactions to current medications (no)    Over the counter  medications reviewed (Yes)

## 2019-06-21 ENCOUNTER — TELEPHONE (OUTPATIENT)
Dept: FAMILY MEDICINE | Facility: CLINIC | Age: 69
End: 2019-06-21

## 2019-06-21 DIAGNOSIS — H65.02 ACUTE SEROUS OTITIS MEDIA OF LEFT EAR, RECURRENCE NOT SPECIFIED: Primary | ICD-10-CM

## 2019-06-21 RX ORDER — NEOMYCIN SULFATE, POLYMYXIN B SULFATE AND HYDROCORTISONE 10; 3.5; 1 MG/ML; MG/ML; [USP'U]/ML
3 SUSPENSION/ DROPS AURICULAR (OTIC) 4 TIMES DAILY
Qty: 6 ML | Refills: 0 | Status: SHIPPED | OUTPATIENT
Start: 2019-06-21 | End: 2019-06-25 | Stop reason: ALTCHOICE

## 2019-06-21 NOTE — TELEPHONE ENCOUNTER
----- Message from Ashu Maynard sent at 6/21/2019  8:18 AM CDT -----  Type: Needs Medical Advice    Who Called:  [facility]    Pharmacy name and phone #:    [id number] - [place], [place] - [address] [facility] [address] [facility] [address]  [address] [place] [address]  Phone: [phone] Fax: [phone]      Best Call Back Number: [phone]  Additional Information: Caller states that the patient's prescription for ciprofloxacin-dexamethasone 0.3-0.1% (CIPRODEX) 0.3-0.1 % DrpS costs $240 and the patient would like a cheaper alternative.

## 2019-06-25 ENCOUNTER — OFFICE VISIT (OUTPATIENT)
Dept: UROLOGY | Facility: CLINIC | Age: 69
End: 2019-06-25
Payer: MEDICARE

## 2019-06-25 VITALS
DIASTOLIC BLOOD PRESSURE: 81 MMHG | SYSTOLIC BLOOD PRESSURE: 126 MMHG | BODY MASS INDEX: 24.87 KG/M2 | HEART RATE: 69 BPM | WEIGHT: 183.63 LBS | RESPIRATION RATE: 18 BRPM | HEIGHT: 72 IN

## 2019-06-25 DIAGNOSIS — N40.0 BPH WITHOUT OBSTRUCTION/LOWER URINARY TRACT SYMPTOMS: Primary | ICD-10-CM

## 2019-06-25 LAB
BILIRUB SERPL-MCNC: ABNORMAL MG/DL
BLOOD URINE, POC: ABNORMAL
COLOR, POC UA: YELLOW
GLUCOSE UR QL STRIP: ABNORMAL
KETONES UR QL STRIP: ABNORMAL
LEUKOCYTE ESTERASE URINE, POC: ABNORMAL
NITRITE, POC UA: ABNORMAL
PH, POC UA: 5
PROTEIN, POC: 30
SPECIFIC GRAVITY, POC UA: 1.02
UROBILINOGEN, POC UA: ABNORMAL

## 2019-06-25 PROCEDURE — 99213 PR OFFICE/OUTPT VISIT, EST, LEVL III, 20-29 MIN: ICD-10-PCS | Mod: 25,S$GLB,, | Performed by: UROLOGY

## 2019-06-25 PROCEDURE — 3079F DIAST BP 80-89 MM HG: CPT | Mod: CPTII,S$GLB,, | Performed by: UROLOGY

## 2019-06-25 PROCEDURE — 99213 OFFICE O/P EST LOW 20 MIN: CPT | Mod: 25,S$GLB,, | Performed by: UROLOGY

## 2019-06-25 PROCEDURE — 3074F PR MOST RECENT SYSTOLIC BLOOD PRESSURE < 130 MM HG: ICD-10-PCS | Mod: CPTII,S$GLB,, | Performed by: UROLOGY

## 2019-06-25 PROCEDURE — 99999 PR PBB SHADOW E&M-EST. PATIENT-LVL III: CPT | Mod: PBBFAC,,, | Performed by: UROLOGY

## 2019-06-25 PROCEDURE — 3074F SYST BP LT 130 MM HG: CPT | Mod: CPTII,S$GLB,, | Performed by: UROLOGY

## 2019-06-25 PROCEDURE — 99999 PR PBB SHADOW E&M-EST. PATIENT-LVL III: ICD-10-PCS | Mod: PBBFAC,,, | Performed by: UROLOGY

## 2019-06-25 PROCEDURE — 81002 URINALYSIS NONAUTO W/O SCOPE: CPT | Mod: S$GLB,,, | Performed by: UROLOGY

## 2019-06-25 PROCEDURE — 1101F PT FALLS ASSESS-DOCD LE1/YR: CPT | Mod: CPTII,S$GLB,, | Performed by: UROLOGY

## 2019-06-25 PROCEDURE — 1101F PR PT FALLS ASSESS DOC 0-1 FALLS W/OUT INJ PAST YR: ICD-10-PCS | Mod: CPTII,S$GLB,, | Performed by: UROLOGY

## 2019-06-25 PROCEDURE — 3079F PR MOST RECENT DIASTOLIC BLOOD PRESSURE 80-89 MM HG: ICD-10-PCS | Mod: CPTII,S$GLB,, | Performed by: UROLOGY

## 2019-06-25 PROCEDURE — 81002 POCT URINE DIPSTICK WITHOUT MICROSCOPE: ICD-10-PCS | Mod: S$GLB,,, | Performed by: UROLOGY

## 2019-06-25 NOTE — Clinical Note
Dr kramer et alConcerned he may have hunter and wants to discuss further - has trouble getting in with yall so also CCed cade to see if perhaps she could work him in thanks

## 2019-06-25 NOTE — PROGRESS NOTES
[facility] Urology Progress Note    Kulwinder Hogue Jr. is a 68 y.o. male who presents for BPH follow up s/p TURP with resultant management of meatal stenosis     Hx pathologic BPH since prostate biopsy and cystoscopy 10/25/16 with Dr Mancia: 65.7cc gland and evidence of GUILLORY from lateral lobes with slight median lobe and grade 3 trabecs in bladder.    3T MRI at [facility] 5/25/17: Prostate size 63 mL.  Intravesical protrusion of the prostate 10 mm. PIRAD 2 only, BPH, prostatitis. psa on 10/12/18 of 4.3 (21.16% free). Confirm mdx of biopsy negative     Cysto/TRUS 12/4/18: volume 91.5cm3, ++median lobe, mild PVR, Bilateral lateral lobe ingrowth causing significant obstruction especially when viewed from verumontanum, with kissing lobes centrally. Signicant median lobe growing in from posterior bladder neck essentially completely obstructing it with encroachment and obstruction from middle lobe leaving only slit like opening at bladder neck, also seen when withdrawing scope into prostatic urethra. Moderate trabeculations     TURP 2/13 uncomplicated. Presented with a significant clot retention on 2/15/19 requiring extensive efforts at manual irrigation and overnight observation with continuous bladder irrigation was discharged home the next day. Passed fill and pull voiding trial 2/18/19 with Carreon removal. 2/27/19 presented with urinary frequency and dysuria.  PVR 18 cc.  Urine culture negative.  No evidence of meatal stenosis.  Declined OAB meds  PATH 12.4g BPH    3/19/19: Off his Flomax.  DT F 5-6 times, NTF 2-3 times.  Drinks water in the morning.  Urge isn't there to go.  On his feet all day.  Urgency is daytime mostly, and he did recently make it through a concert for a few hours without the need to urinate. Hematuria week prior back on his aspirin and fish oil, and occasionally seeing blood at the beginning of his stream. He thinks he may snore in thinks he may need a sleep study. Also notes curvature with erections for  about 1 year up 30°, and can get erections. PVR 0cc     4/15 passed a stone which he brought in for analysis (day after going to ER feeling like couldn't urinate, after cutting grass).  Stone analysis: 80% CaPhos, 10% CaOxDi.  He then presented to the emergency department on 4/23/19 complaining of dribbling and feeling as though he had an obstruction in his urethra. Initial exam appeared normal and PVR <50cc. UA nitrite negative with few bacteria, >100 wbc, 10 rbc. Ucx neg. On re-evaluation patient prior to discharge patient states he was feeling like his penis was swollen and had white urethral calculus/sediment over the distal penile urethra removed with sterile forceps and patient voided approximately 150 cc of clear yellow urine and felt significantly improved.      4/26/19: Some split streaming of his urination.Voiding better since passage of this 2nd stone/sediment per urethra.  No gross external meatal stenosis. Blue meatal dilator passed and appreciated some fossa navicularis narrowing/meatal stenosis, of which the dilator passively dilated and unobstructed, feeling it breakthrough some scar tissue. Advised to use 2x/day x 3 weeks, daily x 2 weeks, 3 days per week x 2 weeks  Ucx negative. CT stone protocol negative for  calcs except punctate prostatic calcifications    He returns today noting:  AUA SS: 4/3 (2: nocturia, 1: frequency, urgency)  Stream is good. Not split streaming, spraying.   Urinating every few hours. Drinks water all day and stops around 5pm  DTF 4-5x, Nocturia x1-2. Large volumes at night with good stream even though stops hours before bed  Never pursued sleep study.  Back to cutting grass and being active.  One episode GH in interim soon after last visit when dehydrated and active and not a problem since.      ROS: A comprehensive 10 system review was performed and is negative except as noted above in HPI    PHYSICAL EXAM:    Vitals:    06/25/19 0820   BP: 126/81   Pulse: 69   Resp: 18      Body mass index is 24.91 kg/m². Weight: 83.3 kg (183 lb 10.3 oz) Height: 6' (182.9 cm)       General: Alert, cooperative, no distress, appears stated age   Head: Normocephalic, without obvious abnormality, atraumatic   Eyes: PERRL, conjunctiva/corneas clear   Lungs: Respirations unlabored   Heart: Warm and well perfused   Abdomen: soft NT ND  : circ normal phallu sbilat desc testes no evidence meatal stenosis - normal caliber and orthotopic  Extremities: Extremities normal, atraumatic, no cyanosis or edema   Skin: Skin color, texture, turgor normal, no rashes or lesions   Psych: Appropriate   Neurologic: Non-focal       Recent Results (from the past 336 hour(s))   POCT URINE DIPSTICK WITHOUT MICROSCOPE    Collection Time: 06/25/19  8:28 AM   Result Value Ref Range    Color, UA yellow     Spec Grav UA 1.025     pH, UA 5     WBC, UA trace     Nitrite, UA neg     Protein 30     Glucose, UA neg     Ketones, UA trace     Urobilinogen, UA neg     Bilirubin small     Blood, UA trace        ASSESSMENT   1. BPH without obstruction/lower urinary tract symptoms  POCT URINE DIPSTICK WITHOUT MICROSCOPE       Plan    Doing well now status post TURP and passive meatal dilation.  He has stopped using his meatal dilator approximately a week ago after tapered protocol as noted above.  We did discuss that he can't intermittently lubricate this and passed at least once a week at for an indeterminate period of time to help prevent recurrence.  He is otherwise voiding well with essential resolution of all obstructive voiding symptoms.  We have discussed previously his snoring and concerned that he may need a sleep study, and given his significant efforts to limit fluid intake in the evenings still with nocturia 2 times in the face of relief of prostatic obstruction, may be worthwhile to pursue sleep study or evaluation for sleep apnea.  We did discuss the pathophysiology of GINO and nocturia, and I will cc his primary care provider  Dr. Lugo as well as other members of his care team, to discuss further, as he does not have a PCP appointment scheduled until December.  At this time, as he is voiding well, I will have him return in 6 months to see our nurse practitioner for distant interim followup for reassessment of his urinary symptoms to make sure he is stable and not having any issues that need addressed further, and if stable at that time I will see him annually with a PSA prior.

## 2019-06-26 ENCOUNTER — PATIENT OUTREACH (OUTPATIENT)
Dept: OTHER | Facility: OTHER | Age: 69
End: 2019-06-26

## 2019-06-26 NOTE — PROGRESS NOTES
Last 5 Patient Entered Readings                                      Current 30 Day Average: 124/81     Recent Readings 6/25/2019 6/21/2019 6/18/2019 6/11/2019 6/8/2019    SBP (mmHg) 126 122 125 123 116    DBP (mmHg) 84 79 79 78 79    Pulse 73 72 74 71 64          HPI:  Called patient to follow up. Patient endorses adherence to medication regimen. Patient denies hypotensive s/sx (lightheadedness, dizziness, nausea, fatigue); patient denies hypertensive s/sx (SOB, CP, severe headaches, changes in vision). Instructed patient to seek medical care if BP > 180/110 and is accompanied by hypertensive s/sx associated, patient confirms understanding.     Assessment:  Reviewed recent readings. Per 2017 ACC/ AHA HTN guidelines (goal of BP < 130/80), current 30-day average is right above goal. BMP 4/2019 reviewed and acceptable.    Plan:  Continue current medication regimen. I will continue to monitor regularly and will follow-up in 2 to 3 months, sooner if blood pressure begins to trend upward or downward.     Current medication regimen:  Hypertension Medications             amLODIPine (NORVASC) 10 MG tablet TAKE 1 TABLET BY MOUTH EVERY EVENING    carvedilol (COREG) 12.5 MG tablet TAKE 1 TABLET BY MOUTH TWICE A DAY WITH MEALS    olmesartan (BENICAR) 40 MG tablet Take 1 tablet (40 mg total) by mouth once daily. (replaces benazepril)          Patient denies having questions or concerns. Patient has my contact information and knows to call with any concerns or clinical changes.

## 2019-06-28 ENCOUNTER — TELEPHONE (OUTPATIENT)
Dept: FAMILY MEDICINE | Facility: CLINIC | Age: 69
End: 2019-06-28

## 2019-06-28 NOTE — TELEPHONE ENCOUNTER
Patient is concerned about having sleep apnea per Dr. Jain. Does patient wish to be seen sooner that scheduled appointment with Dr. Lugo?

## 2019-07-01 ENCOUNTER — TELEPHONE (OUTPATIENT)
Dept: FAMILY MEDICINE | Facility: CLINIC | Age: 69
End: 2019-07-01

## 2019-07-01 DIAGNOSIS — H65.02 ACUTE SEROUS OTITIS MEDIA OF LEFT EAR, RECURRENCE NOT SPECIFIED: Primary | ICD-10-CM

## 2019-07-01 NOTE — TELEPHONE ENCOUNTER
Patient states that the first medication called in for his ear was 200.00.  The second medication is $75.00  Patient wants to know if he really needs it and if he should go to an ENT?

## 2019-07-02 DIAGNOSIS — I10 ESSENTIAL HYPERTENSION: ICD-10-CM

## 2019-07-02 RX ORDER — OLMESARTAN MEDOXOMIL 40 MG/1
TABLET ORAL
Qty: 90 TABLET | Refills: 0 | Status: SHIPPED | OUTPATIENT
Start: 2019-07-02 | End: 2019-09-28 | Stop reason: SDUPTHER

## 2019-07-02 NOTE — TELEPHONE ENCOUNTER
The medication was prescribed almost 2 weeks ago. Inform patient if he has not having any pain or discomfort in that ear, which he complained abut at visit- no need to purchase medication, but if there is pain and this comfort he needs to purchase the medication.

## 2019-07-02 NOTE — TELEPHONE ENCOUNTER
Patient notified of message in attached message from SAUL Jo NP.  Advised patient GoodRx card could possibly be used to obtain medication at cheaper price. Please print prescription for patient to submit to [facility] with GoodRx card.

## 2019-07-03 RX ORDER — NEOMYCIN SULFATE, POLYMYXIN B SULFATE AND HYDROCORTISONE 10; 3.5; 1 MG/ML; MG/ML; [USP'U]/ML
3 SUSPENSION/ DROPS AURICULAR (OTIC) 4 TIMES DAILY
Qty: 6 ML | Refills: 0 | Status: SHIPPED | OUTPATIENT
Start: 2019-07-03 | End: 2019-07-10

## 2019-07-18 ENCOUNTER — PATIENT OUTREACH (OUTPATIENT)
Dept: OTHER | Facility: OTHER | Age: 69
End: 2019-07-18

## 2019-07-18 NOTE — PROGRESS NOTES
Last 5 Patient Entered Readings                                      Current 30 Day Average: 126/82     Recent Readings 7/11/2019 7/7/2019 7/3/2019 6/29/2019 6/25/2019    SBP (mmHg) 129 112 148 120 126    DBP (mmHg) 84 82 87 82 84    Pulse 65 66 63 80 73          Digital Medicine: Health  Follow Up    Called pt for f/u. Pt states that he has started walking regularly again and continues to work on following a low sodium diet. Mr. Hogue will be on vacation next week - reviewed tips for reducing sodium intake when eating out and encouraged pt to use these while on vacation to help keep sodium intake as low as possible. Pt was agreeable to this.     Follow up with Mr. Miramontes CHRISTIAN Hogue Jr. completed. No further questions or concerns. Will continue to follow up to achieve health goals.

## 2019-07-25 DIAGNOSIS — E78.5 HYPERLIPIDEMIA, UNSPECIFIED HYPERLIPIDEMIA TYPE: Chronic | ICD-10-CM

## 2019-07-25 DIAGNOSIS — I10 ESSENTIAL HYPERTENSION: ICD-10-CM

## 2019-07-25 RX ORDER — PRAVASTATIN SODIUM 40 MG/1
TABLET ORAL
Qty: 90 TABLET | Refills: 0 | Status: SHIPPED | OUTPATIENT
Start: 2019-07-25 | End: 2019-10-24 | Stop reason: SDUPTHER

## 2019-08-02 ENCOUNTER — PATIENT MESSAGE (OUTPATIENT)
Dept: ADMINISTRATIVE | Facility: OTHER | Age: 69
End: 2019-08-02

## 2019-08-20 ENCOUNTER — OFFICE VISIT (OUTPATIENT)
Dept: FAMILY MEDICINE | Facility: CLINIC | Age: 69
End: 2019-08-20
Payer: MEDICARE

## 2019-08-20 VITALS
HEART RATE: 67 BPM | OXYGEN SATURATION: 98 % | BODY MASS INDEX: 24.48 KG/M2 | DIASTOLIC BLOOD PRESSURE: 70 MMHG | TEMPERATURE: 98 F | SYSTOLIC BLOOD PRESSURE: 110 MMHG | HEIGHT: 72 IN | WEIGHT: 180.75 LBS

## 2019-08-20 DIAGNOSIS — I10 ESSENTIAL HYPERTENSION: Primary | ICD-10-CM

## 2019-08-20 DIAGNOSIS — J30.9 CHRONIC ALLERGIC RHINITIS: ICD-10-CM

## 2019-08-20 DIAGNOSIS — E78.5 HYPERLIPIDEMIA, UNSPECIFIED HYPERLIPIDEMIA TYPE: Chronic | ICD-10-CM

## 2019-08-20 PROCEDURE — 99999 PR PBB SHADOW E&M-EST. PATIENT-LVL III: ICD-10-PCS | Mod: PBBFAC,,, | Performed by: FAMILY MEDICINE

## 2019-08-20 PROCEDURE — 99213 OFFICE O/P EST LOW 20 MIN: CPT | Mod: S$GLB,,, | Performed by: FAMILY MEDICINE

## 2019-08-20 PROCEDURE — 3074F PR MOST RECENT SYSTOLIC BLOOD PRESSURE < 130 MM HG: ICD-10-PCS | Mod: CPTII,S$GLB,, | Performed by: FAMILY MEDICINE

## 2019-08-20 PROCEDURE — 3074F SYST BP LT 130 MM HG: CPT | Mod: CPTII,S$GLB,, | Performed by: FAMILY MEDICINE

## 2019-08-20 PROCEDURE — 99999 PR PBB SHADOW E&M-EST. PATIENT-LVL III: CPT | Mod: PBBFAC,,, | Performed by: FAMILY MEDICINE

## 2019-08-20 PROCEDURE — 99213 PR OFFICE/OUTPT VISIT, EST, LEVL III, 20-29 MIN: ICD-10-PCS | Mod: S$GLB,,, | Performed by: FAMILY MEDICINE

## 2019-08-20 PROCEDURE — 1101F PR PT FALLS ASSESS DOC 0-1 FALLS W/OUT INJ PAST YR: ICD-10-PCS | Mod: CPTII,S$GLB,, | Performed by: FAMILY MEDICINE

## 2019-08-20 PROCEDURE — 3078F DIAST BP <80 MM HG: CPT | Mod: CPTII,S$GLB,, | Performed by: FAMILY MEDICINE

## 2019-08-20 PROCEDURE — 1101F PT FALLS ASSESS-DOCD LE1/YR: CPT | Mod: CPTII,S$GLB,, | Performed by: FAMILY MEDICINE

## 2019-08-20 PROCEDURE — 3078F PR MOST RECENT DIASTOLIC BLOOD PRESSURE < 80 MM HG: ICD-10-PCS | Mod: CPTII,S$GLB,, | Performed by: FAMILY MEDICINE

## 2019-08-20 RX ORDER — TALC
6 POWDER (GRAM) TOPICAL NIGHTLY
Refills: 0 | COMMUNITY
Start: 2019-08-20 | End: 2019-11-21

## 2019-08-24 NOTE — PATIENT INSTRUCTIONS

## 2019-08-24 NOTE — PROGRESS NOTES
Subjective:       Patient ID: Kulwinder Hogue Jr. is a 68 y.o. male.    Chief Complaint: Hyperlipidemia and Sleep Apnea    Sleep Apnea: Patient presents with hx obstructive sleep apnea. Patent has a 5 months history of symptoms of daytime fatigue, morning fatigue, [place] sleepiness scale and hypertension. Patient generally gets 5 hours of sleep per night, and states they generally have nightime awakenings and difficulty falling back asleep if awakened. Snoring of moderate severity is present. Apneic episodes are not present. Nasal obstruction is present.  Patient had had tonsillectomy.        Hypertension   This is a chronic problem. The current episode started more than 1 year ago. The problem has been resolved since onset. The problem is controlled. Pertinent negatives include no chest pain, headaches, neck pain or palpitations. There are no associated agents to hypertension. Risk factors for coronary artery disease include sedentary lifestyle, male gender and dyslipidemia. The current treatment provides significant improvement. There are no compliance problems.  Identifiable causes of hypertension include renovascular disease and sleep apnea.     Review of Systems   Constitutional: Negative for activity change and unexpected weight change.   HENT: Negative for hearing loss, rhinorrhea and trouble swallowing.    Eyes: Negative for discharge and visual disturbance.   Respiratory: Negative for chest tightness and wheezing.    Cardiovascular: Negative for chest pain and palpitations.   Gastrointestinal: Negative for blood in stool, constipation, diarrhea and vomiting.   Endocrine: Negative for polydipsia and polyuria.   Genitourinary: Negative for difficulty urinating, hematuria and urgency.   Musculoskeletal: Negative for arthralgias, joint swelling and neck pain.   Neurological: Negative for weakness and headaches.   Psychiatric/Behavioral: Negative for confusion and dysphoric mood.       Patient Active Problem  List   Diagnosis    Hyperlipemia    HBP (high blood pressure)    Essential hypertension    Hyperlipidemia    Liposarcoma    Abnormal PSA    Cyclical neutropenia    Excessive cerumen in right ear canal    Hx of colonic polyps    BPH with obstruction/lower urinary tract symptoms    Chronic allergic rhinitis    Hematuria       Objective:      Physical Exam   Constitutional: He is oriented to person, place, and time. He appears well-developed and well-nourished. No distress.   HENT:   Head: Normocephalic and atraumatic.   Right Ear: External ear normal.   Left Ear: External ear normal.   Nose: Nose normal.   Mouth/Throat: Oropharynx is clear and moist. No oropharyngeal exudate.   Eyes: Pupils are equal, round, and reactive to light. Conjunctivae and EOM are normal. Right eye exhibits no discharge. Left eye exhibits no discharge. No scleral icterus.   Neck: Normal range of motion. Neck supple. No JVD present. No tracheal deviation present. No thyromegaly present.   Cardiovascular: Normal rate, normal heart sounds and intact distal pulses.   No murmur heard.  Pulmonary/Chest: Effort normal and breath sounds normal. No respiratory distress. He has no wheezes. He has no rales.   Abdominal: Soft. Bowel sounds are normal. He exhibits no distension and no mass. There is no tenderness. There is no rebound and no guarding.   Musculoskeletal: He exhibits no edema or tenderness.          Lymphadenopathy:     He has no cervical adenopathy.   Neurological: He is alert and oriented to person, place, and time. No cranial nerve deficit. Coordination normal.   Skin: Skin is warm and dry. No rash noted. He is not diaphoretic. No erythema. No pallor.   Psychiatric: He has a normal mood and affect. His behavior is normal. Judgment and thought content normal.   Vitals reviewed.      Lab Results   Component Value Date    WBC 8.30 04/14/2019    HGB 14.9 04/14/2019    HCT 44.1 04/14/2019     04/14/2019    CHOL 163 10/26/2018     TRIG 67 10/26/2018    HDL 58 10/26/2018    ALT 29 10/26/2018    AST 29 10/26/2018     04/23/2019    K 3.5 04/23/2019     04/23/2019    CREATININE 1.0 04/23/2019    BUN 9 04/23/2019    CO2 30 (H) 04/23/2019    TSH 3.208 09/06/2018    PSA 4.5 (H) 04/16/2016    INR 1.0 02/08/2019     The 10-year ASCVD risk score ([place] LA[place] Jr[place] et al., 2013) is: 10.8%    Values used to calculate the score:      Age: 68 years      Sex: Male      Is Non- : No      Diabetic: No      Tobacco smoker: No      Systolic Blood Pressure: 110 mmHg      Is BP treated: Yes      HDL Cholesterol: 58 mg/dL      Total Cholesterol: 163 mg/dL    Assessment:       1. Essential hypertension    2. Hyperlipidemia, unspecified hyperlipidemia type    3. Chronic allergic rhinitis        Plan:       Essential hypertension    Hyperlipidemia, unspecified hyperlipidemia type    Chronic allergic rhinitis    Other orders  -     melatonin (MELATIN); Take 2 tablets (6 mg total) by mouth every evening.; Refill: 0    Continue CPAP machine.  Patient readiness: acceptance and barriers:none    During the course of the visit the patient was educated and counseled about the following:     Hypertension:   Dietary sodium restriction.  Regular aerobic exercise.    Goals: Hypertension: Reduce Blood Pressure    Did patient meet goals/outcomes: No    The following self management tools provided: blood pressure log  excercise log    Patient Instructions (the written plan) was given to the patient/family.     Time spent with patient: 30 minutes    Barriers to medications present (no )    Adverse reactions to current medications (no)    Over the counter medications reviewed (Yes)        30-minute visit. 20 minutes spent counseling patient on diet, exercise, and weight loss.  This has been fully explained to the patient, who indicates understanding.

## 2019-09-03 ENCOUNTER — PATIENT OUTREACH (OUTPATIENT)
Dept: OTHER | Facility: OTHER | Age: 69
End: 2019-09-03

## 2019-09-03 NOTE — PROGRESS NOTES
Last 5 Patient Entered Readings                                      Current 30 Day Average: 133/86     Recent Readings 8/31/2019 8/29/2019 8/27/2019 8/22/2019 8/20/2019    SBP (mmHg) 142 124 122 135 147    DBP (mmHg) 90 85 81 89 95    Pulse 64 68 62 75 68          Digital Medicine: Health  Follow Up    Called pt for f/u. Pt attributes upward trend in BP to not resting after strenuous activity in the heat. Discussed proper technique for measuring after activity and encouraged to wait 2-3 hrs after activity like cutting the grass before taking a reading. Praised pt for maintaining regular activity and a low sodium diet and encouraged to continue.     Follow up with Mr. Kulwinder Hogue Jr. completed. No further questions or concerns. Will continue to follow up to achieve health goals.

## 2019-09-16 ENCOUNTER — PATIENT OUTREACH (OUTPATIENT)
Dept: OTHER | Facility: OTHER | Age: 69
End: 2019-09-16

## 2019-09-16 NOTE — PROGRESS NOTES
Digital Medicine: Clinician Follow-Up    Patient reports eating a tuna salad sandwich yesterday.        Follow Up  Follow-up reason(s): reading review    Office BP 8/20 110/70 mmHg          Sleep Apnea  Patient not previously diagnosed with GINO and     Medication Affordability  Patient is currently having problems affording medications     Medication Adherence:     reports missing evening doses about 1 week ago  He does not wonder if medications are working.  He knows purpose of medications.        INTERVENTION(S)  reviewed appropriate dose schedule and encouragement/support    PLAN  patient verbalizes understanding    BP improving since health  outreach 9/3  Charge BP cuff this week and then monthly  Reviewed that deli-prepared sandwiches likely contain more sodium than he realized        There are no preventive care reminders to display for this patient.    Last 5 Patient Entered Readings                                      Current 30 Day Average: 134/88     Recent Readings 9/16/2019 9/11/2019 9/7/2019 8/31/2019 8/29/2019    SBP (mmHg) 138 131 132 142 124    DBP (mmHg) 89 87 85 90 85    Pulse 62 69 59 64 68             Hypertension Medications             amLODIPine (NORVASC) 10 MG tablet TAKE 1 TABLET BY MOUTH EVERY EVENING    carvedilol (COREG) 12.5 MG tablet TAKE 1 TABLET BY MOUTH TWICE A DAY WITH MEALS    olmesartan (BENICAR) 40 MG tablet TAKE 1 TABLET BY MOUTH EVERY DAY

## 2019-09-28 DIAGNOSIS — I10 ESSENTIAL HYPERTENSION: ICD-10-CM

## 2019-09-29 RX ORDER — OLMESARTAN MEDOXOMIL 40 MG/1
TABLET ORAL
Qty: 90 TABLET | Refills: 3 | Status: SHIPPED | OUTPATIENT
Start: 2019-09-29 | End: 2020-09-19

## 2019-09-30 DIAGNOSIS — I10 ESSENTIAL HYPERTENSION: ICD-10-CM

## 2019-09-30 RX ORDER — AMLODIPINE BESYLATE 10 MG/1
TABLET ORAL
Qty: 90 TABLET | Refills: 0 | Status: SHIPPED | OUTPATIENT
Start: 2019-09-30 | End: 2019-12-27

## 2019-10-22 NOTE — ANESTHESIA PREPROCEDURE EVALUATION
05/02/2018  Kulwinder Hogue Jr. is a 67 y.o., male.    Pre-op Assessment    I have reviewed the Patient Summary Reports.     I have reviewed the Nursing Notes.   I have reviewed the Medications.     Review of Systems  Anesthesia Hx:  No problems with previous Anesthesia  Denies Family Hx of Anesthesia complications.    Social:  Non-Smoker, Alcohol Use Smoking Status: Never Smoker  Smokeless Tobacco Status: Never Used  Alcohol use: Yes, unspecified volume  Drug use: No       Cardiovascular:   Hypertension Denies CAD.    Denies CABG/stent.  hyperlipidemia    Hepatic/GI:  Hepatic/GI Normal        Physical Exam  General:  Well nourished    Airway/Jaw/Neck:  Airway Findings: Mouth Opening: Normal Mallampati: II       Chest/Lungs:  Chest/Lungs Findings: Clear to auscultation, Normal Respiratory Rate     Heart/Vascular:  Heart Findings: Rate: Normal  Rhythm: Regular Rhythm  Sounds: Normal        Mental Status:  Mental Status Findings:  Cooperative, Alert and Oriented         Anesthesia Plan  Type of Anesthesia, risks & benefits discussed:  Anesthesia Type:  general  Patient's Preference:   Intra-op Monitoring Plan: standard ASA monitors  Intra-op Monitoring Plan Comments:   Post Op Pain Control Plan:   Post Op Pain Control Plan Comments:   Induction:   IV  Beta Blocker:  Patient is on a Beta-Blocker and has received one dose within the past 24 hours (No further documentation required).       Informed Consent: Patient understands risks and agrees with Anesthesia plan.  Questions answered. Anesthesia consent signed with patient.  ASA Score: 3     Day of Surgery Review of History & Physical: I have interviewed and examined the patient. I have reviewed the patient's H&P dated:  There are no significant changes.          Ready For Surgery From Anesthesia Perspective.        Statement Selected

## 2019-10-24 DIAGNOSIS — E78.5 HYPERLIPIDEMIA, UNSPECIFIED HYPERLIPIDEMIA TYPE: Chronic | ICD-10-CM

## 2019-10-24 DIAGNOSIS — I10 ESSENTIAL HYPERTENSION: ICD-10-CM

## 2019-10-24 RX ORDER — PRAVASTATIN SODIUM 40 MG/1
TABLET ORAL
Qty: 90 TABLET | Refills: 11 | Status: SHIPPED | OUTPATIENT
Start: 2019-10-24 | End: 2020-11-13 | Stop reason: SDUPTHER

## 2019-11-07 ENCOUNTER — PATIENT OUTREACH (OUTPATIENT)
Dept: OTHER | Facility: OTHER | Age: 69
End: 2019-11-07

## 2019-11-07 NOTE — PROGRESS NOTES
Digital Medicine: Health  Follow-Up        Follow Up  Follow-up reason(s): reading review and routine education      Readings are trending up Pt noted that he has been having trouble sleeping lately and plans to ask his PCP about a sleep study at his next visit.      INTERVENTION(S)  recommended diet modifications and encouragement/support    PLAN  patient amenable to changes and continue monitoring    Discussed sodium recommendations, hidden sources of sodium, label reading, and ways to reduce sodium intake when eating out.           Topic    Lipid (Cholesterol) Test        Last 5 Patient Entered Readings                                      Current 30 Day Average: 139/91     Recent Readings 11/4/2019 10/28/2019 10/22/2019 10/15/2019 10/12/2019    SBP (mmHg) 137 126 147 137 148    DBP (mmHg) 90 82 89 89 97    Pulse 69 67 66 67 59            Diet Screening       Pt reports eating out 1-2 times per week. He usually chooses a lean meat and vegetable option or a sandwich with vegetables for lunch.     Intervention(s): low sodium diet education, reducing sodium intake and sodium reduction while dining out    Assigning the following patient goals: maintain low sodium diet    Physical Activity Screening   No change to exercise routine.    When asked if exercising, patient responded: yes    Patient participates in the following activities: walking      SDOH: Deferred.

## 2019-11-11 NOTE — PROGRESS NOTES
Health  spoke with patient 11/7/19 and addressed decreasing sodium intake  He has labs and PCP appointment next week  SBP typically closer to goal than DBP  If BP elevated at PCP appointment with sodium reduction, would recommend restarting hctz at 12.5 mg daily  Hctz previously stopped and patient reported occasional lightheadedness did not improve

## 2019-11-18 ENCOUNTER — LAB VISIT (OUTPATIENT)
Dept: LAB | Facility: HOSPITAL | Age: 69
End: 2019-11-18
Attending: NURSE PRACTITIONER
Payer: MEDICARE

## 2019-11-18 DIAGNOSIS — I10 ESSENTIAL HYPERTENSION: ICD-10-CM

## 2019-11-18 DIAGNOSIS — E78.2 MIXED HYPERLIPIDEMIA: ICD-10-CM

## 2019-11-18 LAB
ALBUMIN SERPL BCP-MCNC: 3.6 G/DL (ref 3.5–5.2)
ALP SERPL-CCNC: 103 U/L (ref 55–135)
ALT SERPL W/O P-5'-P-CCNC: 21 U/L (ref 10–44)
ANION GAP SERPL CALC-SCNC: 6 MMOL/L (ref 8–16)
AST SERPL-CCNC: 24 U/L (ref 10–40)
BASOPHILS # BLD AUTO: 0.02 K/UL (ref 0–0.2)
BASOPHILS NFR BLD: 0.5 % (ref 0–1.9)
BILIRUB SERPL-MCNC: 3.8 MG/DL (ref 0.1–1)
BUN SERPL-MCNC: 14 MG/DL (ref 8–23)
CALCIUM SERPL-MCNC: 8.7 MG/DL (ref 8.7–10.5)
CHLORIDE SERPL-SCNC: 103 MMOL/L (ref 95–110)
CHOLEST SERPL-MCNC: 145 MG/DL (ref 120–199)
CHOLEST/HDLC SERPL: 2.8 {RATIO} (ref 2–5)
CO2 SERPL-SCNC: 31 MMOL/L (ref 23–29)
CREAT SERPL-MCNC: 0.9 MG/DL (ref 0.5–1.4)
DIFFERENTIAL METHOD: ABNORMAL
EOSINOPHIL # BLD AUTO: 0.2 K/UL (ref 0–0.5)
EOSINOPHIL NFR BLD: 5.7 % (ref 0–8)
ERYTHROCYTE [DISTWIDTH] IN BLOOD BY AUTOMATED COUNT: 12.5 % (ref 11.5–14.5)
EST. GFR  (AFRICAN AMERICAN): >60 ML/MIN/1.73 M^2
EST. GFR  (NON AFRICAN AMERICAN): >60 ML/MIN/1.73 M^2
GLUCOSE SERPL-MCNC: 103 MG/DL (ref 70–110)
HCT VFR BLD AUTO: 46.7 % (ref 40–54)
HDLC SERPL-MCNC: 52 MG/DL (ref 40–75)
HDLC SERPL: 35.9 % (ref 20–50)
HGB BLD-MCNC: 15.7 G/DL (ref 14–18)
IMM GRANULOCYTES # BLD AUTO: 0.01 K/UL (ref 0–0.04)
IMM GRANULOCYTES NFR BLD AUTO: 0.2 % (ref 0–0.5)
LDLC SERPL CALC-MCNC: 66.8 MG/DL (ref 63–159)
LYMPHOCYTES # BLD AUTO: 1.2 K/UL (ref 1–4.8)
LYMPHOCYTES NFR BLD: 28.9 % (ref 18–48)
MCH RBC QN AUTO: 33.1 PG (ref 27–31)
MCHC RBC AUTO-ENTMCNC: 33.6 G/DL (ref 32–36)
MCV RBC AUTO: 98 FL (ref 82–98)
MONOCYTES # BLD AUTO: 0.5 K/UL (ref 0.3–1)
MONOCYTES NFR BLD: 11.1 % (ref 4–15)
NEUTROPHILS # BLD AUTO: 2.3 K/UL (ref 1.8–7.7)
NEUTROPHILS NFR BLD: 53.6 % (ref 38–73)
NONHDLC SERPL-MCNC: 93 MG/DL
NRBC BLD-RTO: 0 /100 WBC
PLATELET # BLD AUTO: 258 K/UL (ref 150–350)
PMV BLD AUTO: 8.9 FL (ref 9.2–12.9)
POTASSIUM SERPL-SCNC: 3.6 MMOL/L (ref 3.5–5.1)
PROT SERPL-MCNC: 6.7 G/DL (ref 6–8.4)
RBC # BLD AUTO: 4.75 M/UL (ref 4.6–6.2)
SODIUM SERPL-SCNC: 140 MMOL/L (ref 136–145)
T4 FREE SERPL-MCNC: 1.03 NG/DL (ref 0.71–1.51)
TRIGL SERPL-MCNC: 131 MG/DL (ref 30–150)
TSH SERPL DL<=0.005 MIU/L-ACNC: 2.44 UIU/ML (ref 0.4–4)
WBC # BLD AUTO: 4.22 K/UL (ref 3.9–12.7)

## 2019-11-18 PROCEDURE — 36415 COLL VENOUS BLD VENIPUNCTURE: CPT | Mod: PO

## 2019-11-18 PROCEDURE — 80061 LIPID PANEL: CPT

## 2019-11-18 PROCEDURE — 85025 COMPLETE CBC W/AUTO DIFF WBC: CPT

## 2019-11-18 PROCEDURE — 84443 ASSAY THYROID STIM HORMONE: CPT

## 2019-11-18 PROCEDURE — 84439 ASSAY OF FREE THYROXINE: CPT

## 2019-11-18 PROCEDURE — 80053 COMPREHEN METABOLIC PANEL: CPT

## 2019-11-19 DIAGNOSIS — R17 ELEVATED BILIRUBIN: Primary | ICD-10-CM

## 2019-11-21 ENCOUNTER — LAB VISIT (OUTPATIENT)
Dept: LAB | Facility: HOSPITAL | Age: 69
End: 2019-11-21
Attending: FAMILY MEDICINE
Payer: MEDICARE

## 2019-11-21 ENCOUNTER — OFFICE VISIT (OUTPATIENT)
Dept: FAMILY MEDICINE | Facility: CLINIC | Age: 69
End: 2019-11-21
Payer: MEDICARE

## 2019-11-21 VITALS
HEART RATE: 62 BPM | DIASTOLIC BLOOD PRESSURE: 80 MMHG | HEIGHT: 72 IN | BODY MASS INDEX: 24.18 KG/M2 | SYSTOLIC BLOOD PRESSURE: 128 MMHG | TEMPERATURE: 98 F | WEIGHT: 178.56 LBS | OXYGEN SATURATION: 99 %

## 2019-11-21 DIAGNOSIS — I15.0 RENOVASCULAR HYPERTENSION: ICD-10-CM

## 2019-11-21 DIAGNOSIS — N28.1 ACQUIRED RENAL CYST OF RIGHT KIDNEY: ICD-10-CM

## 2019-11-21 DIAGNOSIS — R17 ELEVATED BILIRUBIN: ICD-10-CM

## 2019-11-21 DIAGNOSIS — E80.6 ACQUIRED HYPERBILIRUBINEMIA: Primary | ICD-10-CM

## 2019-11-21 DIAGNOSIS — Q61.5: ICD-10-CM

## 2019-11-21 LAB
BILIRUB DIRECT SERPL-MCNC: 0.8 MG/DL (ref 0.1–0.3)
BILIRUB SERPL-MCNC: 2.6 MG/DL (ref 0.1–1)

## 2019-11-21 PROCEDURE — 99214 OFFICE O/P EST MOD 30 MIN: CPT | Mod: S$GLB,,, | Performed by: FAMILY MEDICINE

## 2019-11-21 PROCEDURE — 99999 PR PBB SHADOW E&M-EST. PATIENT-LVL IV: ICD-10-PCS | Mod: PBBFAC,,, | Performed by: FAMILY MEDICINE

## 2019-11-21 PROCEDURE — 99999 PR PBB SHADOW E&M-EST. PATIENT-LVL IV: CPT | Mod: PBBFAC,,, | Performed by: FAMILY MEDICINE

## 2019-11-21 PROCEDURE — 1159F MED LIST DOCD IN RCRD: CPT | Mod: S$GLB,,, | Performed by: FAMILY MEDICINE

## 2019-11-21 PROCEDURE — 82247 BILIRUBIN TOTAL: CPT

## 2019-11-21 PROCEDURE — 36415 COLL VENOUS BLD VENIPUNCTURE: CPT | Mod: PO

## 2019-11-21 PROCEDURE — 3079F DIAST BP 80-89 MM HG: CPT | Mod: CPTII,S$GLB,, | Performed by: FAMILY MEDICINE

## 2019-11-21 PROCEDURE — 99214 PR OFFICE/OUTPT VISIT, EST, LEVL IV, 30-39 MIN: ICD-10-PCS | Mod: S$GLB,,, | Performed by: FAMILY MEDICINE

## 2019-11-21 PROCEDURE — 82248 BILIRUBIN DIRECT: CPT

## 2019-11-21 PROCEDURE — 3074F PR MOST RECENT SYSTOLIC BLOOD PRESSURE < 130 MM HG: ICD-10-PCS | Mod: CPTII,S$GLB,, | Performed by: FAMILY MEDICINE

## 2019-11-21 PROCEDURE — 1101F PT FALLS ASSESS-DOCD LE1/YR: CPT | Mod: CPTII,S$GLB,, | Performed by: FAMILY MEDICINE

## 2019-11-21 PROCEDURE — 3074F SYST BP LT 130 MM HG: CPT | Mod: CPTII,S$GLB,, | Performed by: FAMILY MEDICINE

## 2019-11-21 PROCEDURE — 1126F AMNT PAIN NOTED NONE PRSNT: CPT | Mod: S$GLB,,, | Performed by: FAMILY MEDICINE

## 2019-11-21 PROCEDURE — 1159F PR MEDICATION LIST DOCUMENTED IN MEDICAL RECORD: ICD-10-PCS | Mod: S$GLB,,, | Performed by: FAMILY MEDICINE

## 2019-11-21 PROCEDURE — 1101F PR PT FALLS ASSESS DOC 0-1 FALLS W/OUT INJ PAST YR: ICD-10-PCS | Mod: CPTII,S$GLB,, | Performed by: FAMILY MEDICINE

## 2019-11-21 PROCEDURE — 3079F PR MOST RECENT DIASTOLIC BLOOD PRESSURE 80-89 MM HG: ICD-10-PCS | Mod: CPTII,S$GLB,, | Performed by: FAMILY MEDICINE

## 2019-11-21 PROCEDURE — 1126F PR PAIN SEVERITY QUANTIFIED, NO PAIN PRESENT: ICD-10-PCS | Mod: S$GLB,,, | Performed by: FAMILY MEDICINE

## 2019-11-21 RX ORDER — ACETAMINOPHEN, DIPHENHYDRAMINE HCL, PHENYLEPHRINE HCL 325; 25; 5 MG/1; MG/1; MG/1
10 TABLET ORAL NIGHTLY
COMMUNITY
Start: 2019-11-21 | End: 2020-06-25 | Stop reason: DRUGHIGH

## 2019-11-21 RX ORDER — CARVEDILOL 12.5 MG/1
12.5 TABLET ORAL 2 TIMES DAILY WITH MEALS
Qty: 180 TABLET | Refills: 4 | Status: SHIPPED | OUTPATIENT
Start: 2019-11-21 | End: 2020-02-17

## 2019-11-21 NOTE — PROGRESS NOTES
Subjective:       Patient ID: Kulwinder Hogue Jr. is a 69 y.o. male.    Chief Complaint: Hypertension    Hypertension   This is a chronic problem. The current episode started more than 1 year ago. The problem has been resolved since onset. The problem is controlled. Pertinent negatives include no anxiety, blurred vision, chest pain, headaches, malaise/fatigue, neck pain, orthopnea, palpitations, PND or shortness of breath. There are no associated agents to hypertension. Risk factors for coronary artery disease include sedentary lifestyle, male gender, obesity and dyslipidemia.   Recent mild elevated bilirubin, no LFT's. Renal cyst, no hydronephrosis.  Review of Systems   Constitutional: Negative for activity change and malaise/fatigue.   Eyes: Negative for blurred vision and discharge.   Respiratory: Negative for shortness of breath and wheezing.    Cardiovascular: Negative for chest pain, palpitations, orthopnea and PND.   Gastrointestinal: Negative for constipation, diarrhea and vomiting.   Genitourinary: Negative for difficulty urinating and hematuria.   Musculoskeletal: Negative for neck pain.   Neurological: Negative for headaches.   Psychiatric/Behavioral: Negative for dysphoric mood.       Patient Active Problem List   Diagnosis    Hyperlipemia    HBP (high blood pressure)    Essential hypertension    Hyperlipidemia    Liposarcoma    Abnormal PSA    Cyclical neutropenia    Excessive cerumen in right ear canal    Hx of colonic polyps    BPH with obstruction/lower urinary tract symptoms    Chronic allergic rhinitis    Hematuria     FINDINGS:CT Stone stydy  There is no nephroureterolithiasis or hydroureteronephrosis.  Two small cysts of the right kidney are present the larger of which measures 2.4 cm.    The prostate gland is moderately enlarged, demonstrating central low density in keeping with prior trans urethral resection.  The urinary bladder is unremarkable.    The liver, spleen, pancreas, and  adrenal glands are unremarkable.  The bowel is normal caliber.  There is abundant sigmoid diverticulosis.  The appendix is normal.  There is mild calcification of the aorta without aneurysm.  Moderate degenerative change of the spine is present.          Objective:      Physical Exam   Constitutional: He is oriented to person, place, and time. He appears well-developed and well-nourished. No distress.   HENT:   Head: Normocephalic and atraumatic.   Right Ear: External ear normal.   Left Ear: External ear normal.   Nose: Nose normal.   Mouth/Throat: Oropharynx is clear and moist. No oropharyngeal exudate.   Eyes: Pupils are equal, round, and reactive to light. Conjunctivae and EOM are normal. Right eye exhibits no discharge. Left eye exhibits no discharge. No scleral icterus.   Neck: Normal range of motion. Neck supple. No JVD present. No tracheal deviation present. No thyromegaly present.   Cardiovascular: Normal rate, normal heart sounds and intact distal pulses.   No murmur heard.  Pulmonary/Chest: Effort normal and breath sounds normal. No respiratory distress. He has no wheezes. He has no rales.   Abdominal: Soft. Bowel sounds are normal. He exhibits no distension and no mass. There is no tenderness. There is no rebound and no guarding.   Musculoskeletal: He exhibits no edema or tenderness.          Lymphadenopathy:     He has no cervical adenopathy.   Neurological: He is alert and oriented to person, place, and time. No cranial nerve deficit. Coordination normal.   Skin: Skin is warm and dry. No rash noted. He is not diaphoretic. No erythema. No pallor.   Psychiatric: He has a normal mood and affect. His behavior is normal. Judgment and thought content normal.   Vitals reviewed.      Lab Results   Component Value Date    WBC 4.22 11/18/2019    HGB 15.7 11/18/2019    HCT 46.7 11/18/2019     11/18/2019    CHOL 145 11/18/2019    TRIG 131 11/18/2019    HDL 52 11/18/2019    ALT 21 11/18/2019    AST 24  11/18/2019     11/18/2019    K 3.6 11/18/2019     11/18/2019    CREATININE 0.9 11/18/2019    BUN 14 11/18/2019    CO2 31 (H) 11/18/2019    TSH 2.438 11/18/2019    PSA 4.5 (H) 04/16/2016    INR 1.0 02/08/2019     The 10-year ASCVD risk score ([place]rizio LAWLER Jr., et al., 2013) is: 15%    Values used to calculate the score:      Age: 69 years      Sex: Male      Is Non- : No      Diabetic: No      Tobacco smoker: No      Systolic Blood Pressure: 128 mmHg      Is BP treated: Yes      HDL Cholesterol: 52 mg/dL      Total Cholesterol: 145 mg/dL    Assessment:       1. Acquired hyperbilirubinemia    2. Kidney, medullary cystic    3. Acquired renal cyst of right kidney    4. Renovascular hypertension        Plan:       Acquired hyperbilirubinemia  -     ALKALINE PHOSPHATASE, ISOENZYMES; Future; Expected date: 11/21/2019  -     HAPTOGLOBIN; Future; Expected date: 11/21/2019  -     Hepatitis B Surface Antibody, Qual/Quant; Future; Expected date: 11/21/2019  -     Varicella zoster antibody, IgG; Future; Expected date: 11/21/2019  -     Hepatitis B surface antigen; Future; Expected date: 11/21/2019    Kidney, medullary cystic    Acquired renal cyst of right kidney  -     US Retroperitoneal Complete; Future; Expected date: 11/21/2019    Renovascular hypertension  -     carvedilol (COREG) 12.5 MG tablet; Take 1 tablet (12.5 mg total) by mouth 2 (two) times daily with meals.  Dispense: 180 tablet; Refill: 4    Other orders  -     melatonin 10 mg Tab; Take 1 tablet (10 mg total) by mouth every evening.    Symptom onset has been resolved for a time period of 4 week(s). Severity is described as mild. Course of his symptoms over time is resolved.    Patient readiness: acceptance and barriers:none    During the course of the visit the patient was educated and counseled about the following:     Hypertension:   Regular aerobic exercise.    Goals: Hypertension: Reduce Blood Pressure    Did patient meet  goals/outcomes: Yes    The following self management tools provided: blood pressure log    Patient Instructions (the written plan) was given to the patient/family.     Time spent with patient: 30 minutes    Barriers to medications present (no )    Adverse reactions to current medications (no)    Over the counter medications reviewed (Yes)        30-35-minute visit. 20 minutes spent counseling patient on diet, exercise, and weight loss.  This has been fully explained to the patient, who indicates understanding.

## 2019-11-21 NOTE — PATIENT INSTRUCTIONS
Low-Salt Diet  This diet removes foods that are high in salt. It also limits the amount of salt you use when cooking. It is most often used for people with high blood pressure, edema (fluid retention), and kidney, liver, or heart disease.  Table salt contains the mineral sodium. Your body needs sodium to work normally. But too much sodium can make your health problems worse. Your healthcare provider is recommending a low-salt (also called low-sodium) diet for you. Your total daily allowance of salt is 1,500 to 2,300 milligrams (mg). It is less than 1 teaspoon of table salt. This means you can have only about 500 to 700 mg of sodium at each meal. People with certain health problems should limit salt intake to the lower end of the recommended range.    When you cook, dont add much salt. If you can cook without using salt, even better. Dont add salt to your food at the table.  When shopping, read food labels. Salt is often called sodium on the label. Choose foods that are salt-free, low salt, or very low salt. Note that foods with reduced salt may not lower your salt intake enough.    Beans, potatoes, and pasta  Ok: Dry beans, split peas, lentils, potatoes, rice, macaroni, pasta, spaghetti without added salt  Avoid: Potato chips, tortilla chips, and similar products  Breads and cereals  Ok: Low-sodium breads, rolls, cereals, and cakes; low-salt crackers, matzo crackers  Avoid: Salted crackers, pretzels, popcorn, Hebrew toast, pancakes, muffins  Dairy  Ok: Milk, chocolate milk, hot chocolate mix, low-salt cheeses, and yogurt  Avoid: Processed cheese and cheese spreads; Roquefort, Camembert, and cottage cheese; buttermilk, instant breakfast drink  Desserts  Ok: Ice cream, frozen yogurt, juice bars, gelatin, cookies and pies, sugar, honey, jelly, hard candy  Avoid: Most pies, cakes and cookies prepared or processed with salt; instant pudding  Drinks  Ok: Tea, coffee, fizzy (carbonated) drinks, juices  Avoid: Flavored  coffees, electrolyte replacement drinks, sports drinks  Meats  Ok: All fresh meat, fish, poultry, low-salt tuna, eggs, egg substitute  Avoid: Smoked, pickled, brine-cured, or salted meats and fish. This includes kimble, chipped beef, corned beef, hot dogs, deli meats, ham, kosher meats, salt pork, sausage, canned tuna, salted codfish, smoked salmon, herring, sardines, or anchovies.  Seasonings and spices  Ok: Most seasonings are okay. Good substitutes for salt include: fresh herb blends, hot sauce, lemon, garlic, torres, vinegar, dry mustard, parsley, cilantro, horseradish, tomato paste, regular margarine, mayonnaise, unsalted butter, cream cheese, vegetable oil, cream, low-salt salad dressing and gravy.  Avoid: Regular ketchup, relishes, pickles, soy sauce, teriyaki sauce, Worcestershire sauce, BBQ sauce, tartar sauce, meat tenderizer, chili sauce, regular gravy, regular salad dressing, salted butter  Soups  Ok: Low-salt soups and broths made with allowed foods  Avoid: Bouillon cubes, soups with smoked or salted meats, regular soup and broth  Vegetables  Ok: Most vegetables are okay; also low-salt tomato and vegetable juices  Avoid: Sauerkraut and other brine-soaked vegetables; pickles and other pickled vegetables; tomato juice, olives  Date Last Reviewed: 8/1/2016 © 2000-2017 [employer]. [address] [place] [id number]. All rights reserved. This information is not intended as a substitute for professional medical care. Always follow your healthcare professional's instructions.        Melatonin oral solid dosage forms  What is this medicine?  MELATONIN (precious  JOE jarad) is a dietary supplement. It is mostly promoted to help maintain normal sleep patterns. The FDA has not approved this supplement for any medical use.  This supplement may be used for other purposes; ask your health care provider or pharmacist if you have questions.  How should I use this medicine?  Take this supplement by mouth  with a glass of water. Do not take with food. This supplement is usually taken 1 or 2 hours before bedtime. After taking this supplement, limit your activities to those needed to prepare for bed. Some products may be chewed or dissolved in the mouth before swallowing. Some tablets or capsules must be swallowed whole; do not cut, crush or chew. Follow the directions on the package labeling, or take as directed by your health care professional. Do not take this supplement more often than directed.  Talk to your pediatrician regarding the use of this supplement in children. Special care may be needed. This supplement is not recommended for use in children without a prescription.  What side effects may I notice from receiving this medicine?  Side effects that you should report to your doctor or health care professional as soon as possible:  · allergic reactions like skin rash, itching or hives, swelling of the face, lips, or tongue  · breathing problems  · change in sex drive or performance  · confusion  · depressed mood, irritable, or other changes in moods or behaviors  · fast, irregular heartbeat  · feeling faint or lightheaded, falls  · irregular or missed menstrual periods  · leakage of milk from the nipples in a person who is not breast-feeding  · signs and symptoms of liver injury like dark yellow or brown urine; general ill feeling or flu-like symptoms; light-colored stools; loss of appetite; nausea; right upper belly pain; unusually weak or tired; yellowing of the eyes or skin  · sleep-walking  · trouble staying awake or alert during the day  · unusual activities while you are still asleep like driving, eating, making phone calls  · unusual bleeding or bruising  Side effects that usually do not require medical attention (report to your doctor or health care professional if they continue or are bothersome):  · dizziness  · drowsiness  · headache  · tiredness  · unusual dreams or nightmares  · upset stomach  What  may interact with this medicine?  Do not take this medicine with any of the following medications:  · fluvoxamine  · ramelteon  · tasimelteon  This medicine may also interact with the following medications:  · alcohol  · atazanavir  · caffeine  · carbamazepine  · certain antibiotics like ciprofloxacin, enoxacin  · certain medicines for depression, anxiety, or psychotic disturbances  · cimetidine  · female hormones, like estrogens and birth control pills, patches, rings, or injections  · methoxsalen  · nifedipine  · other medications for sleep  · other herbal or dietary supplements  · phenobarbital  · rifampin  · smoking tobacco  · tamoxifen  · treatments for cancer, organ transplant, or immune disorders  What if I miss a dose?  If you miss taking your dose at the usual time, skip that dose. If it is almost time for your next dose, take only that dose. Do not take double or extra doses.  Where should I keep my medicine?  Keep out of the reach of children.  Store at room temperature or as directed on the package label. Protect from moisture. Throw away any unused supplement after the expiration date.  What should I tell my health care provider before I take this medicine?  They need to know if you have any of these conditions:  · asthma  · cancer  · depression or mental illness  · diabetes  · hormone problems  · if you often drink alcohol  · immune system problems  · liver disease  · organ transplant  · seizure disorder  · an unusual or allergic reaction to melatonin, other medicines, foods, dyes, or preservatives  · pregnant or trying to get pregnant  · breast-feeding  What should I watch for while using this medicine?  See your doctor if your symptoms do not get better or if they get worse. Do not take this supplement for more than 2 weeks unless your doctor tells you to.  You may get drowsy or dizzy. Do not drive, use machinery, or do anything that needs mental alertness until you know how this medicine affects  you. Do not stand or sit up quickly, especially if you are an older patient. This reduces the risk of dizzy or fainting spells. Alcohol may interfere with the effect of this medicine. Avoid alcoholic drinks.  Talk to your doctor before you use this supplement if you are currently being treated for an emotional, mental, or sleep problem. This medicine may interfere with your treatment.  Herbal or dietary supplements are not regulated like medicines. Rigid  standards are not required for dietary supplements. The purity and strength of these products can vary. The safety and effect of this dietary supplement for a certain disease or illness is not well known. This product is not intended to diagnose, treat, cure or prevent any disease.  The Food and Drug Administration suggests the following to help consumers protect themselves:  · Always read product labels and follow directions.  · Natural does not mean a product is safe for humans to take.  · Look for products that include USP after the ingredient name. This means that the  followed the standards of the [place] Pharmacopoeia.  · Supplements made or sold by a nationally known food or drug company are more likely to be made under tight controls. You can write to the company for more information about how the product was made.  NOTE:This sheet is a summary. It may not cover all possible information. If you have questions about this medicine, talk to your doctor, pharmacist, or health care provider. Copyright© 2017 [employer]

## 2019-11-25 ENCOUNTER — HOSPITAL ENCOUNTER (OUTPATIENT)
Dept: RADIOLOGY | Facility: HOSPITAL | Age: 69
Discharge: HOME | End: 2019-11-25
Attending: FAMILY MEDICINE
Payer: MEDICARE

## 2019-11-25 DIAGNOSIS — N28.1 ACQUIRED RENAL CYST OF RIGHT KIDNEY: ICD-10-CM

## 2019-11-25 PROCEDURE — 76770 US EXAM ABDO BACK WALL COMP: CPT | Mod: TC

## 2019-11-25 PROCEDURE — 76770 US EXAM ABDO BACK WALL COMP: CPT | Mod: 26,,, | Performed by: RADIOLOGY

## 2019-11-25 PROCEDURE — 76770 US RETROPERITONEAL COMPLETE: ICD-10-PCS | Mod: 26,,, | Performed by: RADIOLOGY

## 2019-11-27 ENCOUNTER — PATIENT MESSAGE (OUTPATIENT)
Dept: FAMILY MEDICINE | Facility: CLINIC | Age: 69
End: 2019-11-27

## 2019-11-27 DIAGNOSIS — Q61.5: Primary | ICD-10-CM

## 2019-11-27 DIAGNOSIS — N28.89 RENAL MASS OF UNKNOWN NATURE: ICD-10-CM

## 2019-11-29 NOTE — TELEPHONE ENCOUNTER
Portal message forwarded to patient from Dr. Lugo regarding attached. Appointment for CT scheduled for the date 12-5-19.

## 2019-12-05 ENCOUNTER — HOSPITAL ENCOUNTER (OUTPATIENT)
Dept: RADIOLOGY | Facility: HOSPITAL | Age: 69
Discharge: HOME | End: 2019-12-05
Attending: FAMILY MEDICINE
Payer: MEDICARE

## 2019-12-05 ENCOUNTER — TELEPHONE (OUTPATIENT)
Dept: FAMILY MEDICINE | Facility: CLINIC | Age: 69
End: 2019-12-05

## 2019-12-05 DIAGNOSIS — Q61.5: ICD-10-CM

## 2019-12-05 DIAGNOSIS — Z23 NEED FOR ZOSTER VACCINE: Primary | ICD-10-CM

## 2019-12-05 DIAGNOSIS — N28.89 RENAL MASS OF UNKNOWN NATURE: ICD-10-CM

## 2019-12-05 PROCEDURE — 74178 CT ABD&PLV WO CNTR FLWD CNTR: CPT | Mod: TC

## 2019-12-05 PROCEDURE — 74178 CT ABD&PLV WO CNTR FLWD CNTR: CPT | Mod: 26,,, | Performed by: RADIOLOGY

## 2019-12-05 PROCEDURE — 25500020 PHARM REV CODE 255: Performed by: FAMILY MEDICINE

## 2019-12-05 PROCEDURE — 74178 CT ABDOMEN PELVIS W WO CONTRAST: ICD-10-PCS | Mod: 26,,, | Performed by: RADIOLOGY

## 2019-12-05 RX ADMIN — IOHEXOL 75 ML: 350 INJECTION, SOLUTION INTRAVENOUS at 08:12

## 2019-12-05 NOTE — TELEPHONE ENCOUNTER
Recent CT scan results; presence of simple renal cysts.  Patient is aware of results  New prescription for Shingrix sent to pharmacy

## 2019-12-09 ENCOUNTER — PATIENT OUTREACH (OUTPATIENT)
Dept: OTHER | Facility: OTHER | Age: 69
End: 2019-12-09

## 2019-12-09 NOTE — PROGRESS NOTES
Digital Medicine: Clinician Follow-Up    Discussed elevated BP 12/7/19. States he walked, ate and drank caffeiniated tea (which he does not drink on regular basis). States he was getting ready to go out as well.  He has a set a reminder on his phone to charge his BP cuff monthly. Thinks he will be due to charge it in next week or so.  Office BP 11/21/19 128/80 mmHg    The history is provided by the patient.     Follow Up  Follow-up reason(s): reading review          INTERVENTION(S)  recommended diet modifications, recommended physical activity and encouragement/support    PLAN  patient verbalizes understanding and continue monitoring    Suspect elevated BP 12/7/19 due to technique and caffeine intake  Reviewed taking BP prior to caffeine or at least 45 minutes or more after intake  Praised him for lifestyle modifications; reinforced low sodium diet and continuing with physical activity  Continue current regimen  Consider hctz 12.5 mg daily if BP consistently elevated       There are no preventive care reminders to display for this patient.    Last 5 Patient Entered Readings                                      Current 30 Day Average: 140/90     Recent Readings 12/7/2019 12/7/2019 12/3/2019 11/24/2019 11/17/2019    SBP (mmHg) 153 159 130 125 143    DBP (mmHg) 98 91 85 84 87    Pulse 62 60 68 68 73             Hypertension Medications             amLODIPine (NORVASC) 10 MG tablet TAKE 1 TABLET BY MOUTH EVERY EVENING    carvedilol (COREG) 12.5 MG tablet Take 1 tablet (12.5 mg total) by mouth 2 (two) times daily with meals.    olmesartan (BENICAR) 40 MG tablet TAKE 1 TABLET BY MOUTH EVERY DAY                             Medication Adherence Screening     He does not wonder if medications are working.  He knows purpose of medications.

## 2019-12-10 ENCOUNTER — TELEPHONE (OUTPATIENT)
Dept: UROLOGY | Facility: CLINIC | Age: 69
End: 2019-12-10

## 2019-12-10 NOTE — TELEPHONE ENCOUNTER
Call placed to inform patient we need to reschedule his appt on 12/27, as the NP will not be available that day, no answer, message left with call back number.

## 2019-12-27 DIAGNOSIS — I10 ESSENTIAL HYPERTENSION: ICD-10-CM

## 2019-12-27 RX ORDER — AMLODIPINE BESYLATE 10 MG/1
TABLET ORAL
Qty: 90 TABLET | Refills: 0 | Status: SHIPPED | OUTPATIENT
Start: 2019-12-27 | End: 2020-03-24

## 2019-12-29 NOTE — PROGRESS NOTES
[facility] Note  Staff: KAIT Villareal-C    PCP: Dr. Henrique Lugo    Chief Complaint: Follow-up visit    Subjective:        HPI: Kulwinder Hogue Jr. is a 69 y.o. male who presents for BPH follow up s/p TURP with resultant management of meatal stenosis      Hx pathologic BPH since prostate biopsy and cystoscopy 10/25/16 with Dr Mancia: 65.7cc gland and evidence of GUILLORY from lateral lobes with slight median lobe and grade 3 trabecs in bladder.    3T [facility] 5/25/17: Prostate size 63 mL.  Intravesical protrusion of the prostate 10 mm. PIRAD 2 only, BPH, prostatitis. psa on 10/12/18 of 4.3 (21.16% free). Confirm mdx of biopsy negative     Cysto/TRUS 12/4/18: volume 91.5cm3, ++median lobe, mild PVR, Bilateral lateral lobe ingrowth causing significant obstruction especially when viewed from verumontanum, with kissing lobes centrally. Signicant median lobe growing in from posterior bladder neck essentially completely obstructing it with encroachment and obstruction from middle lobe leaving only slit like opening at bladder neck, also seen when withdrawing scope into prostatic urethra. Moderate trabeculations     TURP 2/13 uncomplicated. Presented with a significant clot retention on 2/15/19 requiring extensive efforts at manual irrigation and overnight observation with continuous bladder irrigation was discharged home the next day. Passed fill and pull voiding trial 2/18/19 with Carreon removal. 2/27/19 presented with urinary frequency and dysuria.  PVR 18 cc.  Urine culture negative.  No evidence of meatal stenosis.  Declined OAB meds  PATH 12.4g BPH     3/19/19: Off his Flomax.  DT F 5-6 times, NTF 2-3 times.  Drinks water in the morning.  Urge isn't there to go.  On his feet all day.  Urgency is daytime mostly, and he did recently make it through a concert for a few hours without the need to urinate. Hematuria week prior back on his aspirin and fish oil, and occasionally seeing blood at the  beginning of his stream. He thinks he may snore in thinks he may need a sleep study. Also notes curvature with erections for about 1 year up 30°, and can get erections. PVR 0cc     4/15 passed a stone which he brought in for analysis (day after going to ER feeling like couldn't urinate, after cutting grass).  Stone analysis: 80% CaPhos, 10% CaOxDi.  He then presented to the emergency department on 4/23/19 complaining of dribbling and feeling as though he had an obstruction in his urethra. Initial exam appeared normal and PVR <50cc. UA nitrite negative with few bacteria, >100 wbc, 10 rbc. Ucx neg. On re-evaluation patient prior to discharge patient states he was feeling like his penis was swollen and had white urethral calculus/sediment over the distal penile urethra removed with sterile forceps and patient voided approximately 150 cc of clear yellow urine and felt significantly improved.       4/26/19: Some split streaming of his urination.Voiding better since passage of this 2nd stone/sediment per urethra.  No gross external meatal stenosis. Blue meatal dilator passed and appreciated some fossa navicularis narrowing/meatal stenosis, of which the dilator passively dilated and unobstructed, feeling it breakthrough some scar tissue. Advised to use 2x/day x 3 weeks, daily x 2 weeks, 3 days per week x 2 weeks  Ucx negative. CT stone protocol negative for  calcs except punctate prostatic calcifications     OV on 06/25/19 with Dr. Jain pt noted:  AUA SS: 4/3 (2: nocturia, 1: frequency, urgency)  Stream is good. Not split streaming, spraying.   Urinating every few hours. Drinks water all day and stops around 5pm  DTF 4-5x, Nocturia x1-2. Large volumes at night with good stream even though stops hours before bed  Never pursued sleep study.  Back to cutting grass and being active.  One episode GH in interim soon after last visit when dehydrated and active and not a problem since.     TODAY:  AUA SS:  3/2  PVR by bladder  scan performed by MA today:  177 mL.  Nocturia 1x nightly.  No dysuria or hematuria noted by pt today.  No split stream noticed by pt.    REVIEW OF SYSTEMS:  A comprehensive 10 system review was performed and is negative except as noted above in HPI    PMHx:  Past Medical History:   Diagnosis Date    Elevated PSA     Hyperlipemia     Hypertension     Wears glasses      PSHx:  Past Surgical History:   Procedure Laterality Date    COLONOSCOPY N/A 5/2/2018    Procedure: COLONOSCOPY;  Surgeon: Joe Millan MD;  Location: [facility];  Service: Endoscopy;  Laterality: N/A;    COLONOSCOPY W/ POLYPECTOMY  3/2013    Tubular adenoma    CYSTOSCOPY  10/25/2016    Dr. Mancia    CYSTOSCOPY N/A 12/4/2018    Procedure: CYSTOSCOPY;  Surgeon: Jamal Jain MD;  Location: [facility] OR;  Service: Urology;  Laterality: N/A;    LIPOMA RESECTION  8-    lipoma posterior neck    PROSTATE BIOPSY  10/25/2016    Dr. Mancia    TRANSRECTAL ULTRASOUND EXAMINATION N/A 12/4/2018    Procedure: ULTRASOUND, RECTAL APPROACH;  Surgeon: Jamal Jain MD;  Location: [facility] OR;  Service: Urology;  Laterality: N/A;    TRANSURETHRAL RESECTION OF PROSTATE N/A 2/14/2019    Procedure: TURP (TRANSURETHRAL RESECTION OF PROSTATE);  Surgeon: Jamal Jain MD;  Location: [facility];  Service: Urology;  Laterality: N/A;    VASECTOMY       Allergies:  Percocet [oxycodone-acetaminophen]    Medications: reviewed   Objective:     Vitals:    12/30/19 0846   BP: 134/81   Pulse: 68   Resp: 18   Temp: 97.6 °F (36.4 °C)     General:WDWN in NAD  Eyes: PERRLA, normal conjunctiva  Respiratory: no increased work on breathing, clear to auscultation  Cardiovascular: regular rate and rhythm. No obvious extremity edema.  GI: palpation of masses. No tenderness. No hepatosplenomegaly to palpation.  Musculoskeletal: normal range of motion of bilateral upper extremities. Normal muscle strength and tone.  Skin: no obvious rashes or lesions. No tightening of  skin noted.  Neurologic: CN grossly normal. Normal sensation.   Psychiatric: awake, alert and oriented x 3. Mood and affect normal. Cooperative.    LABS REVIEW:  UA today:  Color:Clear, Yellow  Spec. Grav.  1.025  PH  6.0  Trace Blood  Negative for leukocytes, nitrates, protein, glucose, ketones, urobili, bilirubin.    Assessment:       1. BPH with obstruction/lower urinary tract symptoms    2. Other stricture of urethral meatus in male    3. Urinary frequency          Plan:     Draw PSA level (overdue)  I did mention to pt today due to PVR residual he may want to restart using dilator to make sure no new formation of stenosis noted at this time.  Pt vu.    F/u with Dr. Jain in one year.    [facility]: Active    Hannah León, SURJITP-C

## 2019-12-30 ENCOUNTER — OFFICE VISIT (OUTPATIENT)
Dept: UROLOGY | Facility: CLINIC | Age: 69
End: 2019-12-30
Payer: MEDICARE

## 2019-12-30 VITALS
BODY MASS INDEX: 24.79 KG/M2 | HEIGHT: 72 IN | SYSTOLIC BLOOD PRESSURE: 134 MMHG | TEMPERATURE: 98 F | RESPIRATION RATE: 18 BRPM | HEART RATE: 68 BPM | DIASTOLIC BLOOD PRESSURE: 81 MMHG | WEIGHT: 183 LBS

## 2019-12-30 DIAGNOSIS — N40.1 BPH WITH OBSTRUCTION/LOWER URINARY TRACT SYMPTOMS: Primary | ICD-10-CM

## 2019-12-30 DIAGNOSIS — N13.8 BPH WITH OBSTRUCTION/LOWER URINARY TRACT SYMPTOMS: Primary | ICD-10-CM

## 2019-12-30 DIAGNOSIS — R35.0 URINARY FREQUENCY: ICD-10-CM

## 2019-12-30 DIAGNOSIS — N35.811 OTHER STRICTURE OF URETHRAL MEATUS IN MALE: ICD-10-CM

## 2019-12-30 LAB
BILIRUB SERPL-MCNC: ABNORMAL MG/DL
BLOOD URINE, POC: ABNORMAL
COLOR, POC UA: YELLOW
GLUCOSE UR QL STRIP: ABNORMAL
KETONES UR QL STRIP: ABNORMAL
LEUKOCYTE ESTERASE URINE, POC: ABNORMAL
NITRITE, POC UA: ABNORMAL
PH, POC UA: 6
POC RESIDUAL URINE VOLUME: 177 ML (ref 0–100)
PROTEIN, POC: ABNORMAL
SPECIFIC GRAVITY, POC UA: 1.02
UROBILINOGEN, POC UA: 0.2

## 2019-12-30 PROCEDURE — 99999 PR PBB SHADOW E&M-EST. PATIENT-LVL IV: CPT | Mod: PBBFAC,,, | Performed by: NURSE PRACTITIONER

## 2019-12-30 PROCEDURE — 1126F PR PAIN SEVERITY QUANTIFIED, NO PAIN PRESENT: ICD-10-PCS | Mod: S$GLB,,, | Performed by: NURSE PRACTITIONER

## 2019-12-30 PROCEDURE — 3079F DIAST BP 80-89 MM HG: CPT | Mod: CPTII,S$GLB,, | Performed by: NURSE PRACTITIONER

## 2019-12-30 PROCEDURE — 99999 PR PBB SHADOW E&M-EST. PATIENT-LVL IV: ICD-10-PCS | Mod: PBBFAC,,, | Performed by: NURSE PRACTITIONER

## 2019-12-30 PROCEDURE — 1101F PT FALLS ASSESS-DOCD LE1/YR: CPT | Mod: CPTII,S$GLB,, | Performed by: NURSE PRACTITIONER

## 2019-12-30 PROCEDURE — 3079F PR MOST RECENT DIASTOLIC BLOOD PRESSURE 80-89 MM HG: ICD-10-PCS | Mod: CPTII,S$GLB,, | Performed by: NURSE PRACTITIONER

## 2019-12-30 PROCEDURE — 3075F PR MOST RECENT SYSTOLIC BLOOD PRESS GE 130-139MM HG: ICD-10-PCS | Mod: CPTII,S$GLB,, | Performed by: NURSE PRACTITIONER

## 2019-12-30 PROCEDURE — 99214 OFFICE O/P EST MOD 30 MIN: CPT | Mod: 25,S$GLB,, | Performed by: NURSE PRACTITIONER

## 2019-12-30 PROCEDURE — 81002 URINALYSIS NONAUTO W/O SCOPE: CPT | Mod: S$GLB,,, | Performed by: NURSE PRACTITIONER

## 2019-12-30 PROCEDURE — 99214 PR OFFICE/OUTPT VISIT, EST, LEVL IV, 30-39 MIN: ICD-10-PCS | Mod: 25,S$GLB,, | Performed by: NURSE PRACTITIONER

## 2019-12-30 PROCEDURE — 51798 PR MEAS,POST-VOID RES,US,NON-IMAGING: ICD-10-PCS | Mod: S$GLB,,, | Performed by: NURSE PRACTITIONER

## 2019-12-30 PROCEDURE — 1159F PR MEDICATION LIST DOCUMENTED IN MEDICAL RECORD: ICD-10-PCS | Mod: S$GLB,,, | Performed by: NURSE PRACTITIONER

## 2019-12-30 PROCEDURE — 1126F AMNT PAIN NOTED NONE PRSNT: CPT | Mod: S$GLB,,, | Performed by: NURSE PRACTITIONER

## 2019-12-30 PROCEDURE — 81002 POCT URINE DIPSTICK WITHOUT MICROSCOPE: ICD-10-PCS | Mod: S$GLB,,, | Performed by: NURSE PRACTITIONER

## 2019-12-30 PROCEDURE — 1101F PR PT FALLS ASSESS DOC 0-1 FALLS W/OUT INJ PAST YR: ICD-10-PCS | Mod: CPTII,S$GLB,, | Performed by: NURSE PRACTITIONER

## 2019-12-30 PROCEDURE — 3075F SYST BP GE 130 - 139MM HG: CPT | Mod: CPTII,S$GLB,, | Performed by: NURSE PRACTITIONER

## 2019-12-30 PROCEDURE — 1159F MED LIST DOCD IN RCRD: CPT | Mod: S$GLB,,, | Performed by: NURSE PRACTITIONER

## 2019-12-30 PROCEDURE — 51798 US URINE CAPACITY MEASURE: CPT | Mod: S$GLB,,, | Performed by: NURSE PRACTITIONER

## 2019-12-31 ENCOUNTER — PATIENT OUTREACH (OUTPATIENT)
Dept: OTHER | Facility: OTHER | Age: 69
End: 2019-12-31

## 2019-12-31 NOTE — PROGRESS NOTES
"Digital Medicine: Health  Follow-Up      Follow Up  Follow-up reason(s): reading review and routine education      Readings are trending up due to lifestyle change.      INTERVENTION(S)  encouragement/support and denied questions    PLAN  continue monitoring    Encouraged pt to resume exercise routine. Also discussed tips for reducing sodium intake at special events.     There are no preventive care reminders to display for this patient.    Last 5 Patient Entered Readings                                      Current 30 Day Average: 143/90     Recent Readings 12/29/2019 12/24/2019 12/15/2019 12/14/2019 12/12/2019    SBP (mmHg) 146 155 126 147 144    DBP (mmHg) 92 92 85 91 89    Pulse 69 59 64 61 71            Diet Screening       Pt reports increased sodium intake during the holidays. He states that he is "going to to better with this".     Physical Activity Screening   When asked if exercising, patient responded: yes    Patient participates in the following activities: walking    Pt states that he has been walking less during the holidays. He is planning to go for a walk today.    Intervention(s): goal setting     SDOH: Deferred.   "

## 2020-01-11 ENCOUNTER — LAB VISIT (OUTPATIENT)
Dept: LAB | Facility: HOSPITAL | Age: 70
End: 2020-01-11
Attending: NURSE PRACTITIONER
Payer: MEDICARE

## 2020-01-11 DIAGNOSIS — N13.8 BPH WITH OBSTRUCTION/LOWER URINARY TRACT SYMPTOMS: ICD-10-CM

## 2020-01-11 DIAGNOSIS — N40.1 BPH WITH OBSTRUCTION/LOWER URINARY TRACT SYMPTOMS: ICD-10-CM

## 2020-01-11 LAB — COMPLEXED PSA SERPL-MCNC: 3.4 NG/ML (ref 0–4)

## 2020-01-11 PROCEDURE — 84153 ASSAY OF PSA TOTAL: CPT

## 2020-01-11 PROCEDURE — 36415 COLL VENOUS BLD VENIPUNCTURE: CPT | Mod: PO

## 2020-01-20 ENCOUNTER — PATIENT OUTREACH (OUTPATIENT)
Dept: OTHER | Facility: OTHER | Age: 70
End: 2020-01-20

## 2020-01-20 NOTE — PROGRESS NOTES
Digital Medicine: Clinician Follow-Up    Discussed variable BP readings. States he rushes to take a BP reading when he gets a text message reminder.  He finds it difficult to take an accurate BP reading during the week due to having to commute to work 1 hour each way    The history is provided by the patient.     Follow Up  Follow-up reason(s): reading review          INTERVENTION(S)  encouragement/support and denied questions    PLAN  patient verbalizes understanding and continue monitoring    Reviewed that program requirement is minimum of 1 BP reading per week. Requested patient take BP measurement during the weekend when he is better able to follow proper measurement technique and timing.  Office BP consistently lower than home BP  Continue current regimen. Discussed adding hctz 12.5 mg daily if BP does not improve by follow up      There are no preventive care reminders to display for this patient.    Last 5 Patient Entered Readings                                      Current 30 Day Average: 145/90     Recent Readings 1/18/2020 1/9/2020 12/31/2019 12/29/2019 12/24/2019    SBP (mmHg) 145 132 149 146 155    DBP (mmHg) 87 84 95 92 92    Pulse 55 62 62 69 59             Hypertension Medications             amLODIPine (NORVASC) 10 MG tablet TAKE 1 TABLET BY MOUTH EVERY EVENING    carvedilol (COREG) 12.5 MG tablet Take 1 tablet (12.5 mg total) by mouth 2 (two) times daily with meals.    olmesartan (BENICAR) 40 MG tablet TAKE 1 TABLET BY MOUTH EVERY DAY                             Medication Adherence Screening     He does not wonder if medications are working.  He knows purpose of medications.

## 2020-01-20 NOTE — PROGRESS NOTES
Patient answered and states he will call back  BP average 145/90 mmHg  Discuss starting hctz 12.5 mg daily, however, office /81 mmHg 12/30/19    Hypertension Medications             amLODIPine (NORVASC) 10 MG tablet TAKE 1 TABLET BY MOUTH EVERY EVENING    carvedilol (COREG) 12.5 MG tablet Take 1 tablet (12.5 mg total) by mouth 2 (two) times daily with meals.    olmesartan (BENICAR) 40 MG tablet TAKE 1 TABLET BY MOUTH EVERY DAY

## 2020-02-06 ENCOUNTER — PATIENT MESSAGE (OUTPATIENT)
Dept: ADMINISTRATIVE | Facility: OTHER | Age: 70
End: 2020-02-06

## 2020-02-12 ENCOUNTER — PATIENT OUTREACH (OUTPATIENT)
Dept: OTHER | Facility: OTHER | Age: 70
End: 2020-02-12

## 2020-02-12 NOTE — PROGRESS NOTES
Digital Medicine: Health  Follow-Up      Follow Up  Follow-up reason(s): reading review and routine education    Pt states that he has trouble sleeping and was told that this may be the result of sleep apnea. He asked if this could effect his BP.    INTERVENTION(S)  encouragement/support    PLAN  continue monitoring    Encouraged pt to continue to read nutrition labels and limit sodium intake. Also discussed the effects of sleep apnea on BP control and encouraged pt to discuss this with his PCP.     There are no preventive care reminders to display for this patient.    Last 5 Patient Entered Readings                                      Current 30 Day Average: 146/90     Recent Readings 2/8/2020 2/2/2020 2/1/2020 1/26/2020 1/25/2020    SBP (mmHg) 130 147 154 142 157    DBP (mmHg) 82 93 93 91 93    Pulse 58 66 58 67 60            Diet Screening       Pt states that he has been more mindful of sodium intake, limiting processed foods, and reading nutrition labels.     Physical Activity Screening       Pt states that he has not been walking much 2/2 bad weather.     SDOH: Deferred.

## 2020-02-17 ENCOUNTER — PATIENT OUTREACH (OUTPATIENT)
Dept: OTHER | Facility: OTHER | Age: 70
End: 2020-02-17

## 2020-02-17 ENCOUNTER — TELEPHONE (OUTPATIENT)
Dept: FAMILY MEDICINE | Facility: CLINIC | Age: 70
End: 2020-02-17

## 2020-02-17 DIAGNOSIS — I15.0 RENOVASCULAR HYPERTENSION: ICD-10-CM

## 2020-02-17 RX ORDER — CARVEDILOL 25 MG/1
25 TABLET ORAL 2 TIMES DAILY WITH MEALS
Qty: 60 TABLET | Refills: 3 | Status: SHIPPED | OUTPATIENT
Start: 2020-02-17 | End: 2020-08-16

## 2020-02-17 RX ORDER — AZELASTINE 1 MG/ML
1 SPRAY, METERED NASAL 2 TIMES DAILY
Qty: 30 ML | Refills: 2 | Status: SHIPPED | OUTPATIENT
Start: 2020-02-17 | End: 2021-02-25

## 2020-02-17 NOTE — PROGRESS NOTES
Digital Medicine: Clinician Follow-Up    Patient states melatonin has helped some with sleep but still experiencing some trouble sleeping  Wakes up with dry mouth   Denies excessive daytime sleepiness  Wonders if he has sinus issues. States one nostril becomes congested and drains/improves when he changes sides.    The history is provided by the patient.     Follow Up  Follow-up reason(s): reading review          INTERVENTION(S)  reviewed appropriate dose schedule, recommended med change - patient refuses, encouragement/support and denied questions    PLAN  patient verbalizes understanding and continue monitoring    BP elevated  Sent information on sleep patterns to Dr. Lugo to inquire about doing a sleep study  Discussed adding hctz 12.5 mg daily or changing amlodipine to nifedipine 60 - 90 mg daily. Patient recently filled a 90 day supply of amlodipine and would like to further consider medication change  He is traveling 3/1-3/9 and requests follow up after he returns      There are no preventive care reminders to display for this patient.    Last 5 Patient Entered Readings                                      Current 30 Day Average: 148/89     Recent Readings 2/16/2020 2/16/2020 2/15/2020 2/8/2020 2/2/2020    SBP (mmHg) 155 172 151 130 147    DBP (mmHg) 79 95 86 82 93    Pulse 61 59 55 58 66             Hypertension Medications             amLODIPine (NORVASC) 10 MG tablet TAKE 1 TABLET BY MOUTH EVERY EVENING    carvedilol (COREG) 12.5 MG tablet Take 1 tablet (12.5 mg total) by mouth 2 (two) times daily with meals.    olmesartan (BENICAR) 40 MG tablet TAKE 1 TABLET BY MOUTH EVERY DAY                             Medication Adherence Screening     Patient knows purpose of medications.

## 2020-02-17 NOTE — TELEPHONE ENCOUNTER
----- Message from Karen Alberto PharmD sent at 2/17/2020 11:12 AM CST -----  Patient is on 3 BP medications with 30 day average remaining elevated at 148/89 mmHg.    He reports the following about his sleep patterns:  Patient states melatonin has helped some with sleep but still experiencing some trouble sleeping  Wakes up with dry mouth (as though breathing through mouth during the night)  Denies excessive daytime sleepiness  Wonders if he has sinus issues. States one nostril becomes congested and drains/improves when he changes sides. States he has seen ENT but does not usually experience congestion during the day.    Do you think it would be worth having him complete a sleep study?    Karen Alberto, PharmD  Digital Medicine Clinician  [phone]

## 2020-02-18 NOTE — PROGRESS NOTES
Dr. Lugo advised patient to increased carvedilol to 25 mg BID  Reviewed to monitor pulse with beta-blocker dose increase  Advised patient to contact me if experiencing significant dizziness or lightheadedness with dose increase  Will plan to follow up after patient returns to town unless he reports side effects or concerns

## 2020-03-16 NOTE — PROGRESS NOTES
BP improved with increase of carvedilol  Average 138/84 mmHg (decreased from 148/89 mmHg prior to medication adjustment)  Continue current regimen and monitoring in 70 yo patient  Consider changing amlodipine to nifedipine 60-90 mg daily if need further BP lowering    Hypertension Medications             amLODIPine (NORVASC) 10 MG tablet TAKE 1 TABLET BY MOUTH EVERY EVENING    carvediloL (COREG) 25 MG tablet Take 1 tablet (25 mg total) by mouth 2 (two) times daily with meals.    olmesartan (BENICAR) 40 MG tablet TAKE 1 TABLET BY MOUTH EVERY DAY

## 2020-03-24 DIAGNOSIS — I10 ESSENTIAL HYPERTENSION: ICD-10-CM

## 2020-03-24 RX ORDER — AMLODIPINE BESYLATE 10 MG/1
TABLET ORAL
Qty: 90 TABLET | Refills: 3 | Status: SHIPPED | OUTPATIENT
Start: 2020-03-24 | End: 2021-03-18 | Stop reason: SDUPTHER

## 2020-03-31 ENCOUNTER — PATIENT OUTREACH (OUTPATIENT)
Dept: OTHER | Facility: OTHER | Age: 70
End: 2020-03-31

## 2020-03-31 NOTE — PROGRESS NOTES
Digital Medicine: Health  Follow-Up      Follow Up  Follow-up reason(s): reading review and goal follow-up    Pt states that he was in contact with two people who have tested positive for COVID-19. He states that this was 3+ weeks ago and that he has had no symptoms. He states that he is feeling well otherwise and is staying active and trying to limit his sodium intake. He has no new health goals to establish at this time.    INTERVENTION(S)  encouragement/support    PLAN  continue monitoring    Discussed patient's concerns about the COVID-19 outbreak, and encouraged prevention strategies. Reminded patient that digital medicine team is available for questions or concerns. Encouraged pt to continue to work on healthy habits for BP control.    There are no preventive care reminders to display for this patient.    Last 5 Patient Entered Readings                                      Current 30 Day Average: 138/84     Recent Readings 3/28/2020 3/25/2020 3/19/2020 3/14/2020 3/10/2020    SBP (mmHg) 145 121 144 140 141    DBP (mmHg) 82 78 91 82 88    Pulse 54 56 55 56 61        SDOH: Deferred.

## 2020-04-01 ENCOUNTER — TELEPHONE (OUTPATIENT)
Dept: FAMILY MEDICINE | Facility: CLINIC | Age: 70
End: 2020-04-01

## 2020-04-01 NOTE — TELEPHONE ENCOUNTER
----- Message from Mariluz Ring sent at 4/1/2020 10:11 AM CDT -----  Contact: pt  Type: Needs Medical Advice    Who Called:  pt    Best Call Back Number:[phone]  Additional Information: pt said he was in contact with two people that tested positive for Covid-19 early March but isn't pt showing symptoms. Pt wants to know if he can increase vitamins with all of his current medication. Please call pt to advise.

## 2020-04-20 ENCOUNTER — TELEPHONE (OUTPATIENT)
Dept: UROLOGY | Facility: CLINIC | Age: 70
End: 2020-04-20

## 2020-04-20 DIAGNOSIS — N50.89 SCROTAL SWELLING: Primary | ICD-10-CM

## 2020-04-20 NOTE — TELEPHONE ENCOUNTER
----- Message from Bridger Sparks sent at 4/20/2020 12:10 PM CDT -----  Contact: pt  Type: Needs Medical Advice    Who Called:  pt    Best Call Back Number: [phone]  Additional Information: Would like to speak to a nurse in the office. Would not give any further information. Please call to advise.

## 2020-04-20 NOTE — TELEPHONE ENCOUNTER
Spoke w pt states that he has expereinced testicle enlargement one side that has occurred over the weekend pt c/o no pain/ no discharge/ pt voiced he has been doing some yard work picking up heavy tree limbs or doesn't remember it being to heavy.

## 2020-05-02 NOTE — TELEPHONE ENCOUNTER
Apologies for response delay - pls check in w pt.  Scrotal support and antiinflammatories. If still swollen can get u/s. By history concerning for hernia.

## 2020-05-04 NOTE — TELEPHONE ENCOUNTER
Spoke w pt was informed of Dr Jain order for ultrasound which is scheduled for Wed and VV with dr Jain on Friday pt voiced ok and understanding.

## 2020-05-04 NOTE — TELEPHONE ENCOUNTER
Spoke w pt regarding scrotal swelling/enlargement was given Dr Jain suggestion for scrotal support and taking anitinflammatories. Pt voiced that he has been wearing supportive undies but still concerned with swelling states not having any pain but would like to have ultrasound to get checked out.     Please advise

## 2020-05-05 ENCOUNTER — PATIENT MESSAGE (OUTPATIENT)
Dept: ADMINISTRATIVE | Facility: HOSPITAL | Age: 70
End: 2020-05-05

## 2020-05-06 ENCOUNTER — PATIENT OUTREACH (OUTPATIENT)
Dept: ADMINISTRATIVE | Facility: OTHER | Age: 70
End: 2020-05-06

## 2020-05-06 ENCOUNTER — HOSPITAL ENCOUNTER (OUTPATIENT)
Dept: RADIOLOGY | Facility: CLINIC | Age: 70
Discharge: HOME | End: 2020-05-06
Attending: UROLOGY
Payer: MEDICARE

## 2020-05-06 DIAGNOSIS — N50.89 SCROTAL SWELLING: ICD-10-CM

## 2020-05-06 PROCEDURE — 76870 US SCROTUM AND TESTICLES: ICD-10-PCS | Mod: 26,,, | Performed by: RADIOLOGY

## 2020-05-06 PROCEDURE — 76870 US EXAM SCROTUM: CPT | Mod: 26,,, | Performed by: RADIOLOGY

## 2020-05-06 PROCEDURE — 76870 US EXAM SCROTUM: CPT | Mod: TC,PO

## 2020-05-07 ENCOUNTER — TELEPHONE (OUTPATIENT)
Dept: UROLOGY | Facility: CLINIC | Age: 70
End: 2020-05-07

## 2020-05-08 ENCOUNTER — OFFICE VISIT (OUTPATIENT)
Dept: UROLOGY | Facility: CLINIC | Age: 70
End: 2020-05-08
Payer: MEDICARE

## 2020-05-08 DIAGNOSIS — Z12.5 PROSTATE CANCER SCREENING: ICD-10-CM

## 2020-05-08 DIAGNOSIS — N43.3 RIGHT HYDROCELE: Primary | ICD-10-CM

## 2020-05-08 DIAGNOSIS — N99.110 POSTPROCEDURAL MALE URETHRAL MEATAL STRICTURE: ICD-10-CM

## 2020-05-08 DIAGNOSIS — R39.14 BENIGN PROSTATIC HYPERPLASIA WITH INCOMPLETE BLADDER EMPTYING: ICD-10-CM

## 2020-05-08 DIAGNOSIS — N40.1 BENIGN PROSTATIC HYPERPLASIA WITH INCOMPLETE BLADDER EMPTYING: ICD-10-CM

## 2020-05-08 PROCEDURE — 1159F MED LIST DOCD IN RCRD: CPT | Mod: 95,,, | Performed by: UROLOGY

## 2020-05-08 PROCEDURE — 99214 OFFICE O/P EST MOD 30 MIN: CPT | Mod: 95,,, | Performed by: UROLOGY

## 2020-05-08 PROCEDURE — 1159F PR MEDICATION LIST DOCUMENTED IN MEDICAL RECORD: ICD-10-PCS | Mod: 95,,, | Performed by: UROLOGY

## 2020-05-08 PROCEDURE — 1101F PT FALLS ASSESS-DOCD LE1/YR: CPT | Mod: CPTII,95,, | Performed by: UROLOGY

## 2020-05-08 PROCEDURE — 99214 PR OFFICE/OUTPT VISIT, EST, LEVL IV, 30-39 MIN: ICD-10-PCS | Mod: 95,,, | Performed by: UROLOGY

## 2020-05-08 PROCEDURE — 1101F PR PT FALLS ASSESS DOC 0-1 FALLS W/OUT INJ PAST YR: ICD-10-PCS | Mod: CPTII,95,, | Performed by: UROLOGY

## 2020-05-08 NOTE — PROGRESS NOTES
[facility] Urology Progress Note (virtual/video visit)    Kulwinder Hogue Jr. is a 69 y.o. male w hx of BPH (and TURP with post turp meatal stenosis) who presents for evaluation of scrotal swelling    Hx pathologic BPH since prostate biopsy and cystoscopy 10/25/16 with Dr Mancia: 65.7cc gland and evidence of GUILLORY from lateral lobes with slight median lobe and grade 3 trabecs in bladder.    3T [facility] at [facility] 5/25/17: Prostate size 63 mL.  Intravesical protrusion of the prostate 10 mm. PIRAD 2 only, BPH, prostatitis. psa on 10/12/18 of 4.3 (21.16% free). Confirm mdx of biopsy negative   Cysto/TRUS 12/4/18: 91.5cm3 with sig trilobar obstruction and only slit-like opening at bladder neck from middle lobe obst   TURP 2/13/19 uncomplicated. 12.4g BPH. Clot retention on 2/15/19 requiring extensive efforts at manual irrigation and overnight observation with CBI. Passed fill and pull voiding trial 2/18/19     3/19/19: Hematuria week prior back on his aspirin and fish oil, and occasionally seeing blood at the beginning of his stream. He thinks he may snore in thinks he may need a sleep study. Also notes curvature with erections for about 1 year up 30°, and can get erections. PVR 0cc     4/15/19 passed a stone day after going to ER feeling like couldn't urinate, after cutting grass - 80% CaPhos, 10% CaOxDi. (? Prostatic calc), then went to ER 4/23 dribbling and feeling as though he had an obstruction in his urethra. Initial exam appeared normal and PVR <50cc. UA/Ucx negative though white urethral calculus/sediment over the distal penile urethra removed with sterile forceps and patient voided approximately 150 cc of clear yellow urine and felt significantly improved.    4/26/19: split streaming, though voiding better. No gross external meatal stenosis. Blue meatal dilator appreciated some fossa navicularis narrowing/meatal stenosis, passively dilated and unobstructed, feeling it breakthrough some scar tissue. Advised to use 2x/day x 3  weeks, daily x 2 weeks, 3 days per week x 2 weeks. Ucx negative. CT negative for  calcs except punctate prostatic calcifications  6/25/19 : AUA SS: 4/3 (2: nocturia, 1: frequency, urgency). Good stream no split/spray, good  Water intake, still NTF 1-2x good volumes, never pursued sleep study, active and cutting grass  12/30/19: AUA SS 3/2,  but not bothered by LUTS. Advised try dilator use again and fu routine    He returns today with concern of scrotal swelling  Has been present 1-2 months  Noticed it  Has become more uncomfortbale latelty  Wears tight briefs  Not uncomfortable, just noticed it  When calling in to office with this concern, scheduled scrotal US prior to appt  5/6/20 US scrotum - RIGHT: 3 simple-appearing subcentimeter intratesticular cysts, ranging from 0.3 cm to 0.8 cm.  A 0.3 cm intratesticular calcification is present. Hydrocele: Large.  Fluid collection measures approximately 6.2 x 3.3 x 4.1 cm.    psa 3.4 1/2020  Discussed sleep study with dr kramer after last appt 7/19 and after screening questions decided not needed no daytime sleepiness etc but started melatonin for better sleep  Sometimes has AM frequency many days once drinking water. Still has ntf x1.  Still has good strong steam without intermittency and no spraying or split streaming  No narrowing of meatus. No urgency. No dribbling. No hematuria dysuria            ROS: A comprehensive 10 system review was performed and is negative except as noted above in HPI    PHYSICAL EXAM:     There were no vitals filed for this visit. 2/2 virtual visit, but denies fever     General: Alert, cooperative, no distress, appears stated age   Head: Normocephalic, without obvious abnormality, atraumatic   Eyes: PERRL, conjunctiva/corneas clear   Lungs: Respirations visibly unlabored, no accessory muscle use   CV: appears Warm and well perfused   Extremities: Extremities normal, atraumatic, no cyanosis or edema   Skin: Skin color, texture, turgor  normal, no rashes or lesions   Psych: Appropriate   Neurologic: Non-focal        ASSESSMENT   1. Right hydrocele     2. Benign prostatic hyperplasia with incomplete bladder emptying     3. 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 Prostate cancer screening  PSA, Screening       Plan    Imaging reviewed, and reviewed with patient, denoting his large right hydrocele.  Of note, he also has a benign appearing testicular cysts.  We did discuss the natural history of hydrocele, noting it as a benign fluid collection around the testicle.  He is not extremely bothered by it not very symptomatic, just happened to notice it and wanted to make sure not a problem.  We did discuss management strategies such as conservative management with scrotal support, verses surgical management with hydrocelectomy.  Procedure in detail was described.  Inquired about drainage in advised this is not recommended as will reaccumulate and increases risk for infection and percutaneous complications.  Management options for hydrocele are symptomatic and conservative management versus surgical management.  Extensive risk benefit discussion about both options and answered questions but hydrocelectomy.  He is not too concerned or bothered by it at this time and would like to observe.  We did discuss scrotal support with type briefs, compression shorts, jockstrap, etc at all times.  If it is achy asymptomatic can use and stays in refrain from strenuous physical activity.    He is otherwise voiding well without complication after TURP and post TURP meatal stenosis which was responsive to passive dilation.  He did however have an elevated postvoid residual at his last office visit with our nurse practitioner, and would like to follow this closely.  As he is relatively asymptomatic, does not need any medical therapy at this time though would like him to return for a nurse visit in July for office based assessment of obstruction with uroflow  study and recheck of postvoid residual.  Will plan on his annual appointment in late December early January with a PSA prior to continue his PSA screening, but chart check the results of his intervening nurse visit to see if any earlier evaluation of further recommendations are necessary.    The patient location is: home [address]  The chief complaint leading to consultation is: scrotal swelling, BPH  Visit type: Virtual visit with synchronous audio and video  Time spent with patient: 30 mins, over half in counseling  Each patient to whom he or she provides medical services by telemedicine is:  (1) informed of the relationship between the physician and patient and the respective role of any other health care provider with respect to management of the patient; and (2) notified that he or she may decline to receive medical services by telemedicine and may withdraw from such care at any time

## 2020-05-21 ENCOUNTER — PATIENT OUTREACH (OUTPATIENT)
Dept: OTHER | Facility: OTHER | Age: 70
End: 2020-05-21

## 2020-05-21 NOTE — PROGRESS NOTES
Digital Medicine: Clinician Follow-Up    Patient reports slight dizziness with lower SBP of 100 mmHg. Reports symptoms are infrequent.    The history is provided by the patient.     Follow Up  Follow-up reason(s): reading review          INTERVENTION(S)  encouragement/support and denied questions    PLAN  patient verbalizes understanding and continue monitoring    BP at goal  Reviewed s/s of hypotension. Advised patient to contact me if he begins experiencing symptoms more frequently or feels unbalanced/increased fall risk.  Reviewed importance of adequate hydration.      There are no preventive care reminders to display for this patient.    Last 5 Patient Entered Readings                                      Current 30 Day Average: 122/79     Recent Readings 5/17/2020 5/16/2020 5/10/2020 5/9/2020 5/8/2020    SBP (mmHg) 140 127 125 128 109    DBP (mmHg) 83 84 74 81 79    Pulse 58 60 59 61 60             Hypertension Medications             amLODIPine (NORVASC) 10 MG tablet TAKE 1 TABLET BY MOUTH EVERY EVENING    carvediloL (COREG) 25 MG tablet Take 1 tablet (25 mg total) by mouth 2 (two) times daily with meals.    olmesartan (BENICAR) 40 MG tablet TAKE 1 TABLET BY MOUTH EVERY DAY                             Medication Adherence Screening     Patient knows purpose of medications.

## 2020-05-26 ENCOUNTER — PATIENT OUTREACH (OUTPATIENT)
Dept: OTHER | Facility: OTHER | Age: 70
End: 2020-05-26

## 2020-05-26 NOTE — PROGRESS NOTES
Digital Medicine: Health  Follow-Up      Follow Up  Follow-up reason(s): reading review and goal follow-up    Pt denies s/sx of hypertension with elevated BP on 5/25. He is also unsure what could have contributed to these elevated readings. He denies changes in dietary habits or physical activity and states that he used appropriate technique and was resting. He is not sure when he last charged his device and states that he placed it on the  after receiving the elevated readings.     INTERVENTION(S)  reviewed monitoring technique and encouragement/support    PLAN  continue monitoring    Instructed pt to charge device at least monthly. Encouraged pt to monitor BP daily or every other day for the next few weeks so that we can assess trend in readings. Also encouraged pt to continue to stay active and to work on limiting sodium intake.     There are no preventive care reminders to display for this patient.    Last 5 Patient Entered Readings                                      Current 30 Day Average: 126/81     Recent Readings 5/25/2020 5/25/2020 5/17/2020 5/16/2020 5/10/2020    SBP (mmHg) 169 156 140 127 125    DBP (mmHg) 95 96 83 84 74    Pulse 59 60 58 60 59

## 2020-06-25 ENCOUNTER — LAB VISIT (OUTPATIENT)
Dept: LAB | Facility: HOSPITAL | Age: 70
End: 2020-06-25
Attending: FAMILY MEDICINE
Payer: MEDICARE

## 2020-06-25 ENCOUNTER — OFFICE VISIT (OUTPATIENT)
Dept: FAMILY MEDICINE | Facility: CLINIC | Age: 70
End: 2020-06-25
Payer: MEDICARE

## 2020-06-25 VITALS
HEIGHT: 72 IN | RESPIRATION RATE: 16 BRPM | TEMPERATURE: 98 F | BODY MASS INDEX: 23.95 KG/M2 | DIASTOLIC BLOOD PRESSURE: 74 MMHG | WEIGHT: 176.81 LBS | OXYGEN SATURATION: 97 % | HEART RATE: 58 BPM | SYSTOLIC BLOOD PRESSURE: 116 MMHG

## 2020-06-25 DIAGNOSIS — I10 ESSENTIAL HYPERTENSION: ICD-10-CM

## 2020-06-25 DIAGNOSIS — R79.89 OTHER SPECIFIED ABNORMAL FINDINGS OF BLOOD CHEMISTRY: ICD-10-CM

## 2020-06-25 DIAGNOSIS — G47.9 SLEEP DISORDER WITH COGNITIVE COMPLAINTS: ICD-10-CM

## 2020-06-25 DIAGNOSIS — Z00.00 ANNUAL PHYSICAL EXAM: ICD-10-CM

## 2020-06-25 DIAGNOSIS — Z23 NEED FOR PNEUMOCOCCAL VACCINATION: ICD-10-CM

## 2020-06-25 DIAGNOSIS — E80.6 ACQUIRED HYPERBILIRUBINEMIA: ICD-10-CM

## 2020-06-25 DIAGNOSIS — R41.9 SLEEP DISORDER WITH COGNITIVE COMPLAINTS: ICD-10-CM

## 2020-06-25 DIAGNOSIS — Z00.00 ANNUAL PHYSICAL EXAM: Primary | ICD-10-CM

## 2020-06-25 LAB — SARS-COV-2 IGG SERPLBLD QL IA.RAPID: NEGATIVE

## 2020-06-25 PROCEDURE — 99999 PR PBB SHADOW E&M-EST. PATIENT-LVL V: CPT | Mod: PBBFAC,,, | Performed by: FAMILY MEDICINE

## 2020-06-25 PROCEDURE — 90732 PPSV23 VACC 2 YRS+ SUBQ/IM: CPT | Mod: S$GLB,,, | Performed by: FAMILY MEDICINE

## 2020-06-25 PROCEDURE — 3008F BODY MASS INDEX DOCD: CPT | Mod: CPTII,S$GLB,, | Performed by: FAMILY MEDICINE

## 2020-06-25 PROCEDURE — 1159F PR MEDICATION LIST DOCUMENTED IN MEDICAL RECORD: ICD-10-PCS | Mod: S$GLB,,, | Performed by: FAMILY MEDICINE

## 2020-06-25 PROCEDURE — 1126F AMNT PAIN NOTED NONE PRSNT: CPT | Mod: S$GLB,,, | Performed by: FAMILY MEDICINE

## 2020-06-25 PROCEDURE — G0009 ADMIN PNEUMOCOCCAL VACCINE: HCPCS | Mod: S$GLB,,, | Performed by: FAMILY MEDICINE

## 2020-06-25 PROCEDURE — 3078F PR MOST RECENT DIASTOLIC BLOOD PRESSURE < 80 MM HG: ICD-10-PCS | Mod: CPTII,S$GLB,, | Performed by: FAMILY MEDICINE

## 2020-06-25 PROCEDURE — 1101F PT FALLS ASSESS-DOCD LE1/YR: CPT | Mod: CPTII,S$GLB,, | Performed by: FAMILY MEDICINE

## 2020-06-25 PROCEDURE — G0009 PNEUMOCOCCAL POLYSACCHARIDE VACCINE 23-VALENT =>2YO SQ IM: ICD-10-PCS | Mod: S$GLB,,, | Performed by: FAMILY MEDICINE

## 2020-06-25 PROCEDURE — 1101F PR PT FALLS ASSESS DOC 0-1 FALLS W/OUT INJ PAST YR: ICD-10-PCS | Mod: CPTII,S$GLB,, | Performed by: FAMILY MEDICINE

## 2020-06-25 PROCEDURE — 99999 PR PBB SHADOW E&M-EST. PATIENT-LVL V: ICD-10-PCS | Mod: PBBFAC,,, | Performed by: FAMILY MEDICINE

## 2020-06-25 PROCEDURE — 99213 PR OFFICE/OUTPT VISIT, EST, LEVL III, 20-29 MIN: ICD-10-PCS | Mod: 25,S$GLB,, | Performed by: FAMILY MEDICINE

## 2020-06-25 PROCEDURE — 3078F DIAST BP <80 MM HG: CPT | Mod: CPTII,S$GLB,, | Performed by: FAMILY MEDICINE

## 2020-06-25 PROCEDURE — 1159F MED LIST DOCD IN RCRD: CPT | Mod: S$GLB,,, | Performed by: FAMILY MEDICINE

## 2020-06-25 PROCEDURE — 99213 OFFICE O/P EST LOW 20 MIN: CPT | Mod: 25,S$GLB,, | Performed by: FAMILY MEDICINE

## 2020-06-25 PROCEDURE — 86769 SARS-COV-2 COVID-19 ANTIBODY: CPT

## 2020-06-25 PROCEDURE — 3008F PR BODY MASS INDEX (BMI) DOCUMENTED: ICD-10-PCS | Mod: CPTII,S$GLB,, | Performed by: FAMILY MEDICINE

## 2020-06-25 PROCEDURE — 90732 PNEUMOCOCCAL POLYSACCHARIDE VACCINE 23-VALENT =>2YO SQ IM: ICD-10-PCS | Mod: S$GLB,,, | Performed by: FAMILY MEDICINE

## 2020-06-25 PROCEDURE — 3074F PR MOST RECENT SYSTOLIC BLOOD PRESSURE < 130 MM HG: ICD-10-PCS | Mod: CPTII,S$GLB,, | Performed by: FAMILY MEDICINE

## 2020-06-25 PROCEDURE — 1126F PR PAIN SEVERITY QUANTIFIED, NO PAIN PRESENT: ICD-10-PCS | Mod: S$GLB,,, | Performed by: FAMILY MEDICINE

## 2020-06-25 PROCEDURE — 36415 COLL VENOUS BLD VENIPUNCTURE: CPT | Mod: PO

## 2020-06-25 PROCEDURE — 3074F SYST BP LT 130 MM HG: CPT | Mod: CPTII,S$GLB,, | Performed by: FAMILY MEDICINE

## 2020-06-25 RX ORDER — MAGNESIUM OXIDE 500 MG
1 TABLET ORAL NIGHTLY
COMMUNITY

## 2020-06-25 RX ORDER — BACITRACIN 500 [USP'U]/G
OINTMENT TOPICAL 2 TIMES DAILY
Qty: 30 G | Refills: 1 | COMMUNITY
Start: 2020-06-25 | End: 2021-02-25

## 2020-06-25 NOTE — PROGRESS NOTES
2 patient identifiers used (name and ). Administered Pneumovax vaccine IM. Patient tolerated well, no bleeding at insertion site noted. Pain rated as 0 on scale 0/10. Aseptic technique maintained. Immunization information given to patient.

## 2020-06-25 NOTE — PATIENT INSTRUCTIONS

## 2020-07-15 ENCOUNTER — CLINICAL SUPPORT (OUTPATIENT)
Dept: UROLOGY | Facility: CLINIC | Age: 70
End: 2020-07-15
Payer: MEDICARE

## 2020-07-15 DIAGNOSIS — N40.1 BPH WITH OBSTRUCTION/LOWER URINARY TRACT SYMPTOMS: Primary | ICD-10-CM

## 2020-07-15 DIAGNOSIS — N13.8 BPH WITH OBSTRUCTION/LOWER URINARY TRACT SYMPTOMS: Primary | ICD-10-CM

## 2020-07-15 LAB
POC RESIDUAL URINE VOLUME: 259 ML (ref 0–100)
POC RESIDUAL URINE VOLUME: 87 ML (ref 0–100)

## 2020-07-15 PROCEDURE — 51798 POCT BLADDER SCAN: ICD-10-PCS | Mod: S$GLB,,, | Performed by: UROLOGY

## 2020-07-15 PROCEDURE — 51798 US URINE CAPACITY MEASURE: CPT | Mod: S$GLB,,, | Performed by: UROLOGY

## 2020-07-15 NOTE — PROGRESS NOTES
Uroflow results (date: 7/15/2020) on  :   Voiding time: 45.9s,   Flow time: 45.8s,   TTP flow: 5.1s,   Peak flowrate: 35.0 mL/s,   Average flowrate: 11.7mL/s,   Intervals: 1,    Voided volume: 537 mL,    Pvr by bladder scan: 259ml  2nd void- pvr 87ml.   Pattern of curve: to be determined by physician.

## 2020-07-22 NOTE — PROGRESS NOTES
Subjective:       Patient ID: Kulwinder Hogue Jr. is a 69 y.o. male.    Chief Complaint: Follow-up    Hypertension  This is a chronic problem. The current episode started more than 1 year ago. The problem has been resolved since onset. The problem is controlled. Pertinent negatives include no anxiety, blurred vision, chest pain, headaches, malaise/fatigue, neck pain, orthopnea, palpitations, PND or shortness of breath. There are no associated agents to hypertension. Risk factors for coronary artery disease include sedentary lifestyle, male gender, obesity and dyslipidemia.   Follow-up  This is a chronic (Hypertension) problem. The current episode started more than 1 year ago. The problem has been resolved. Pertinent negatives include no arthralgias, chest pain, headaches, joint swelling, neck pain, vomiting or weakness.   Recent BP logs are well controlled..  Review of Systems   Constitutional: Negative for activity change, malaise/fatigue and unexpected weight change.   HENT: Negative for hearing loss, rhinorrhea and trouble swallowing.    Eyes: Negative for blurred vision, discharge and visual disturbance.   Respiratory: Negative for chest tightness, shortness of breath and wheezing.    Cardiovascular: Negative for chest pain, palpitations, orthopnea and PND.   Gastrointestinal: Negative for blood in stool, constipation, diarrhea and vomiting.   Endocrine: Negative for polydipsia and polyuria.   Genitourinary: Negative for difficulty urinating, hematuria and urgency.   Musculoskeletal: Negative for arthralgias, joint swelling and neck pain.   Neurological: Negative for weakness and headaches.   Psychiatric/Behavioral: Negative for confusion and dysphoric mood.     Abnormal liver tests.  Most likely due to alcohol misuse.  Patient denies substance abuse, illegal drugs, no history of IV user, no traveling, no history of blood infusions.  Negative hepatitis screen.  Patient Active Problem List   Diagnosis     Hyperlipemia    HBP (high blood pressure)    Essential hypertension    Hyperlipidemia    Liposarcoma    Abnormal PSA    Cyclical neutropenia    Excessive cerumen in right ear canal    Hx of colonic polyps    BPH with obstruction/lower urinary tract symptoms    Chronic allergic rhinitis    Hematuria               Objective:      Physical Exam  Vitals signs reviewed.   Constitutional:       General: He is not in acute distress.     Appearance: He is well-developed. He is not diaphoretic.   HENT:      Head: Normocephalic and atraumatic.      Right Ear: External ear normal.      Left Ear: External ear normal.      Nose: Nose normal.      Mouth/Throat:      Pharynx: No oropharyngeal exudate.   Eyes:      General: No scleral icterus.        Right eye: No discharge.         Left eye: No discharge.      Conjunctiva/sclera: Conjunctivae normal.      Pupils: Pupils are equal, round, and reactive to light.   Neck:      Musculoskeletal: Normal range of motion and neck supple.      Thyroid: No thyromegaly.      Vascular: No JVD.      Trachea: No tracheal deviation.   Cardiovascular:      Rate and Rhythm: Normal rate.      Heart sounds: Normal heart sounds. No murmur.   Pulmonary:      Effort: Pulmonary effort is normal. No respiratory distress.      Breath sounds: Normal breath sounds. No wheezing or rales.   Abdominal:      General: Bowel sounds are normal. There is no distension.      Palpations: Abdomen is soft. There is no mass.      Tenderness: There is no abdominal tenderness. There is no guarding or rebound.   Musculoskeletal:         General: No tenderness.      Comments:        Lymphadenopathy:      Cervical: No cervical adenopathy.   Skin:     General: Skin is warm and dry.      Coloration: Skin is not pale.      Findings: No erythema or rash.   Neurological:      Mental Status: He is alert and oriented to person, place, and time.      Cranial Nerves: No cranial nerve deficit.      Coordination: Coordination  normal.   Psychiatric:         Behavior: Behavior normal.         Thought Content: Thought content normal.         Judgment: Judgment normal.         Lab Results   Component Value Date    WBC 4.22 11/18/2019    HGB 15.7 11/18/2019    HCT 46.7 11/18/2019     11/18/2019    CHOL 145 11/18/2019    TRIG 131 11/18/2019    HDL 52 11/18/2019    ALT 21 11/18/2019    AST 24 11/18/2019     11/18/2019    K 3.6 11/18/2019     11/18/2019    CREATININE 0.9 11/18/2019    BUN 14 11/18/2019    CO2 31 (H) 11/18/2019    TSH 2.438 11/18/2019    PSA 4.5 (H) 04/16/2016    INR 1.0 02/08/2019     The 10-year ASCVD risk score (Fabrizio LAWLER Jr., et al., 2013) is: 12.7%    Values used to calculate the score:      Age: 69 years      Sex: Male      Is Non- : No      Diabetic: No      Tobacco smoker: No      Systolic Blood Pressure: 116 mmHg      Is BP treated: Yes      HDL Cholesterol: 52 mg/dL      Total Cholesterol: 145 mg/dL    Assessment:       1. Annual physical exam    2. Need for pneumococcal vaccination    3. Sleep disorder with cognitive complaints    4. Essential hypertension    5. Acquired hyperbilirubinemia    6. Other specified abnormal findings of blood chemistry         Plan:       Annual physical exam  -     COVID-19 (SARS CoV-2) IgG Antibody; Future; Expected date: 06/25/2020    Need for pneumococcal vaccination  -     (In Office Administered) Pneumococcal Polysaccharide Vaccine (23 Valent) (SQ/IM)    Sleep disorder with cognitive complaints  -     Ambulatory referral/consult to Pulmonology; Future; Expected date: 07/02/2020    Essential hypertension  -     CBC auto differential; Future; Expected date: 06/25/2020    Acquired hyperbilirubinemia  -     Lipid Panel; Future; Expected date: 06/25/2020  -     Comprehensive metabolic panel; Future; Expected date: 06/25/2020    Other specified abnormal findings of blood chemistry   -     Lipid Panel; Future; Expected date: 06/25/2020    Other  orders  -     bacitracin 500 unit/gram ointment; Apply topically 2 (two) times daily.  Dispense: 30 g; Refill: 1    Symptom onset has been resolved for a time period of 4 week(s). Severity is described as mild. Course of his symptoms over time is resolved.    Patient readiness: acceptance and barriers:none    During the course of the visit the patient was educated and counseled about the following:     Hypertension:   Regular aerobic exercise.    Goals: Hypertension: Reduce Blood Pressure    Did patient meet goals/outcomes: Yes    The following self management tools provided: blood pressure log    Patient Instructions (the written plan) was given to the patient/family.     Time spent with patient: 30 minutes    Barriers to medications present (no )    Adverse reactions to current medications (no)    Over the counter medications reviewed (Yes)        30-35-minute visit. 20 minutes spent counseling patient on diet, exercise, and weight loss.  This has been fully explained to the patient, who indicates understanding.

## 2020-07-24 ENCOUNTER — PATIENT OUTREACH (OUTPATIENT)
Dept: OTHER | Facility: OTHER | Age: 70
End: 2020-07-24

## 2020-07-24 NOTE — PROGRESS NOTES
Digital Medicine: Health  Follow-Up    The history is provided by the patient.             Reason for review: Blood pressure not at goal        Topics Covered on Call: physical activity and Diet          Diet-no change to diet    No change to diet.  Patient reports eating or drinking the following: Pt states that he continues to work on limiting sodium intake by limiting processed foods and reading nutrition labels when he does eat processed food.    Additional diet details:    Physical Activity-no change to routine  No change to exercise routine.       Additional physical activity details: Pt states that he has been trying to walk regularly for exercise, but due to the weather recently he is not able to be as consistent as he would like.      Medication Adherence-Medication adherence was assessed.      Substance, Sleep, Stress-Not assessed    PLAN  Continue current diet/physical activity routine:    Patient verbalizes understanding. Patient did not express questions or concerns and patient has contact information if needed.    Explained the importance of self-monitoring and medication adherence. Encouraged the patient to communicate with their health  for lifestyle modifications to help improve or maintain a healthy lifestyle.        There are no preventive care reminders to display for this patient.    Last 5 Patient Entered Readings                                      Current 30 Day Average: 127/85     Recent Readings 7/18/2020 7/11/2020 7/4/2020 6/25/2020 6/20/2020    SBP (mmHg) 136 150 110 113 133    DBP (mmHg) 86 93 81 78 83    Pulse 59 54 58 72 61

## 2020-09-18 ENCOUNTER — PATIENT OUTREACH (OUTPATIENT)
Dept: OTHER | Facility: OTHER | Age: 70
End: 2020-09-18

## 2020-09-29 ENCOUNTER — TELEPHONE (OUTPATIENT)
Dept: UROLOGY | Facility: CLINIC | Age: 70
End: 2020-09-29

## 2020-09-29 NOTE — PROGRESS NOTES
Uroflow 7/15 reviewed as per parameters documented in nursing note    Pattern of curve reviewed and noted to be good bell shaped unobstructed voiding curve, though average is effected by some trailing off towards end of stream  Overall looks good without evidence of obstruction    He did void 537cc with initial pvr of 259cc which likely represents over-filling, as was then able to void a 2nd time with PVR of 87cc which is resonable  FU as planned and will recheck PVR at that time. Encourage double voiding in interim

## 2020-09-29 NOTE — TELEPHONE ENCOUNTER
Spoke w pt apologized for delay of review of nurse visit uroflow pt was given Dr Jain recommendation regarding voiding and no meds needed pt voiced ok and understanding will keep appt in dec informed will recheck voiding with bladder scan for dec appt.

## 2020-09-29 NOTE — TELEPHONE ENCOUNTER
Please apologize for delay in review of uroflow, and note he had unobstructed pattern.  Initial incomplete emptying likely due to overfilling as was able to empty well with 2nd void.  Agree no meds needed and can f/u as planned in December, though would advise double voiding (waiting a bit at end and urinating a 2nd time) if he has any signs of prolonged emptying/dribbling to empty/or stream trailing off at the end.  Will recheck PVR at his scheduled fu

## 2020-10-19 NOTE — PROGRESS NOTES
Digital Medicine: Health  Follow-Up    The history is provided by the patient.             Reason for review: Blood pressure not at goal        Topics Covered on Call: physical activity and Diet            Diet-no change to diet    No change to diet.  Patient reports eating or drinking the following: Patient states that he continues to work on limiting sodium intake.       Physical Activity-no change to routine  No change to exercise routine.       Additional physical activity details: Patient continues to struggle with exercising regularly. He states that he is on his feet a lot at work, but has not been walking regularly like he was before. He states that this is something that he knows he needs to do. He was not willing to set a specific goal today.      Medication Adherence-Medication Adherence not addressed.        Substance, Sleep, Stress-change  stress-not assessed  Details:  Intervention(s):    Sleep-assessed  Details:Patient states that he has not been sleeping well. He reports discussing this with his PCP who put in a referral for a sleep study, and also recommended melatonin and a nasal spray.   Intervention(s):    Alcohol -not assessed  Details:  Intervention(s):    Tobacco-Not Assessed  Details:  Intervention(s):    Additional details:Patient states that he has not made a sleep study appointment, but since he is not having any improvement from the melatonin and nasal spray, he is going to schedule it soon. .         Additional monitoring needed. Encouraged patient to look into sleep study and discussed the effects of poor sleep and untreated sleep apnea on BP.        Addressed patient questions and patient has my contact information if needed prior to next outreach. Patient verbalizes understanding.      Explained the importance of self-monitoring and medication adherence. Encouraged the patient to communicate with their health  for lifestyle modifications to help improve or maintain a healthy  lifestyle.               There are no preventive care reminders to display for this patient.      Last 5 Patient Entered Readings                                      Current 30 Day Average: 141/88     Recent Readings 10/18/2020 10/12/2020 10/12/2020 10/11/2020 10/11/2020    SBP (mmHg) 136 148 155 164 149    DBP (mmHg) 88 89 94 101 91    Pulse 70 62 63 66 69

## 2020-11-04 ENCOUNTER — PATIENT OUTREACH (OUTPATIENT)
Dept: OTHER | Facility: OTHER | Age: 70
End: 2020-11-04

## 2020-11-04 NOTE — PROGRESS NOTES
Digital Medicine: Clinician Follow-Up    The history is provided by the patient.   Follow-up reason(s): routine follow up.     Hypertension    Readings are trending down   Patient is not experiencing signs/symptoms of hypotension. Occasional dizziness/LH, no more frequent than previously reported  Patient is not experiencing signs/symptoms of hypertension.            Last 5 Patient Entered Readings                                      Current 30 Day Average: 135/87     Recent Readings 11/1/2020 10/29/2020 10/25/2020 10/18/2020 10/12/2020    SBP (mmHg) 126 114 120 136 148    DBP (mmHg) 78 77 77 88 89    Pulse 61 62 66 70 62                 Depression Screening  Did not address depression screening.    Sleep Apnea Screening    Did not address sleep apnea screening.     Medication Affordability Screening  Did not address medication affordability screening.     Medication Adherence-Medication adherence was assessed.          ASSESSMENT(S)  Patients BP average is 135/87 mmHg, which is above goal. Patient's BP goal is less than or equal to 130/80.     Recent readings at goal      Hypertension Plan  Continue current therapy.  Provided patient education. Reminded patient to take carvedilol with food. Encouraged him to check BP during dizziness/LH to help identify if BP running low.  CMP pending 12/4/20     Addressed patient questions and patient has my contact information if needed prior to next outreach. Patient verbalizes understanding.             There are no preventive care reminders to display for this patient.  There are no preventive care reminders to display for this patient.      Hypertension Medications             amLODIPine (NORVASC) 10 MG tablet TAKE 1 TABLET BY MOUTH EVERY EVENING    carvediloL (COREG) 25 MG tablet TAKE 1 TABLET(25 MG) BY MOUTH TWICE DAILY WITH MEALS    olmesartan (BENICAR) 40 MG tablet TAKE 1 TABLET BY MOUTH EVERY DAY

## 2020-11-13 DIAGNOSIS — I10 ESSENTIAL HYPERTENSION: ICD-10-CM

## 2020-11-13 DIAGNOSIS — E78.5 HYPERLIPIDEMIA, UNSPECIFIED HYPERLIPIDEMIA TYPE: Chronic | ICD-10-CM

## 2020-11-13 RX ORDER — PRAVASTATIN SODIUM 40 MG/1
40 TABLET ORAL DAILY
Qty: 90 TABLET | Refills: 11 | Status: SHIPPED | OUTPATIENT
Start: 2020-11-13 | End: 2022-02-10

## 2020-11-13 NOTE — TELEPHONE ENCOUNTER
Prescription refill request.   LOV: 06/25/2020  NOV: 12/28/2020  LDR: 10/24/2019  Last Labs: 01/11/2020

## 2020-12-04 ENCOUNTER — LAB VISIT (OUTPATIENT)
Dept: LAB | Facility: HOSPITAL | Age: 70
End: 2020-12-04
Attending: UROLOGY
Payer: MEDICARE

## 2020-12-04 DIAGNOSIS — Z12.5 PROSTATE CANCER SCREENING: ICD-10-CM

## 2020-12-04 DIAGNOSIS — E80.6 ACQUIRED HYPERBILIRUBINEMIA: ICD-10-CM

## 2020-12-04 DIAGNOSIS — I10 ESSENTIAL HYPERTENSION: ICD-10-CM

## 2020-12-04 DIAGNOSIS — R79.89 OTHER SPECIFIED ABNORMAL FINDINGS OF BLOOD CHEMISTRY: ICD-10-CM

## 2020-12-04 LAB
ALBUMIN SERPL BCP-MCNC: 3.7 G/DL (ref 3.5–5.2)
ALP SERPL-CCNC: 71 U/L (ref 55–135)
ALT SERPL W/O P-5'-P-CCNC: 21 U/L (ref 10–44)
ANION GAP SERPL CALC-SCNC: 9 MMOL/L (ref 8–16)
AST SERPL-CCNC: 23 U/L (ref 10–40)
BASOPHILS # BLD AUTO: 0.03 K/UL (ref 0–0.2)
BASOPHILS NFR BLD: 0.6 % (ref 0–1.9)
BILIRUB SERPL-MCNC: 2.1 MG/DL (ref 0.1–1)
BUN SERPL-MCNC: 10 MG/DL (ref 8–23)
CALCIUM SERPL-MCNC: 8.4 MG/DL (ref 8.7–10.5)
CHLORIDE SERPL-SCNC: 104 MMOL/L (ref 95–110)
CHOLEST SERPL-MCNC: 138 MG/DL (ref 120–199)
CHOLEST/HDLC SERPL: 2.5 {RATIO} (ref 2–5)
CO2 SERPL-SCNC: 30 MMOL/L (ref 23–29)
COMPLEXED PSA SERPL-MCNC: 2.6 NG/ML (ref 0–4)
CREAT SERPL-MCNC: 0.9 MG/DL (ref 0.5–1.4)
DIFFERENTIAL METHOD: ABNORMAL
EOSINOPHIL # BLD AUTO: 0.2 K/UL (ref 0–0.5)
EOSINOPHIL NFR BLD: 3.6 % (ref 0–8)
ERYTHROCYTE [DISTWIDTH] IN BLOOD BY AUTOMATED COUNT: 12.1 % (ref 11.5–14.5)
EST. GFR  (AFRICAN AMERICAN): >60 ML/MIN/1.73 M^2
EST. GFR  (NON AFRICAN AMERICAN): >60 ML/MIN/1.73 M^2
GLUCOSE SERPL-MCNC: 94 MG/DL (ref 70–110)
HCT VFR BLD AUTO: 43.7 % (ref 40–54)
HDLC SERPL-MCNC: 56 MG/DL (ref 40–75)
HDLC SERPL: 40.6 % (ref 20–50)
HGB BLD-MCNC: 15.2 G/DL (ref 14–18)
IMM GRANULOCYTES # BLD AUTO: 0.01 K/UL (ref 0–0.04)
IMM GRANULOCYTES NFR BLD AUTO: 0.2 % (ref 0–0.5)
LDLC SERPL CALC-MCNC: 73.2 MG/DL (ref 63–159)
LYMPHOCYTES # BLD AUTO: 1.7 K/UL (ref 1–4.8)
LYMPHOCYTES NFR BLD: 34.5 % (ref 18–48)
MCH RBC QN AUTO: 33.2 PG (ref 27–31)
MCHC RBC AUTO-ENTMCNC: 34.8 G/DL (ref 32–36)
MCV RBC AUTO: 95 FL (ref 82–98)
MONOCYTES # BLD AUTO: 0.5 K/UL (ref 0.3–1)
MONOCYTES NFR BLD: 9.3 % (ref 4–15)
NEUTROPHILS # BLD AUTO: 2.6 K/UL (ref 1.8–7.7)
NEUTROPHILS NFR BLD: 51.8 % (ref 38–73)
NONHDLC SERPL-MCNC: 82 MG/DL
NRBC BLD-RTO: 0 /100 WBC
PLATELET # BLD AUTO: 226 K/UL (ref 150–350)
PMV BLD AUTO: 8.5 FL (ref 9.2–12.9)
POTASSIUM SERPL-SCNC: 3.8 MMOL/L (ref 3.5–5.1)
PROT SERPL-MCNC: 6.7 G/DL (ref 6–8.4)
RBC # BLD AUTO: 4.58 M/UL (ref 4.6–6.2)
SODIUM SERPL-SCNC: 143 MMOL/L (ref 136–145)
TRIGL SERPL-MCNC: 44 MG/DL (ref 30–150)
WBC # BLD AUTO: 5.04 K/UL (ref 3.9–12.7)

## 2020-12-04 PROCEDURE — 80061 LIPID PANEL: CPT

## 2020-12-04 PROCEDURE — 84153 ASSAY OF PSA TOTAL: CPT

## 2020-12-04 PROCEDURE — 36415 COLL VENOUS BLD VENIPUNCTURE: CPT

## 2020-12-04 PROCEDURE — 85025 COMPLETE CBC W/AUTO DIFF WBC: CPT

## 2020-12-04 PROCEDURE — 80053 COMPREHEN METABOLIC PANEL: CPT

## 2020-12-11 ENCOUNTER — OFFICE VISIT (OUTPATIENT)
Dept: UROLOGY | Facility: CLINIC | Age: 70
End: 2020-12-11
Payer: MEDICARE

## 2020-12-11 VITALS
WEIGHT: 176.56 LBS | SYSTOLIC BLOOD PRESSURE: 128 MMHG | HEART RATE: 64 BPM | DIASTOLIC BLOOD PRESSURE: 82 MMHG | HEIGHT: 72 IN | BODY MASS INDEX: 23.92 KG/M2

## 2020-12-11 DIAGNOSIS — N40.0 BPH WITHOUT OBSTRUCTION/LOWER URINARY TRACT SYMPTOMS: Primary | ICD-10-CM

## 2020-12-11 DIAGNOSIS — N48.6 PEYRONIE'S DISEASE: ICD-10-CM

## 2020-12-11 DIAGNOSIS — Z12.5 PROSTATE CANCER SCREENING: ICD-10-CM

## 2020-12-11 DIAGNOSIS — N43.3 RIGHT HYDROCELE: ICD-10-CM

## 2020-12-11 LAB
BILIRUB SERPL-MCNC: ABNORMAL MG/DL
BLOOD URINE, POC: ABNORMAL
CLARITY, POC UA: CLEAR
COLOR, POC UA: YELLOW
GLUCOSE UR QL STRIP: ABNORMAL
KETONES UR QL STRIP: ABNORMAL
LEUKOCYTE ESTERASE URINE, POC: ABNORMAL
NITRITE, POC UA: ABNORMAL
PH, POC UA: 5.5
PROTEIN, POC: ABNORMAL
SPECIFIC GRAVITY, POC UA: 1.03
UROBILINOGEN, POC UA: ABNORMAL

## 2020-12-11 PROCEDURE — 81002 URINALYSIS NONAUTO W/O SCOPE: CPT | Mod: S$GLB,,, | Performed by: UROLOGY

## 2020-12-11 PROCEDURE — 3079F DIAST BP 80-89 MM HG: CPT | Mod: CPTII,S$GLB,, | Performed by: UROLOGY

## 2020-12-11 PROCEDURE — 99999 PR PBB SHADOW E&M-EST. PATIENT-LVL IV: ICD-10-PCS | Mod: PBBFAC,,, | Performed by: UROLOGY

## 2020-12-11 PROCEDURE — 99214 OFFICE O/P EST MOD 30 MIN: CPT | Mod: 25,S$GLB,, | Performed by: UROLOGY

## 2020-12-11 PROCEDURE — 99999 PR PBB SHADOW E&M-EST. PATIENT-LVL IV: CPT | Mod: PBBFAC,,, | Performed by: UROLOGY

## 2020-12-11 PROCEDURE — 3079F PR MOST RECENT DIASTOLIC BLOOD PRESSURE 80-89 MM HG: ICD-10-PCS | Mod: CPTII,S$GLB,, | Performed by: UROLOGY

## 2020-12-11 PROCEDURE — 3074F SYST BP LT 130 MM HG: CPT | Mod: CPTII,S$GLB,, | Performed by: UROLOGY

## 2020-12-11 PROCEDURE — 3008F BODY MASS INDEX DOCD: CPT | Mod: CPTII,S$GLB,, | Performed by: UROLOGY

## 2020-12-11 PROCEDURE — 3074F PR MOST RECENT SYSTOLIC BLOOD PRESSURE < 130 MM HG: ICD-10-PCS | Mod: CPTII,S$GLB,, | Performed by: UROLOGY

## 2020-12-11 PROCEDURE — 3008F PR BODY MASS INDEX (BMI) DOCUMENTED: ICD-10-PCS | Mod: CPTII,S$GLB,, | Performed by: UROLOGY

## 2020-12-11 PROCEDURE — 1159F PR MEDICATION LIST DOCUMENTED IN MEDICAL RECORD: ICD-10-PCS | Mod: S$GLB,,, | Performed by: UROLOGY

## 2020-12-11 PROCEDURE — 81002 POCT URINE DIPSTICK WITHOUT MICROSCOPE: ICD-10-PCS | Mod: S$GLB,,, | Performed by: UROLOGY

## 2020-12-11 PROCEDURE — 1159F MED LIST DOCD IN RCRD: CPT | Mod: S$GLB,,, | Performed by: UROLOGY

## 2020-12-11 PROCEDURE — 99214 PR OFFICE/OUTPT VISIT, EST, LEVL IV, 30-39 MIN: ICD-10-PCS | Mod: 25,S$GLB,, | Performed by: UROLOGY

## 2020-12-11 RX ORDER — SILDENAFIL 100 MG/1
100 TABLET, FILM COATED ORAL
Qty: 6 TABLET | Refills: 6 | Status: SHIPPED | OUTPATIENT
Start: 2020-12-11 | End: 2022-06-02

## 2020-12-11 NOTE — PATIENT INSTRUCTIONS
25, 50, or 100mg (1/4, 1/2, or whole tab)  We did also discussed proper use of oral medications such as using them 30-45 minutes in advance of an anticipated sexual encounter, and not using them within 30-60 minutes of a meal on either and as they are better observed on an empty stomach.        Treating Peyronie Disease    Peyronie disease occurs when the penis curves during an erection. This is most often due to a plaque (scar) that forms inside the penis. In some men, the plaque shrinks and disappears on its own, without treatment. If treatment is needed, the main goal is to relieve pain and make the penis straight enough for sex. Pain is caused by an erection and subsequent bending of the penis. Peyronie disease is not contagious or caused by any transmittable disease. There are different kinds of treatment. The success of these different treatments varies from man to man.  Medicine  Medicine is usually tried for 1 to 2 years before other treatments are done. For some men, the disease will go away during this time. Medicine may help reduce pain. It may also help soften and shrink the plaque in the penis. Some may be injected into the plaque. Medicines that treat erectile dysfunction may help with some of the problems of Peyronie disease. But they will not treat the curvature or pain. Your doctor will discuss all your options and possible side effects with you.  Surgery  Surgery is used in cases that cant be treated by other means. It may also be done for a severe curve in the penis. It may also be done for severe pain that does not stop. Options for surgery include:  · An incision in the plaque to release tension. Part of the plaque is removed and replaced with a graft.  · Making the penis shorter. This is done on the opposite side of the plaque. It can cancel out the curve.  · Implanting of a device (prosthesis). This can straighten the penis and make it rigid enough for sex.  Your doctor can discuss the risks  and benefits of these treatments with you. Be sure to ask questions. Consider all of your options before you choose surgery.  Peyronie disease is hard to cure. Counseling may help you cope with the effects of the disease. It may help you and your partner find ways to deal with it.   Date Last Reviewed: 12/1/2016  © 4891-6326 [employer]. [address] [place] [address], [place] [address]. All rights reserved. This information is not intended as a substitute for professional medical care. Always follow your healthcare professional's instructions.

## 2020-12-11 NOTE — PROGRESS NOTES
[facility] Urology Progress Note    Kulwinder Hogue Jr. is a 70 y.o. male who presents for follow up of BPH and R hydrocele     Hx pathologic BPH since prostate biopsy and cystoscopy 10/25/16 with Dr Mancia: 65.7cc gland and evidence of GUILLORY from lateral lobes with slight median lobe and grade 3 trabecs in bladder.  3T MRI at [facility] 5/25/17: Prostate size 63 mL.  Intravesical protrusion of the prostate 10 mm. PIRAD 2 only, BPH, prostatitis. psa on 10/12/18 of 4.3 (21.16% free). Confirm mdx of biopsy negative     Cysto/TRUS 12/4/18: 91.5cm3 with sig trilobar obstruction and only slit-like opening at bladder neck from middle lobe obst   TURP 2/13/19 uncomplicated. 12.4g BPH. Clot retention on 2/15/19 requiring extensive efforts at manual irrigation and overnight observation with CBI. Passed fill and pull voiding trial 2/18/19     3/19/19: Hematuria week prior back on his aspirin and fish oil, and occasionally seeing blood at the beginning of his stream. He thinks he may snore in thinks he may need a sleep study. Also notes curvature with erections for about 1 year up 30°, and can get erections. PVR 0cc     4/15/19 passed a stone day after going to ER feeling like couldn't urinate, after cutting grass - 80% CaPhos, 10% CaOxDi. (? Prostatic calc), then went to ER 4/23 dribbling and feeling as though he had an obstruction in his urethra. Initial exam appeared normal and PVR <50cc. UA/Ucx negative though white urethral calculus/sediment over the distal penile urethra removed with sterile forceps and patient voided approximately 150 cc of clear yellow urine and felt significantly improved.    4/26/19: split streaming, though voiding better. No gross external meatal stenosis. Blue meatal dilator appreciated some fossa navicularis narrowing/meatal stenosis, passively dilated and unobstructed, feeling it breakthrough some scar tissue. Advised to use 2x/day x 3 weeks, daily x 2 weeks, 3 days per week x 2 weeks. Ucx negative. CT  negative for  calcs except punctate prostatic calcifications  6/25/19 : AUA SS: 4/3 (2: nocturia, 1: frequency, urgency). Good stream no split/spray, good  Water intake, still NTF 1-2x good volumes, never pursued sleep study, active and cutting grass  12/30/19: AUA SS 3/2,  but not bothered by LUTS. Advised try dilator use again and fu routine     5/8/20 concern of scrotal swelling, present 1-2 months, Has become more uncomfortbale latelty. Wears tight briefs. Not uncomfortable, just noticed it  When calling in to office with this concern, scheduled scrotal US prior to appt  5/6/20 US scrotum - RIGHT: 3 simple-appearing subcentimeter intratesticular cysts, ranging from 0.3 cm to 0.8 cm.  A 0.3 cm intratesticular calcification is present. Hydrocele: Large.  Fluid collection measures approximately 6.2 x 3.3 x 4.1 cm.  psa 3.4 1/2020.   Discussed sleep study with dr kramer after last appt 7/19 and after screening questions decided not needed no daytime sleepiness etc but started melatonin for better sleep  Sometimes has AM frequency many days once drinking water. Still has ntf x1. Still has good strong steam without intermittency and no spraying or split streaming  No narrowing of meatus. No urgency. No dribbling. No hematuria dysuria.    Uroflow 7/15/20: Pattern of curve reviewed and noted to be good bell shaped unobstructed voiding curve, though average is effected by some trailing off towards end of stream. He did void 537cc with initial pvr of 259cc which likely represents over-filling, as was then able to void a 2nd time with PVR of 87cc which is resonable. Qm 35cc/s, Qa 11.7cc/s.    He returns today noting  psa 2.6 on 12/4/20  AUA SS 6/3 (2: nocturia; 1: frequency, urgency, weak stream, straining)  Some days drinks water constantly and doesn't get the urge to go  But when does urinate, is good stream  Other days is normal  pvr 42cc  Erection curves up a bit. Estimates 45 degree dorsal curvature on his  own  Some trouble getting and or keeping erection  No distinct libido issues, but covid fatigue affecting that a bit  Considering sleep study. Never did it  5mg melatonin  May get up 1-2x      ROS: A comprehensive 10 system review was performed and is negative except as noted above in HPI    PHYSICAL EXAM:    Vitals:    12/11/20 0836   BP: 128/82   Pulse: 64     Body mass index is 23.95 kg/m². Weight: 80.1 kg (176 lb 9.4 oz) Height: 6' (182.9 cm)       General: Alert, cooperative, no distress, appears stated age   Head: Normocephalic, without obvious abnormality, atraumatic   Eyes: PERRL, conjunctiva/corneas clear   Lungs: Respirations unlabored   Heart: Warm and well perfused   Abdomen: Soft NT ND   : no visble meatal stenosis, mild R hydrocele, dorsal plaque  Extremities: Extremities normal, atraumatic, no cyanosis or edema   Skin: Skin color, texture, turgor normal, no rashes or lesions   Psych: Appropriate   Neurologic: Non-focal       Recent Results (from the past 336 hour(s))   PSA, Screening    Collection Time: 12/04/20  8:05 AM   Result Value Ref Range    PSA, Screen 2.6 0.00 - 4.00 ng/mL   Lipid Panel    Collection Time: 12/04/20  8:05 AM   Result Value Ref Range    Cholesterol 138 120 - 199 mg/dL    Triglycerides 44 30 - 150 mg/dL    HDL 56 40 - 75 mg/dL    LDL Cholesterol 73.2 63.0 - 159.0 mg/dL    HDL/Cholesterol Ratio 40.6 20.0 - 50.0 %    Total Cholesterol/HDL Ratio 2.5 2.0 - 5.0    Non-HDL Cholesterol 82 mg/dL   Comprehensive metabolic panel    Collection Time: 12/04/20  8:05 AM   Result Value Ref Range    Sodium 143 136 - 145 mmol/L    Potassium 3.8 3.5 - 5.1 mmol/L    Chloride 104 95 - 110 mmol/L    CO2 30 (H) 23 - 29 mmol/L    Glucose 94 70 - 110 mg/dL    BUN 10 8 - 23 mg/dL    Creatinine 0.9 0.5 - 1.4 mg/dL    Calcium 8.4 (L) 8.7 - 10.5 mg/dL    Total Protein 6.7 6.0 - 8.4 g/dL    Albumin 3.7 3.5 - 5.2 g/dL    Total Bilirubin 2.1 (H) 0.1 - 1.0 mg/dL    Alkaline Phosphatase 71 55 - 135 U/L    AST  23 10 - 40 U/L    ALT 21 10 - 44 U/L    Anion Gap 9 8 - 16 mmol/L    eGFR if African American >60 >60 mL/min/1.73 m^2    eGFR if non African American >60 >60 mL/min/1.73 m^2   CBC auto differential    Collection Time: 12/04/20  8:05 AM   Result Value Ref Range    WBC 5.04 3.90 - 12.70 K/uL    RBC 4.58 (L) 4.60 - 6.20 M/uL    Hemoglobin 15.2 14.0 - 18.0 g/dL    Hematocrit 43.7 40.0 - 54.0 %    MCV 95 82 - 98 fL    MCH 33.2 (H) 27.0 - 31.0 pg    MCHC 34.8 32.0 - 36.0 g/dL    RDW 12.1 11.5 - 14.5 %    Platelets 226 150 - 350 K/uL    MPV 8.5 (L) 9.2 - 12.9 fL    Immature Granulocytes 0.2 0.0 - 0.5 %    Gran # (ANC) 2.6 1.8 - 7.7 K/uL    Immature Grans (Abs) 0.01 0.00 - 0.04 K/uL    Lymph # 1.7 1.0 - 4.8 K/uL    Mono # 0.5 0.3 - 1.0 K/uL    Eos # 0.2 0.0 - 0.5 K/uL    Baso # 0.03 0.00 - 0.20 K/uL    nRBC 0 0 /100 WBC    Gran % 51.8 38.0 - 73.0 %    Lymph % 34.5 18.0 - 48.0 %    Mono % 9.3 4.0 - 15.0 %    Eosinophil % 3.6 0.0 - 8.0 %    Basophil % 0.6 0.0 - 1.9 %    Differential Method Automated    POCT URINE DIPSTICK WITHOUT MICROSCOPE    Collection Time: 12/11/20  8:49 AM   Result Value Ref Range    Color, UA Yellow     pH, UA 5.5     WBC, UA n     Nitrite, UA n     Protein n     Glucose, UA n     Ketones, UA n     Urobilinogen, UA n     Bilirubin n     Blood, UA trace     Clarity, UA Clear     Spec Grav UA 1.030        ASSESSMENT   1. BPH without obstruction/lower urinary tract symptoms  POCT URINE DIPSTICK WITHOUT MICROSCOPE   2. Right hydrocele  POCT URINE DIPSTICK WITHOUT MICROSCOPE   3. Peyronie's disease     4. Prostate cancer screening         Plan    Voiding and emptying well without significant LUTS without medication, and no recurrence of meatal stenosis.  Hydrocele minimal.  Again discussed observation versus intervention and risks of the latter.  Will observe at this time, non bothersome, minimal on exam.  Did again review concern for Peyronie's disease, with dorsal plaque on exam an upward curvature present  for at least 2 years.  Reviewed active in stable phase as well as treatment options.  He previously had no impact on erections, but recently has some erectile changes in his not tried oral PD 5 inhibitors.  Viagra prescribed at this time and discussed on demand use an appropriate dosing instructions which were provided in writing.  Will consult with Dr. Cunningham in 2021 about management of Peyronie's disease and curvature.  Try oral medications for erections in the interim.  RTC 1 year with PSA prior for annual prostate cancer screening evaluation of LUTS.  * of note, was unable to order PSA in system for next year as he had 1 done this month      25 mins spent in encounter, over half in counseling.  All questions patient had were answered in detail and he is agreeable to treatment plan.

## 2020-12-14 ENCOUNTER — PATIENT OUTREACH (OUTPATIENT)
Dept: ADMINISTRATIVE | Facility: HOSPITAL | Age: 70
End: 2020-12-14

## 2020-12-18 ENCOUNTER — PATIENT OUTREACH (OUTPATIENT)
Dept: OTHER | Facility: OTHER | Age: 70
End: 2020-12-18

## 2020-12-18 NOTE — PROGRESS NOTES
"Digital Medicine: Health  Follow-Up    The history is provided by the patient.             Reason for review: Blood pressure not at goal        Topics Covered on Call: physical activity and Diet            Diet-no change to diet    No change to diet.  Patient reports eating or drinking the following: Patient eats out for lunch daily, but states that he has not eaten out in "a while". He usually eats oatmeal or a bagel for breakfast and will either skip dinner or have something prepared at home. He does not cook with salt.       Physical Activity-no change to routine  No change to exercise routine.       Additional physical activity details: Patient states that he has not started walking for exercise. He states that he does not like to exercise in cold weather. He states that the majority of his exercise comes from house and yard work.       Medication Adherence-Medication Adherence not addressed.        Substance, Sleep, Stress-No change  stress-not assessed  Details:  Intervention(s):    Sleep-assessed  Details:Patient states that he continues to wake up at least 2 times per night. He has not followed up with his sleep study referral, but states that he has an appointment with his PCP next week and he plans to discuss this.   Intervention(s):    Alcohol -not assessed  Details:  Intervention(s):    Tobacco-Not Assessed  Details:  Intervention(s):          Additional monitoring needed. Discussed tips for reducing sodium intake when eating out. Encouraged patient to ask for foods to be prepared without salt. Also dicsussed the benefits of a consistent exercise routine on BP readings.        Addressed patient questions and patient has my contact information if needed prior to next outreach. Patient verbalizes understanding.      Explained the importance of self-monitoring and medication adherence. Encouraged the patient to communicate with their health  for lifestyle modifications to help improve or maintain " a healthy lifestyle.               There are no preventive care reminders to display for this patient.      Last 5 Patient Entered Readings                                      Current 30 Day Average: 133/85     Recent Readings 12/12/2020 12/9/2020 12/6/2020 11/28/2020 11/21/2020    SBP (mmHg) 113 148 152 113 138    DBP (mmHg) 76 90 91 78 88    Pulse 63 61 69 59 59

## 2020-12-28 ENCOUNTER — OFFICE VISIT (OUTPATIENT)
Dept: FAMILY MEDICINE | Facility: CLINIC | Age: 70
End: 2020-12-28
Payer: MEDICARE

## 2020-12-28 VITALS
HEIGHT: 72 IN | WEIGHT: 177.69 LBS | BODY MASS INDEX: 24.07 KG/M2 | HEART RATE: 61 BPM | RESPIRATION RATE: 16 BRPM | SYSTOLIC BLOOD PRESSURE: 120 MMHG | DIASTOLIC BLOOD PRESSURE: 78 MMHG | OXYGEN SATURATION: 98 % | TEMPERATURE: 98 F

## 2020-12-28 DIAGNOSIS — Z23 NEED FOR ZOSTER VACCINE: ICD-10-CM

## 2020-12-28 DIAGNOSIS — I10 ESSENTIAL HYPERTENSION: Primary | ICD-10-CM

## 2020-12-28 DIAGNOSIS — D70.4 CYCLICAL NEUTROPENIA: ICD-10-CM

## 2020-12-28 DIAGNOSIS — C49.9 LIPOSARCOMA: ICD-10-CM

## 2020-12-28 DIAGNOSIS — H61.23 EXCESSIVE CERUMEN IN BOTH EAR CANALS: ICD-10-CM

## 2020-12-28 PROCEDURE — 99999 PR PBB SHADOW E&M-EST. PATIENT-LVL V: ICD-10-PCS | Mod: PBBFAC,,, | Performed by: FAMILY MEDICINE

## 2020-12-28 PROCEDURE — 1101F PR PT FALLS ASSESS DOC 0-1 FALLS W/OUT INJ PAST YR: ICD-10-PCS | Mod: CPTII,S$GLB,, | Performed by: FAMILY MEDICINE

## 2020-12-28 PROCEDURE — 99213 PR OFFICE/OUTPT VISIT, EST, LEVL III, 20-29 MIN: ICD-10-PCS | Mod: S$GLB,,, | Performed by: FAMILY MEDICINE

## 2020-12-28 PROCEDURE — 3078F PR MOST RECENT DIASTOLIC BLOOD PRESSURE < 80 MM HG: ICD-10-PCS | Mod: CPTII,S$GLB,, | Performed by: FAMILY MEDICINE

## 2020-12-28 PROCEDURE — 3074F PR MOST RECENT SYSTOLIC BLOOD PRESSURE < 130 MM HG: ICD-10-PCS | Mod: CPTII,S$GLB,, | Performed by: FAMILY MEDICINE

## 2020-12-28 PROCEDURE — 99999 PR PBB SHADOW E&M-EST. PATIENT-LVL V: CPT | Mod: PBBFAC,,, | Performed by: FAMILY MEDICINE

## 2020-12-28 PROCEDURE — 1126F PR PAIN SEVERITY QUANTIFIED, NO PAIN PRESENT: ICD-10-PCS | Mod: S$GLB,,, | Performed by: FAMILY MEDICINE

## 2020-12-28 PROCEDURE — 99213 OFFICE O/P EST LOW 20 MIN: CPT | Mod: S$GLB,,, | Performed by: FAMILY MEDICINE

## 2020-12-28 PROCEDURE — 1159F PR MEDICATION LIST DOCUMENTED IN MEDICAL RECORD: ICD-10-PCS | Mod: S$GLB,,, | Performed by: FAMILY MEDICINE

## 2020-12-28 PROCEDURE — 1101F PT FALLS ASSESS-DOCD LE1/YR: CPT | Mod: CPTII,S$GLB,, | Performed by: FAMILY MEDICINE

## 2020-12-28 PROCEDURE — 3078F DIAST BP <80 MM HG: CPT | Mod: CPTII,S$GLB,, | Performed by: FAMILY MEDICINE

## 2020-12-28 PROCEDURE — 3288F PR FALLS RISK ASSESSMENT DOCUMENTED: ICD-10-PCS | Mod: CPTII,S$GLB,, | Performed by: FAMILY MEDICINE

## 2020-12-28 PROCEDURE — 1126F AMNT PAIN NOTED NONE PRSNT: CPT | Mod: S$GLB,,, | Performed by: FAMILY MEDICINE

## 2020-12-28 PROCEDURE — 3008F PR BODY MASS INDEX (BMI) DOCUMENTED: ICD-10-PCS | Mod: CPTII,S$GLB,, | Performed by: FAMILY MEDICINE

## 2020-12-28 PROCEDURE — 1159F MED LIST DOCD IN RCRD: CPT | Mod: S$GLB,,, | Performed by: FAMILY MEDICINE

## 2020-12-28 PROCEDURE — 3074F SYST BP LT 130 MM HG: CPT | Mod: CPTII,S$GLB,, | Performed by: FAMILY MEDICINE

## 2020-12-28 PROCEDURE — 3008F BODY MASS INDEX DOCD: CPT | Mod: CPTII,S$GLB,, | Performed by: FAMILY MEDICINE

## 2020-12-28 PROCEDURE — 3288F FALL RISK ASSESSMENT DOCD: CPT | Mod: CPTII,S$GLB,, | Performed by: FAMILY MEDICINE

## 2020-12-28 RX ORDER — ZOSTER VACCINE RECOMBINANT, ADJUVANTED 50 MCG/0.5
0.5 KIT INTRAMUSCULAR ONCE
Qty: 1 EACH | Refills: 1 | Status: SHIPPED | OUTPATIENT
Start: 2020-12-28 | End: 2020-12-28

## 2020-12-28 NOTE — PATIENT INSTRUCTIONS

## 2020-12-30 ENCOUNTER — TELEPHONE (OUTPATIENT)
Dept: FAMILY MEDICINE | Facility: CLINIC | Age: 70
End: 2020-12-30

## 2020-12-30 DIAGNOSIS — H61.23 EXCESSIVE CERUMEN IN BOTH EAR CANALS: Primary | ICD-10-CM

## 2020-12-30 RX ORDER — DOCUSATE SODIUM 50 MG/5ML
LIQUID ORAL
Qty: 10 ML | Refills: 0 | Status: SHIPPED | OUTPATIENT
Start: 2020-12-30 | End: 2021-02-25

## 2020-12-30 NOTE — PROGRESS NOTES
Subjective:       Patient ID: Kulwinder Hogue Jr. is a 70 y.o. male.    Chief Complaint: Annual Exam    Hypertension  This is a chronic problem. The current episode started more than 1 year ago. The problem has been resolved since onset. The problem is controlled. Associated symptoms include anxiety, malaise/fatigue and neck pain. Pertinent negatives include no blurred vision, chest pain, headaches, orthopnea, palpitations, peripheral edema, PND, shortness of breath or sweats. There are no associated agents to hypertension. Risk factors for coronary artery disease include male gender and stress. Past treatments include angiotensin blockers and beta blockers. The current treatment provides significant improvement. There are no compliance problems.      Review of Systems   Constitutional: Positive for malaise/fatigue. Negative for fatigue and unexpected weight change.   HENT: Positive for hearing loss.    Eyes: Negative for blurred vision.   Respiratory: Negative for chest tightness and shortness of breath.    Cardiovascular: Negative for chest pain, palpitations, orthopnea, leg swelling and PND.   Gastrointestinal: Negative for abdominal pain.   Musculoskeletal: Positive for neck pain. Negative for arthralgias.   Neurological: Negative for dizziness, syncope, light-headedness and headaches.       Patient Active Problem List   Diagnosis    Hyperlipemia    HBP (high blood pressure)    Essential hypertension    Hyperlipidemia    Liposarcoma    Abnormal PSA    Cyclical neutropenia    Excessive cerumen in right ear canal    Hx of colonic polyps    BPH with obstruction/lower urinary tract symptoms    Chronic allergic rhinitis    Hematuria       Objective:      Physical Exam  Vitals signs and nursing note reviewed.   Constitutional:       Appearance: He is well-developed.   HENT:      Right Ear: Decreased hearing noted. There is impacted cerumen.      Left Ear: Decreased hearing noted. There is impacted  cerumen.      Ears:      Comments: Poor results with irrigation  Cardiovascular:      Rate and Rhythm: Normal rate and regular rhythm.      Heart sounds: Normal heart sounds.   Pulmonary:      Effort: Pulmonary effort is normal.      Breath sounds: Normal breath sounds.   Skin:     General: Skin is warm and dry.   Neurological:      Mental Status: He is alert and oriented to person, place, and time.         Lab Results   Component Value Date    WBC 5.04 12/04/2020    HGB 15.2 12/04/2020    HCT 43.7 12/04/2020     12/04/2020    CHOL 138 12/04/2020    TRIG 44 12/04/2020    HDL 56 12/04/2020    ALT 21 12/04/2020    AST 23 12/04/2020     12/04/2020    K 3.8 12/04/2020     12/04/2020    CREATININE 0.9 12/04/2020    BUN 10 12/04/2020    CO2 30 (H) 12/04/2020    TSH 2.438 11/18/2019    PSA 2.6 12/04/2020    INR 1.0 02/08/2019     The 10-year ASCVD risk score (Jackson NURIS Jr., et al., 2013) is: 13.7%    Values used to calculate the score:      Age: 70 years      Sex: Male      Is Non- : No      Diabetic: No      Tobacco smoker: No      Systolic Blood Pressure: 120 mmHg      Is BP treated: Yes      HDL Cholesterol: 56 mg/dL      Total Cholesterol: 138 mg/dL    Assessment:       1. Essential hypertension    2. Liposarcoma    3. Cyclical neutropenia    4. Need for zoster vaccine        Plan:       Essential hypertension  -     Comprehensive Metabolic Panel; Future; Expected date: 12/28/2020  -     CBC Auto Differential; Future; Expected date: 12/28/2020    Liposarcoma    Cyclical neutropenia    Need for zoster vaccine  -     varicella-zoster [id number], PF, (SHINGRIX, PF,) 50 mcg/0.5 mL injection; Inject 0.5 mLs into the muscle once. for 1 dose  Dispense: 1 each; Refill: 1    Patient education given on HT, exercises and the patient expresses understanding and acceptance of instructions. Henrique Lugo 12/30/2020 5:29 PM

## 2020-12-30 NOTE — TELEPHONE ENCOUNTER
----- Message from Mickey Montoya sent at 12/30/2020  8:47 AM CST -----  Contact: patient  Patient called in and stated he came in on 12/28/20 and the nurse tried to flush out his left ear & it did not seem to work.  Patient stated nurse told him he was going to get with Dr. Lugo to see what patient should do.     Patient would like a call back at [phone]

## 2021-01-06 ENCOUNTER — PATIENT MESSAGE (OUTPATIENT)
Dept: ADMINISTRATIVE | Facility: OTHER | Age: 71
End: 2021-01-06

## 2021-01-13 ENCOUNTER — PATIENT MESSAGE (OUTPATIENT)
Dept: ADMINISTRATIVE | Facility: OTHER | Age: 71
End: 2021-01-13

## 2021-02-01 NOTE — PROGRESS NOTES
BP stable since last outreach, average 135/85 mmHg  /78 mmHg at 12/28/20 PCP appointment  12/4/20 CMP reviewed  71 yo with LH concerns  Continue current regimen and monitoring

## 2021-02-25 ENCOUNTER — OFFICE VISIT (OUTPATIENT)
Dept: OTOLARYNGOLOGY | Facility: CLINIC | Age: 71
End: 2021-02-25
Payer: MEDICARE

## 2021-02-25 VITALS
SYSTOLIC BLOOD PRESSURE: 124 MMHG | HEIGHT: 72 IN | WEIGHT: 180.56 LBS | HEART RATE: 65 BPM | OXYGEN SATURATION: 98 % | BODY MASS INDEX: 24.46 KG/M2 | DIASTOLIC BLOOD PRESSURE: 78 MMHG

## 2021-02-25 DIAGNOSIS — J31.0 CHRONIC RHINITIS: ICD-10-CM

## 2021-02-25 DIAGNOSIS — H61.23 IMPACTED CERUMEN, BILATERAL: Primary | ICD-10-CM

## 2021-02-25 PROCEDURE — 3078F DIAST BP <80 MM HG: CPT | Mod: CPTII,S$GLB,, | Performed by: OTOLARYNGOLOGY

## 2021-02-25 PROCEDURE — 1159F MED LIST DOCD IN RCRD: CPT | Mod: S$GLB,,, | Performed by: OTOLARYNGOLOGY

## 2021-02-25 PROCEDURE — 1159F PR MEDICATION LIST DOCUMENTED IN MEDICAL RECORD: ICD-10-PCS | Mod: S$GLB,,, | Performed by: OTOLARYNGOLOGY

## 2021-02-25 PROCEDURE — 99204 OFFICE O/P NEW MOD 45 MIN: CPT | Mod: 25,S$GLB,, | Performed by: OTOLARYNGOLOGY

## 2021-02-25 PROCEDURE — 1126F PR PAIN SEVERITY QUANTIFIED, NO PAIN PRESENT: ICD-10-PCS | Mod: S$GLB,,, | Performed by: OTOLARYNGOLOGY

## 2021-02-25 PROCEDURE — 1101F PT FALLS ASSESS-DOCD LE1/YR: CPT | Mod: CPTII,S$GLB,, | Performed by: OTOLARYNGOLOGY

## 2021-02-25 PROCEDURE — 99204 PR OFFICE/OUTPT VISIT, NEW, LEVL IV, 45-59 MIN: ICD-10-PCS | Mod: 25,S$GLB,, | Performed by: OTOLARYNGOLOGY

## 2021-02-25 PROCEDURE — 3288F PR FALLS RISK ASSESSMENT DOCUMENTED: ICD-10-PCS | Mod: CPTII,S$GLB,, | Performed by: OTOLARYNGOLOGY

## 2021-02-25 PROCEDURE — 1101F PR PT FALLS ASSESS DOC 0-1 FALLS W/OUT INJ PAST YR: ICD-10-PCS | Mod: CPTII,S$GLB,, | Performed by: OTOLARYNGOLOGY

## 2021-02-25 PROCEDURE — 1126F AMNT PAIN NOTED NONE PRSNT: CPT | Mod: S$GLB,,, | Performed by: OTOLARYNGOLOGY

## 2021-02-25 PROCEDURE — 3078F PR MOST RECENT DIASTOLIC BLOOD PRESSURE < 80 MM HG: ICD-10-PCS | Mod: CPTII,S$GLB,, | Performed by: OTOLARYNGOLOGY

## 2021-02-25 PROCEDURE — 3074F SYST BP LT 130 MM HG: CPT | Mod: CPTII,S$GLB,, | Performed by: OTOLARYNGOLOGY

## 2021-02-25 PROCEDURE — 3008F PR BODY MASS INDEX (BMI) DOCUMENTED: ICD-10-PCS | Mod: CPTII,S$GLB,, | Performed by: OTOLARYNGOLOGY

## 2021-02-25 PROCEDURE — 3008F BODY MASS INDEX DOCD: CPT | Mod: CPTII,S$GLB,, | Performed by: OTOLARYNGOLOGY

## 2021-02-25 PROCEDURE — 3288F FALL RISK ASSESSMENT DOCD: CPT | Mod: CPTII,S$GLB,, | Performed by: OTOLARYNGOLOGY

## 2021-02-25 PROCEDURE — 69210 REMOVE IMPACTED EAR WAX UNI: CPT | Mod: S$GLB,,, | Performed by: OTOLARYNGOLOGY

## 2021-02-25 PROCEDURE — 3074F PR MOST RECENT SYSTOLIC BLOOD PRESSURE < 130 MM HG: ICD-10-PCS | Mod: CPTII,S$GLB,, | Performed by: OTOLARYNGOLOGY

## 2021-02-25 PROCEDURE — 69210 PR REMOVAL IMPACTED CERUMEN REQUIRING INSTRUMENTATION, UNILATERAL: ICD-10-PCS | Mod: S$GLB,,, | Performed by: OTOLARYNGOLOGY

## 2021-02-25 RX ORDER — CARVEDILOL 25 MG/1
1 TABLET ORAL 2 TIMES DAILY
COMMUNITY
Start: 2021-02-09 | End: 2021-05-07

## 2021-02-25 RX ORDER — FLUTICASONE PROPIONATE 50 MCG
2 SPRAY, SUSPENSION (ML) NASAL DAILY
Qty: 16 G | Refills: 2 | Status: SHIPPED | OUTPATIENT
Start: 2021-02-25 | End: 2021-05-24

## 2021-03-18 DIAGNOSIS — I10 ESSENTIAL HYPERTENSION: ICD-10-CM

## 2021-03-19 RX ORDER — AMLODIPINE BESYLATE 10 MG/1
10 TABLET ORAL NIGHTLY
Qty: 90 TABLET | Refills: 3 | Status: SHIPPED | OUTPATIENT
Start: 2021-03-19 | End: 2021-05-03 | Stop reason: ALTCHOICE

## 2021-04-10 ENCOUNTER — IMMUNIZATION (OUTPATIENT)
Dept: PRIMARY CARE CLINIC | Facility: CLINIC | Age: 71
End: 2021-04-10
Payer: MEDICARE

## 2021-04-10 DIAGNOSIS — Z23 NEED FOR VACCINATION: Primary | ICD-10-CM

## 2021-04-10 PROCEDURE — 0001A COVID-19, MRNA, LNP-S, PF, 30 MCG/0.3 ML DOSE VACCINE: CPT | Mod: CV19,S$GLB,, | Performed by: FAMILY MEDICINE

## 2021-04-10 PROCEDURE — 0001A COVID-19, MRNA, LNP-S, PF, 30 MCG/0.3 ML DOSE VACCINE: ICD-10-PCS | Mod: CV19,S$GLB,, | Performed by: FAMILY MEDICINE

## 2021-04-10 PROCEDURE — 91300 COVID-19, MRNA, LNP-S, PF, 30 MCG/0.3 ML DOSE VACCINE: CPT | Mod: S$GLB,,, | Performed by: FAMILY MEDICINE

## 2021-04-10 PROCEDURE — 91300 COVID-19, MRNA, LNP-S, PF, 30 MCG/0.3 ML DOSE VACCINE: ICD-10-PCS | Mod: S$GLB,,, | Performed by: FAMILY MEDICINE

## 2021-05-03 ENCOUNTER — IMMUNIZATION (OUTPATIENT)
Dept: PRIMARY CARE CLINIC | Facility: CLINIC | Age: 71
End: 2021-05-03
Payer: MEDICARE

## 2021-05-03 DIAGNOSIS — Z23 NEED FOR VACCINATION: Primary | ICD-10-CM

## 2021-05-03 PROCEDURE — 0002A COVID-19, MRNA, LNP-S, PF, 30 MCG/0.3 ML DOSE VACCINE: CPT | Mod: CV19,S$GLB,, | Performed by: FAMILY MEDICINE

## 2021-05-03 PROCEDURE — 91300 COVID-19, MRNA, LNP-S, PF, 30 MCG/0.3 ML DOSE VACCINE: CPT | Mod: S$GLB,,, | Performed by: FAMILY MEDICINE

## 2021-05-03 PROCEDURE — 0002A COVID-19, MRNA, LNP-S, PF, 30 MCG/0.3 ML DOSE VACCINE: ICD-10-PCS | Mod: CV19,S$GLB,, | Performed by: FAMILY MEDICINE

## 2021-05-03 PROCEDURE — 91300 COVID-19, MRNA, LNP-S, PF, 30 MCG/0.3 ML DOSE VACCINE: ICD-10-PCS | Mod: S$GLB,,, | Performed by: FAMILY MEDICINE

## 2021-06-19 NOTE — PROGRESS NOTES
Defer to health  outreach planned this week to further address sodium in diet prior to making medication change   [facility] DIVISION OF KIDNEY DISEASES AND HYPERTENSION -- [phone]  -- INITIAL CONSULT NOTE  --------------------------------------------------------------------------------  HPI: 80 year old male with prior history of duodenal perforation in April 2021, s/p J tube and recent admission at OSH for GIB admitted to [facility] as a transfer from OS for recurrent GIB and hemodynamic instability. Hospital course complicated by hemorrhagic shock and respiratory failure. Pt. currently in MICU , intubated on multiple vasopressors to maintain MAP. Nephrology consultation requested for oliguric WALDO on CKD and anticipated need for HD/RRT.    Pt. seen in the MICU today. Pt. remains critically ill. Hx obtained from provider hand-off and chart review. Scr elevated at 3.50 today with minimal UOP since overnight.     PAST HISTORY  --------------------------------------------------------------------------------  PAST MEDICAL & SURGICAL HISTORY:  Thrombocytopenia  Anemia  Abdominal aortic aneurysm  Deep vein thrombosis (DVT)  Diabetes mellitus  Hypertension  Hyperlipidemia  Duodenal ulcer  s/p amy plication and J-tube 4/2021  S/P IVC filter  H/O: duodenal ulcer  s/p amy plication and J-tube 4/2021  H/O exploratory laparotomy  4/2021 for duodenal perforation    FAMILY HISTORY: Non-contributory    PAST SOCIAL HISTORY: Unable to obtain    ALLERGIES & MEDICATIONS  --------------------------------------------------------------------------------  Allergies    No Known Allergies    Intolerances    Standing Inpatient Medications  chlorhexidine 0.12% Liquid 15 milliLiter(s) Oral Mucosa every 12 hours  chlorhexidine 4% Liquid 1 Application(s) Topical <User Schedule>  fentaNYL   Infusion... 0.5 MICROgram(s)/kG/Hr IV Continuous <Continuous>  insulin lispro (ADMELOG) corrective regimen sliding scale   SubCutaneous every 6 hours  ketamine Infusion. 0.25 mG/kG/Hr IV Continuous <Continuous>  meropenem  IVPB      meropenem  IVPB 500 milliGRAM(s) IV Intermittent every 12 hours  norepinephrine Infusion 0.05 MICROgram(s)/kG/Min IV Continuous <Continuous>  pantoprazole Infusion 8 mG/Hr IV Continuous <Continuous>  phenylephrine    Infusion 2 MICROgram(s)/kG/Min IV Continuous <Continuous>  vasopressin Infusion 0.04 Unit(s)/Min IV Continuous <Continuous>    REVIEW OF SYSTEMS  --------------------------------------------------------------------------------  Unable to obtain due to medical condition    VITALS/PHYSICAL EXAM  --------------------------------------------------------------------------------  T(C): 37.5 (06-19-21 @ 16:00), Max: 38 (06-19-21 @ 08:00)  HR: 65 (06-19-21 @ 18:30) (47 - 81)  BP: 125/59 (06-19-21 @ 18:30) (89/50 - 200/89)  RR: 20 (06-19-21 @ 18:30) (16 - 33)  SpO2: 99% (06-19-21 @ 18:30) (75% - 100%)  Wt(kg): --        06-18-21 @ 07:01  -  06-19-21 @ 07:00  --------------------------------------------------------  IN: 7138.1 mL / OUT: 545 mL / NET: 6593.1 mL    06-19-21 @ 07:01  -  06-19-21 @ 18:39  --------------------------------------------------------  IN: 2121.3 mL / OUT: 15 mL / NET: 2106.3 mL    Physical Exam  	Gen: Intubated, sedated  	HEENT: ET tube +  	Pulm: Fair air entry  	CV: Tachycardiac  	Abd: Soft, +BS , Distended  	Ext: B/L LE pitting edema, B/L UE swollen  	Neuro: Sedated  	Skin: Warm and dry    LABS/STUDIES  --------------------------------------------------------------------------------              9.7    14.78 >-----------<  58       [06-19-21 @ 16:28]              30.0     145  |  118  |  63  ----------------------------<  163      [06-19-21 @ 16:28]  4.6   |  12  |  3.50        Ca     7.0     [06-19-21 @ 16:28]      Mg     1.8     [06-19-21 @ 16:28]      Phos  5.9     [06-19-21 @ 16:28]    TPro  4.3  /  Alb  1.9  /  TBili  0.3  /  DBili  x   /  AST  26  /  ALT  8   /  AlkPhos  44  [06-19-21 @ 16:28]    PT/INR: PT 15.7 , INR 1.33       [06-19-21 @ 00:10]  PTT: 37.1       [06-19-21 @ 00:10]    Creatinine Trend:  SCr 3.50 [06-19 @ 16:28]  SCr 3.29 [06-19 @ 11:39]  SCr 3.23 [06-19 @ 08:11]  SCr 2.93 [06-19 @ 03:45]  SCr 2.72 [06-19 @ 00:10]    Urinalysis - [06-18-21 @ 21:44]      Color Yellow / Appearance Slightly Turbid / SG 1.023 / pH 5.5      Gluc Negative / Ketone Negative  / Bili Negative / Urobili Negative       Blood Small / Protein 100 / Leuk Est Moderate / Nitrite Negative      RBC 7 / WBC 15 / Hyaline 11 / Gran  / Sq Epi  / Non Sq Epi 6 / Bacteria Negative

## 2021-06-21 ENCOUNTER — LAB VISIT (OUTPATIENT)
Dept: LAB | Facility: HOSPITAL | Age: 71
End: 2021-06-21
Attending: FAMILY MEDICINE
Payer: MEDICARE

## 2021-06-21 DIAGNOSIS — I10 ESSENTIAL HYPERTENSION: ICD-10-CM

## 2021-06-21 LAB
BASOPHILS # BLD AUTO: 0.03 K/UL (ref 0–0.2)
BASOPHILS NFR BLD: 0.4 % (ref 0–1.9)
DIFFERENTIAL METHOD: ABNORMAL
EOSINOPHIL # BLD AUTO: 0.2 K/UL (ref 0–0.5)
EOSINOPHIL NFR BLD: 3 % (ref 0–8)
ERYTHROCYTE [DISTWIDTH] IN BLOOD BY AUTOMATED COUNT: 12.5 % (ref 11.5–14.5)
HCT VFR BLD AUTO: 43.9 % (ref 40–54)
HGB BLD-MCNC: 15.1 G/DL (ref 14–18)
IMM GRANULOCYTES # BLD AUTO: 0.03 K/UL (ref 0–0.04)
IMM GRANULOCYTES NFR BLD AUTO: 0.4 % (ref 0–0.5)
LYMPHOCYTES # BLD AUTO: 2.2 K/UL (ref 1–4.8)
LYMPHOCYTES NFR BLD: 31.3 % (ref 18–48)
MCH RBC QN AUTO: 33 PG (ref 27–31)
MCHC RBC AUTO-ENTMCNC: 34.4 G/DL (ref 32–36)
MCV RBC AUTO: 96 FL (ref 82–98)
MONOCYTES # BLD AUTO: 0.5 K/UL (ref 0.3–1)
MONOCYTES NFR BLD: 7.2 % (ref 4–15)
NEUTROPHILS # BLD AUTO: 4 K/UL (ref 1.8–7.7)
NEUTROPHILS NFR BLD: 57.7 % (ref 38–73)
NRBC BLD-RTO: 0 /100 WBC
PLATELET # BLD AUTO: 268 K/UL (ref 150–450)
PMV BLD AUTO: 9.1 FL (ref 9.2–12.9)
RBC # BLD AUTO: 4.57 M/UL (ref 4.6–6.2)
WBC # BLD AUTO: 6.97 K/UL (ref 3.9–12.7)

## 2021-06-21 PROCEDURE — 85025 COMPLETE CBC W/AUTO DIFF WBC: CPT | Performed by: FAMILY MEDICINE

## 2021-06-21 PROCEDURE — 36415 COLL VENOUS BLD VENIPUNCTURE: CPT | Mod: PO | Performed by: FAMILY MEDICINE

## 2021-06-21 PROCEDURE — 80053 COMPREHEN METABOLIC PANEL: CPT | Performed by: FAMILY MEDICINE

## 2021-06-22 LAB
ALBUMIN SERPL BCP-MCNC: 3.8 G/DL (ref 3.5–5.2)
ALP SERPL-CCNC: 78 U/L (ref 55–135)
ALT SERPL W/O P-5'-P-CCNC: 19 U/L (ref 10–44)
ANION GAP SERPL CALC-SCNC: 11 MMOL/L (ref 8–16)
AST SERPL-CCNC: 22 U/L (ref 10–40)
BILIRUB SERPL-MCNC: 1.6 MG/DL (ref 0.1–1)
BUN SERPL-MCNC: 18 MG/DL (ref 8–23)
CALCIUM SERPL-MCNC: 9.1 MG/DL (ref 8.7–10.5)
CHLORIDE SERPL-SCNC: 103 MMOL/L (ref 95–110)
CO2 SERPL-SCNC: 27 MMOL/L (ref 23–29)
CREAT SERPL-MCNC: 1.1 MG/DL (ref 0.5–1.4)
EST. GFR  (AFRICAN AMERICAN): >60 ML/MIN/1.73 M^2
EST. GFR  (NON AFRICAN AMERICAN): >60 ML/MIN/1.73 M^2
GLUCOSE SERPL-MCNC: 96 MG/DL (ref 70–110)
POTASSIUM SERPL-SCNC: 3.6 MMOL/L (ref 3.5–5.1)
PROT SERPL-MCNC: 6.8 G/DL (ref 6–8.4)
SODIUM SERPL-SCNC: 141 MMOL/L (ref 136–145)

## 2021-06-28 ENCOUNTER — LAB VISIT (OUTPATIENT)
Dept: LAB | Facility: HOSPITAL | Age: 71
End: 2021-06-28
Attending: FAMILY MEDICINE
Payer: MEDICARE

## 2021-06-28 ENCOUNTER — OFFICE VISIT (OUTPATIENT)
Dept: FAMILY MEDICINE | Facility: CLINIC | Age: 71
End: 2021-06-28
Payer: MEDICARE

## 2021-06-28 VITALS
WEIGHT: 167.56 LBS | TEMPERATURE: 98 F | SYSTOLIC BLOOD PRESSURE: 116 MMHG | OXYGEN SATURATION: 98 % | RESPIRATION RATE: 16 BRPM | HEIGHT: 72 IN | DIASTOLIC BLOOD PRESSURE: 68 MMHG | BODY MASS INDEX: 22.69 KG/M2 | HEART RATE: 66 BPM

## 2021-06-28 DIAGNOSIS — F34.1 EARLY ONSET DYSTHYMIA: ICD-10-CM

## 2021-06-28 DIAGNOSIS — R41.9 SLEEP DISORDER WITH COGNITIVE COMPLAINTS: ICD-10-CM

## 2021-06-28 DIAGNOSIS — I10 ESSENTIAL HYPERTENSION: ICD-10-CM

## 2021-06-28 DIAGNOSIS — I95.2 HYPOTENSION DUE TO DRUGS: ICD-10-CM

## 2021-06-28 DIAGNOSIS — R63.4 ABNORMAL LOSS OF WEIGHT: Primary | ICD-10-CM

## 2021-06-28 DIAGNOSIS — G47.9 SLEEP DISORDER WITH COGNITIVE COMPLAINTS: ICD-10-CM

## 2021-06-28 DIAGNOSIS — R63.4 ABNORMAL LOSS OF WEIGHT: ICD-10-CM

## 2021-06-28 LAB — TESTOST SERPL-MCNC: 403 NG/DL (ref 304–1227)

## 2021-06-28 PROCEDURE — 99214 PR OFFICE/OUTPT VISIT, EST, LEVL IV, 30-39 MIN: ICD-10-PCS | Mod: S$GLB,,, | Performed by: FAMILY MEDICINE

## 2021-06-28 PROCEDURE — 3288F FALL RISK ASSESSMENT DOCD: CPT | Mod: CPTII,S$GLB,, | Performed by: FAMILY MEDICINE

## 2021-06-28 PROCEDURE — 1159F PR MEDICATION LIST DOCUMENTED IN MEDICAL RECORD: ICD-10-PCS | Mod: S$GLB,,, | Performed by: FAMILY MEDICINE

## 2021-06-28 PROCEDURE — 3008F PR BODY MASS INDEX (BMI) DOCUMENTED: ICD-10-PCS | Mod: CPTII,S$GLB,, | Performed by: FAMILY MEDICINE

## 2021-06-28 PROCEDURE — 84403 ASSAY OF TOTAL TESTOSTERONE: CPT | Performed by: FAMILY MEDICINE

## 2021-06-28 PROCEDURE — 1159F MED LIST DOCD IN RCRD: CPT | Mod: S$GLB,,, | Performed by: FAMILY MEDICINE

## 2021-06-28 PROCEDURE — 1126F PR PAIN SEVERITY QUANTIFIED, NO PAIN PRESENT: ICD-10-PCS | Mod: S$GLB,,, | Performed by: FAMILY MEDICINE

## 2021-06-28 PROCEDURE — 84134 ASSAY OF PREALBUMIN: CPT | Performed by: FAMILY MEDICINE

## 2021-06-28 PROCEDURE — 1101F PT FALLS ASSESS-DOCD LE1/YR: CPT | Mod: CPTII,S$GLB,, | Performed by: FAMILY MEDICINE

## 2021-06-28 PROCEDURE — 99214 OFFICE O/P EST MOD 30 MIN: CPT | Mod: S$GLB,,, | Performed by: FAMILY MEDICINE

## 2021-06-28 PROCEDURE — 84402 ASSAY OF FREE TESTOSTERONE: CPT | Performed by: FAMILY MEDICINE

## 2021-06-28 PROCEDURE — 3288F PR FALLS RISK ASSESSMENT DOCUMENTED: ICD-10-PCS | Mod: CPTII,S$GLB,, | Performed by: FAMILY MEDICINE

## 2021-06-28 PROCEDURE — 99999 PR PBB SHADOW E&M-EST. PATIENT-LVL V: ICD-10-PCS | Mod: PBBFAC,,, | Performed by: FAMILY MEDICINE

## 2021-06-28 PROCEDURE — 1101F PR PT FALLS ASSESS DOC 0-1 FALLS W/OUT INJ PAST YR: ICD-10-PCS | Mod: CPTII,S$GLB,, | Performed by: FAMILY MEDICINE

## 2021-06-28 PROCEDURE — 3008F BODY MASS INDEX DOCD: CPT | Mod: CPTII,S$GLB,, | Performed by: FAMILY MEDICINE

## 2021-06-28 PROCEDURE — 99999 PR PBB SHADOW E&M-EST. PATIENT-LVL V: CPT | Mod: PBBFAC,,, | Performed by: FAMILY MEDICINE

## 2021-06-28 PROCEDURE — 1126F AMNT PAIN NOTED NONE PRSNT: CPT | Mod: S$GLB,,, | Performed by: FAMILY MEDICINE

## 2021-06-28 RX ORDER — OLMESARTAN MEDOXOMIL 20 MG/1
20 TABLET ORAL DAILY
Start: 2021-06-28 | End: 2021-07-28

## 2021-06-28 RX ORDER — BUPROPION HYDROCHLORIDE 75 MG/1
75 TABLET ORAL 2 TIMES DAILY
Qty: 60 TABLET | Refills: 3 | Status: SHIPPED | OUTPATIENT
Start: 2021-06-28 | End: 2021-10-24

## 2021-06-29 LAB — PREALB SERPL-MCNC: 31 MG/DL (ref 20–43)

## 2021-07-02 LAB — TESTOST FREE SERPL-MCNC: 16.3 PG/ML

## 2021-07-16 ENCOUNTER — TELEPHONE (OUTPATIENT)
Dept: FAMILY MEDICINE | Facility: CLINIC | Age: 71
End: 2021-07-16

## 2021-07-16 ENCOUNTER — CLINICAL SUPPORT (OUTPATIENT)
Dept: FAMILY MEDICINE | Facility: CLINIC | Age: 71
End: 2021-07-16
Payer: MEDICARE

## 2021-07-16 VITALS — HEART RATE: 66 BPM | SYSTOLIC BLOOD PRESSURE: 138 MMHG | DIASTOLIC BLOOD PRESSURE: 84 MMHG

## 2021-07-16 PROCEDURE — 99999 PR PBB SHADOW E&M-EST. PATIENT-LVL II: CPT | Mod: PBBFAC,,,

## 2021-07-16 PROCEDURE — 99999 PR PBB SHADOW E&M-EST. PATIENT-LVL II: ICD-10-PCS | Mod: PBBFAC,,,

## 2021-07-28 ENCOUNTER — PATIENT MESSAGE (OUTPATIENT)
Dept: FAMILY MEDICINE | Facility: CLINIC | Age: 71
End: 2021-07-28

## 2021-07-28 ENCOUNTER — NURSE TRIAGE (OUTPATIENT)
Dept: ADMINISTRATIVE | Facility: CLINIC | Age: 71
End: 2021-07-28

## 2021-07-28 ENCOUNTER — TELEPHONE (OUTPATIENT)
Dept: FAMILY MEDICINE | Facility: CLINIC | Age: 71
End: 2021-07-28

## 2021-07-28 DIAGNOSIS — I10 ESSENTIAL HYPERTENSION: ICD-10-CM

## 2021-07-28 RX ORDER — OLMESARTAN MEDOXOMIL 20 MG/1
TABLET ORAL
Qty: 45 TABLET | Refills: 3 | Status: SHIPPED | OUTPATIENT
Start: 2021-07-28 | End: 2021-09-29 | Stop reason: SDUPTHER

## 2021-07-28 RX ORDER — OLMESARTAN MEDOXOMIL 20 MG/1
TABLET ORAL
Start: 2021-07-28 | End: 2021-07-28 | Stop reason: SDUPTHER

## 2021-08-02 ENCOUNTER — OFFICE VISIT (OUTPATIENT)
Dept: FAMILY MEDICINE | Facility: CLINIC | Age: 71
End: 2021-08-02
Payer: MEDICARE

## 2021-08-02 ENCOUNTER — TELEPHONE (OUTPATIENT)
Dept: FAMILY MEDICINE | Facility: CLINIC | Age: 71
End: 2021-08-02

## 2021-08-02 VITALS
HEART RATE: 63 BPM | RESPIRATION RATE: 16 BRPM | DIASTOLIC BLOOD PRESSURE: 86 MMHG | TEMPERATURE: 98 F | BODY MASS INDEX: 23.09 KG/M2 | OXYGEN SATURATION: 98 % | HEIGHT: 72 IN | WEIGHT: 170.44 LBS | SYSTOLIC BLOOD PRESSURE: 130 MMHG

## 2021-08-02 DIAGNOSIS — E78.5 HYPERLIPIDEMIA, UNSPECIFIED HYPERLIPIDEMIA TYPE: Chronic | ICD-10-CM

## 2021-08-02 DIAGNOSIS — I10 ESSENTIAL HYPERTENSION: Primary | ICD-10-CM

## 2021-08-02 PROCEDURE — 3079F DIAST BP 80-89 MM HG: CPT | Mod: CPTII,S$GLB,, | Performed by: PHYSICIAN ASSISTANT

## 2021-08-02 PROCEDURE — 3079F PR MOST RECENT DIASTOLIC BLOOD PRESSURE 80-89 MM HG: ICD-10-PCS | Mod: CPTII,S$GLB,, | Performed by: PHYSICIAN ASSISTANT

## 2021-08-02 PROCEDURE — 99213 PR OFFICE/OUTPT VISIT, EST, LEVL III, 20-29 MIN: ICD-10-PCS | Mod: S$GLB,,, | Performed by: PHYSICIAN ASSISTANT

## 2021-08-02 PROCEDURE — 3008F BODY MASS INDEX DOCD: CPT | Mod: CPTII,S$GLB,, | Performed by: PHYSICIAN ASSISTANT

## 2021-08-02 PROCEDURE — 1126F PR PAIN SEVERITY QUANTIFIED, NO PAIN PRESENT: ICD-10-PCS | Mod: CPTII,S$GLB,, | Performed by: PHYSICIAN ASSISTANT

## 2021-08-02 PROCEDURE — 1101F PR PT FALLS ASSESS DOC 0-1 FALLS W/OUT INJ PAST YR: ICD-10-PCS | Mod: CPTII,S$GLB,, | Performed by: PHYSICIAN ASSISTANT

## 2021-08-02 PROCEDURE — 3008F PR BODY MASS INDEX (BMI) DOCUMENTED: ICD-10-PCS | Mod: CPTII,S$GLB,, | Performed by: PHYSICIAN ASSISTANT

## 2021-08-02 PROCEDURE — 3075F PR MOST RECENT SYSTOLIC BLOOD PRESS GE 130-139MM HG: ICD-10-PCS | Mod: CPTII,S$GLB,, | Performed by: PHYSICIAN ASSISTANT

## 2021-08-02 PROCEDURE — 3075F SYST BP GE 130 - 139MM HG: CPT | Mod: CPTII,S$GLB,, | Performed by: PHYSICIAN ASSISTANT

## 2021-08-02 PROCEDURE — 1159F MED LIST DOCD IN RCRD: CPT | Mod: CPTII,S$GLB,, | Performed by: PHYSICIAN ASSISTANT

## 2021-08-02 PROCEDURE — 3288F PR FALLS RISK ASSESSMENT DOCUMENTED: ICD-10-PCS | Mod: CPTII,S$GLB,, | Performed by: PHYSICIAN ASSISTANT

## 2021-08-02 PROCEDURE — 1126F AMNT PAIN NOTED NONE PRSNT: CPT | Mod: CPTII,S$GLB,, | Performed by: PHYSICIAN ASSISTANT

## 2021-08-02 PROCEDURE — 99999 PR PBB SHADOW E&M-EST. PATIENT-LVL V: CPT | Mod: PBBFAC,,, | Performed by: PHYSICIAN ASSISTANT

## 2021-08-02 PROCEDURE — 1159F PR MEDICATION LIST DOCUMENTED IN MEDICAL RECORD: ICD-10-PCS | Mod: CPTII,S$GLB,, | Performed by: PHYSICIAN ASSISTANT

## 2021-08-02 PROCEDURE — 3288F FALL RISK ASSESSMENT DOCD: CPT | Mod: CPTII,S$GLB,, | Performed by: PHYSICIAN ASSISTANT

## 2021-08-02 PROCEDURE — 1101F PT FALLS ASSESS-DOCD LE1/YR: CPT | Mod: CPTII,S$GLB,, | Performed by: PHYSICIAN ASSISTANT

## 2021-08-02 PROCEDURE — 99213 OFFICE O/P EST LOW 20 MIN: CPT | Mod: S$GLB,,, | Performed by: PHYSICIAN ASSISTANT

## 2021-08-02 PROCEDURE — 99999 PR PBB SHADOW E&M-EST. PATIENT-LVL V: ICD-10-PCS | Mod: PBBFAC,,, | Performed by: PHYSICIAN ASSISTANT

## 2021-08-20 ENCOUNTER — OFFICE VISIT (OUTPATIENT)
Dept: PULMONOLOGY | Facility: CLINIC | Age: 71
End: 2021-08-20
Payer: MEDICARE

## 2021-08-20 VITALS
TEMPERATURE: 98 F | SYSTOLIC BLOOD PRESSURE: 145 MMHG | BODY MASS INDEX: 22.89 KG/M2 | DIASTOLIC BLOOD PRESSURE: 94 MMHG | HEART RATE: 66 BPM | OXYGEN SATURATION: 98 % | HEIGHT: 72 IN | WEIGHT: 169 LBS

## 2021-08-20 DIAGNOSIS — R41.9 SLEEP DISORDER WITH COGNITIVE COMPLAINTS: ICD-10-CM

## 2021-08-20 DIAGNOSIS — G47.30 SLEEP APNEA, UNSPECIFIED TYPE: Primary | ICD-10-CM

## 2021-08-20 DIAGNOSIS — G47.9 SLEEP DISORDER WITH COGNITIVE COMPLAINTS: ICD-10-CM

## 2021-08-20 PROCEDURE — 99214 PR OFFICE/OUTPT VISIT, EST, LEVL IV, 30-39 MIN: ICD-10-PCS | Mod: S$GLB,,, | Performed by: NURSE PRACTITIONER

## 2021-08-20 PROCEDURE — 1126F PR PAIN SEVERITY QUANTIFIED, NO PAIN PRESENT: ICD-10-PCS | Mod: CPTII,S$GLB,, | Performed by: NURSE PRACTITIONER

## 2021-08-20 PROCEDURE — 3077F PR MOST RECENT SYSTOLIC BLOOD PRESSURE >= 140 MM HG: ICD-10-PCS | Mod: CPTII,S$GLB,, | Performed by: NURSE PRACTITIONER

## 2021-08-20 PROCEDURE — 3080F PR MOST RECENT DIASTOLIC BLOOD PRESSURE >= 90 MM HG: ICD-10-PCS | Mod: CPTII,S$GLB,, | Performed by: NURSE PRACTITIONER

## 2021-08-20 PROCEDURE — 99214 OFFICE O/P EST MOD 30 MIN: CPT | Mod: S$GLB,,, | Performed by: NURSE PRACTITIONER

## 2021-08-20 PROCEDURE — 3008F BODY MASS INDEX DOCD: CPT | Mod: CPTII,S$GLB,, | Performed by: NURSE PRACTITIONER

## 2021-08-20 PROCEDURE — 99999 PR PBB SHADOW E&M-EST. PATIENT-LVL IV: CPT | Mod: PBBFAC,,, | Performed by: NURSE PRACTITIONER

## 2021-08-20 PROCEDURE — 3080F DIAST BP >= 90 MM HG: CPT | Mod: CPTII,S$GLB,, | Performed by: NURSE PRACTITIONER

## 2021-08-20 PROCEDURE — 1126F AMNT PAIN NOTED NONE PRSNT: CPT | Mod: CPTII,S$GLB,, | Performed by: NURSE PRACTITIONER

## 2021-08-20 PROCEDURE — 1160F PR REVIEW ALL MEDS BY PRESCRIBER/CLIN PHARMACIST DOCUMENTED: ICD-10-PCS | Mod: CPTII,S$GLB,, | Performed by: NURSE PRACTITIONER

## 2021-08-20 PROCEDURE — 3288F PR FALLS RISK ASSESSMENT DOCUMENTED: ICD-10-PCS | Mod: CPTII,S$GLB,, | Performed by: NURSE PRACTITIONER

## 2021-08-20 PROCEDURE — 99999 PR PBB SHADOW E&M-EST. PATIENT-LVL IV: ICD-10-PCS | Mod: PBBFAC,,, | Performed by: NURSE PRACTITIONER

## 2021-08-20 PROCEDURE — 1101F PT FALLS ASSESS-DOCD LE1/YR: CPT | Mod: CPTII,S$GLB,, | Performed by: NURSE PRACTITIONER

## 2021-08-20 PROCEDURE — 1159F MED LIST DOCD IN RCRD: CPT | Mod: CPTII,S$GLB,, | Performed by: NURSE PRACTITIONER

## 2021-08-20 PROCEDURE — 1160F RVW MEDS BY RX/DR IN RCRD: CPT | Mod: CPTII,S$GLB,, | Performed by: NURSE PRACTITIONER

## 2021-08-20 PROCEDURE — 1159F PR MEDICATION LIST DOCUMENTED IN MEDICAL RECORD: ICD-10-PCS | Mod: CPTII,S$GLB,, | Performed by: NURSE PRACTITIONER

## 2021-08-20 PROCEDURE — 3288F FALL RISK ASSESSMENT DOCD: CPT | Mod: CPTII,S$GLB,, | Performed by: NURSE PRACTITIONER

## 2021-08-20 PROCEDURE — 1101F PR PT FALLS ASSESS DOC 0-1 FALLS W/OUT INJ PAST YR: ICD-10-PCS | Mod: CPTII,S$GLB,, | Performed by: NURSE PRACTITIONER

## 2021-08-20 PROCEDURE — 3077F SYST BP >= 140 MM HG: CPT | Mod: CPTII,S$GLB,, | Performed by: NURSE PRACTITIONER

## 2021-08-20 PROCEDURE — 3008F PR BODY MASS INDEX (BMI) DOCUMENTED: ICD-10-PCS | Mod: CPTII,S$GLB,, | Performed by: NURSE PRACTITIONER

## 2021-08-24 ENCOUNTER — OFFICE VISIT (OUTPATIENT)
Dept: FAMILY MEDICINE | Facility: CLINIC | Age: 71
End: 2021-08-24
Payer: MEDICARE

## 2021-08-24 VITALS
SYSTOLIC BLOOD PRESSURE: 134 MMHG | WEIGHT: 171.5 LBS | BODY MASS INDEX: 23.23 KG/M2 | DIASTOLIC BLOOD PRESSURE: 80 MMHG | RESPIRATION RATE: 16 BRPM | HEART RATE: 67 BPM | HEIGHT: 72 IN | TEMPERATURE: 98 F | OXYGEN SATURATION: 99 %

## 2021-08-24 DIAGNOSIS — D53.1 MEGALOBLASTIC ERYTHROCYTES: ICD-10-CM

## 2021-08-24 DIAGNOSIS — I10 HTN, GOAL BELOW 130/80: Primary | ICD-10-CM

## 2021-08-24 PROCEDURE — 3288F FALL RISK ASSESSMENT DOCD: CPT | Mod: CPTII,S$GLB,, | Performed by: FAMILY MEDICINE

## 2021-08-24 PROCEDURE — 1159F MED LIST DOCD IN RCRD: CPT | Mod: CPTII,S$GLB,, | Performed by: FAMILY MEDICINE

## 2021-08-24 PROCEDURE — 3079F PR MOST RECENT DIASTOLIC BLOOD PRESSURE 80-89 MM HG: ICD-10-PCS | Mod: CPTII,S$GLB,, | Performed by: FAMILY MEDICINE

## 2021-08-24 PROCEDURE — 99214 PR OFFICE/OUTPT VISIT, EST, LEVL IV, 30-39 MIN: ICD-10-PCS | Mod: S$GLB,,, | Performed by: FAMILY MEDICINE

## 2021-08-24 PROCEDURE — 1101F PR PT FALLS ASSESS DOC 0-1 FALLS W/OUT INJ PAST YR: ICD-10-PCS | Mod: CPTII,S$GLB,, | Performed by: FAMILY MEDICINE

## 2021-08-24 PROCEDURE — 1159F PR MEDICATION LIST DOCUMENTED IN MEDICAL RECORD: ICD-10-PCS | Mod: CPTII,S$GLB,, | Performed by: FAMILY MEDICINE

## 2021-08-24 PROCEDURE — 1126F AMNT PAIN NOTED NONE PRSNT: CPT | Mod: CPTII,S$GLB,, | Performed by: FAMILY MEDICINE

## 2021-08-24 PROCEDURE — 3079F DIAST BP 80-89 MM HG: CPT | Mod: CPTII,S$GLB,, | Performed by: FAMILY MEDICINE

## 2021-08-24 PROCEDURE — 99999 PR PBB SHADOW E&M-EST. PATIENT-LVL V: CPT | Mod: PBBFAC,,, | Performed by: FAMILY MEDICINE

## 2021-08-24 PROCEDURE — 3075F PR MOST RECENT SYSTOLIC BLOOD PRESS GE 130-139MM HG: ICD-10-PCS | Mod: CPTII,S$GLB,, | Performed by: FAMILY MEDICINE

## 2021-08-24 PROCEDURE — 3008F PR BODY MASS INDEX (BMI) DOCUMENTED: ICD-10-PCS | Mod: CPTII,S$GLB,, | Performed by: FAMILY MEDICINE

## 2021-08-24 PROCEDURE — 3288F PR FALLS RISK ASSESSMENT DOCUMENTED: ICD-10-PCS | Mod: CPTII,S$GLB,, | Performed by: FAMILY MEDICINE

## 2021-08-24 PROCEDURE — 99214 OFFICE O/P EST MOD 30 MIN: CPT | Mod: S$GLB,,, | Performed by: FAMILY MEDICINE

## 2021-08-24 PROCEDURE — 3075F SYST BP GE 130 - 139MM HG: CPT | Mod: CPTII,S$GLB,, | Performed by: FAMILY MEDICINE

## 2021-08-24 PROCEDURE — 99999 PR PBB SHADOW E&M-EST. PATIENT-LVL V: ICD-10-PCS | Mod: PBBFAC,,, | Performed by: FAMILY MEDICINE

## 2021-08-24 PROCEDURE — 1101F PT FALLS ASSESS-DOCD LE1/YR: CPT | Mod: CPTII,S$GLB,, | Performed by: FAMILY MEDICINE

## 2021-08-24 PROCEDURE — 1126F PR PAIN SEVERITY QUANTIFIED, NO PAIN PRESENT: ICD-10-PCS | Mod: CPTII,S$GLB,, | Performed by: FAMILY MEDICINE

## 2021-08-24 PROCEDURE — 3008F BODY MASS INDEX DOCD: CPT | Mod: CPTII,S$GLB,, | Performed by: FAMILY MEDICINE

## 2021-08-24 PROCEDURE — 1160F PR REVIEW ALL MEDS BY PRESCRIBER/CLIN PHARMACIST DOCUMENTED: ICD-10-PCS | Mod: CPTII,S$GLB,, | Performed by: FAMILY MEDICINE

## 2021-08-24 PROCEDURE — 1160F RVW MEDS BY RX/DR IN RCRD: CPT | Mod: CPTII,S$GLB,, | Performed by: FAMILY MEDICINE

## 2021-08-24 RX ORDER — HYDROCHLOROTHIAZIDE 12.5 MG/1
12.5 TABLET ORAL DAILY
Qty: 30 TABLET | Refills: 11 | Status: SHIPPED | OUTPATIENT
Start: 2021-08-24 | End: 2022-06-02 | Stop reason: SDUPTHER

## 2021-08-24 RX ORDER — FOLIC ACID 1 MG/1
1 TABLET ORAL DAILY
Qty: 90 TABLET | Refills: 4 | Status: SHIPPED | OUTPATIENT
Start: 2021-08-24 | End: 2022-06-02 | Stop reason: SDUPTHER

## 2021-09-10 ENCOUNTER — TELEPHONE (OUTPATIENT)
Dept: PULMONOLOGY | Facility: CLINIC | Age: 71
End: 2021-09-10

## 2021-09-13 ENCOUNTER — PROCEDURE VISIT (OUTPATIENT)
Dept: SLEEP MEDICINE | Facility: HOSPITAL | Age: 71
End: 2021-09-13
Attending: NURSE PRACTITIONER
Payer: MEDICARE

## 2021-09-13 DIAGNOSIS — G47.9 SLEEP DISORDER WITH COGNITIVE COMPLAINTS: ICD-10-CM

## 2021-09-13 DIAGNOSIS — R41.9 SLEEP DISORDER WITH COGNITIVE COMPLAINTS: ICD-10-CM

## 2021-09-13 DIAGNOSIS — G47.30 SLEEP APNEA, UNSPECIFIED TYPE: ICD-10-CM

## 2021-09-13 PROCEDURE — 95806 SLEEP STUDY UNATT&RESP EFFT: CPT

## 2021-09-21 ENCOUNTER — PATIENT MESSAGE (OUTPATIENT)
Dept: PULMONOLOGY | Facility: CLINIC | Age: 71
End: 2021-09-21

## 2021-09-23 ENCOUNTER — PATIENT MESSAGE (OUTPATIENT)
Dept: PULMONOLOGY | Facility: CLINIC | Age: 71
End: 2021-09-23

## 2021-09-29 DIAGNOSIS — I10 ESSENTIAL HYPERTENSION: ICD-10-CM

## 2021-09-30 RX ORDER — OLMESARTAN MEDOXOMIL 20 MG/1
TABLET ORAL
Qty: 45 TABLET | Refills: 3 | Status: SHIPPED | OUTPATIENT
Start: 2021-09-30 | End: 2021-10-05

## 2021-10-05 ENCOUNTER — OFFICE VISIT (OUTPATIENT)
Dept: FAMILY MEDICINE | Facility: CLINIC | Age: 71
End: 2021-10-05
Payer: MEDICARE

## 2021-10-05 ENCOUNTER — LAB VISIT (OUTPATIENT)
Dept: LAB | Facility: HOSPITAL | Age: 71
End: 2021-10-05
Attending: FAMILY MEDICINE
Payer: MEDICARE

## 2021-10-05 VITALS
HEART RATE: 76 BPM | WEIGHT: 170 LBS | OXYGEN SATURATION: 95 % | SYSTOLIC BLOOD PRESSURE: 132 MMHG | HEIGHT: 72 IN | TEMPERATURE: 98 F | DIASTOLIC BLOOD PRESSURE: 82 MMHG | BODY MASS INDEX: 23.03 KG/M2

## 2021-10-05 DIAGNOSIS — Z12.5 ENCOUNTER FOR SCREENING FOR MALIGNANT NEOPLASM OF PROSTATE: ICD-10-CM

## 2021-10-05 DIAGNOSIS — N13.8 BPH WITH OBSTRUCTION/LOWER URINARY TRACT SYMPTOMS: ICD-10-CM

## 2021-10-05 DIAGNOSIS — I10 ESSENTIAL HYPERTENSION: Primary | ICD-10-CM

## 2021-10-05 DIAGNOSIS — I10 ESSENTIAL HYPERTENSION: ICD-10-CM

## 2021-10-05 DIAGNOSIS — E78.5 HYPERLIPIDEMIA, UNSPECIFIED HYPERLIPIDEMIA TYPE: Chronic | ICD-10-CM

## 2021-10-05 DIAGNOSIS — N40.1 BPH WITH OBSTRUCTION/LOWER URINARY TRACT SYMPTOMS: ICD-10-CM

## 2021-10-05 LAB
ANION GAP SERPL CALC-SCNC: 11 MMOL/L (ref 8–16)
BUN SERPL-MCNC: 15 MG/DL (ref 8–23)
CALCIUM SERPL-MCNC: 9.3 MG/DL (ref 8.7–10.5)
CHLORIDE SERPL-SCNC: 98 MMOL/L (ref 95–110)
CO2 SERPL-SCNC: 31 MMOL/L (ref 23–29)
CREAT SERPL-MCNC: 0.9 MG/DL (ref 0.5–1.4)
EST. GFR  (AFRICAN AMERICAN): >60 ML/MIN/1.73 M^2
EST. GFR  (NON AFRICAN AMERICAN): >60 ML/MIN/1.73 M^2
GLUCOSE SERPL-MCNC: 91 MG/DL (ref 70–110)
POTASSIUM SERPL-SCNC: 3 MMOL/L (ref 3.5–5.1)
SODIUM SERPL-SCNC: 140 MMOL/L (ref 136–145)

## 2021-10-05 PROCEDURE — 1126F PR PAIN SEVERITY QUANTIFIED, NO PAIN PRESENT: ICD-10-PCS | Mod: CPTII,S$GLB,, | Performed by: PHYSICIAN ASSISTANT

## 2021-10-05 PROCEDURE — 1159F MED LIST DOCD IN RCRD: CPT | Mod: CPTII,S$GLB,, | Performed by: PHYSICIAN ASSISTANT

## 2021-10-05 PROCEDURE — 3288F PR FALLS RISK ASSESSMENT DOCUMENTED: ICD-10-PCS | Mod: CPTII,S$GLB,, | Performed by: PHYSICIAN ASSISTANT

## 2021-10-05 PROCEDURE — 3288F FALL RISK ASSESSMENT DOCD: CPT | Mod: CPTII,S$GLB,, | Performed by: PHYSICIAN ASSISTANT

## 2021-10-05 PROCEDURE — 1101F PR PT FALLS ASSESS DOC 0-1 FALLS W/OUT INJ PAST YR: ICD-10-PCS | Mod: CPTII,S$GLB,, | Performed by: PHYSICIAN ASSISTANT

## 2021-10-05 PROCEDURE — 3075F SYST BP GE 130 - 139MM HG: CPT | Mod: CPTII,S$GLB,, | Performed by: PHYSICIAN ASSISTANT

## 2021-10-05 PROCEDURE — 3079F PR MOST RECENT DIASTOLIC BLOOD PRESSURE 80-89 MM HG: ICD-10-PCS | Mod: CPTII,S$GLB,, | Performed by: PHYSICIAN ASSISTANT

## 2021-10-05 PROCEDURE — 1160F RVW MEDS BY RX/DR IN RCRD: CPT | Mod: CPTII,S$GLB,, | Performed by: PHYSICIAN ASSISTANT

## 2021-10-05 PROCEDURE — 1126F AMNT PAIN NOTED NONE PRSNT: CPT | Mod: CPTII,S$GLB,, | Performed by: PHYSICIAN ASSISTANT

## 2021-10-05 PROCEDURE — 99214 PR OFFICE/OUTPT VISIT, EST, LEVL IV, 30-39 MIN: ICD-10-PCS | Mod: S$GLB,,, | Performed by: PHYSICIAN ASSISTANT

## 2021-10-05 PROCEDURE — 99999 PR PBB SHADOW E&M-EST. PATIENT-LVL V: ICD-10-PCS | Mod: PBBFAC,,, | Performed by: PHYSICIAN ASSISTANT

## 2021-10-05 PROCEDURE — 36415 COLL VENOUS BLD VENIPUNCTURE: CPT | Mod: PO | Performed by: FAMILY MEDICINE

## 2021-10-05 PROCEDURE — 4010F PR ACE/ARB THEARPY RXD/TAKEN: ICD-10-PCS | Mod: CPTII,S$GLB,, | Performed by: PHYSICIAN ASSISTANT

## 2021-10-05 PROCEDURE — 1159F PR MEDICATION LIST DOCUMENTED IN MEDICAL RECORD: ICD-10-PCS | Mod: CPTII,S$GLB,, | Performed by: PHYSICIAN ASSISTANT

## 2021-10-05 PROCEDURE — 3008F PR BODY MASS INDEX (BMI) DOCUMENTED: ICD-10-PCS | Mod: CPTII,S$GLB,, | Performed by: PHYSICIAN ASSISTANT

## 2021-10-05 PROCEDURE — 3008F BODY MASS INDEX DOCD: CPT | Mod: CPTII,S$GLB,, | Performed by: PHYSICIAN ASSISTANT

## 2021-10-05 PROCEDURE — 3079F DIAST BP 80-89 MM HG: CPT | Mod: CPTII,S$GLB,, | Performed by: PHYSICIAN ASSISTANT

## 2021-10-05 PROCEDURE — 1160F PR REVIEW ALL MEDS BY PRESCRIBER/CLIN PHARMACIST DOCUMENTED: ICD-10-PCS | Mod: CPTII,S$GLB,, | Performed by: PHYSICIAN ASSISTANT

## 2021-10-05 PROCEDURE — 99214 OFFICE O/P EST MOD 30 MIN: CPT | Mod: S$GLB,,, | Performed by: PHYSICIAN ASSISTANT

## 2021-10-05 PROCEDURE — 4010F ACE/ARB THERAPY RXD/TAKEN: CPT | Mod: CPTII,S$GLB,, | Performed by: PHYSICIAN ASSISTANT

## 2021-10-05 PROCEDURE — 3075F PR MOST RECENT SYSTOLIC BLOOD PRESS GE 130-139MM HG: ICD-10-PCS | Mod: CPTII,S$GLB,, | Performed by: PHYSICIAN ASSISTANT

## 2021-10-05 PROCEDURE — 1101F PT FALLS ASSESS-DOCD LE1/YR: CPT | Mod: CPTII,S$GLB,, | Performed by: PHYSICIAN ASSISTANT

## 2021-10-05 PROCEDURE — 80048 BASIC METABOLIC PNL TOTAL CA: CPT | Performed by: FAMILY MEDICINE

## 2021-10-05 PROCEDURE — 99999 PR PBB SHADOW E&M-EST. PATIENT-LVL V: CPT | Mod: PBBFAC,,, | Performed by: PHYSICIAN ASSISTANT

## 2021-10-05 RX ORDER — OLMESARTAN MEDOXOMIL 20 MG/1
30 TABLET ORAL DAILY
COMMUNITY
End: 2022-01-26 | Stop reason: SDUPTHER

## 2021-10-09 ENCOUNTER — IMMUNIZATION (OUTPATIENT)
Dept: FAMILY MEDICINE | Facility: CLINIC | Age: 71
End: 2021-10-09
Payer: MEDICARE

## 2021-10-09 PROCEDURE — G0008 FLU VACCINE - QUADRIVALENT - ADJUVANTED: ICD-10-PCS | Mod: S$GLB,,, | Performed by: FAMILY MEDICINE

## 2021-10-09 PROCEDURE — G0008 ADMIN INFLUENZA VIRUS VAC: HCPCS | Mod: S$GLB,,, | Performed by: FAMILY MEDICINE

## 2021-10-09 PROCEDURE — 90694 FLU VACCINE - QUADRIVALENT - ADJUVANTED: ICD-10-PCS | Mod: S$GLB,,, | Performed by: FAMILY MEDICINE

## 2021-10-09 PROCEDURE — 90694 VACC AIIV4 NO PRSRV 0.5ML IM: CPT | Mod: S$GLB,,, | Performed by: FAMILY MEDICINE

## 2021-10-20 ENCOUNTER — LAB VISIT (OUTPATIENT)
Dept: LAB | Facility: HOSPITAL | Age: 71
End: 2021-10-20
Attending: FAMILY MEDICINE
Payer: MEDICARE

## 2021-10-20 DIAGNOSIS — I10 ESSENTIAL HYPERTENSION: ICD-10-CM

## 2021-10-20 LAB — POTASSIUM SERPL-SCNC: 3.1 MMOL/L (ref 3.5–5.1)

## 2021-10-20 PROCEDURE — 84132 ASSAY OF SERUM POTASSIUM: CPT | Performed by: FAMILY MEDICINE

## 2021-10-20 PROCEDURE — 36415 COLL VENOUS BLD VENIPUNCTURE: CPT | Mod: PO | Performed by: FAMILY MEDICINE

## 2021-10-25 DIAGNOSIS — E87.6 DIURETIC-INDUCED HYPOKALEMIA: Primary | ICD-10-CM

## 2021-10-25 DIAGNOSIS — T50.2X5A DIURETIC-INDUCED HYPOKALEMIA: Primary | ICD-10-CM

## 2021-10-25 RX ORDER — POTASSIUM CHLORIDE 20 MEQ/1
20 TABLET, EXTENDED RELEASE ORAL 2 TIMES DAILY
Qty: 180 TABLET | Refills: 3 | Status: SHIPPED | OUTPATIENT
Start: 2021-10-25 | End: 2022-12-16

## 2021-11-04 ENCOUNTER — LAB VISIT (OUTPATIENT)
Dept: LAB | Facility: HOSPITAL | Age: 71
End: 2021-11-04
Attending: FAMILY MEDICINE
Payer: MEDICARE

## 2021-11-04 DIAGNOSIS — I10 ESSENTIAL HYPERTENSION: ICD-10-CM

## 2021-11-04 LAB — POTASSIUM SERPL-SCNC: 3.4 MMOL/L (ref 3.5–5.1)

## 2021-11-04 PROCEDURE — 36415 COLL VENOUS BLD VENIPUNCTURE: CPT | Mod: PO | Performed by: FAMILY MEDICINE

## 2021-11-04 PROCEDURE — 84132 ASSAY OF SERUM POTASSIUM: CPT | Performed by: FAMILY MEDICINE

## 2021-11-30 ENCOUNTER — TELEPHONE (OUTPATIENT)
Dept: FAMILY MEDICINE | Facility: CLINIC | Age: 71
End: 2021-11-30
Payer: MEDICARE

## 2021-12-01 ENCOUNTER — CLINICAL SUPPORT (OUTPATIENT)
Dept: FAMILY MEDICINE | Facility: CLINIC | Age: 71
End: 2021-12-01
Payer: MEDICARE

## 2021-12-01 VITALS
SYSTOLIC BLOOD PRESSURE: 130 MMHG | DIASTOLIC BLOOD PRESSURE: 82 MMHG | HEART RATE: 67 BPM | OXYGEN SATURATION: 98 % | RESPIRATION RATE: 16 BRPM | TEMPERATURE: 98 F

## 2021-12-01 PROCEDURE — 99999 PR PBB SHADOW E&M-EST. PATIENT-LVL II: CPT | Mod: PBBFAC,,,

## 2021-12-01 PROCEDURE — 99999 PR PBB SHADOW E&M-EST. PATIENT-LVL II: ICD-10-PCS | Mod: PBBFAC,,,

## 2021-12-15 ENCOUNTER — LAB VISIT (OUTPATIENT)
Dept: LAB | Facility: HOSPITAL | Age: 71
End: 2021-12-15
Attending: PHYSICIAN ASSISTANT
Payer: MEDICARE

## 2021-12-15 DIAGNOSIS — E78.5 HYPERLIPIDEMIA, UNSPECIFIED HYPERLIPIDEMIA TYPE: Chronic | ICD-10-CM

## 2021-12-15 DIAGNOSIS — N13.8 BPH WITH OBSTRUCTION/LOWER URINARY TRACT SYMPTOMS: ICD-10-CM

## 2021-12-15 DIAGNOSIS — Z12.5 ENCOUNTER FOR SCREENING FOR MALIGNANT NEOPLASM OF PROSTATE: ICD-10-CM

## 2021-12-15 DIAGNOSIS — I10 ESSENTIAL HYPERTENSION: ICD-10-CM

## 2021-12-15 DIAGNOSIS — N40.1 BPH WITH OBSTRUCTION/LOWER URINARY TRACT SYMPTOMS: ICD-10-CM

## 2021-12-15 LAB
ALBUMIN SERPL BCP-MCNC: 3.6 G/DL (ref 3.5–5.2)
ALP SERPL-CCNC: 60 U/L (ref 55–135)
ALT SERPL W/O P-5'-P-CCNC: 21 U/L (ref 10–44)
ANION GAP SERPL CALC-SCNC: 11 MMOL/L (ref 8–16)
AST SERPL-CCNC: 25 U/L (ref 10–40)
BILIRUB SERPL-MCNC: 2.1 MG/DL (ref 0.1–1)
BUN SERPL-MCNC: 15 MG/DL (ref 8–23)
CALCIUM SERPL-MCNC: 9.4 MG/DL (ref 8.7–10.5)
CHLORIDE SERPL-SCNC: 98 MMOL/L (ref 95–110)
CHOLEST SERPL-MCNC: 130 MG/DL (ref 120–199)
CHOLEST/HDLC SERPL: 2.4 {RATIO} (ref 2–5)
CO2 SERPL-SCNC: 31 MMOL/L (ref 23–29)
COMPLEXED PSA SERPL-MCNC: 4.6 NG/ML (ref 0–4)
CREAT SERPL-MCNC: 1.1 MG/DL (ref 0.5–1.4)
EST. GFR  (AFRICAN AMERICAN): >60 ML/MIN/1.73 M^2
EST. GFR  (NON AFRICAN AMERICAN): >60 ML/MIN/1.73 M^2
GLUCOSE SERPL-MCNC: 92 MG/DL (ref 70–110)
HDLC SERPL-MCNC: 55 MG/DL (ref 40–75)
HDLC SERPL: 42.3 % (ref 20–50)
LDLC SERPL CALC-MCNC: 63 MG/DL (ref 63–159)
NONHDLC SERPL-MCNC: 75 MG/DL
POTASSIUM SERPL-SCNC: 3 MMOL/L (ref 3.5–5.1)
PROT SERPL-MCNC: 6.5 G/DL (ref 6–8.4)
SODIUM SERPL-SCNC: 140 MMOL/L (ref 136–145)
TRIGL SERPL-MCNC: 60 MG/DL (ref 30–150)

## 2021-12-15 PROCEDURE — 36415 COLL VENOUS BLD VENIPUNCTURE: CPT | Mod: PO | Performed by: PHYSICIAN ASSISTANT

## 2021-12-15 PROCEDURE — 84153 ASSAY OF PSA TOTAL: CPT | Performed by: PHYSICIAN ASSISTANT

## 2021-12-15 PROCEDURE — 80053 COMPREHEN METABOLIC PANEL: CPT | Performed by: PHYSICIAN ASSISTANT

## 2021-12-15 PROCEDURE — 80061 LIPID PANEL: CPT | Performed by: PHYSICIAN ASSISTANT

## 2022-01-03 ENCOUNTER — PATIENT MESSAGE (OUTPATIENT)
Dept: ADMINISTRATIVE | Facility: OTHER | Age: 72
End: 2022-01-03
Payer: MEDICARE

## 2022-01-30 DIAGNOSIS — I10 ESSENTIAL HYPERTENSION: ICD-10-CM

## 2022-01-30 DIAGNOSIS — E78.5 HYPERLIPIDEMIA, UNSPECIFIED HYPERLIPIDEMIA TYPE: Chronic | ICD-10-CM

## 2022-01-30 NOTE — TELEPHONE ENCOUNTER
No new care gaps identified.  Powered by [employer]. Reference number: [id number].   1/30/2022 3:38:28 AM CST

## 2022-02-10 ENCOUNTER — TELEPHONE (OUTPATIENT)
Dept: FAMILY MEDICINE | Facility: CLINIC | Age: 72
End: 2022-02-10
Payer: MEDICARE

## 2022-02-10 RX ORDER — PRAVASTATIN SODIUM 40 MG/1
TABLET ORAL
Qty: 90 TABLET | Refills: 1 | Status: SHIPPED | OUTPATIENT
Start: 2022-02-10 | End: 2022-06-02 | Stop reason: SDUPTHER

## 2022-02-10 NOTE — TELEPHONE ENCOUNTER
----- Message from Kassidy Humphries sent at 2/10/2022 10:36 AM CST -----  Contact: pt  Type: Needs Medical Advice  Who Called:  pt   Pharmacy name and phone #:    [id number] - [place] - [address] AT [facility] [address]  [address] [place] [address]  Phone: [phone] Fax: [phone]      Best Call Back Number: [phone]    Additional Information: please call pt regarding Rx refill request, pharmacy will not refill

## 2022-02-10 NOTE — TELEPHONE ENCOUNTER
Refill Authorization Note   Kulwinder Hogue  is requesting a refill authorization.  Brief Assessment and Rationale for Refill:  Approve     Medication Therapy Plan:       Medication Reconciliation Completed: No   Comments:   --->Care Gap information included below if applicable.   Orders Placed This Encounter    pravastatin (PRAVACHOL) 40 MG tablet      Requested Prescriptions   Signed Prescriptions Disp Refills    pravastatin (PRAVACHOL) 40 MG tablet 90 tablet 1     Sig: TAKE 1 TABLET BY MOUTH ONCE DAILY       Cardiovascular:  Antilipid - Statins Passed - 1/30/2022  3:38 AM        Passed - Patient is at least 18 years old        Passed - Valid encounter within last 15 months     Recent Visits  Date Type Provider Dept   08/24/21 Office Visit Henrique Luog MD [facility]   06/28/21 Office Visit Henrique Lugo MD [facility]   12/28/20 Office Visit Henrique Lugo MD [facility]   06/25/20 Office Visit Henrique Lugo MD [facility]   Showing recent visits within past 720 days and meeting all other requirements  Future Appointments  No visits were found meeting these conditions.  Showing future appointments within next 150 days and meeting all other requirements      Future Appointments              In 1 month Jamal Jain MD [place] [place]    In 2 months Henrique Lugo MD [place] [facility] [place]                Passed - ALT is 131 or below and within 360 days     ALT   Date Value Ref Range Status   12/15/2021 21 10 - 44 U/L Final   06/21/2021 19 10 - 44 U/L Final   12/04/2020 21 10 - 44 U/L Final              Passed - AST is 119 or below and within 360 days     AST   Date Value Ref Range Status   12/15/2021 25 10 - 40 U/L Final   06/21/2021 22 10 - 40 U/L Final   12/04/2020 23 10 - 40 U/L Final              Passed - Total Cholesterol within 360 days     Lab Results   Component Value Date    CHOL 130 12/15/2021    CHOL 138 12/04/2020    CHOL 145 11/18/2019               Passed - LDL within 360 days     LDL Cholesterol   Date Value Ref Range Status   12/15/2021 63.0 63.0 - 159.0 mg/dL Final     Comment:     The National Cholesterol Education Program (NCEP) has set the  following guidelines (reference values) for LDL Cholesterol:  Optimal.......................<130 mg/dL  Borderline High...............130-159 mg/dL  High..........................160-189 mg/dL  Very High.....................>190 mg/dL              Passed - HDL within 360 days     HDL   Date Value Ref Range Status   12/15/2021 55 40 - 75 mg/dL Final     Comment:     The National Cholesterol Education Program (NCEP) has set the  following guidelines (reference values) for HDL Cholesterol:  Low...............<40 mg/dL  Optimal...........>60 mg/dL              Passed - Triglycerides within 360 days     Lab Results   Component Value Date    TRIG 60 12/15/2021    TRIG 44 12/04/2020    TRIG 131 11/18/2019                  Appointments  past 12m or future 3m with PCP    Date Provider   Last Visit   8/24/2021 Henrique Lugo MD   Next Visit   2/3/2022 Henrique Lugo MD   ED visits in past 90 days: 0     Note composed:11:56 AM 02/10/2022

## 2022-04-06 ENCOUNTER — TELEPHONE (OUTPATIENT)
Dept: FAMILY MEDICINE | Facility: CLINIC | Age: 72
End: 2022-04-06
Payer: MEDICARE

## 2022-04-06 NOTE — TELEPHONE ENCOUNTER
I spoke with pt via phone, I advised him that Dr. Plata is currently not taking any pts over the age of 65 . Will see Dr. Mason to est care.       ----- Message from Marie Simpson sent at 4/6/2022  9:25 AM CDT -----  Type:  Sooner Apoointment Request    Caller is requesting a sooner appointment.  Caller declined first available appointment listed below.  Caller will not accept being placed on the waitlist and is requesting a message be sent to doctor.  Name of Caller:Kulwinder Rehman     When is the first available appointment?No available appointment     Symptoms:Annual check up Est care needing a new pcp Doctor     Would the patient rather a call back or a response via [facility]? Call back     Best Call Back Number:[phone] (mobile)     Additional Information:

## 2022-04-12 ENCOUNTER — LAB VISIT (OUTPATIENT)
Dept: LAB | Facility: HOSPITAL | Age: 72
End: 2022-04-12
Attending: UROLOGY
Payer: MEDICARE

## 2022-04-12 ENCOUNTER — OFFICE VISIT (OUTPATIENT)
Dept: UROLOGY | Facility: CLINIC | Age: 72
End: 2022-04-12
Payer: MEDICARE

## 2022-04-12 VITALS
SYSTOLIC BLOOD PRESSURE: 161 MMHG | DIASTOLIC BLOOD PRESSURE: 101 MMHG | HEIGHT: 72 IN | BODY MASS INDEX: 22.69 KG/M2 | WEIGHT: 167.56 LBS | HEART RATE: 66 BPM

## 2022-04-12 DIAGNOSIS — R97.20 ELEVATED PSA: ICD-10-CM

## 2022-04-12 DIAGNOSIS — N48.6 PEYRONIE'S DISEASE: ICD-10-CM

## 2022-04-12 DIAGNOSIS — N40.0 BPH WITHOUT OBSTRUCTION/LOWER URINARY TRACT SYMPTOMS: Primary | ICD-10-CM

## 2022-04-12 DIAGNOSIS — N43.3 RIGHT HYDROCELE: ICD-10-CM

## 2022-04-12 DIAGNOSIS — R31.29 MICROHEMATURIA: ICD-10-CM

## 2022-04-12 LAB
ANION GAP SERPL CALC-SCNC: 9 MMOL/L (ref 8–16)
BILIRUB SERPL-MCNC: ABNORMAL MG/DL
BLOOD URINE, POC: ABNORMAL
BUN SERPL-MCNC: 14 MG/DL (ref 8–23)
CALCIUM SERPL-MCNC: 9.4 MG/DL (ref 8.7–10.5)
CHLORIDE SERPL-SCNC: 101 MMOL/L (ref 95–110)
CLARITY, POC UA: CLEAR
CO2 SERPL-SCNC: 30 MMOL/L (ref 23–29)
COLOR, POC UA: YELLOW
CREAT SERPL-MCNC: 1 MG/DL (ref 0.5–1.4)
EST. GFR  (AFRICAN AMERICAN): >60 ML/MIN/1.73 M^2
EST. GFR  (NON AFRICAN AMERICAN): >60 ML/MIN/1.73 M^2
GLUCOSE SERPL-MCNC: 100 MG/DL (ref 70–110)
GLUCOSE UR QL STRIP: ABNORMAL
KETONES UR QL STRIP: ABNORMAL
LEUKOCYTE ESTERASE URINE, POC: ABNORMAL
MICROSCOPIC COMMENT: NORMAL
NITRITE, POC UA: ABNORMAL
PH, POC UA: 6
POC RESIDUAL URINE VOLUME: 0 ML (ref 0–100)
POTASSIUM SERPL-SCNC: 4.3 MMOL/L (ref 3.5–5.1)
PROSTATE SPECIFIC ANTIGEN, TOTAL: 4.8 NG/ML (ref 0–4)
PROTEIN, POC: ABNORMAL
PSA FREE MFR SERPL: 20 %
PSA FREE SERPL-MCNC: 0.96 NG/ML (ref 0–1.5)
RBC #/AREA URNS HPF: 1 /HPF (ref 0–4)
SODIUM SERPL-SCNC: 140 MMOL/L (ref 136–145)
SPECIFIC GRAVITY, POC UA: 1.02
UROBILINOGEN, POC UA: 0.2
WBC #/AREA URNS HPF: 1 /HPF (ref 0–5)

## 2022-04-12 PROCEDURE — 99999 PR PBB SHADOW E&M-EST. PATIENT-LVL III: ICD-10-PCS | Mod: PBBFAC,,, | Performed by: UROLOGY

## 2022-04-12 PROCEDURE — 3080F PR MOST RECENT DIASTOLIC BLOOD PRESSURE >= 90 MM HG: ICD-10-PCS | Mod: CPTII,S$GLB,, | Performed by: UROLOGY

## 2022-04-12 PROCEDURE — 1126F AMNT PAIN NOTED NONE PRSNT: CPT | Mod: CPTII,S$GLB,, | Performed by: UROLOGY

## 2022-04-12 PROCEDURE — 99215 OFFICE O/P EST HI 40 MIN: CPT | Mod: S$GLB,,, | Performed by: UROLOGY

## 2022-04-12 PROCEDURE — 1101F PT FALLS ASSESS-DOCD LE1/YR: CPT | Mod: CPTII,S$GLB,, | Performed by: UROLOGY

## 2022-04-12 PROCEDURE — 99215 PR OFFICE/OUTPT VISIT, EST, LEVL V, 40-54 MIN: ICD-10-PCS | Mod: S$GLB,,, | Performed by: UROLOGY

## 2022-04-12 PROCEDURE — 80048 BASIC METABOLIC PNL TOTAL CA: CPT | Performed by: UROLOGY

## 2022-04-12 PROCEDURE — 36415 COLL VENOUS BLD VENIPUNCTURE: CPT | Performed by: UROLOGY

## 2022-04-12 PROCEDURE — 1160F RVW MEDS BY RX/DR IN RCRD: CPT | Mod: CPTII,S$GLB,, | Performed by: UROLOGY

## 2022-04-12 PROCEDURE — 84153 ASSAY OF PSA TOTAL: CPT | Performed by: UROLOGY

## 2022-04-12 PROCEDURE — 3288F FALL RISK ASSESSMENT DOCD: CPT | Mod: CPTII,S$GLB,, | Performed by: UROLOGY

## 2022-04-12 PROCEDURE — 3077F SYST BP >= 140 MM HG: CPT | Mod: CPTII,S$GLB,, | Performed by: UROLOGY

## 2022-04-12 PROCEDURE — 1126F PR PAIN SEVERITY QUANTIFIED, NO PAIN PRESENT: ICD-10-PCS | Mod: CPTII,S$GLB,, | Performed by: UROLOGY

## 2022-04-12 PROCEDURE — 81002 URINALYSIS NONAUTO W/O SCOPE: CPT | Mod: S$GLB,,, | Performed by: UROLOGY

## 2022-04-12 PROCEDURE — 1159F PR MEDICATION LIST DOCUMENTED IN MEDICAL RECORD: ICD-10-PCS | Mod: CPTII,S$GLB,, | Performed by: UROLOGY

## 2022-04-12 PROCEDURE — 1159F MED LIST DOCD IN RCRD: CPT | Mod: CPTII,S$GLB,, | Performed by: UROLOGY

## 2022-04-12 PROCEDURE — 99999 PR PBB SHADOW E&M-EST. PATIENT-LVL III: CPT | Mod: PBBFAC,,, | Performed by: UROLOGY

## 2022-04-12 PROCEDURE — 4010F PR ACE/ARB THEARPY RXD/TAKEN: ICD-10-PCS | Mod: CPTII,S$GLB,, | Performed by: UROLOGY

## 2022-04-12 PROCEDURE — 1160F PR REVIEW ALL MEDS BY PRESCRIBER/CLIN PHARMACIST DOCUMENTED: ICD-10-PCS | Mod: CPTII,S$GLB,, | Performed by: UROLOGY

## 2022-04-12 PROCEDURE — 51798 US URINE CAPACITY MEASURE: CPT | Mod: S$GLB,,, | Performed by: UROLOGY

## 2022-04-12 PROCEDURE — 81002 POCT URINE DIPSTICK WITHOUT MICROSCOPE: ICD-10-PCS | Mod: S$GLB,,, | Performed by: UROLOGY

## 2022-04-12 PROCEDURE — 3008F BODY MASS INDEX DOCD: CPT | Mod: CPTII,S$GLB,, | Performed by: UROLOGY

## 2022-04-12 PROCEDURE — 3008F PR BODY MASS INDEX (BMI) DOCUMENTED: ICD-10-PCS | Mod: CPTII,S$GLB,, | Performed by: UROLOGY

## 2022-04-12 PROCEDURE — 3077F PR MOST RECENT SYSTOLIC BLOOD PRESSURE >= 140 MM HG: ICD-10-PCS | Mod: CPTII,S$GLB,, | Performed by: UROLOGY

## 2022-04-12 PROCEDURE — 3288F PR FALLS RISK ASSESSMENT DOCUMENTED: ICD-10-PCS | Mod: CPTII,S$GLB,, | Performed by: UROLOGY

## 2022-04-12 PROCEDURE — 81000 URINALYSIS NONAUTO W/SCOPE: CPT | Performed by: UROLOGY

## 2022-04-12 PROCEDURE — 3080F DIAST BP >= 90 MM HG: CPT | Mod: CPTII,S$GLB,, | Performed by: UROLOGY

## 2022-04-12 PROCEDURE — 51798 POCT BLADDER SCAN: ICD-10-PCS | Mod: S$GLB,,, | Performed by: UROLOGY

## 2022-04-12 PROCEDURE — 4010F ACE/ARB THERAPY RXD/TAKEN: CPT | Mod: CPTII,S$GLB,, | Performed by: UROLOGY

## 2022-04-12 PROCEDURE — 1101F PR PT FALLS ASSESS DOC 0-1 FALLS W/OUT INJ PAST YR: ICD-10-PCS | Mod: CPTII,S$GLB,, | Performed by: UROLOGY

## 2022-04-12 NOTE — PROGRESS NOTES
[facility] Urology Progress Note     Kulwinder Hogue Jr. is a 71 y.o. male with history of BPH and R hydrocele who presents for annual follow up     Hx pathologic BPH since prostate biopsy and cystoscopy 10/25/16 with Dr Mancia: 65.7cc gland and evidence of GUILLORY from lateral lobes with slight median lobe and grade 3 trabecs in bladder.  3T MRI at [facility] 5/25/17: Prostate size 63 mL.  Intravesical protrusion of the prostate 10 mm. PIRAD 2 only, BPH, prostatitis. psa on 10/12/18 of 4.3 (21.16% free). Confirm mdx of biopsy negative. FU Cysto/TRUS 12/4/18: 91.5cm3 with sig trilobar obstruction and only slit-like opening at bladder neck from middle lobe obst.   Also noted curvature with erections for about 1 year up 30°, and can get erections.     TURP 2/13/19 uncomplicated. 12.4g BPH. Clot retention on 2/15/19 requiring extensive efforts at manual irrigation and overnight observation with CBI. PVR 0cc 1 month postop   2 mos postop had er visit with concern for retention but voided and day later passed a stone (? Prostatic calc 80% CaPhos, 10% CaOxDi) and 1 week later returned to ER for dribbling and feeling of obstruction in his urethra. White urethral calculus/sediment over the distal penile urethra removed with sterile forceps and patient voided approximately 150 cc of clear yellow urine and felt significantly improved. And on f/u noted split streaming, though voiding better. No gross external meatal stenosis. Blue meatal dilator appreciated some fossa navicularis narrowing/meatal stenosis, passively dilated and unobstructed, feeling it breakthrough some scar tissue. Given taper protocol. No stones, only prostate calcs, on CT  6/25/19 : AUA SS: 4/3 (2: nocturia, 1: frequency, urgency). Good stream no split/spray, good  Water intake, still NTF 1-2x good volumes, never pursued sleep study, active  12/30/19: AUA SS 3/2,  but not bothered by LUTS. Advised try dilator use again and fu routine     5/2020 concern of scrotal  swelling 1-2 months, noticeable as wears tight briefs. US scrotum with large 6.2cm R hydrocele and a few intratesticular subcm cysts  Discussed sleep study with dr kramer, decided not needed no daytime sleepiness etc but started melatonin for better sleep. Still good strong steam w/o intermitt or spraying  No narrowing of meatus. No urgency. No dribbling. No hematuria dysuria.  Uroflow 7/15/20: good bell shaped unobstructed voiding curve, though average is affected by some trailing off towards end of stream. He did void 537cc with initial pvr of 259cc which likely represents over-filling, as was then able to void a 2nd time with PVR of 87cc which is resonable. Qm 35cc/s, Qa 11.7cc/s.     Last seen 12/2020. psa 2.6 on 12/4/20, AUA SS 6/3 (2: nocturia; 1: frequency, urgency, weak stream, straining). Good stream. PVR 42cc  45 degree dorsal curvature on his own, now some trouble getting and/or keeping erection. 5mg melatonin. May get up 1-2x.   no visble meatal stenosis, mild R hydrocele, dorsal plaque. --> Started viagra, planned PD eval with dr gaytan 3/21 (canceled)    He returns today noting  PSA 4.6 on 12/15/21, as ordered by primary care. Normal T 403 in 6/21  R hydrocele stable, no increase in size, hasnt bothered him  Still has dorsal curve but not bothering him nor affecting erections or intercourse at this time  AUA SS: 3/2, mostly sat (2 intermittency, 1 straining). PVR 0cc  No split stream, emptying well  Udip small blood.   Switching pcps currently - ests with Dr Mason in june    Focused Physical Exam:    Vitals:    04/12/22 0819   BP: (!) 161/101   Pulse: 66     Body mass index is 22.72 kg/m². Weight: 76 kg (167 lb 8.8 oz) Height: 6' (182.9 cm)     Abdomen: Soft, non-tender, nondistended, no CVA tenderness  :  circ normal phallus without plaques/lesions, orthotopic urethral meatus, no stenosis, bilaterally desc testes in normal scrotum with trc right hydrocele, testis palpable normal  ALMA: normal  sphincter tone, no masses, no hemmorrhoids   PROSTATE: 40g, no nodules, non-tender, symmetrical.       Recent Results (from the past 336 hour(s))   POCT Bladder Scan    Collection Time: 04/12/22  8:32 AM   Result Value Ref Range    POC Residual Urine Volume 0 0 - 100 mL   POCT URINE DIPSTICK WITHOUT MICROSCOPE    Collection Time: 04/12/22  8:34 AM   Result Value Ref Range    Color, UA Yellow     pH, UA 6     WBC, UA neg     Nitrite, UA neg     Protein, POC neg     Glucose, UA neg     Ketones, UA neg     Urobilinogen, UA 0.2     Bilirubin, POC neg     Blood, UA small     Clarity, UA Clear     Spec Grav UA 1.025        ASSESSMENT   1. BPH without obstruction/lower urinary tract symptoms  POCT Bladder Scan    POCT URINE DIPSTICK WITHOUT MICROSCOPE   2. Elevated PSA  PSA, Total and Free    Basic Metabolic Panel    PSA, Total and Free   3. Right hydrocele     4. Peyronie's disease     5. Microhematuria  Urinalysis Microscopic       Plan    Overall doing very well with his multiple urologic issues.  Now 3 years status post transurethral resection of prostate with initially complex and protracted course including hematuria, meatal stenosis, passage of prostate calcifications.  He did use meatal dilator taper protocol and since has had on obstructive voiding.  Today he has no significant bothersome urinary symptoms, no recurrence of meatal stenosis, and empties well with a postvoid residual is 0 cc.  No urinary complaints.  No urinary medications.  Does have small blood on urine dipstick, without any gross hematuria so will check a urinalysis microscopic exam to rule out micro hematuria and chart check and advise if further evaluation is needed.  Otherwise will follow up his urinalysis at each subsequent visit    He does however have a recent acute elevation in his PSA from his post TURP baseline as per his primary care labs in December of 2021. We did review his previous long history of elevated PSA which had negative  biopsy and negative confirm MDX prior to BPH interventions, and his PSA responded appropriately after TURP, but has jumped up in the last year and is out elevated again.  His digital rectal exam is benign and did advise at 1st starting with free and total PSA.  We reviewed the significance of free PSA percentage noting greater than 30% indicates single digit risk of malignancy and if PSA is stable or improved and free PSA is normal, will follow every 6 months for at least 1 year.  If PSA continues to rise or free PSA is borderline recommended MRI of the prostate to rule out any clinically significant index lesions.  Again if MRI is performed, and negative, would recheck in 6 months.  Six-month lab visit set.  Will chart check labs today.    We otherwise reviewed the benign nature of right hydrocele, it is small, it is not bothersome to him, will continue observation.  And also briefly reviewed his Peyronie's disease and penile curvature which at this time is not bothersome to him or impacting quality of life.  We did review the possibility of evaluation for office based management such as with Xiaflex with Dr Cunningham in the future if he desires        Total time spent in/on encounter today, including face to face time with patient, counseling, medical record review, interpretation of tests/results, , and treatment plan coordination: 40 minutes

## 2022-04-17 DIAGNOSIS — R97.20 ELEVATED PSA: Primary | ICD-10-CM

## 2022-04-18 NOTE — PROGRESS NOTES
As per discussion at last o/v, given sustained re-elevation of psa post turp with borderline free psa (normal >30%), would recommend mri prostate as next step. Labs current 4/12 so schedule MRI prior to 5/11 and will chart check and advise

## 2022-05-03 ENCOUNTER — HOSPITAL ENCOUNTER (OUTPATIENT)
Dept: RADIOLOGY | Facility: HOSPITAL | Age: 72
Discharge: HOME | End: 2022-05-03
Attending: UROLOGY
Payer: MEDICARE

## 2022-05-03 DIAGNOSIS — R97.20 ELEVATED PSA: ICD-10-CM

## 2022-05-03 PROCEDURE — 25500020 PHARM REV CODE 255: Performed by: UROLOGY

## 2022-05-03 PROCEDURE — A9585 GADOBUTROL INJECTION: HCPCS | Performed by: UROLOGY

## 2022-05-03 PROCEDURE — 72197 MRI PELVIS W/O & W/DYE: CPT | Mod: 26,,, | Performed by: RADIOLOGY

## 2022-05-03 PROCEDURE — 72197 MRI PELVIS W/O & W/DYE: CPT | Mod: TC

## 2022-05-03 PROCEDURE — 72197 MRI PROSTATE W W/O CONTRAST: ICD-10-PCS | Mod: 26,,, | Performed by: RADIOLOGY

## 2022-05-03 RX ORDER — GADOBUTROL 604.72 MG/ML
7 INJECTION INTRAVENOUS
Status: COMPLETED | OUTPATIENT
Start: 2022-05-03 | End: 2022-05-03

## 2022-05-03 RX ADMIN — GADOBUTROL 7 ML: 604.72 INJECTION INTRAVENOUS at 11:05

## 2022-05-16 ENCOUNTER — TELEPHONE (OUTPATIENT)
Dept: UROLOGY | Facility: CLINIC | Age: 72
End: 2022-05-16
Payer: MEDICARE

## 2022-05-16 DIAGNOSIS — R97.20 ELEVATED PSA: Primary | ICD-10-CM

## 2022-05-16 NOTE — PROGRESS NOTES
Mri negative, so with past negative biopsy and confirm mdx prior to turp which was benign, ok to follow free/total psa Q6 mos as planned. If continues to rise in 6 months will rec repeat biopsy, and if stable, repeat 6 mos later prior to annual as planned

## 2022-06-02 ENCOUNTER — OFFICE VISIT (OUTPATIENT)
Dept: FAMILY MEDICINE | Facility: CLINIC | Age: 72
End: 2022-06-02
Payer: MEDICARE

## 2022-06-02 ENCOUNTER — LAB VISIT (OUTPATIENT)
Dept: LAB | Facility: HOSPITAL | Age: 72
End: 2022-06-02
Attending: UROLOGY
Payer: MEDICARE

## 2022-06-02 VITALS
WEIGHT: 165.13 LBS | HEIGHT: 72 IN | HEART RATE: 79 BPM | SYSTOLIC BLOOD PRESSURE: 130 MMHG | TEMPERATURE: 99 F | DIASTOLIC BLOOD PRESSURE: 70 MMHG | BODY MASS INDEX: 22.37 KG/M2 | RESPIRATION RATE: 18 BRPM | OXYGEN SATURATION: 98 %

## 2022-06-02 DIAGNOSIS — I10 ESSENTIAL HYPERTENSION: ICD-10-CM

## 2022-06-02 DIAGNOSIS — E87.6 HYPOKALEMIA: ICD-10-CM

## 2022-06-02 DIAGNOSIS — R97.20 ELEVATED PSA: ICD-10-CM

## 2022-06-02 DIAGNOSIS — E80.6 HYPERBILIRUBINEMIA: ICD-10-CM

## 2022-06-02 DIAGNOSIS — E78.5 HYPERLIPIDEMIA, UNSPECIFIED HYPERLIPIDEMIA TYPE: Chronic | ICD-10-CM

## 2022-06-02 DIAGNOSIS — I10 HTN, GOAL BELOW 130/80: ICD-10-CM

## 2022-06-02 DIAGNOSIS — F34.1 EARLY ONSET DYSTHYMIA: ICD-10-CM

## 2022-06-02 DIAGNOSIS — E87.6 HYPOKALEMIA: Primary | ICD-10-CM

## 2022-06-02 DIAGNOSIS — D53.1 MEGALOBLASTIC ERYTHROCYTES: ICD-10-CM

## 2022-06-02 LAB
ALBUMIN SERPL BCP-MCNC: 3.9 G/DL (ref 3.5–5.2)
ALP SERPL-CCNC: 63 U/L (ref 55–135)
ALT SERPL W/O P-5'-P-CCNC: 20 U/L (ref 10–44)
ANION GAP SERPL CALC-SCNC: 9 MMOL/L (ref 8–16)
AST SERPL-CCNC: 25 U/L (ref 10–40)
BASOPHILS # BLD AUTO: 0.02 K/UL (ref 0–0.2)
BASOPHILS NFR BLD: 0.3 % (ref 0–1.9)
BILIRUB DIRECT SERPL-MCNC: 0.8 MG/DL (ref 0.1–0.3)
BILIRUB SERPL-MCNC: 2.7 MG/DL (ref 0.1–1)
BUN SERPL-MCNC: 15 MG/DL (ref 8–23)
CALCIUM SERPL-MCNC: 9.6 MG/DL (ref 8.7–10.5)
CHLORIDE SERPL-SCNC: 100 MMOL/L (ref 95–110)
CO2 SERPL-SCNC: 30 MMOL/L (ref 23–29)
CREAT SERPL-MCNC: 1.1 MG/DL (ref 0.5–1.4)
DIFFERENTIAL METHOD: ABNORMAL
EOSINOPHIL # BLD AUTO: 0.1 K/UL (ref 0–0.5)
EOSINOPHIL NFR BLD: 1 % (ref 0–8)
ERYTHROCYTE [DISTWIDTH] IN BLOOD BY AUTOMATED COUNT: 12 % (ref 11.5–14.5)
EST. GFR  (AFRICAN AMERICAN): >60 ML/MIN/1.73 M^2
EST. GFR  (NON AFRICAN AMERICAN): >60 ML/MIN/1.73 M^2
GLUCOSE SERPL-MCNC: 96 MG/DL (ref 70–110)
HCT VFR BLD AUTO: 45.4 % (ref 40–54)
HGB BLD-MCNC: 15.5 G/DL (ref 14–18)
IMM GRANULOCYTES # BLD AUTO: 0.01 K/UL (ref 0–0.04)
IMM GRANULOCYTES NFR BLD AUTO: 0.1 % (ref 0–0.5)
LYMPHOCYTES # BLD AUTO: 1.6 K/UL (ref 1–4.8)
LYMPHOCYTES NFR BLD: 23.2 % (ref 18–48)
MCH RBC QN AUTO: 32.8 PG (ref 27–31)
MCHC RBC AUTO-ENTMCNC: 34.1 G/DL (ref 32–36)
MCV RBC AUTO: 96 FL (ref 82–98)
MONOCYTES # BLD AUTO: 0.5 K/UL (ref 0.3–1)
MONOCYTES NFR BLD: 7.4 % (ref 4–15)
NEUTROPHILS # BLD AUTO: 4.6 K/UL (ref 1.8–7.7)
NEUTROPHILS NFR BLD: 68 % (ref 38–73)
NRBC BLD-RTO: 0 /100 WBC
PLATELET # BLD AUTO: 294 K/UL (ref 150–450)
PMV BLD AUTO: 8.7 FL (ref 9.2–12.9)
POTASSIUM SERPL-SCNC: 4 MMOL/L (ref 3.5–5.1)
PROT SERPL-MCNC: 7.4 G/DL (ref 6–8.4)
RBC # BLD AUTO: 4.73 M/UL (ref 4.6–6.2)
SODIUM SERPL-SCNC: 139 MMOL/L (ref 136–145)
WBC # BLD AUTO: 6.8 K/UL (ref 3.9–12.7)

## 2022-06-02 PROCEDURE — 4010F ACE/ARB THERAPY RXD/TAKEN: CPT | Mod: CPTII,S$GLB,, | Performed by: FAMILY MEDICINE

## 2022-06-02 PROCEDURE — 99999 PR PBB SHADOW E&M-EST. PATIENT-LVL V: ICD-10-PCS | Mod: PBBFAC,,, | Performed by: FAMILY MEDICINE

## 2022-06-02 PROCEDURE — 1126F AMNT PAIN NOTED NONE PRSNT: CPT | Mod: CPTII,S$GLB,, | Performed by: FAMILY MEDICINE

## 2022-06-02 PROCEDURE — 80053 COMPREHEN METABOLIC PANEL: CPT | Performed by: FAMILY MEDICINE

## 2022-06-02 PROCEDURE — 3288F FALL RISK ASSESSMENT DOCD: CPT | Mod: CPTII,S$GLB,, | Performed by: FAMILY MEDICINE

## 2022-06-02 PROCEDURE — 82248 BILIRUBIN DIRECT: CPT | Performed by: FAMILY MEDICINE

## 2022-06-02 PROCEDURE — 3078F PR MOST RECENT DIASTOLIC BLOOD PRESSURE < 80 MM HG: ICD-10-PCS | Mod: CPTII,S$GLB,, | Performed by: FAMILY MEDICINE

## 2022-06-02 PROCEDURE — 99999 PR PBB SHADOW E&M-EST. PATIENT-LVL V: CPT | Mod: PBBFAC,,, | Performed by: FAMILY MEDICINE

## 2022-06-02 PROCEDURE — 84153 ASSAY OF PSA TOTAL: CPT | Performed by: UROLOGY

## 2022-06-02 PROCEDURE — 1159F PR MEDICATION LIST DOCUMENTED IN MEDICAL RECORD: ICD-10-PCS | Mod: CPTII,S$GLB,, | Performed by: FAMILY MEDICINE

## 2022-06-02 PROCEDURE — 3288F PR FALLS RISK ASSESSMENT DOCUMENTED: ICD-10-PCS | Mod: CPTII,S$GLB,, | Performed by: FAMILY MEDICINE

## 2022-06-02 PROCEDURE — 99214 PR OFFICE/OUTPT VISIT, EST, LEVL IV, 30-39 MIN: ICD-10-PCS | Mod: S$GLB,,, | Performed by: FAMILY MEDICINE

## 2022-06-02 PROCEDURE — 4010F PR ACE/ARB THEARPY RXD/TAKEN: ICD-10-PCS | Mod: CPTII,S$GLB,, | Performed by: FAMILY MEDICINE

## 2022-06-02 PROCEDURE — 1101F PR PT FALLS ASSESS DOC 0-1 FALLS W/OUT INJ PAST YR: ICD-10-PCS | Mod: CPTII,S$GLB,, | Performed by: FAMILY MEDICINE

## 2022-06-02 PROCEDURE — 36415 COLL VENOUS BLD VENIPUNCTURE: CPT | Mod: PO | Performed by: UROLOGY

## 2022-06-02 PROCEDURE — 1126F PR PAIN SEVERITY QUANTIFIED, NO PAIN PRESENT: ICD-10-PCS | Mod: CPTII,S$GLB,, | Performed by: FAMILY MEDICINE

## 2022-06-02 PROCEDURE — 1159F MED LIST DOCD IN RCRD: CPT | Mod: CPTII,S$GLB,, | Performed by: FAMILY MEDICINE

## 2022-06-02 PROCEDURE — 3008F BODY MASS INDEX DOCD: CPT | Mod: CPTII,S$GLB,, | Performed by: FAMILY MEDICINE

## 2022-06-02 PROCEDURE — 85025 COMPLETE CBC W/AUTO DIFF WBC: CPT | Performed by: FAMILY MEDICINE

## 2022-06-02 PROCEDURE — 3008F PR BODY MASS INDEX (BMI) DOCUMENTED: ICD-10-PCS | Mod: CPTII,S$GLB,, | Performed by: FAMILY MEDICINE

## 2022-06-02 PROCEDURE — 1101F PT FALLS ASSESS-DOCD LE1/YR: CPT | Mod: CPTII,S$GLB,, | Performed by: FAMILY MEDICINE

## 2022-06-02 PROCEDURE — 3075F SYST BP GE 130 - 139MM HG: CPT | Mod: CPTII,S$GLB,, | Performed by: FAMILY MEDICINE

## 2022-06-02 PROCEDURE — 3078F DIAST BP <80 MM HG: CPT | Mod: CPTII,S$GLB,, | Performed by: FAMILY MEDICINE

## 2022-06-02 PROCEDURE — 3075F PR MOST RECENT SYSTOLIC BLOOD PRESS GE 130-139MM HG: ICD-10-PCS | Mod: CPTII,S$GLB,, | Performed by: FAMILY MEDICINE

## 2022-06-02 PROCEDURE — 84154 ASSAY OF PSA FREE: CPT | Performed by: UROLOGY

## 2022-06-02 PROCEDURE — 99214 OFFICE O/P EST MOD 30 MIN: CPT | Mod: S$GLB,,, | Performed by: FAMILY MEDICINE

## 2022-06-02 RX ORDER — CARVEDILOL 25 MG/1
25 TABLET ORAL 2 TIMES DAILY WITH MEALS
Qty: 180 TABLET | Refills: 11 | Status: SHIPPED | OUTPATIENT
Start: 2022-06-02 | End: 2022-08-08

## 2022-06-02 RX ORDER — FOLIC ACID 1 MG/1
1 TABLET ORAL DAILY
Qty: 90 TABLET | Refills: 4 | Status: SHIPPED | OUTPATIENT
Start: 2022-06-02 | End: 2022-11-22

## 2022-06-02 RX ORDER — BUPROPION HYDROCHLORIDE 75 MG/1
75 TABLET ORAL 2 TIMES DAILY
Qty: 60 TABLET | Refills: 11 | Status: SHIPPED | OUTPATIENT
Start: 2022-06-02 | End: 2023-06-02

## 2022-06-02 RX ORDER — AMLODIPINE BESYLATE 5 MG/1
5 TABLET ORAL NIGHTLY
Qty: 30 TABLET | Refills: 11 | Status: SHIPPED | OUTPATIENT
Start: 2022-06-02 | End: 2023-07-24 | Stop reason: SDUPTHER

## 2022-06-02 RX ORDER — PRAVASTATIN SODIUM 40 MG/1
40 TABLET ORAL DAILY
Qty: 90 TABLET | Refills: 3 | Status: SHIPPED | OUTPATIENT
Start: 2022-06-02 | End: 2022-08-08

## 2022-06-02 RX ORDER — OLMESARTAN MEDOXOMIL 40 MG/1
40 TABLET ORAL DAILY
Qty: 90 TABLET | Refills: 3 | Status: SHIPPED | OUTPATIENT
Start: 2022-06-02 | End: 2022-07-21 | Stop reason: SDUPTHER

## 2022-06-02 RX ORDER — HYDROCHLOROTHIAZIDE 12.5 MG/1
12.5 TABLET ORAL DAILY
Qty: 30 TABLET | Refills: 11 | Status: SHIPPED | OUTPATIENT
Start: 2022-06-02 | End: 2022-08-19

## 2022-06-02 NOTE — PATIENT INSTRUCTIONS
Steve Miramontes,     If you are due for any health screening(s) below please notify me so we can arrange them to be ordered and scheduled to maintain your health. Most healthy patients complete it. Don't lose out on improving your health.     Health Maintenance   Topic Date Due    Lipid Panel  12/15/2022    TETANUS VACCINE  08/23/2025    Hepatitis C Screening  Completed

## 2022-06-02 NOTE — PROGRESS NOTES
Subjective:       Patient ID: Kulwinder Hogue Jr. is a 71 y.o. male.    Chief Complaint: Establish Care    Patient is a 71 year old male with history of essential hypertension, cyclical neutropenia, hyperlipidemia, bph, hematuria,  presenting for routine health maintenance and to establish care, patient endorses compliance with medication regimen and denies any acute symptoms today.           Current Outpatient Medications:     aspirin 81 mg Tab, Take 81 mg by mouth once daily. Every day, Disp: , Rfl:     fish oil-omega-3 fatty acids 300-1,000 mg capsule, Take 2 g by mouth once daily., Disp: , Rfl:     melatonin 5 mg TbIE, Take 1 tablet by mouth every evening., Disp: , Rfl:     multivitamin capsule, Take 1 capsule by mouth once daily., Disp: , Rfl:     potassium chloride SA (K-DUR,KLOR-CON) 20 MEQ tablet, Take 1 tablet (20 mEq total) by mouth 2 (two) times daily., Disp: 180 tablet, Rfl: 3    sildenafiL (VIAGRA) 100 MG tablet, Take 1 tablet (100 mg total) by mouth as needed., Disp: 6 tablet, Rfl: 6    amLODIPine (NORVASC) 5 MG tablet, Take 1 tablet (5 mg total) by mouth every evening. (for blood pressure), Disp: 30 tablet, Rfl: 11    buPROPion (WELLBUTRIN) 75 MG tablet, Take 1 tablet (75 mg total) by mouth 2 (two) times daily., Disp: 60 tablet, Rfl: 11    carvediloL (COREG) 25 MG tablet, Take 1 tablet (25 mg total) by mouth 2 (two) times daily with meals., Disp: 180 tablet, Rfl: 11    folic acid (FOLVITE) 1 MG tablet, Take 1 tablet (1 mg total) by mouth once daily., Disp: 90 tablet, Rfl: 4    hydroCHLOROthiazide (HYDRODIURIL) 12.5 MG Tab, Take 1 tablet (12.5 mg total) by mouth once daily., Disp: 30 tablet, Rfl: 11    olmesartan (BENICAR) 40 MG tablet, Take 1 tablet (40 mg total) by mouth once daily., Disp: 90 tablet, Rfl: 3    pravastatin (PRAVACHOL) 40 MG tablet, Take 1 tablet (40 mg total) by mouth once daily., Disp: 90 tablet, Rfl: 3    Review of patient's allergies indicates:   Allergen Reactions     Percocet [oxycodone-acetaminophen] Itching       Past Medical History:   Diagnosis Date    Elevated PSA     Hyperlipemia     Hypertension     Wears glasses        Past Surgical History:   Procedure Laterality Date    COLONOSCOPY N/A 5/2/2018    Procedure: COLONOSCOPY;  Surgeon: Joe Millan MD;  Location: [facility];  Service: Endoscopy;  Laterality: N/A;    COLONOSCOPY W/ POLYPECTOMY  3/2013    Tubular adenoma    CYSTOSCOPY  10/25/2016    Dr. Mancia    CYSTOSCOPY N/A 12/4/2018    Procedure: CYSTOSCOPY;  Surgeon: Jamal Jain MD;  Location: [facility];  Service: Urology;  Laterality: N/A;    LIPOMA RESECTION  8-    lipoma posterior neck    PROSTATE BIOPSY  10/25/2016    Dr. Mancia    TRANSRECTAL ULTRASOUND EXAMINATION N/A 12/4/2018    Procedure: ULTRASOUND, RECTAL APPROACH;  Surgeon: Jamal Jain MD;  Location: [facility];  Service: Urology;  Laterality: N/A;    TRANSURETHRAL RESECTION OF PROSTATE N/A 2/14/2019    Procedure: TURP (TRANSURETHRAL RESECTION OF PROSTATE);  Surgeon: Jamal Jain MD;  Location: [facility];  Service: Urology;  Laterality: N/A;    VASECTOMY         Family History   Problem Relation Age of Onset    Cancer Mother         breast    Kidney cancer Neg Hx     Prostate cancer Neg Hx        Social History     Tobacco Use    Smoking status: Never Smoker    Smokeless tobacco: Never Used   Substance Use Topics    Alcohol use: Yes     Comment: WEEKEND    Drug use: No       Review of Systems   Constitutional: Negative for activity change, appetite change, chills, diaphoresis, fatigue, fever and unexpected weight change.   HENT: Negative for hearing loss, postnasal drip, rhinorrhea, sinus pressure/congestion, sore throat and trouble swallowing.    Eyes: Negative for visual disturbance.   Respiratory: Negative for apnea, chest tightness, shortness of breath and wheezing.    Cardiovascular: Negative for chest pain.   Gastrointestinal: Negative for abdominal pain,  blood in stool, change in bowel habit, constipation, diarrhea, nausea, vomiting and change in bowel habit.   Endocrine: Negative for polydipsia and polyphagia.   Genitourinary: Negative for dysuria, enuresis, flank pain, frequency and urgency.   Integumentary:  Negative for rash and mole/lesion.   Neurological: Negative for dizziness, light-headedness, headaches and memory loss.   Psychiatric/Behavioral: Negative for confusion, decreased concentration, sleep disturbance and suicidal ideas. The patient is not nervous/anxious.            Objective:      Vitals:    06/02/22 1050   BP: 130/70   BP Location: Left arm   Patient Position: Sitting   BP Method: Medium (Manual)   Pulse: 79   Resp: 18   Temp: 98.6 °F (37 °C)   TempSrc: Oral   SpO2: 98%   Weight: 74.9 kg (165 lb 2 oz)   Height: 6' (1.829 m)     Physical Exam  Constitutional:       General: He is not in acute distress.     Appearance: He is obese. He is not ill-appearing, toxic-appearing or diaphoretic.   HENT:      Head: Normocephalic and atraumatic.      Nose: Nose normal. No congestion or rhinorrhea.      Mouth/Throat:      Mouth: Mucous membranes are moist.      Pharynx: No oropharyngeal exudate or posterior oropharyngeal erythema.   Eyes:      General: No scleral icterus.        Right eye: No discharge.         Left eye: No discharge.      Conjunctiva/sclera: Conjunctivae normal.      Pupils: Pupils are equal, round, and reactive to light.   Cardiovascular:      Rate and Rhythm: Normal rate and regular rhythm.      Pulses: Normal pulses.      Heart sounds: Normal heart sounds. No murmur heard.    No friction rub. No gallop.   Pulmonary:      Effort: Pulmonary effort is normal. No respiratory distress.      Breath sounds: Normal breath sounds. No stridor. No wheezing, rhonchi or rales.   Chest:      Chest wall: No tenderness.   Abdominal:      General: Abdomen is flat. Bowel sounds are normal. There is no distension.      Palpations: Abdomen is soft. There  is no mass.      Tenderness: There is no abdominal tenderness. There is no guarding or rebound.      Hernia: No hernia is present.   Musculoskeletal:      Cervical back: No rigidity or tenderness.   Neurological:      Mental Status: He is alert.           Assessment:       1. Hypokalemia    2. Hyperbilirubinemia    3. Essential hypertension    4. Early onset dysthymia    5. Hyperlipidemia, unspecified hyperlipidemia type    6. HTN, goal below 130/80    7. Megaloblastic erythrocytes        Plan:       Hypokalemia  -     Comprehensive Metabolic Panel; Future    Hyperbilirubinemia  -     Comprehensive Metabolic Panel; Future  -     BILIRUBIN, DIRECT; Future; Expected date: 06/02/2022  -     CBC Auto Differential; Future  -     Ambulatory referral/consult to Hepatology; Future; Expected date: 06/09/2022  -     US Liver with Doppler (xpd); Future; Expected date: 06/02/2022  -     CT Abdomen Pelvis  Without Contrast; Future; Expected date: 06/02/2022    Essential hypertension  -     amLODIPine (NORVASC) 5 MG tablet; Take 1 tablet (5 mg total) by mouth every evening. (for blood pressure)  Dispense: 30 tablet; Refill: 11  -     pravastatin (PRAVACHOL) 40 MG tablet; Take 1 tablet (40 mg total) by mouth once daily.  Dispense: 90 tablet; Refill: 3  -     olmesartan (BENICAR) 40 MG tablet; Take 1 tablet (40 mg total) by mouth once daily.  Dispense: 90 tablet; Refill: 3    Early onset dysthymia  -     buPROPion (WELLBUTRIN) 75 MG tablet; Take 1 tablet (75 mg total) by mouth 2 (two) times daily.  Dispense: 60 tablet; Refill: 11    Hyperlipidemia, unspecified hyperlipidemia type  -     pravastatin (PRAVACHOL) 40 MG tablet; Take 1 tablet (40 mg total) by mouth once daily.  Dispense: 90 tablet; Refill: 3    HTN, goal below 130/80  -     hydroCHLOROthiazide (HYDRODIURIL) 12.5 MG Tab; Take 1 tablet (12.5 mg total) by mouth once daily.  Dispense: 30 tablet; Refill: 11  -     carvediloL (COREG) 25 MG tablet; Take 1 tablet (25 mg total)  by mouth 2 (two) times daily with meals.  Dispense: 180 tablet; Refill: 11    Megaloblastic erythrocytes  -     folic acid (FOLVITE) 1 MG tablet; Take 1 tablet (1 mg total) by mouth once daily.  Dispense: 90 tablet; Refill: 4        Follow up in about 3 months (around 9/2/2022).    Elaine Mason MD           DISCLAIMER: This note was prepared with [employer] voice recognition transcription software. Garbled syntax, mangled pronouns, and other bizarre constructions may be attributed to that software system.

## 2022-06-03 DIAGNOSIS — R97.20 ABNORMAL PSA: Primary | ICD-10-CM

## 2022-06-03 LAB
PROSTATE SPECIFIC ANTIGEN, TOTAL: 5.2 NG/ML (ref 0–4)
PSA FREE MFR SERPL: 17.31 %
PSA FREE SERPL-MCNC: 0.9 NG/ML (ref 0–1.5)

## 2022-06-04 NOTE — PROGRESS NOTES
Your bloodwork shows direct hyperbilirubinemia which may mean there can be an antomical reason for the elebation. Please make sure you get the imaging I ordered and that you go to the hepatologist.     Please contact me if you have any additional concerns.    Sincerely,    Elaine Mason

## 2022-06-13 ENCOUNTER — HOSPITAL ENCOUNTER (OUTPATIENT)
Dept: RADIOLOGY | Facility: HOSPITAL | Age: 72
Discharge: HOME | End: 2022-06-13
Attending: FAMILY MEDICINE
Payer: MEDICARE

## 2022-06-13 DIAGNOSIS — E80.6 HYPERBILIRUBINEMIA: ICD-10-CM

## 2022-06-13 PROCEDURE — 76705 US LIVER WITH DOPPLER: ICD-10-PCS | Mod: 26,XS,, | Performed by: RADIOLOGY

## 2022-06-13 PROCEDURE — 93976 VASCULAR STUDY: CPT | Mod: TC

## 2022-06-13 PROCEDURE — 74176 CT ABD & PELVIS W/O CONTRAST: CPT | Mod: TC

## 2022-06-13 PROCEDURE — 93976 US LIVER WITH DOPPLER: ICD-10-PCS | Mod: 26,,, | Performed by: RADIOLOGY

## 2022-06-13 PROCEDURE — 76705 ECHO EXAM OF ABDOMEN: CPT | Mod: TC

## 2022-06-13 PROCEDURE — 93976 VASCULAR STUDY: CPT | Mod: 26,,, | Performed by: RADIOLOGY

## 2022-06-13 PROCEDURE — 76705 ECHO EXAM OF ABDOMEN: CPT | Mod: 26,XS,, | Performed by: RADIOLOGY

## 2022-06-13 PROCEDURE — 74176 CT ABD & PELVIS W/O CONTRAST: CPT | Mod: 26,,, | Performed by: RADIOLOGY

## 2022-06-13 PROCEDURE — 74176 CT ABDOMEN PELVIS WITHOUT CONTRAST: ICD-10-PCS | Mod: 26,,, | Performed by: RADIOLOGY

## 2022-06-17 ENCOUNTER — OFFICE VISIT (OUTPATIENT)
Dept: HEPATOLOGY | Facility: CLINIC | Age: 72
End: 2022-06-17
Payer: MEDICARE

## 2022-06-17 DIAGNOSIS — E80.6 HYPERBILIRUBINEMIA: Primary | ICD-10-CM

## 2022-06-17 DIAGNOSIS — R79.9 ABNORMAL FINDING OF BLOOD CHEMISTRY, UNSPECIFIED: ICD-10-CM

## 2022-06-17 DIAGNOSIS — K76.0 FATTY LIVER: ICD-10-CM

## 2022-06-17 PROCEDURE — 4010F ACE/ARB THERAPY RXD/TAKEN: CPT | Mod: CPTII,95,, | Performed by: NURSE PRACTITIONER

## 2022-06-17 PROCEDURE — 99214 OFFICE O/P EST MOD 30 MIN: CPT | Mod: 95,,, | Performed by: NURSE PRACTITIONER

## 2022-06-17 PROCEDURE — 4010F PR ACE/ARB THEARPY RXD/TAKEN: ICD-10-PCS | Mod: CPTII,95,, | Performed by: NURSE PRACTITIONER

## 2022-06-17 PROCEDURE — 99214 PR OFFICE/OUTPT VISIT, EST, LEVL IV, 30-39 MIN: ICD-10-PCS | Mod: 95,,, | Performed by: NURSE PRACTITIONER

## 2022-06-17 NOTE — PROGRESS NOTES
The patient location is: [place], [place]  The chief complaint leading to consultation is: Elevated bilirubin    Visit type: audiovisual    Face to Face time with patient: 20 min  45 minutes of total time spent on the encounter, which includes face to face time and non-face to face time preparing to see the patient (eg, review of tests), Obtaining and/or reviewing separately obtained history, Documenting clinical information in the electronic or other health record, Independently interpreting results (not separately reported) and communicating results to the patient/family/caregiver, or Care coordination (not separately reported).     Each patient to whom he or she provides medical services by telemedicine is:  (1) informed of the relationship between the physician and patient and the respective role of any other health care provider with respect to management of the patient; and (2) notified that he or she may decline to receive medical services by telemedicine and may withdraw from such care at any time.        [facility] - New Patient     REFERRING PROVIDER: Dr. Elaine Mason  PCP: Elaine Mason MD    Chief Complaint: Elevated bilirubin      HISTORY     This is a 71 y.o. male referred for evaluation of elevated bilirubin.    No known history of liver disease.    Review of labs:  - Transaminases: WNL  - Alk phos: WNL  - Synthetic liver function: TBili chronically elevated since at least 2004, max 3.8 and mainly indirect (direct always <1). LFTs otherwise WNL   - Platelets: WNL    Workup thus far:  Serologies:   Lab Results   Component Value Date    HEPBSAG Negative 11/21/2019    HEPBIGM Negative 11/12/2018    HEPCAB Negative 11/12/2018    HEPAIGM Negative 11/12/2018       US w/ doppler 6/13/22- normal liver and biliary morphology; satisfactory doppler assessment    CT AP 6/13/22- no acute findings    Of note, he had an US in 2012 that showed an enlarged fatty liver    Risk factors for fatty liver:    · Weight -- BMI ~22, has lost weight over the last few years               · Dyslipidemia -- yes                               · Insulin resistance / diabetes -- no      · Hypertension -- yes  · Alcohol use -- not daily or even weekly    Family history:  No family history of liver disease.    Meds:  -Rx: none concerning from liver standpoint  -OTC: n/a  -Herbs/supplements: n/a   No recent medication changes.     No symptoms of hepatic decompensation including jaundice, ascites, cognitive problems to suggest hepatic encephalopathy, or GI bleeding.              Past medical history, surgical history, problem list, family history, social history, allergies: Reviewed and updated in the appropriate section of the electronic medical record.      Current Outpatient Medications   Medication Sig Dispense Refill    amLODIPine (NORVASC) 5 MG tablet Take 1 tablet (5 mg total) by mouth every evening. (for blood pressure) 30 tablet 11    aspirin 81 mg Tab Take 81 mg by mouth once daily. Every day      buPROPion (WELLBUTRIN) 75 MG tablet Take 1 tablet (75 mg total) by mouth 2 (two) times daily. 60 tablet 11    carvediloL (COREG) 25 MG tablet Take 1 tablet (25 mg total) by mouth 2 (two) times daily with meals. 180 tablet 11    fish oil-omega-3 fatty acids 300-1,000 mg capsule Take 2 g by mouth once daily.      folic acid (FOLVITE) 1 MG tablet Take 1 tablet (1 mg total) by mouth once daily. 90 tablet 4    hydroCHLOROthiazide (HYDRODIURIL) 12.5 MG Tab Take 1 tablet (12.5 mg total) by mouth once daily. 30 tablet 11    melatonin 5 mg TbIE Take 1 tablet by mouth every evening.      multivitamin capsule Take 1 capsule by mouth once daily.      olmesartan (BENICAR) 40 MG tablet Take 1 tablet (40 mg total) by mouth once daily. 90 tablet 3    potassium chloride SA (K-DUR,KLOR-CON) 20 MEQ tablet Take 1 tablet (20 mEq total) by mouth 2 (two) times daily. 180 tablet 3    pravastatin (PRAVACHOL) 40 MG tablet Take 1 tablet (40 mg  total) by mouth once daily. 90 tablet 3    sildenafiL (VIAGRA) 100 MG tablet Take 1 tablet (100 mg total) by mouth as needed. 6 tablet 6     No current facility-administered medications for this visit.     Medication list reviewed and updated.      Review of Systems   Constitutional: Negative for fatigue or unexpected weight change.   Respiratory: Negative for shortness of breath.    Cardiovascular: Negative for leg swelling.  Gastrointestinal: Negative for abdominal distention, abdominal pain, diarrhea, constipation, nausea, and vomiting. Negative for blood in stool, melena, or hematemesis.  Musculoskeletal: Negative for myalgias.    Skin: Negative for itching.  Neurological: Negative for dizziness or tremors. Negative for confusion, slowed mentation, or memory loss.   Hematological: Does not bruise/bleed easily.   Psychiatric: Negative for mood changes or sleep disturbance.      Physical Exam   Constitutional: No distress. Alert and oriented to person, place, and time.  Pulmonary/Chest: No respiratory distress.   Skin: No jaundice.   Psychiatric: Normal mood and affect. Speech, behavior, and thought content normal. No depression or anxiety noted.         LABS & DIAGNOSTIC STUDIES     I have personally reviewed pertinent laboratory findings:    Lab Results   Component Value Date    ALT 20 06/02/2022    AST 25 06/02/2022    ALKPHOS 63 06/02/2022    BILITOT 2.7 (H) 06/02/2022    ALBUMIN 3.9 06/02/2022    INR 1.0 02/08/2019       Lab Results   Component Value Date    WBC 6.80 06/02/2022    HGB 15.5 06/02/2022    HCT 45.4 06/02/2022    MCV 96 06/02/2022     06/02/2022       Lab Results   Component Value Date     06/02/2022    K 4.0 06/02/2022    BUN 15 06/02/2022    CREATININE 1.1 06/02/2022    ESTGFRAFRICA >60.0 06/02/2022    EGFRNONAA >60.0 06/02/2022       Lab Results   Component Value Date    HEPBSAG Negative 11/21/2019    HEPBIGM Negative 11/12/2018    HEPCAB Negative 11/12/2018    HEPAIGM Negative  11/12/2018       No results found for: AFP    I have personally reviewed the following result reports:  Abdominal US - 6/13/22  CT - 6/13/22        ASSESSMENT & PLAN     71 y.o. male with:    1. Elevated bilirubin, h/o fatty liver  -- Presumed Gilbert's as bilirubin is chronically elevated, mainly indirect, and all other liver tests are normal. Will check UGT1A1 testing to confirm. Reassured that if he has Gilbert's, this is a benign genetic variant, does not cause liver problems, and requires no treatment  -- No anemia though can rule out hemolysis  -- Normal US w/ doppler  -- Plan for Fibroscan for fibrosis/steatosis staging as he had prior imaging with fatty liver. He has lost weight and metabolic risk factors appear well controlled.       Orders Placed This Encounter   Procedures    FibroScan (Vibration Controlled Transient Elastography)    UGT1A1 Genotype    Haptoglobin    Lactate Dehydrogenase    Reticulocytes    Ferritin    Iron and TIBC       *See AVS for patient education and instructions.       Return to clinic any time after labs (scheduled in Oct) to complete Fibroscan and review results.      Thank you for allowing me to participate in the care of SURJIT Vigil Jr.P-C  Hepatology

## 2022-06-21 ENCOUNTER — TELEPHONE (OUTPATIENT)
Dept: HEPATOLOGY | Facility: CLINIC | Age: 72
End: 2022-06-21
Payer: MEDICARE

## 2022-06-21 NOTE — TELEPHONE ENCOUNTER
----- Message from Anjelica Archibald NP sent at 6/20/2022 10:10 PM CDT -----  Please schedule Fibroscan and f/u visit at least 2 weeks after his labs in Oct. Thanks!

## 2022-06-21 NOTE — PATIENT INSTRUCTIONS
Labs in October as scheduled. Will check testing for Gilbert's, a benign cause for elevated bilirubin    Follow-up in our clinic any time after to review results and complete Fibroscan (this will assess for fatty liver and any liver scarring/damage)

## 2022-06-21 NOTE — TELEPHONE ENCOUNTER
Call placed to pt to schd follow up with yosef arriaza with fibroscan late October. No answer. lvm

## 2022-06-23 ENCOUNTER — TELEPHONE (OUTPATIENT)
Dept: FAMILY MEDICINE | Facility: CLINIC | Age: 72
End: 2022-06-23
Payer: MEDICARE

## 2022-06-23 DIAGNOSIS — K82.8 SLUDGE IN GALLBLADDER: Primary | ICD-10-CM

## 2022-06-23 DIAGNOSIS — Q61.02 MULTIPLE RENAL CYSTS: Primary | ICD-10-CM

## 2022-06-23 NOTE — TELEPHONE ENCOUNTER
----- Message from Elaine Mason MD sent at 6/23/2022  3:23 PM CDT -----  Your ct shows right kidney cysts, I am making a referral to urology.     Please contact me if you have any additional concerns.    Sincerely,    Elaine Mason

## 2022-06-23 NOTE — PROGRESS NOTES
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 I am making a referral to general surgery.     Please contact me if you have any additional concerns.    Sincerely,    Elaine Mason

## 2022-06-23 NOTE — PROGRESS NOTES
Your ct shows right kidney cysts, I am making a referral to urology.     Please contact me if you have any additional concerns.    Sincerely,    Elaine Mason

## 2022-06-23 NOTE — TELEPHONE ENCOUNTER
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 I am making a referral to general surgery.     Please contact me if you have any additional concerns.    Sincerely,    Elaine Mason

## 2022-06-23 NOTE — TELEPHONE ENCOUNTER
Call placed to patient for notification. Patient verbalized understanding. Number provided to call and schedule appointment with general surgery ([phone]).

## 2022-06-23 NOTE — TELEPHONE ENCOUNTER
Call placed to patient for notification. Patient verbalized understanding. Advised patient urology department will be in touch with him to assist with scheduling appointment.

## 2022-06-24 ENCOUNTER — TELEPHONE (OUTPATIENT)
Dept: UROLOGY | Facility: CLINIC | Age: 72
End: 2022-06-24
Payer: MEDICARE

## 2022-06-24 NOTE — TELEPHONE ENCOUNTER
we are aware of his benign renal cysts as per his prior imaging and no new urology appt needee f/u as planned.-    lmom

## 2022-06-30 ENCOUNTER — TELEPHONE (OUTPATIENT)
Dept: HEPATOLOGY | Facility: CLINIC | Age: 72
End: 2022-06-30
Payer: MEDICARE

## 2022-06-30 NOTE — TELEPHONE ENCOUNTER
----- Message from Chantal Thompson MA sent at 6/21/2022  4:32 PM CDT -----    ----- Message -----  From: Anjelica Archibald NP  Sent: 6/20/2022  10:10 PM CDT  To: [facility] De[facility] Staff    Please schedule Fibroscan and f/u visit at least 2 weeks after his labs in Oct. Thanks!

## 2022-07-01 ENCOUNTER — OFFICE VISIT (OUTPATIENT)
Dept: SURGERY | Facility: CLINIC | Age: 72
End: 2022-07-01
Payer: MEDICARE

## 2022-07-01 VITALS
DIASTOLIC BLOOD PRESSURE: 83 MMHG | WEIGHT: 164.25 LBS | RESPIRATION RATE: 16 BRPM | TEMPERATURE: 98 F | HEART RATE: 69 BPM | SYSTOLIC BLOOD PRESSURE: 136 MMHG | BODY MASS INDEX: 22.28 KG/M2

## 2022-07-01 DIAGNOSIS — K82.4 GALLBLADDER POLYP: Primary | ICD-10-CM

## 2022-07-01 DIAGNOSIS — E80.6 HYPERBILIRUBINEMIA: ICD-10-CM

## 2022-07-01 PROCEDURE — 99203 OFFICE O/P NEW LOW 30 MIN: CPT | Mod: S$GLB,,, | Performed by: SURGERY

## 2022-07-01 PROCEDURE — 1159F PR MEDICATION LIST DOCUMENTED IN MEDICAL RECORD: ICD-10-PCS | Mod: CPTII,S$GLB,, | Performed by: SURGERY

## 2022-07-01 PROCEDURE — 3079F DIAST BP 80-89 MM HG: CPT | Mod: CPTII,S$GLB,, | Performed by: SURGERY

## 2022-07-01 PROCEDURE — 1126F PR PAIN SEVERITY QUANTIFIED, NO PAIN PRESENT: ICD-10-PCS | Mod: CPTII,S$GLB,, | Performed by: SURGERY

## 2022-07-01 PROCEDURE — 4010F PR ACE/ARB THEARPY RXD/TAKEN: ICD-10-PCS | Mod: CPTII,S$GLB,, | Performed by: SURGERY

## 2022-07-01 PROCEDURE — 3079F PR MOST RECENT DIASTOLIC BLOOD PRESSURE 80-89 MM HG: ICD-10-PCS | Mod: CPTII,S$GLB,, | Performed by: SURGERY

## 2022-07-01 PROCEDURE — 3008F BODY MASS INDEX DOCD: CPT | Mod: CPTII,S$GLB,, | Performed by: SURGERY

## 2022-07-01 PROCEDURE — 3075F PR MOST RECENT SYSTOLIC BLOOD PRESS GE 130-139MM HG: ICD-10-PCS | Mod: CPTII,S$GLB,, | Performed by: SURGERY

## 2022-07-01 PROCEDURE — 1159F MED LIST DOCD IN RCRD: CPT | Mod: CPTII,S$GLB,, | Performed by: SURGERY

## 2022-07-01 PROCEDURE — 3288F PR FALLS RISK ASSESSMENT DOCUMENTED: ICD-10-PCS | Mod: CPTII,S$GLB,, | Performed by: SURGERY

## 2022-07-01 PROCEDURE — 99999 PR PBB SHADOW E&M-EST. PATIENT-LVL III: ICD-10-PCS | Mod: PBBFAC,,, | Performed by: SURGERY

## 2022-07-01 PROCEDURE — 1101F PT FALLS ASSESS-DOCD LE1/YR: CPT | Mod: CPTII,S$GLB,, | Performed by: SURGERY

## 2022-07-01 PROCEDURE — 3008F PR BODY MASS INDEX (BMI) DOCUMENTED: ICD-10-PCS | Mod: CPTII,S$GLB,, | Performed by: SURGERY

## 2022-07-01 PROCEDURE — 4010F ACE/ARB THERAPY RXD/TAKEN: CPT | Mod: CPTII,S$GLB,, | Performed by: SURGERY

## 2022-07-01 PROCEDURE — 1126F AMNT PAIN NOTED NONE PRSNT: CPT | Mod: CPTII,S$GLB,, | Performed by: SURGERY

## 2022-07-01 PROCEDURE — 1101F PR PT FALLS ASSESS DOC 0-1 FALLS W/OUT INJ PAST YR: ICD-10-PCS | Mod: CPTII,S$GLB,, | Performed by: SURGERY

## 2022-07-01 PROCEDURE — 99999 PR PBB SHADOW E&M-EST. PATIENT-LVL III: CPT | Mod: PBBFAC,,, | Performed by: SURGERY

## 2022-07-01 PROCEDURE — 3075F SYST BP GE 130 - 139MM HG: CPT | Mod: CPTII,S$GLB,, | Performed by: SURGERY

## 2022-07-01 PROCEDURE — 99203 PR OFFICE/OUTPT VISIT, NEW, LEVL III, 30-44 MIN: ICD-10-PCS | Mod: S$GLB,,, | Performed by: SURGERY

## 2022-07-01 PROCEDURE — 3288F FALL RISK ASSESSMENT DOCD: CPT | Mod: CPTII,S$GLB,, | Performed by: SURGERY

## 2022-07-01 NOTE — H&P
GENERAL SURGERY  OUTPATIENT H&P    REASON FOR VISIT/CC:  Gallbladder polyp, hyperbilirubinemia    HPI: Kulwinder Hogue Jr. is a 71 y.o. male referred for evaluation after workup for hyperbilirubinemia revealed a polyp versus sludge in the gallbladder.  Patient has history of chronic hyperbilirubinemia which is mostly indirect.  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  He did undergo CT imaging which was unremarkable and liver imaging which showed a small polyp versus sludge in the gallbladder.  He was sent to General surgery for evaluation.  There was no ductal dilatation identified.  Patient has no history of abdominal pain, epigastric pain, nausea, vomiting, postprandial symptoms, pancreatitis.  He has never had any intra-abdominal surgery.  May have possibly had 1 episode of diverticulitis requiring evaluation in the ER but no further intervention.    I have reviewed the patient's chart including prior progress notes, procedures and testing.     ROS:   Review of Systems   Constitutional: Negative for activity change, chills and fever.   HENT: Negative for trouble swallowing.    Respiratory: Negative for apnea, chest tightness and shortness of breath.    Cardiovascular: Negative for chest pain, palpitations and leg swelling.   Gastrointestinal: Negative for abdominal distention, abdominal pain, nausea and vomiting.   Genitourinary: Negative for difficulty urinating, dysuria and hematuria.   Musculoskeletal: Negative for arthralgias, neck pain and neck stiffness.   Skin: Negative for color change and wound.   Neurological: Negative for dizziness, weakness and light-headedness.       PROBLEM LIST:  Patient Active Problem List   Diagnosis    Hyperlipemia    HBP (high blood pressure)    Essential hypertension    Hyperlipidemia    Liposarcoma    Abnormal PSA    Cyclical neutropenia    Excessive cerumen in right ear canal    Hx of colonic polyps    BPH with obstruction/lower urinary  tract symptoms    Chronic allergic rhinitis    Hematuria         HISTORY  Past Medical History:   Diagnosis Date    Elevated PSA     Hyperlipemia     Hypertension     Wears glasses        Past Surgical History:   Procedure Laterality Date    COLONOSCOPY N/A 5/2/2018    Procedure: COLONOSCOPY;  Surgeon: Joe Millan MD;  Location: [facility];  Service: Endoscopy;  Laterality: N/A;    COLONOSCOPY W/ POLYPECTOMY  3/2013    Tubular adenoma    CYSTOSCOPY  10/25/2016    Dr. Mancia    CYSTOSCOPY N/A 12/4/2018    Procedure: CYSTOSCOPY;  Surgeon: Jamal Jain MD;  Location: [facility] OR;  Service: Urology;  Laterality: N/A;    LIPOMA RESECTION  8-    lipoma posterior neck    PROSTATE BIOPSY  10/25/2016    Dr. Mancia    TRANSRECTAL ULTRASOUND EXAMINATION N/A 12/4/2018    Procedure: ULTRASOUND, RECTAL APPROACH;  Surgeon: Jamal Jain MD;  Location: [facility] OR;  Service: Urology;  Laterality: N/A;    TRANSURETHRAL RESECTION OF PROSTATE N/A 2/14/2019    Procedure: TURP (TRANSURETHRAL RESECTION OF PROSTATE);  Surgeon: Jamal Jain MD;  Location: [facility];  Service: Urology;  Laterality: N/A;    VASECTOMY         Social History     Tobacco Use    Smoking status: Never Smoker    Smokeless tobacco: Never Used   Substance Use Topics    Alcohol use: Yes     Comment: WEEKEND    Drug use: No       Family History   Problem Relation Age of Onset    Cancer Mother         breast    Kidney cancer Neg Hx     Prostate cancer Neg Hx          MEDS:  Current Outpatient Medications on File Prior to Visit   Medication Sig Dispense Refill    amLODIPine (NORVASC) 5 MG tablet Take 1 tablet (5 mg total) by mouth every evening. (for blood pressure) 30 tablet 11    aspirin 81 mg Tab Take 81 mg by mouth once daily. Every day      buPROPion (WELLBUTRIN) 75 MG tablet Take 1 tablet (75 mg total) by mouth 2 (two) times daily. 60 tablet 11    carvediloL (COREG) 25 MG tablet Take 1 tablet (25 mg total) by mouth 2  (two) times daily with meals. 180 tablet 11    fish oil-omega-3 fatty acids 300-1,000 mg capsule Take 2 g by mouth once daily.      folic acid (FOLVITE) 1 MG tablet Take 1 tablet (1 mg total) by mouth once daily. 90 tablet 4    hydroCHLOROthiazide (HYDRODIURIL) 12.5 MG Tab Take 1 tablet (12.5 mg total) by mouth once daily. 30 tablet 11    melatonin 5 mg TbIE Take 1 tablet by mouth every evening.      multivitamin capsule Take 1 capsule by mouth once daily.      olmesartan (BENICAR) 40 MG tablet Take 1 tablet (40 mg total) by mouth once daily. 90 tablet 3    potassium chloride SA (K-DUR,KLOR-CON) 20 MEQ tablet Take 1 tablet (20 mEq total) by mouth 2 (two) times daily. 180 tablet 3    pravastatin (PRAVACHOL) 40 MG tablet Take 1 tablet (40 mg total) by mouth once daily. 90 tablet 3    sildenafiL (VIAGRA) 100 MG tablet Take 1 tablet (100 mg total) by mouth as needed. 6 tablet 6     No current facility-administered medications on file prior to visit.       ALLERGIES:  Review of patient's allergies indicates:   Allergen Reactions    Percocet [oxycodone-acetaminophen] Itching         VITALS:  Vitals:    07/01/22 0949   BP: 136/83   Pulse: 69   Resp: 16   Temp: 97.7 °F (36.5 °C)         PHYSICAL EXAM:  Physical Exam  Vitals reviewed.   Constitutional:       General: He is not in acute distress.     Appearance: Normal appearance. He is well-developed.   HENT:      Head: Normocephalic and atraumatic.   Eyes:      General: No scleral icterus.  Neck:      Trachea: No tracheal deviation.   Cardiovascular:      Rate and Rhythm: Normal rate and regular rhythm.      Pulses: Normal pulses.   Pulmonary:      Effort: Pulmonary effort is normal. No respiratory distress.      Breath sounds: Normal breath sounds.   Abdominal:      General: There is no distension.      Palpations: Abdomen is soft.      Tenderness: There is no abdominal tenderness.   Musculoskeletal:         General: No swelling or tenderness. Normal range of  motion.      Cervical back: Normal range of motion and neck supple. No rigidity.   Skin:     General: Skin is warm and dry.      Coloration: Skin is not jaundiced.      Findings: No erythema.   Neurological:      General: No focal deficit present.      Mental Status: He is alert and oriented to person, place, and time. He is not disoriented.      Motor: No weakness or abnormal muscle tone.   Psychiatric:         Mood and Affect: Mood normal.         Behavior: Behavior normal.         Thought Content: Thought content normal.         Judgment: Judgment normal.           LABS:  Lab Results   Component Value Date    WBC 6.80 06/02/2022    RBC 4.73 06/02/2022    HGB 15.5 06/02/2022    HGB 16.4 08/09/2012    HCT 45.4 06/02/2022     06/02/2022     Lab Results   Component Value Date    GLU 96 06/02/2022     06/02/2022    K 4.0 06/02/2022     06/02/2022    CO2 30 (H) 06/02/2022    BUN 15 06/02/2022    CREATININE 1.1 06/02/2022    CREATININE 1.0 08/09/2012    CALCIUM 9.6 06/02/2022    CALCIUM 9.9 08/09/2012     Lab Results   Component Value Date    ALT 20 06/02/2022    AST 25 06/02/2022    ALKPHOS 63 06/02/2022    BILITOT 2.7 (H) 06/02/2022     No results found for: MG, PHOS    STUDIES:  Images and reports were personally reviewed.  US  FINDINGS:  Unremarkable appearance of the pancreas.  Liver is normal in size at 15 cm length.  The liver demonstrates homogeneous echotexture.  No focal hepatic lesions are seen.  No fluid collections.     There is a 4 mm nonmobile echogenic focus along the nondependent gallbladder wall, without associated vascular flow.  The common duct is not dilated, measuring 3 mm.  No dilated intrahepatic radicles are seen.     Color flow and spectral waveform analysis was performed.  The main portal vein, right portal vein, left portal vein, middle hepatic vein, right hepatic vein, left hepatic vein, and IVC are patent with proper directional flow.     Main hepatic artery demonstrates  appropriate forward flow with normal waveform.     Left hepatic artery shows appropriate forward flow with normal waveform.     Anterior branch of the right hepatic artery demonstrates appropriate forward flow with normal waveform.     Posterior branch of the right hepatic artery demonstrates appropriate forward flow with normal waveform.     Impression:     1. Satisfactory doppler assessment of the liver.  2. Normal liver and biliary morphology.  3. Possible gallbladder polyp or sludge ball, measuring 4 mm.      ASSESSMENT & PLAN:  71 y.o. male with hyperbilirubinemia, gallbladder polyp versus sludge ball  - agree with hepatology that etiology of the patient's chronic hyperbilirubinemia is indirect in nature likely represents Gilbert syndrome  - small lesions seen on imaging likely represents small polyp or sludge, regardless patient is asymptomatic without any significant  upper abdominal symptoms  - at this time no surgical intervention recommended the patient be followed with an ultrasound  - return clinic p.r.n.

## 2022-07-05 ENCOUNTER — TELEPHONE (OUTPATIENT)
Dept: FAMILY MEDICINE | Facility: CLINIC | Age: 72
End: 2022-07-05
Payer: MEDICARE

## 2022-07-05 NOTE — TELEPHONE ENCOUNTER
----- Message from Simba Hammonds sent at 7/5/2022  8:06 AM CDT -----  Contact: Self  Type:  Sooner Appointment Request    Caller is requesting a sooner appointment.  Caller declined first available appointment listed below.  Caller will not accept being placed on the waitlist and is requesting a message be sent to doctor.    Name of Caller:  Patient  When is the first available appointment?  1/23  Symptoms:  Follow up  Best Call Back Number:  [phone]   Additional Information:   Pt of Dr Mason, would like to r/s 10/12 appt

## 2022-07-05 NOTE — PROGRESS NOTES
Psa stable - though was checked in and drawn in error for this value (looks like when he went for his pcp labs)  Last psa 2 months prior, and psa was open in chart as one is scheduled for October and this was intended to be 6 months later prior to his annual  He did not need this value repeated 2 months later  Should be credited    As well, can reset timeline of October to early December so there is 6 months between values    (Also of note pt has known renal cysts, and is not new discovery, so referral by PCP discontinued as patient is established and has been imaged for this benign condition)    Reset psa f/t lab visit from October to December, and reset annual visit reminder to June 2023 with psa prior

## 2022-07-21 DIAGNOSIS — I10 ESSENTIAL HYPERTENSION: ICD-10-CM

## 2022-07-21 NOTE — TELEPHONE ENCOUNTER
LOV 6/2/2022, [facility]  Portal ms sent for pt to schedule appt.

## 2022-07-22 RX ORDER — OLMESARTAN MEDOXOMIL 40 MG/1
40 TABLET ORAL DAILY
Qty: 90 TABLET | Refills: 3 | Status: SHIPPED | OUTPATIENT
Start: 2022-07-22 | End: 2023-07-21

## 2022-10-12 ENCOUNTER — LAB VISIT (OUTPATIENT)
Dept: LAB | Facility: HOSPITAL | Age: 72
End: 2022-10-12
Attending: UROLOGY
Payer: MEDICARE

## 2022-10-12 DIAGNOSIS — R79.9 ABNORMAL FINDING OF BLOOD CHEMISTRY, UNSPECIFIED: ICD-10-CM

## 2022-10-12 DIAGNOSIS — E80.6 HYPERBILIRUBINEMIA: ICD-10-CM

## 2022-10-12 LAB
FERRITIN SERPL-MCNC: 268 NG/ML (ref 20–300)
HAPTOGLOB SERPL-MCNC: 120 MG/DL (ref 30–250)
IRON SERPL-MCNC: 133 UG/DL (ref 45–160)
LDH SERPL L TO P-CCNC: 253 U/L (ref 110–260)
RETICS/RBC NFR AUTO: 1.4 % (ref 0.4–2)
SATURATED IRON: 34 % (ref 20–50)
TOTAL IRON BINDING CAPACITY: 392 UG/DL (ref 250–450)
TRANSFERRIN SERPL-MCNC: 265 MG/DL (ref 200–375)

## 2022-10-12 PROCEDURE — 82728 ASSAY OF FERRITIN: CPT | Performed by: NURSE PRACTITIONER

## 2022-10-12 PROCEDURE — 85045 AUTOMATED RETICULOCYTE COUNT: CPT | Performed by: NURSE PRACTITIONER

## 2022-10-12 PROCEDURE — 83615 LACTATE (LD) (LDH) ENZYME: CPT | Performed by: NURSE PRACTITIONER

## 2022-10-12 PROCEDURE — 84466 ASSAY OF TRANSFERRIN: CPT | Performed by: NURSE PRACTITIONER

## 2022-10-12 PROCEDURE — 81350 UGT1A1 GENE COMMON VARIANTS: CPT | Performed by: NURSE PRACTITIONER

## 2022-10-12 PROCEDURE — 83010 ASSAY OF HAPTOGLOBIN QUANT: CPT | Performed by: NURSE PRACTITIONER

## 2022-10-12 PROCEDURE — 36415 COLL VENOUS BLD VENIPUNCTURE: CPT | Mod: PO | Performed by: NURSE PRACTITIONER

## 2022-10-13 ENCOUNTER — OFFICE VISIT (OUTPATIENT)
Dept: FAMILY MEDICINE | Facility: CLINIC | Age: 72
End: 2022-10-13
Payer: MEDICARE

## 2022-10-13 ENCOUNTER — LAB VISIT (OUTPATIENT)
Dept: LAB | Facility: HOSPITAL | Age: 72
End: 2022-10-13
Attending: STUDENT IN AN ORGANIZED HEALTH CARE EDUCATION/TRAINING PROGRAM
Payer: MEDICARE

## 2022-10-13 VITALS
HEART RATE: 73 BPM | BODY MASS INDEX: 21.95 KG/M2 | RESPIRATION RATE: 18 BRPM | SYSTOLIC BLOOD PRESSURE: 130 MMHG | WEIGHT: 162.06 LBS | OXYGEN SATURATION: 97 % | DIASTOLIC BLOOD PRESSURE: 80 MMHG | HEIGHT: 72 IN

## 2022-10-13 DIAGNOSIS — F32.1 CURRENT MODERATE EPISODE OF MAJOR DEPRESSIVE DISORDER WITHOUT PRIOR EPISODE: Primary | ICD-10-CM

## 2022-10-13 DIAGNOSIS — R41.9 UNSPECIFIED SYMPTOMS AND SIGNS INVOLVING COGNITIVE FUNCTIONS AND AWARENESS: ICD-10-CM

## 2022-10-13 DIAGNOSIS — F32.1 CURRENT MODERATE EPISODE OF MAJOR DEPRESSIVE DISORDER WITHOUT PRIOR EPISODE: ICD-10-CM

## 2022-10-13 PROCEDURE — 82607 VITAMIN B-12: CPT | Performed by: STUDENT IN AN ORGANIZED HEALTH CARE EDUCATION/TRAINING PROGRAM

## 2022-10-13 PROCEDURE — 1101F PT FALLS ASSESS-DOCD LE1/YR: CPT | Mod: CPTII,S$GLB,, | Performed by: STUDENT IN AN ORGANIZED HEALTH CARE EDUCATION/TRAINING PROGRAM

## 2022-10-13 PROCEDURE — 3079F DIAST BP 80-89 MM HG: CPT | Mod: CPTII,S$GLB,, | Performed by: STUDENT IN AN ORGANIZED HEALTH CARE EDUCATION/TRAINING PROGRAM

## 2022-10-13 PROCEDURE — 4010F ACE/ARB THERAPY RXD/TAKEN: CPT | Mod: CPTII,S$GLB,, | Performed by: STUDENT IN AN ORGANIZED HEALTH CARE EDUCATION/TRAINING PROGRAM

## 2022-10-13 PROCEDURE — 4010F PR ACE/ARB THEARPY RXD/TAKEN: ICD-10-PCS | Mod: CPTII,S$GLB,, | Performed by: STUDENT IN AN ORGANIZED HEALTH CARE EDUCATION/TRAINING PROGRAM

## 2022-10-13 PROCEDURE — 99214 PR OFFICE/OUTPT VISIT, EST, LEVL IV, 30-39 MIN: ICD-10-PCS | Mod: S$GLB,,, | Performed by: STUDENT IN AN ORGANIZED HEALTH CARE EDUCATION/TRAINING PROGRAM

## 2022-10-13 PROCEDURE — 1159F PR MEDICATION LIST DOCUMENTED IN MEDICAL RECORD: ICD-10-PCS | Mod: CPTII,S$GLB,, | Performed by: STUDENT IN AN ORGANIZED HEALTH CARE EDUCATION/TRAINING PROGRAM

## 2022-10-13 PROCEDURE — 99999 PR PBB SHADOW E&M-EST. PATIENT-LVL IV: ICD-10-PCS | Mod: PBBFAC,,, | Performed by: STUDENT IN AN ORGANIZED HEALTH CARE EDUCATION/TRAINING PROGRAM

## 2022-10-13 PROCEDURE — 3079F PR MOST RECENT DIASTOLIC BLOOD PRESSURE 80-89 MM HG: ICD-10-PCS | Mod: CPTII,S$GLB,, | Performed by: STUDENT IN AN ORGANIZED HEALTH CARE EDUCATION/TRAINING PROGRAM

## 2022-10-13 PROCEDURE — 3075F PR MOST RECENT SYSTOLIC BLOOD PRESS GE 130-139MM HG: ICD-10-PCS | Mod: CPTII,S$GLB,, | Performed by: STUDENT IN AN ORGANIZED HEALTH CARE EDUCATION/TRAINING PROGRAM

## 2022-10-13 PROCEDURE — 3288F PR FALLS RISK ASSESSMENT DOCUMENTED: ICD-10-PCS | Mod: CPTII,S$GLB,, | Performed by: STUDENT IN AN ORGANIZED HEALTH CARE EDUCATION/TRAINING PROGRAM

## 2022-10-13 PROCEDURE — 1125F AMNT PAIN NOTED PAIN PRSNT: CPT | Mod: CPTII,S$GLB,, | Performed by: STUDENT IN AN ORGANIZED HEALTH CARE EDUCATION/TRAINING PROGRAM

## 2022-10-13 PROCEDURE — 1160F RVW MEDS BY RX/DR IN RCRD: CPT | Mod: CPTII,S$GLB,, | Performed by: STUDENT IN AN ORGANIZED HEALTH CARE EDUCATION/TRAINING PROGRAM

## 2022-10-13 PROCEDURE — 1160F PR REVIEW ALL MEDS BY PRESCRIBER/CLIN PHARMACIST DOCUMENTED: ICD-10-PCS | Mod: CPTII,S$GLB,, | Performed by: STUDENT IN AN ORGANIZED HEALTH CARE EDUCATION/TRAINING PROGRAM

## 2022-10-13 PROCEDURE — 1159F MED LIST DOCD IN RCRD: CPT | Mod: CPTII,S$GLB,, | Performed by: STUDENT IN AN ORGANIZED HEALTH CARE EDUCATION/TRAINING PROGRAM

## 2022-10-13 PROCEDURE — 3288F FALL RISK ASSESSMENT DOCD: CPT | Mod: CPTII,S$GLB,, | Performed by: STUDENT IN AN ORGANIZED HEALTH CARE EDUCATION/TRAINING PROGRAM

## 2022-10-13 PROCEDURE — 3075F SYST BP GE 130 - 139MM HG: CPT | Mod: CPTII,S$GLB,, | Performed by: STUDENT IN AN ORGANIZED HEALTH CARE EDUCATION/TRAINING PROGRAM

## 2022-10-13 PROCEDURE — 80053 COMPREHEN METABOLIC PANEL: CPT | Performed by: STUDENT IN AN ORGANIZED HEALTH CARE EDUCATION/TRAINING PROGRAM

## 2022-10-13 PROCEDURE — 99999 PR PBB SHADOW E&M-EST. PATIENT-LVL IV: CPT | Mod: PBBFAC,,, | Performed by: STUDENT IN AN ORGANIZED HEALTH CARE EDUCATION/TRAINING PROGRAM

## 2022-10-13 PROCEDURE — 1101F PR PT FALLS ASSESS DOC 0-1 FALLS W/OUT INJ PAST YR: ICD-10-PCS | Mod: CPTII,S$GLB,, | Performed by: STUDENT IN AN ORGANIZED HEALTH CARE EDUCATION/TRAINING PROGRAM

## 2022-10-13 PROCEDURE — 84443 ASSAY THYROID STIM HORMONE: CPT | Performed by: STUDENT IN AN ORGANIZED HEALTH CARE EDUCATION/TRAINING PROGRAM

## 2022-10-13 PROCEDURE — 84425 ASSAY OF VITAMIN B-1: CPT | Performed by: STUDENT IN AN ORGANIZED HEALTH CARE EDUCATION/TRAINING PROGRAM

## 2022-10-13 PROCEDURE — 99214 OFFICE O/P EST MOD 30 MIN: CPT | Mod: S$GLB,,, | Performed by: STUDENT IN AN ORGANIZED HEALTH CARE EDUCATION/TRAINING PROGRAM

## 2022-10-13 PROCEDURE — 1125F PR PAIN SEVERITY QUANTIFIED, PAIN PRESENT: ICD-10-PCS | Mod: CPTII,S$GLB,, | Performed by: STUDENT IN AN ORGANIZED HEALTH CARE EDUCATION/TRAINING PROGRAM

## 2022-10-13 RX ORDER — ESCITALOPRAM OXALATE 10 MG/1
10 TABLET ORAL NIGHTLY
Qty: 30 TABLET | Refills: 11 | Status: SHIPPED | OUTPATIENT
Start: 2022-10-13 | End: 2022-12-05

## 2022-10-13 RX ORDER — ESCITALOPRAM OXALATE 10 MG/1
10 TABLET ORAL DAILY
Qty: 30 TABLET | Refills: 11 | Status: SHIPPED | OUTPATIENT
Start: 2022-10-13 | End: 2022-10-13

## 2022-10-13 NOTE — PROGRESS NOTES
"[facility] CLINIC NOTE    Patient Name: Kulwinder Hogue Jr.  YOB: 1950    PRESENTING HISTORY     History of Present Illness:  Mr. Kulwinder Hogue Jr. is a 71 y.o. male here with depression.     CC: "I think I feel a little depressed"  Don't want to get out of bed in the morning. Keep hitting the snooze.   Forgetful at work.   Appetite is normal.   No specific triggers.    Sister did die 4 years ago, they were closed.   Wife's son  tragically about 1 year ago.   Feels depressed, feels like should be able to work way out of it.   No guilt.   Onset 6+months        PHQ9-12 points.   No SI, HI, AVH      ROS      OBJECTIVE:   Vital Signs:  Vitals:    10/13/22 1332   BP: 130/80   Pulse: 73   Resp: 18   SpO2: 97%   Weight: 73.5 kg (162 lb 0.6 oz)   Height: 6' (1.829 m)            Physical Exam  Vitals and nursing note reviewed.   Constitutional:       Appearance: He is not diaphoretic.   HENT:      Head: Normocephalic and atraumatic.      Right Ear: External ear normal.      Left Ear: External ear normal.   Eyes:      General: No scleral icterus.     Conjunctiva/sclera: Conjunctivae normal.      Pupils: Pupils are equal, round, and reactive to light.   Neck:      Thyroid: No thyromegaly.   Cardiovascular:      Rate and Rhythm: Normal rate and regular rhythm.      Heart sounds: Normal heart sounds. No murmur heard.  Pulmonary:      Effort: Pulmonary effort is normal. No respiratory distress.      Breath sounds: Normal breath sounds. No wheezing or rales.   Musculoskeletal:         General: No tenderness or deformity. Normal range of motion.      Cervical back: Normal range of motion and neck supple.   Lymphadenopathy:      Cervical: No cervical adenopathy.   Skin:     General: Skin is warm and dry.      Findings: No erythema or rash.   Neurological:      Mental Status: He is alert and oriented to person, place, and time.      Gait: Gait is intact.   Psychiatric:         Mood and Affect: " Affect normal.         Cognition and Memory: Memory normal.      Comments: Speech is slow. Mood is depressed, affect congruent.        ASSESSMENT & PLAN:     Current moderate episode of major depressive disorder without prior episode  -     Discontinue: EScitalopram oxalate (LEXAPRO) 10 MG tablet; Take 1 tablet (10 mg total) by mouth once daily.  Dispense: 30 tablet; Refill: 11  -     TSH; Future; Expected date: 10/13/2022  -     VITAMIN B12; Future; Expected date: 10/13/2022  -     VITAMIN B1; Future; Expected date: 10/13/2022  -     EScitalopram oxalate (LEXAPRO) 10 MG tablet; Take 1 tablet (10 mg total) by mouth every evening.  Dispense: 30 tablet; Refill: 11    Unspecified symptoms and signs involving cognitive functions and awareness  -     VITAMIN B12; Future; Expected date: 10/13/2022  -     Comprehensive Metabolic Panel; Future; Expected date: 10/13/2022    Active MDD- start SSRI, reassess in a few weeks. Plan to continue at least 6 months.   Can consider psychotherapy if not responding.   Screen for medical etiologies, I think this is unlikely.       Terrance Plata MD   Internal Medicine

## 2022-10-14 LAB
ALBUMIN SERPL BCP-MCNC: 3.9 G/DL (ref 3.5–5.2)
ALP SERPL-CCNC: 65 U/L (ref 55–135)
ALT SERPL W/O P-5'-P-CCNC: 24 U/L (ref 10–44)
ANION GAP SERPL CALC-SCNC: 8 MMOL/L (ref 8–16)
ANNOTATION COMMENT IMP: NORMAL
AST SERPL-CCNC: 26 U/L (ref 10–40)
BILIRUB SERPL-MCNC: 2.7 MG/DL (ref 0.1–1)
BUN SERPL-MCNC: 13 MG/DL (ref 8–23)
CALCIUM SERPL-MCNC: 9 MG/DL (ref 8.7–10.5)
CHLORIDE SERPL-SCNC: 102 MMOL/L (ref 95–110)
CO2 SERPL-SCNC: 30 MMOL/L (ref 23–29)
CREAT SERPL-MCNC: 1 MG/DL (ref 0.5–1.4)
EST. GFR  (NO RACE VARIABLE): >60 ML/MIN/1.73 M^2
GENETICIST REVIEW: NORMAL
GLUCOSE SERPL-MCNC: 99 MG/DL (ref 70–110)
POTASSIUM SERPL-SCNC: 4 MMOL/L (ref 3.5–5.1)
PROT SERPL-MCNC: 7 G/DL (ref 6–8.4)
PROVIDER SIGNING NAME: NORMAL
SODIUM SERPL-SCNC: 140 MMOL/L (ref 136–145)
TEST PERFORMANCE INFO SPEC: NORMAL
TSH SERPL DL<=0.005 MIU/L-ACNC: 2.55 UIU/ML (ref 0.4–4)
UGT1A1 GENE PROD MET ACT IMP BLD/T-IMP: NORMAL
UGT1A1 GENOTYPE: NORMAL
UGT1A1 METHOD: NORMAL
VIT B12 SERPL-MCNC: 456 PG/ML (ref 210–950)

## 2022-10-19 LAB — VIT B1 BLD-MCNC: 115 UG/L (ref 38–122)

## 2022-10-27 ENCOUNTER — TELEPHONE (OUTPATIENT)
Dept: HEPATOLOGY | Facility: CLINIC | Age: 72
End: 2022-10-27
Payer: MEDICARE

## 2022-10-28 ENCOUNTER — OFFICE VISIT (OUTPATIENT)
Dept: HEPATOLOGY | Facility: CLINIC | Age: 72
End: 2022-10-28
Payer: MEDICARE

## 2022-10-28 ENCOUNTER — PROCEDURE VISIT (OUTPATIENT)
Dept: HEPATOLOGY | Facility: CLINIC | Age: 72
End: 2022-10-28
Payer: MEDICARE

## 2022-10-28 VITALS
RESPIRATION RATE: 18 BRPM | WEIGHT: 163.38 LBS | SYSTOLIC BLOOD PRESSURE: 172 MMHG | BODY MASS INDEX: 22.13 KG/M2 | OXYGEN SATURATION: 97 % | TEMPERATURE: 97 F | DIASTOLIC BLOOD PRESSURE: 97 MMHG | HEIGHT: 72 IN | HEART RATE: 62 BPM

## 2022-10-28 DIAGNOSIS — K76.0 FATTY LIVER: ICD-10-CM

## 2022-10-28 DIAGNOSIS — E80.4 GILBERT'S SYNDROME: Primary | ICD-10-CM

## 2022-10-28 DIAGNOSIS — E80.6 HYPERBILIRUBINEMIA: ICD-10-CM

## 2022-10-28 PROCEDURE — 3080F PR MOST RECENT DIASTOLIC BLOOD PRESSURE >= 90 MM HG: ICD-10-PCS | Mod: CPTII,S$GLB,, | Performed by: NURSE PRACTITIONER

## 2022-10-28 PROCEDURE — 99213 OFFICE O/P EST LOW 20 MIN: CPT | Mod: S$GLB,,, | Performed by: NURSE PRACTITIONER

## 2022-10-28 PROCEDURE — 3077F PR MOST RECENT SYSTOLIC BLOOD PRESSURE >= 140 MM HG: ICD-10-PCS | Mod: CPTII,S$GLB,, | Performed by: NURSE PRACTITIONER

## 2022-10-28 PROCEDURE — 3288F PR FALLS RISK ASSESSMENT DOCUMENTED: ICD-10-PCS | Mod: CPTII,S$GLB,, | Performed by: NURSE PRACTITIONER

## 2022-10-28 PROCEDURE — 99213 PR OFFICE/OUTPT VISIT, EST, LEVL III, 20-29 MIN: ICD-10-PCS | Mod: S$GLB,,, | Performed by: NURSE PRACTITIONER

## 2022-10-28 PROCEDURE — 3077F SYST BP >= 140 MM HG: CPT | Mod: CPTII,S$GLB,, | Performed by: NURSE PRACTITIONER

## 2022-10-28 PROCEDURE — 4010F ACE/ARB THERAPY RXD/TAKEN: CPT | Mod: CPTII,S$GLB,, | Performed by: NURSE PRACTITIONER

## 2022-10-28 PROCEDURE — 91200 LIVER ELASTOGRAPHY: CPT | Mod: S$GLB,,, | Performed by: NURSE PRACTITIONER

## 2022-10-28 PROCEDURE — 1101F PT FALLS ASSESS-DOCD LE1/YR: CPT | Mod: CPTII,S$GLB,, | Performed by: NURSE PRACTITIONER

## 2022-10-28 PROCEDURE — 3080F DIAST BP >= 90 MM HG: CPT | Mod: CPTII,S$GLB,, | Performed by: NURSE PRACTITIONER

## 2022-10-28 PROCEDURE — 1126F AMNT PAIN NOTED NONE PRSNT: CPT | Mod: CPTII,S$GLB,, | Performed by: NURSE PRACTITIONER

## 2022-10-28 PROCEDURE — 3288F FALL RISK ASSESSMENT DOCD: CPT | Mod: CPTII,S$GLB,, | Performed by: NURSE PRACTITIONER

## 2022-10-28 PROCEDURE — 1159F PR MEDICATION LIST DOCUMENTED IN MEDICAL RECORD: ICD-10-PCS | Mod: CPTII,S$GLB,, | Performed by: NURSE PRACTITIONER

## 2022-10-28 PROCEDURE — 1159F MED LIST DOCD IN RCRD: CPT | Mod: CPTII,S$GLB,, | Performed by: NURSE PRACTITIONER

## 2022-10-28 PROCEDURE — 1126F PR PAIN SEVERITY QUANTIFIED, NO PAIN PRESENT: ICD-10-PCS | Mod: CPTII,S$GLB,, | Performed by: NURSE PRACTITIONER

## 2022-10-28 PROCEDURE — 99999 PR PBB SHADOW E&M-EST. PATIENT-LVL III: CPT | Mod: PBBFAC,,, | Performed by: NURSE PRACTITIONER

## 2022-10-28 PROCEDURE — 99999 PR PBB SHADOW E&M-EST. PATIENT-LVL III: ICD-10-PCS | Mod: PBBFAC,,, | Performed by: NURSE PRACTITIONER

## 2022-10-28 PROCEDURE — 4010F PR ACE/ARB THEARPY RXD/TAKEN: ICD-10-PCS | Mod: CPTII,S$GLB,, | Performed by: NURSE PRACTITIONER

## 2022-10-28 PROCEDURE — 91200 FIBROSCAN (VIBRATION CONTROLLED TRANSIENT ELASTOGRAPHY): ICD-10-PCS | Mod: S$GLB,,, | Performed by: NURSE PRACTITIONER

## 2022-10-28 PROCEDURE — 1101F PR PT FALLS ASSESS DOC 0-1 FALLS W/OUT INJ PAST YR: ICD-10-PCS | Mod: CPTII,S$GLB,, | Performed by: NURSE PRACTITIONER

## 2022-10-28 NOTE — PROCEDURES
FibroScan (Vibration Controlled Transient Elastography)    Date/Time: 10/28/2022 11:45 AM  Performed by: Anjelica Archibald NP  Authorized by: Anjelica Archibald NP     Diagnosis:  Other    Probe:  M    Universal Protocol: Patient's identity, procedure and site were verified, confirmatory pause was performed.  Discussed procedure including risks and potential complications.  Questions answered.  Patient verbalizes understanding and wishes to proceed with VCTE.     Procedure: After providing explanations of the procedure, patient was placed in the supine position with right arm in maximum abduction to allow optimal exposure of right lateral abdomen.  Patient was briefly assessed, Testing was performed in the mid-axillary location, 50Hz Shear Wave pulses were applied and the resulting Shear Wave and Propagation Speed detected with a 3.5 MHz ultrasonic signal, using the FibroScan probe, Skin to liver capsule distance and liver parenchyma were accessed during the entire examination with the FibroScan probe, Patient was instructed to breathe normally and to abstain from sudden movements during the procedure, allowing for random measurements of liver stiffness. At least 10 Shear Waves were produced, Individual measurements of each Shear Wave were calculated.  Patient tolerated the procedure well with no complications.  Meets discharge criteria as was dismissed.  Rates pain 0 out of 10.  Patient will follow up with ordering provider to review results.    Findings  Median liver stiffness score:  3.6  CAP Reading: dB/m:  244    IQR/med %:  8  Interpretation  Fibrosis interpretation is based on medial liver stiffness - Kilopascal (kPa).    Fibrosis Stage:  F 0-1  Steatosis interpretation is based on controlled attenuation parameter - (dB/m).    Steatosis Grade:  S1

## 2022-10-28 NOTE — PATIENT INSTRUCTIONS
Gilbert's syndrome  Gilbert's syndrome is a very common hereditary condition. About 1 out of every 20 people have this syndrome. It is more common in men than in women. It is often first diagnosed in the late teens or early twenties.    Gilbert's syndrome can sometimes cause mild jaundice (yellowing of skin and eyes). It is usually harmless and does not require treatment. It is due to a reduced amount of a chemical in the liver, which processes a breakdown product of blood cells, called bilirubin. Jaundice tends to occur more commonly if you are ill with another problem such as:  - An infection.  - Starvation.  - Repeatedly being sick (vomiting).  - Following surgery.  - During times of exertion or stress.    A blood test can usually confirm the diagnosis of Gilbert's syndrome. It shows a mildly raised level of bilirubin; however, all the other liver tests will be normal. The level of bilirubin in the blood goes up and down; however, this does not usually cause any problems as the level does not go very high. No treatment is needed.     The take home message is - Gilbert's syndrome is really a mild abnormality of how the liver processes a chemical (enzyme) called bilirubin. It is not really a 'disease' and does not normally cause problems.

## 2022-10-28 NOTE — PROGRESS NOTES
[facility] - Established Patient    Last Clinic Visit: 6/20/22    Chief Complaint: Follow-up for elevated bilirubin        HISTORY     This is a 72 y.o. male with PMH noted below, here for follow-up of elevated bilirubin. Here to review results of recent workup.     His TBili has been chronically elevated since at least 2004, max 3.8 and mainly indirect (direct always <1). Liver enzymes and LFTs otherwise WNL.    Serologic workup has been negative for hemochromatosis, viral hepatitis, and hemolysis. UGT1A1 testing confirmed Gilbert's.    He had an US w/ doppler 6/2022. This showed a normal liver and bile ducts; satisfactory doppler assessment. CT same month with no acute findings.    Fibroscan completed today as he had imaging years ago that showed an enlarged fatty liver.    Fibroscan:  kPa 3.6, F0-F1  , S1    He has lost weight over the years and metabolic risk factors have been well controlled.  Body mass index is 22.16 kg/m².        Past medical history, surgical history, problem list, family history, social history, allergies: Reviewed and updated in the appropriate section of the electronic medical record.    Current Outpatient Medications   Medication Sig Dispense Refill    amLODIPine (NORVASC) 5 MG tablet Take 1 tablet (5 mg total) by mouth every evening. (for blood pressure) 30 tablet 11    aspirin 81 mg Tab Take 81 mg by mouth once daily. Every day      buPROPion (WELLBUTRIN) 75 MG tablet Take 1 tablet (75 mg total) by mouth 2 (two) times daily. 60 tablet 11    carvediloL (COREG) 25 MG tablet TAKE 1 TABLET(25 MG) BY MOUTH TWICE DAILY WITH MEALS 180 tablet 11    EScitalopram oxalate (LEXAPRO) 10 MG tablet Take 1 tablet (10 mg total) by mouth every evening. 30 tablet 11    fish oil-omega-3 fatty acids 300-1,000 mg capsule Take 2 g by mouth once daily.      folic acid (FOLVITE) 1 MG tablet Take 1 tablet (1 mg total) by mouth once daily. 90 tablet 4    hydroCHLOROthiazide (HYDRODIURIL)  12.5 MG Tab TAKE 1 TABLET(12.5 MG) BY MOUTH EVERY DAY 30 tablet 11    melatonin 5 mg TbIE Take 1 tablet by mouth every evening.      multivitamin capsule Take 1 capsule by mouth once daily.      olmesartan (BENICAR) 40 MG tablet Take 1 tablet (40 mg total) by mouth once daily. 90 tablet 3    potassium chloride SA (K-DUR,KLOR-CON) 20 MEQ tablet Take 1 tablet (20 mEq total) by mouth 2 (two) times daily. 180 tablet 3    pravastatin (PRAVACHOL) 40 MG tablet TAKE 1 TABLET BY MOUTH EVERY DAY 90 tablet 3    sildenafiL (VIAGRA) 100 MG tablet Take 1 tablet (100 mg total) by mouth as needed. 6 tablet 6     No current facility-administered medications for this visit.     Medication list reviewed and updated.      Review of Systems   As per HPI      Physical Exam   Constitutional: Well-nourished. No distress. Alert and oriented.  Eyes: No scleral icterus.   Pulmonary/Chest: Respiratory effort normal. No respiratory distress.   Abdominal: No distension, no ascites appreciated.   Extremities: No edema.   Neurological: No tremor or asterixis. Gait normal.  Skin: No jaundice. No spider telangiectasias or palmar erythema.  Psychiatric: Normal mood and affect. Speech, behavior, and thought content normal. No depression or anxiety noted.       Vitals reviewed.  BP (!) 172/97 (BP Location: Right arm, Patient Position: Sitting, BP Method: Medium (Automatic))   Pulse 62   Temp 97.1 °F (36.2 °C) (Oral)   Resp 18   Ht 6' (1.829 m)   Wt 74.1 kg (163 lb 5.8 oz)   SpO2 97%   BMI 22.16 kg/m²       LABS & DIAGNOSTIC STUDIES     I have personally reviewed pertinent laboratory findings:    Lab Results   Component Value Date    ALT 24 10/13/2022    AST 26 10/13/2022    ALKPHOS 65 10/13/2022    BILITOT 2.7 (H) 10/13/2022    ALBUMIN 3.9 10/13/2022    INR 1.0 02/08/2019       Lab Results   Component Value Date    WBC 6.80 06/02/2022    HGB 15.5 06/02/2022    HCT 45.4 06/02/2022    MCV 96 06/02/2022     06/02/2022       Lab Results    Component Value Date     10/13/2022    K 4.0 10/13/2022    BUN 13 10/13/2022    CREATININE 1.0 10/13/2022    ESTGFRAFRICA >60.0 06/02/2022    EGFRNONAA >60.0 06/02/2022       Lab Results   Component Value Date    FERRITIN 268 10/12/2022    FESATURATED 34 10/12/2022    HEPBSAG Negative 11/21/2019    HEPBIGM Negative 11/12/2018    HEPCAB Negative 11/12/2018    HEPAIGM Negative 11/12/2018       No results found for: AFP    I have personally reviewed the following result reports:  Abdominal US - 6/13/22  CT - 6/13/22      ASSESSMENT & PLAN     72 y.o. male with:    1. Elevated bilirubin - Gilbert's confirmed  -- Serologies reviewed. UGT1A1 testing confirms Gilbert's. Reassured this is a benign genetic variant and does not cause liver problems. No treatment required.  -- Bilirubin will likely continue to be elevated and may rise with fasting/dehydration, illness, etc.   -- He has normal liver imaging  -- Fibroscan reassuring; minimal steatosis and no suggestion of fibrosis (F0-F1).       *See AVS for patient education and instructions.      No hepatology follow-up at this time.  Return to clinic PRN.      Thank you for allowing me to participate in the care of Kulwinder Archibald, SURJITP-C  Hepatology        Duration of encounter: 20 min  This includes face to face time and non-face to face time preparing to see the patient (eg, review of tests), obtaining and/or reviewing separately obtained history, documenting clinical information in the electronic or other health record, independently interpreting results and communicating results to the patient/family/caregiver, or care coordination.

## 2022-11-16 ENCOUNTER — TELEPHONE (OUTPATIENT)
Dept: FAMILY MEDICINE | Facility: CLINIC | Age: 72
End: 2022-11-16
Payer: MEDICARE

## 2022-11-16 NOTE — TELEPHONE ENCOUNTER
I spoke with pt spouse. I advised her to keep appointment that is scheduled for 12/5/2022 at 10:40.    No further questions at this time.   ----- Message from Julio Ponce sent at 11/16/2022  1:33 PM CST -----  Regarding: sooner appointment  Contact: wife, Ashanti  Type:  Sooner Appointment Request    Caller is requesting a sooner appointment.  Caller declined first available appointment listed below.  Caller will not accept being placed on the waitlist and is requesting a message be sent to doctor.    Name of Caller:  Patient  When is the first available appointment?  No availability   Symptoms:  possible alzheimers  Best Call Back Number: [phone]    Case number [id number]

## 2022-12-05 ENCOUNTER — OFFICE VISIT (OUTPATIENT)
Dept: FAMILY MEDICINE | Facility: CLINIC | Age: 72
End: 2022-12-05
Payer: MEDICARE

## 2022-12-05 VITALS
BODY MASS INDEX: 22.09 KG/M2 | HEIGHT: 72 IN | HEART RATE: 65 BPM | OXYGEN SATURATION: 98 % | DIASTOLIC BLOOD PRESSURE: 80 MMHG | RESPIRATION RATE: 18 BRPM | SYSTOLIC BLOOD PRESSURE: 124 MMHG | WEIGHT: 163.13 LBS

## 2022-12-05 DIAGNOSIS — R41.3 OTHER AMNESIA: Primary | ICD-10-CM

## 2022-12-05 DIAGNOSIS — F32.1 CURRENT MODERATE EPISODE OF MAJOR DEPRESSIVE DISORDER WITHOUT PRIOR EPISODE: ICD-10-CM

## 2022-12-05 PROCEDURE — 99213 OFFICE O/P EST LOW 20 MIN: CPT | Mod: S$GLB,,, | Performed by: STUDENT IN AN ORGANIZED HEALTH CARE EDUCATION/TRAINING PROGRAM

## 2022-12-05 PROCEDURE — 1126F PR PAIN SEVERITY QUANTIFIED, NO PAIN PRESENT: ICD-10-PCS | Mod: CPTII,S$GLB,, | Performed by: STUDENT IN AN ORGANIZED HEALTH CARE EDUCATION/TRAINING PROGRAM

## 2022-12-05 PROCEDURE — 99999 PR PBB SHADOW E&M-EST. PATIENT-LVL V: CPT | Mod: PBBFAC,,, | Performed by: STUDENT IN AN ORGANIZED HEALTH CARE EDUCATION/TRAINING PROGRAM

## 2022-12-05 PROCEDURE — 1159F PR MEDICATION LIST DOCUMENTED IN MEDICAL RECORD: ICD-10-PCS | Mod: CPTII,S$GLB,, | Performed by: STUDENT IN AN ORGANIZED HEALTH CARE EDUCATION/TRAINING PROGRAM

## 2022-12-05 PROCEDURE — 3074F PR MOST RECENT SYSTOLIC BLOOD PRESSURE < 130 MM HG: ICD-10-PCS | Mod: CPTII,S$GLB,, | Performed by: STUDENT IN AN ORGANIZED HEALTH CARE EDUCATION/TRAINING PROGRAM

## 2022-12-05 PROCEDURE — 1101F PT FALLS ASSESS-DOCD LE1/YR: CPT | Mod: CPTII,S$GLB,, | Performed by: STUDENT IN AN ORGANIZED HEALTH CARE EDUCATION/TRAINING PROGRAM

## 2022-12-05 PROCEDURE — 3079F DIAST BP 80-89 MM HG: CPT | Mod: CPTII,S$GLB,, | Performed by: STUDENT IN AN ORGANIZED HEALTH CARE EDUCATION/TRAINING PROGRAM

## 2022-12-05 PROCEDURE — 3008F PR BODY MASS INDEX (BMI) DOCUMENTED: ICD-10-PCS | Mod: CPTII,S$GLB,, | Performed by: STUDENT IN AN ORGANIZED HEALTH CARE EDUCATION/TRAINING PROGRAM

## 2022-12-05 PROCEDURE — 3074F SYST BP LT 130 MM HG: CPT | Mod: CPTII,S$GLB,, | Performed by: STUDENT IN AN ORGANIZED HEALTH CARE EDUCATION/TRAINING PROGRAM

## 2022-12-05 PROCEDURE — 4010F ACE/ARB THERAPY RXD/TAKEN: CPT | Mod: CPTII,S$GLB,, | Performed by: STUDENT IN AN ORGANIZED HEALTH CARE EDUCATION/TRAINING PROGRAM

## 2022-12-05 PROCEDURE — 90694 FLU VACCINE - QUADRIVALENT - ADJUVANTED: ICD-10-PCS | Mod: S$GLB,,, | Performed by: STUDENT IN AN ORGANIZED HEALTH CARE EDUCATION/TRAINING PROGRAM

## 2022-12-05 PROCEDURE — 90694 VACC AIIV4 NO PRSRV 0.5ML IM: CPT | Mod: S$GLB,,, | Performed by: STUDENT IN AN ORGANIZED HEALTH CARE EDUCATION/TRAINING PROGRAM

## 2022-12-05 PROCEDURE — G0008 FLU VACCINE - QUADRIVALENT - ADJUVANTED: ICD-10-PCS | Mod: S$GLB,,, | Performed by: STUDENT IN AN ORGANIZED HEALTH CARE EDUCATION/TRAINING PROGRAM

## 2022-12-05 PROCEDURE — 1159F MED LIST DOCD IN RCRD: CPT | Mod: CPTII,S$GLB,, | Performed by: STUDENT IN AN ORGANIZED HEALTH CARE EDUCATION/TRAINING PROGRAM

## 2022-12-05 PROCEDURE — 1160F RVW MEDS BY RX/DR IN RCRD: CPT | Mod: CPTII,S$GLB,, | Performed by: STUDENT IN AN ORGANIZED HEALTH CARE EDUCATION/TRAINING PROGRAM

## 2022-12-05 PROCEDURE — 4010F PR ACE/ARB THEARPY RXD/TAKEN: ICD-10-PCS | Mod: CPTII,S$GLB,, | Performed by: STUDENT IN AN ORGANIZED HEALTH CARE EDUCATION/TRAINING PROGRAM

## 2022-12-05 PROCEDURE — G0008 ADMIN INFLUENZA VIRUS VAC: HCPCS | Mod: S$GLB,,, | Performed by: STUDENT IN AN ORGANIZED HEALTH CARE EDUCATION/TRAINING PROGRAM

## 2022-12-05 PROCEDURE — 3008F BODY MASS INDEX DOCD: CPT | Mod: CPTII,S$GLB,, | Performed by: STUDENT IN AN ORGANIZED HEALTH CARE EDUCATION/TRAINING PROGRAM

## 2022-12-05 PROCEDURE — 99999 PR PBB SHADOW E&M-EST. PATIENT-LVL V: ICD-10-PCS | Mod: PBBFAC,,, | Performed by: STUDENT IN AN ORGANIZED HEALTH CARE EDUCATION/TRAINING PROGRAM

## 2022-12-05 PROCEDURE — 1101F PR PT FALLS ASSESS DOC 0-1 FALLS W/OUT INJ PAST YR: ICD-10-PCS | Mod: CPTII,S$GLB,, | Performed by: STUDENT IN AN ORGANIZED HEALTH CARE EDUCATION/TRAINING PROGRAM

## 2022-12-05 PROCEDURE — 3288F PR FALLS RISK ASSESSMENT DOCUMENTED: ICD-10-PCS | Mod: CPTII,S$GLB,, | Performed by: STUDENT IN AN ORGANIZED HEALTH CARE EDUCATION/TRAINING PROGRAM

## 2022-12-05 PROCEDURE — 1126F AMNT PAIN NOTED NONE PRSNT: CPT | Mod: CPTII,S$GLB,, | Performed by: STUDENT IN AN ORGANIZED HEALTH CARE EDUCATION/TRAINING PROGRAM

## 2022-12-05 PROCEDURE — 99213 PR OFFICE/OUTPT VISIT, EST, LEVL III, 20-29 MIN: ICD-10-PCS | Mod: S$GLB,,, | Performed by: STUDENT IN AN ORGANIZED HEALTH CARE EDUCATION/TRAINING PROGRAM

## 2022-12-05 PROCEDURE — 3288F FALL RISK ASSESSMENT DOCD: CPT | Mod: CPTII,S$GLB,, | Performed by: STUDENT IN AN ORGANIZED HEALTH CARE EDUCATION/TRAINING PROGRAM

## 2022-12-05 PROCEDURE — 3079F PR MOST RECENT DIASTOLIC BLOOD PRESSURE 80-89 MM HG: ICD-10-PCS | Mod: CPTII,S$GLB,, | Performed by: STUDENT IN AN ORGANIZED HEALTH CARE EDUCATION/TRAINING PROGRAM

## 2022-12-05 PROCEDURE — 1160F PR REVIEW ALL MEDS BY PRESCRIBER/CLIN PHARMACIST DOCUMENTED: ICD-10-PCS | Mod: CPTII,S$GLB,, | Performed by: STUDENT IN AN ORGANIZED HEALTH CARE EDUCATION/TRAINING PROGRAM

## 2022-12-05 RX ORDER — ESCITALOPRAM OXALATE 20 MG/1
20 TABLET ORAL NIGHTLY
Qty: 30 TABLET | Refills: 11 | Status: SHIPPED | OUTPATIENT
Start: 2022-12-05 | End: 2023-12-05

## 2022-12-05 NOTE — PROGRESS NOTES
[facility] CLINIC NOTE    Patient Name: Kulwinder Hogue Jr.  YOB: 1950    PRESENTING HISTORY     History of Present Illness:  Mr. Kulwinder Hogue Jr. is a 72 y.o. male here for depression f/u.     Minimal change since last visit.   Slow reactions.   Depression- a little better.    Can't get out of bed in the morning.    FOregetfull ness maybe a little better.     No HAs.   No vision changes.   No peripheral neurological symtpoms.   No bulbar symptoms.     He was started on lexapro 10mg 1 month ago.     ROS      OBJECTIVE:   Vital Signs:  Vitals:    12/05/22 1047   BP: 124/80   Pulse: 65   Resp: 18   SpO2: 98%   Weight: 74 kg (163 lb 2.3 oz)   Height: 6' (1.829 m)         Physical Exam  Vitals and nursing note reviewed.   Constitutional:       General: He is not in acute distress.     Appearance: He is not toxic-appearing or diaphoretic.   HENT:      Head: Normocephalic and atraumatic.      Right Ear: External ear normal.      Left Ear: External ear normal.   Eyes:      General: No scleral icterus.     Conjunctiva/sclera: Conjunctivae normal.      Pupils: Pupils are equal, round, and reactive to light.   Neck:      Thyroid: No thyromegaly.   Cardiovascular:      Rate and Rhythm: Normal rate and regular rhythm.      Heart sounds: Normal heart sounds. No murmur heard.  Pulmonary:      Effort: Pulmonary effort is normal. No respiratory distress.      Breath sounds: Normal breath sounds. No wheezing or rales.   Musculoskeletal:         General: No tenderness or deformity. Normal range of motion.      Cervical back: Normal range of motion and neck supple.   Lymphadenopathy:      Cervical: No cervical adenopathy.   Skin:     General: Skin is warm and dry.      Findings: No erythema or rash.   Neurological:      General: No focal deficit present.      Mental Status: He is alert and oriented to person, place, and time.      Cranial Nerves: No cranial nerve deficit.      Sensory: No sensory  deficit.      Motor: No weakness.      Coordination: Coordination normal.      Gait: Gait is intact. Gait normal.      Deep Tendon Reflexes: Reflexes normal.      Comments: Psychomotor retardation.   Mildly increased tone LUE.   No tremors   Psychiatric:         Mood and Affect: Affect normal.         Cognition and Memory: Memory normal.      Comments: Flat affect.        ASSESSMENT & PLAN:     Other amnesia  -     CT Head Without Contrast; Future; Expected date: 12/05/2022    Current moderate episode of major depressive disorder without prior episode  -     EScitalopram oxalate (LEXAPRO) 20 MG tablet; Take 1 tablet (20 mg total) by mouth every evening.  Dispense: 30 tablet; Refill: 11    Other orders  -     Influenza - Quadrivalent (Adjuvanted)    72 M - I believe his symptoms are depression related, but due to poor response and motor changes of LUE will get brain imaging to be safe.     Increase lexapro.       Terrance Plata MD   Internal Medicine

## 2022-12-07 ENCOUNTER — LAB VISIT (OUTPATIENT)
Dept: LAB | Facility: HOSPITAL | Age: 72
End: 2022-12-07
Attending: UROLOGY
Payer: MEDICARE

## 2022-12-07 DIAGNOSIS — R97.20 ELEVATED PSA: ICD-10-CM

## 2022-12-07 LAB
PROSTATE SPECIFIC ANTIGEN, TOTAL: 4.5 NG/ML (ref 0–4)
PSA FREE MFR SERPL: 23.78 %
PSA FREE SERPL-MCNC: 1.07 NG/ML (ref 0–1.5)

## 2022-12-07 PROCEDURE — 84154 ASSAY OF PSA FREE: CPT | Performed by: UROLOGY

## 2022-12-07 PROCEDURE — 84153 ASSAY OF PSA TOTAL: CPT | Performed by: UROLOGY

## 2022-12-07 PROCEDURE — 36415 COLL VENOUS BLD VENIPUNCTURE: CPT | Performed by: UROLOGY

## 2022-12-13 ENCOUNTER — HOSPITAL ENCOUNTER (OUTPATIENT)
Dept: RADIOLOGY | Facility: HOSPITAL | Age: 72
Discharge: HOME | End: 2022-12-13
Attending: STUDENT IN AN ORGANIZED HEALTH CARE EDUCATION/TRAINING PROGRAM
Payer: MEDICARE

## 2022-12-13 DIAGNOSIS — R41.3 OTHER AMNESIA: ICD-10-CM

## 2022-12-13 PROCEDURE — 70450 CT HEAD WITHOUT CONTRAST: ICD-10-PCS | Mod: 26,,, | Performed by: RADIOLOGY

## 2022-12-13 PROCEDURE — 70450 CT HEAD/BRAIN W/O DYE: CPT | Mod: 26,,, | Performed by: RADIOLOGY

## 2022-12-13 PROCEDURE — 70450 CT HEAD/BRAIN W/O DYE: CPT | Mod: TC

## 2022-12-15 ENCOUNTER — PATIENT MESSAGE (OUTPATIENT)
Dept: RESEARCH | Facility: CLINIC | Age: 72
End: 2022-12-15
Payer: MEDICARE

## 2022-12-29 ENCOUNTER — OFFICE VISIT (OUTPATIENT)
Dept: URGENT CARE | Facility: CLINIC | Age: 72
End: 2022-12-29
Payer: MEDICARE

## 2022-12-29 VITALS
HEART RATE: 73 BPM | WEIGHT: 166.81 LBS | BODY MASS INDEX: 22.59 KG/M2 | SYSTOLIC BLOOD PRESSURE: 104 MMHG | DIASTOLIC BLOOD PRESSURE: 63 MMHG | RESPIRATION RATE: 18 BRPM | OXYGEN SATURATION: 97 % | TEMPERATURE: 99 F | HEIGHT: 72 IN

## 2022-12-29 DIAGNOSIS — J00 ACUTE RHINITIS: Primary | ICD-10-CM

## 2022-12-29 DIAGNOSIS — R89.4 INFLUENZA A VIRUS NOT DETECTED: ICD-10-CM

## 2022-12-29 DIAGNOSIS — J03.90 EXUDATIVE TONSILLITIS: ICD-10-CM

## 2022-12-29 DIAGNOSIS — Z20.822 COVID-19 VIRUS NOT DETECTED: ICD-10-CM

## 2022-12-29 DIAGNOSIS — J02.9 SORE THROAT: ICD-10-CM

## 2022-12-29 LAB
CTP QC/QA: YES
FLUAV AG NPH QL: NEGATIVE
FLUBV AG NPH QL: NEGATIVE
S PYO RRNA THROAT QL PROBE: NEGATIVE
SARS-COV-2 AG RESP QL IA.RAPID: NEGATIVE

## 2022-12-29 PROCEDURE — 4010F PR ACE/ARB THEARPY RXD/TAKEN: ICD-10-PCS | Mod: CPTII,S$GLB,ICN, | Performed by: NURSE PRACTITIONER

## 2022-12-29 PROCEDURE — 87880 STREP A ASSAY W/OPTIC: CPT | Mod: QW,ICN,, | Performed by: NURSE PRACTITIONER

## 2022-12-29 PROCEDURE — 87804 INFLUENZA ASSAY W/OPTIC: CPT | Mod: QW,ICN,, | Performed by: NURSE PRACTITIONER

## 2022-12-29 PROCEDURE — 3078F DIAST BP <80 MM HG: CPT | Mod: CPTII,S$GLB,ICN, | Performed by: NURSE PRACTITIONER

## 2022-12-29 PROCEDURE — 4010F ACE/ARB THERAPY RXD/TAKEN: CPT | Mod: CPTII,S$GLB,ICN, | Performed by: NURSE PRACTITIONER

## 2022-12-29 PROCEDURE — 3008F BODY MASS INDEX DOCD: CPT | Mod: CPTII,S$GLB,ICN, | Performed by: NURSE PRACTITIONER

## 2022-12-29 PROCEDURE — 1160F PR REVIEW ALL MEDS BY PRESCRIBER/CLIN PHARMACIST DOCUMENTED: ICD-10-PCS | Mod: CPTII,S$GLB,ICN, | Performed by: NURSE PRACTITIONER

## 2022-12-29 PROCEDURE — 3078F PR MOST RECENT DIASTOLIC BLOOD PRESSURE < 80 MM HG: ICD-10-PCS | Mod: CPTII,S$GLB,ICN, | Performed by: NURSE PRACTITIONER

## 2022-12-29 PROCEDURE — 87811 SARS-COV-2 COVID19 W/OPTIC: CPT | Mod: QW,S$GLB,ICN, | Performed by: NURSE PRACTITIONER

## 2022-12-29 PROCEDURE — 3008F PR BODY MASS INDEX (BMI) DOCUMENTED: ICD-10-PCS | Mod: CPTII,S$GLB,ICN, | Performed by: NURSE PRACTITIONER

## 2022-12-29 PROCEDURE — 1159F MED LIST DOCD IN RCRD: CPT | Mod: CPTII,S$GLB,ICN, | Performed by: NURSE PRACTITIONER

## 2022-12-29 PROCEDURE — 99214 OFFICE O/P EST MOD 30 MIN: CPT | Mod: 25,S$GLB,ICN, | Performed by: NURSE PRACTITIONER

## 2022-12-29 PROCEDURE — 3074F SYST BP LT 130 MM HG: CPT | Mod: CPTII,S$GLB,ICN, | Performed by: NURSE PRACTITIONER

## 2022-12-29 PROCEDURE — 87811 SARS CORONAVIRUS 2 ANTIGEN POCT, MANUAL READ: ICD-10-PCS | Mod: QW,S$GLB,ICN, | Performed by: NURSE PRACTITIONER

## 2022-12-29 PROCEDURE — 3074F PR MOST RECENT SYSTOLIC BLOOD PRESSURE < 130 MM HG: ICD-10-PCS | Mod: CPTII,S$GLB,ICN, | Performed by: NURSE PRACTITIONER

## 2022-12-29 PROCEDURE — 99214 PR OFFICE/OUTPT VISIT, EST, LEVL IV, 30-39 MIN: ICD-10-PCS | Mod: 25,S$GLB,ICN, | Performed by: NURSE PRACTITIONER

## 2022-12-29 PROCEDURE — 87804 POCT INFLUENZA A/B: ICD-10-PCS | Mod: QW,ICN,, | Performed by: NURSE PRACTITIONER

## 2022-12-29 PROCEDURE — 1160F RVW MEDS BY RX/DR IN RCRD: CPT | Mod: CPTII,S$GLB,ICN, | Performed by: NURSE PRACTITIONER

## 2022-12-29 PROCEDURE — 87880 POCT RAPID STREP A: ICD-10-PCS | Mod: QW,ICN,, | Performed by: NURSE PRACTITIONER

## 2022-12-29 PROCEDURE — 1159F PR MEDICATION LIST DOCUMENTED IN MEDICAL RECORD: ICD-10-PCS | Mod: CPTII,S$GLB,ICN, | Performed by: NURSE PRACTITIONER

## 2022-12-29 RX ORDER — LEVOCETIRIZINE DIHYDROCHLORIDE 5 MG/1
5 TABLET, FILM COATED ORAL DAILY PRN
Qty: 7 TABLET | Refills: 0 | Status: SHIPPED | OUTPATIENT
Start: 2022-12-29 | End: 2023-01-17

## 2022-12-29 RX ORDER — FLUTICASONE PROPIONATE 50 MCG
2 SPRAY, SUSPENSION (ML) NASAL DAILY PRN
Qty: 15.8 ML | Refills: 0 | Status: SHIPPED | OUTPATIENT
Start: 2022-12-29 | End: 2023-01-12

## 2022-12-29 NOTE — PROGRESS NOTES
Subjective:       Patient ID: Kulwinder Hogue Jr. is a 72 y.o. male.    Vitals:  height is 6' (1.829 m) and weight is 75.7 kg (166 lb 12.8 oz). His oral temperature is 99.3 °F (37.4 °C). His blood pressure is 104/63 and his pulse is 73. His respiration is 18 and oxygen saturation is 97%.     Chief Complaint: Sinus Problem    Sinus Problem  This is a new problem. Episode onset: a week ago. The problem has been gradually worsening since onset. Associated symptoms include sneezing and a sore throat. Pertinent negatives include no chills, congestion, coughing or ear pain. (Runny nose) Past treatments include nothing.     Constitution: Negative for chills and fever.   HENT:  Positive for sore throat. Negative for ear pain, congestion, trouble swallowing and voice change.    Neck: Negative for painful lymph nodes.   Cardiovascular:  Negative for chest pain, palpitations and sob on exertion.   Respiratory:  Negative for cough and stridor.    Gastrointestinal:  Negative for nausea and vomiting.   Skin:  Negative for rash.   Allergic/Immunologic: Positive for sneezing.   Neurological:  Negative for dizziness, light-headedness, passing out, disorientation and altered mental status.   Hematologic/Lymphatic: Negative for swollen lymph nodes.   Psychiatric/Behavioral:  Negative for altered mental status, disorientation and confusion.      Objective:      Physical Exam   Constitutional: He is oriented to person, place, and time. He appears well-developed. He is cooperative.  Non-toxic appearance. He does not appear ill. No distress.   HENT:   Head: Normocephalic and atraumatic.   Ears:   Right Ear: Hearing, tympanic membrane, external ear and ear canal normal.   Left Ear: Hearing, tympanic membrane, external ear and ear canal normal.   Nose: Rhinorrhea present. No mucosal edema, nasal deformity or congestion. No epistaxis. Right sinus exhibits no maxillary sinus tenderness and no frontal sinus tenderness. Left sinus exhibits no  maxillary sinus tenderness and no frontal sinus tenderness.   Mouth/Throat: Uvula is midline and mucous membranes are normal. Mucous membranes are moist. No trismus in the jaw. Normal dentition. No uvula swelling. Posterior oropharyngeal erythema present. No oropharyngeal exudate, posterior oropharyngeal edema, tonsillar abscesses or cobblestoning. Tonsils are 1+ on the right. Tonsils are 1+ on the left. No tonsillar exudate.   Eyes: Conjunctivae and lids are normal. No scleral icterus.   Neck: Trachea normal and phonation normal. Neck supple. No edema present. No erythema present. No neck rigidity present.   Cardiovascular: Normal rate, regular rhythm, normal heart sounds and normal pulses.   Pulmonary/Chest: Effort normal and breath sounds normal. No respiratory distress. He has no decreased breath sounds. He has no rhonchi.   Abdominal: Normal appearance.   Musculoskeletal: Normal range of motion.         General: No deformity. Normal range of motion.   Lymphadenopathy:     He has no cervical adenopathy.   Neurological: He is alert and oriented to person, place, and time. He exhibits normal muscle tone. Coordination normal.   Skin: Skin is warm, dry, intact, not diaphoretic and not pale. Capillary refill takes 2 to 3 seconds.   Psychiatric: His speech is normal and behavior is normal. Judgment and thought content normal.   Nursing note and vitals reviewed.      Assessment:       1. Acute rhinitis    2. Sore throat    3. Exudative tonsillitis    4. COVID-19 virus not detected    5. Influenza A virus not detected          Plan:         Acute rhinitis  -     SARS Coronavirus 2 Antigen, POCT Manual Read  -     POCT Influenza A/B  -     fluticasone propionate (FLONASE) 50 mcg/actuation nasal spray; 2 sprays (100 mcg total) by Each Nostril route daily as needed for Rhinitis.  Dispense: 15.8 mL; Refill: 0  -     levocetirizine (XYZAL) 5 MG tablet; Take 1 tablet (5 mg total) by mouth daily as needed for Allergies.   Dispense: 7 tablet; Refill: 0    Sore throat  -     POCT rapid strep A  -     benzocaine-menthoL 6-10 mg lozenge; Take 1 lozenge by mouth every 2 (two) hours as needed for Pain.  Dispense: 18 tablet; Refill: 0    Exudative tonsillitis  -     benzocaine-menthoL 6-10 mg lozenge; Take 1 lozenge by mouth every 2 (two) hours as needed for Pain.  Dispense: 18 tablet; Refill: 0    COVID-19 virus not detected    Influenza A virus not detected    Strep negative    I have discussed the test results and physical exam findings with the patient. We discussed symptom monitoring, conservative care methods, medication use, and follow up orders. He verbalized understanding and agreement with the plan of care.      Increase clear fluid intake  Take tylenol or motrin for pain  Use flonase and xyzal as needed for nasal congestion and sneezing.  Warm saltwater gargles four times daily and benzocaine throat lozenges as needed for comfort  May drink warm soup and soft foods until better  Follow up with PCP    Go to the ER for airway involvement, shortness of breath, fever unresolved with medication, or other emergent concern.  Return to clinic for any new concern.

## 2022-12-29 NOTE — PATIENT INSTRUCTIONS
Increase clear fluid intake  Take tylenol or motrin for pain  Use flonase and xyzal as needed for nasal congestion and sneezing.  Warm saltwater gargles four times daily and benzocaine throat lozenges as needed for comfort  May drink warm soup and soft foods until better  Follow up with PCP    Go to the ER for airway involvement, shortness of breath, fever unresolved with medication, or other emergent concern.  Return to clinic for any new concern.

## 2023-01-17 ENCOUNTER — TELEPHONE (OUTPATIENT)
Dept: FAMILY MEDICINE | Facility: CLINIC | Age: 73
End: 2023-01-17
Payer: MEDICARE

## 2023-01-17 NOTE — TELEPHONE ENCOUNTER
Call placed to [facility]. Was advised pharmacy has been attempting to get a refill of Lovocetirizine 5 mg from provider at urgent care. Requesting to know if PCP will authorize prescription. Call placed to patient who states he no longer needs this medication. Return call to pharmacy for notification patient states he does not need refill of this medication, and is no longer taking it. Medication also removed from current medication list.

## 2023-01-17 NOTE — TELEPHONE ENCOUNTER
----- Message from Donya Castanon sent at 1/17/2023 12:54 PM CST -----  Contact: Patient  Type:  Needs Medical Advice    Who Called:  Patient     Would the patient rather a call back or a response via [facility]? Call     Best Call Back Number: [phone]     Additional Information:  Patient would like to speak with the nurse in regards to medication questions. Patient stated that [facility] is telling him that they can't refill his medication but patient does not know what medication it is. Patient wants to know if someone could call to see what is going on.       [id number] - [place] - [address] AT [facility] [address]  [address] [address]  Phone: [phone] Fax: [phone]         Please call to advise

## 2023-01-23 DIAGNOSIS — R97.20 ELEVATED PSA: Primary | ICD-10-CM

## 2023-06-07 ENCOUNTER — OFFICE VISIT (OUTPATIENT)
Dept: FAMILY MEDICINE | Facility: CLINIC | Age: 73
End: 2023-06-07
Payer: MEDICARE

## 2023-06-07 VITALS
HEART RATE: 63 BPM | RESPIRATION RATE: 16 BRPM | WEIGHT: 167.56 LBS | DIASTOLIC BLOOD PRESSURE: 76 MMHG | SYSTOLIC BLOOD PRESSURE: 130 MMHG | BODY MASS INDEX: 22.69 KG/M2 | HEIGHT: 72 IN | OXYGEN SATURATION: 99 %

## 2023-06-07 DIAGNOSIS — F33.0 MILD EPISODE OF RECURRENT MAJOR DEPRESSIVE DISORDER: Primary | ICD-10-CM

## 2023-06-07 PROCEDURE — 1101F PR PT FALLS ASSESS DOC 0-1 FALLS W/OUT INJ PAST YR: ICD-10-PCS | Mod: CPTII,S$GLB,, | Performed by: STUDENT IN AN ORGANIZED HEALTH CARE EDUCATION/TRAINING PROGRAM

## 2023-06-07 PROCEDURE — 99999 PR PBB SHADOW E&M-EST. PATIENT-LVL V: ICD-10-PCS | Mod: PBBFAC,,, | Performed by: STUDENT IN AN ORGANIZED HEALTH CARE EDUCATION/TRAINING PROGRAM

## 2023-06-07 PROCEDURE — 99999 PR PBB SHADOW E&M-EST. PATIENT-LVL V: CPT | Mod: PBBFAC,,, | Performed by: STUDENT IN AN ORGANIZED HEALTH CARE EDUCATION/TRAINING PROGRAM

## 2023-06-07 PROCEDURE — 1160F PR REVIEW ALL MEDS BY PRESCRIBER/CLIN PHARMACIST DOCUMENTED: ICD-10-PCS | Mod: CPTII,S$GLB,, | Performed by: STUDENT IN AN ORGANIZED HEALTH CARE EDUCATION/TRAINING PROGRAM

## 2023-06-07 PROCEDURE — 3288F PR FALLS RISK ASSESSMENT DOCUMENTED: ICD-10-PCS | Mod: CPTII,S$GLB,, | Performed by: STUDENT IN AN ORGANIZED HEALTH CARE EDUCATION/TRAINING PROGRAM

## 2023-06-07 PROCEDURE — 3078F PR MOST RECENT DIASTOLIC BLOOD PRESSURE < 80 MM HG: ICD-10-PCS | Mod: CPTII,S$GLB,, | Performed by: STUDENT IN AN ORGANIZED HEALTH CARE EDUCATION/TRAINING PROGRAM

## 2023-06-07 PROCEDURE — 99213 PR OFFICE/OUTPT VISIT, EST, LEVL III, 20-29 MIN: ICD-10-PCS | Mod: S$GLB,,, | Performed by: STUDENT IN AN ORGANIZED HEALTH CARE EDUCATION/TRAINING PROGRAM

## 2023-06-07 PROCEDURE — 4010F PR ACE/ARB THEARPY RXD/TAKEN: ICD-10-PCS | Mod: CPTII,S$GLB,, | Performed by: STUDENT IN AN ORGANIZED HEALTH CARE EDUCATION/TRAINING PROGRAM

## 2023-06-07 PROCEDURE — 1126F AMNT PAIN NOTED NONE PRSNT: CPT | Mod: CPTII,S$GLB,, | Performed by: STUDENT IN AN ORGANIZED HEALTH CARE EDUCATION/TRAINING PROGRAM

## 2023-06-07 PROCEDURE — 96127 BRIEF EMOTIONAL/BEHAV ASSMT: CPT | Mod: S$GLB,,, | Performed by: STUDENT IN AN ORGANIZED HEALTH CARE EDUCATION/TRAINING PROGRAM

## 2023-06-07 PROCEDURE — 3288F FALL RISK ASSESSMENT DOCD: CPT | Mod: CPTII,S$GLB,, | Performed by: STUDENT IN AN ORGANIZED HEALTH CARE EDUCATION/TRAINING PROGRAM

## 2023-06-07 PROCEDURE — 3078F DIAST BP <80 MM HG: CPT | Mod: CPTII,S$GLB,, | Performed by: STUDENT IN AN ORGANIZED HEALTH CARE EDUCATION/TRAINING PROGRAM

## 2023-06-07 PROCEDURE — 1126F PR PAIN SEVERITY QUANTIFIED, NO PAIN PRESENT: ICD-10-PCS | Mod: CPTII,S$GLB,, | Performed by: STUDENT IN AN ORGANIZED HEALTH CARE EDUCATION/TRAINING PROGRAM

## 2023-06-07 PROCEDURE — 3008F BODY MASS INDEX DOCD: CPT | Mod: CPTII,S$GLB,, | Performed by: STUDENT IN AN ORGANIZED HEALTH CARE EDUCATION/TRAINING PROGRAM

## 2023-06-07 PROCEDURE — 4010F ACE/ARB THERAPY RXD/TAKEN: CPT | Mod: CPTII,S$GLB,, | Performed by: STUDENT IN AN ORGANIZED HEALTH CARE EDUCATION/TRAINING PROGRAM

## 2023-06-07 PROCEDURE — 1159F PR MEDICATION LIST DOCUMENTED IN MEDICAL RECORD: ICD-10-PCS | Mod: CPTII,S$GLB,, | Performed by: STUDENT IN AN ORGANIZED HEALTH CARE EDUCATION/TRAINING PROGRAM

## 2023-06-07 PROCEDURE — 1160F RVW MEDS BY RX/DR IN RCRD: CPT | Mod: CPTII,S$GLB,, | Performed by: STUDENT IN AN ORGANIZED HEALTH CARE EDUCATION/TRAINING PROGRAM

## 2023-06-07 PROCEDURE — 1159F MED LIST DOCD IN RCRD: CPT | Mod: CPTII,S$GLB,, | Performed by: STUDENT IN AN ORGANIZED HEALTH CARE EDUCATION/TRAINING PROGRAM

## 2023-06-07 PROCEDURE — 3008F PR BODY MASS INDEX (BMI) DOCUMENTED: ICD-10-PCS | Mod: CPTII,S$GLB,, | Performed by: STUDENT IN AN ORGANIZED HEALTH CARE EDUCATION/TRAINING PROGRAM

## 2023-06-07 PROCEDURE — 1101F PT FALLS ASSESS-DOCD LE1/YR: CPT | Mod: CPTII,S$GLB,, | Performed by: STUDENT IN AN ORGANIZED HEALTH CARE EDUCATION/TRAINING PROGRAM

## 2023-06-07 PROCEDURE — 3075F PR MOST RECENT SYSTOLIC BLOOD PRESS GE 130-139MM HG: ICD-10-PCS | Mod: CPTII,S$GLB,, | Performed by: STUDENT IN AN ORGANIZED HEALTH CARE EDUCATION/TRAINING PROGRAM

## 2023-06-07 PROCEDURE — 99213 OFFICE O/P EST LOW 20 MIN: CPT | Mod: S$GLB,,, | Performed by: STUDENT IN AN ORGANIZED HEALTH CARE EDUCATION/TRAINING PROGRAM

## 2023-06-07 PROCEDURE — 3075F SYST BP GE 130 - 139MM HG: CPT | Mod: CPTII,S$GLB,, | Performed by: STUDENT IN AN ORGANIZED HEALTH CARE EDUCATION/TRAINING PROGRAM

## 2023-06-07 PROCEDURE — 96127 PR BRIEF EMOTIONAL/BEHAV ASSMT: ICD-10-PCS | Mod: S$GLB,,, | Performed by: STUDENT IN AN ORGANIZED HEALTH CARE EDUCATION/TRAINING PROGRAM

## 2023-06-07 NOTE — PATIENT INSTRUCTIONS
Steve Miramontes,     If you are due for any health screening(s) below please notify me so we can arrange them to be ordered and scheduled to maintain your health. Most healthy patients complete it. Don't lose out on improving your health.     Tests to Keep You Healthy    Colon Cancer Screening: DUE  Last Blood Pressure <= 139/89 (6/7/2023): Yes         Colon Cancer Screening    Of cancers affecting both men and women, colorectal cancer is the third leading cancer killer in the [place]. But it doesnt have to be. Screening can prevent colorectal cancer or find it at an early stage when treatment often leads to a cure.    A colonoscopy is the preferred test for detecting colon cancer. It is needed only once every 10 years if results are negative. While sedated, a flexible, lighted tube with a tiny camera is inserted into the rectum and advanced through the colon to look for cancers. An alternative screening test that is used at home and returned to the lab may also be used. It detects hidden blood in bowel movements which could indicate cancer in the colon. If results are positive, you will need a colonoscopy to determine if the blood is a sign of cancer. This type of follow up (diagnostic) colonoscopy usually requires additional copays as required by your insurance provider. Please contact your PCP if you have any questions.

## 2023-06-07 NOTE — PROGRESS NOTES
[facility] CLINIC NOTE    Patient Name: Kulwinder Hogue Jr.  YOB: 1950    PRESENTING HISTORY     History of Present Illness:  Mr. Kulwinder Hogue Jr. is a 72 y.o. male here for depression f/u.     His wife was concerned about him and asked that I get him in for an appointment.   He doesn't feel like there is much of an issue.     Goes to bed 930-10 pm.   Gets out of bed around 7AM.   Reports that he gets up only because of work and if he was not working would sleep much longer.     Memory is alright per him.     PHQ9- 6 points    His main concern is pursuing half-way. He wants to retire.  Work hasn't been going very well.   Mother, sister .             ROS      OBJECTIVE:   Vital Signs:  Vitals:    23 0803   BP: 130/76   Pulse: 63   Resp: 16   SpO2: 99%   Weight: 76 kg (167 lb 8.8 oz)   Height: 6' (1.829 m)         Physical Exam  Vitals and nursing note reviewed.   Constitutional:       Appearance: He is not diaphoretic.   HENT:      Head: Normocephalic and atraumatic.   Eyes:      General: No scleral icterus.     Conjunctiva/sclera: Conjunctivae normal.      Pupils: Pupils are equal, round, and reactive to light.   Neck:      Thyroid: No thyromegaly.      Vascular: No carotid bruit.   Cardiovascular:      Rate and Rhythm: Normal rate and regular rhythm.      Heart sounds: Normal heart sounds. No murmur heard.  Pulmonary:      Effort: Pulmonary effort is normal. No respiratory distress.      Breath sounds: Normal breath sounds. No wheezing or rales.   Musculoskeletal:         General: No tenderness or deformity. Normal range of motion.      Cervical back: Normal range of motion and neck supple.   Lymphadenopathy:      Cervical: No cervical adenopathy.   Skin:     General: Skin is warm and dry.      Findings: No erythema or rash.   Neurological:      Mental Status: He is alert.      Gait: Gait is intact.      Comments: No tremors.   CNs grossly intact.   Nl tone, power,  reflexes  Gait grossly normal.   Stooped posture.      Psychiatric:         Mood and Affect: Affect normal.         Cognition and Memory: Memory normal.      Comments: Affect is flat.   +psychomotor retardation.        ASSESSMENT & PLAN:     Mild episode of recurrent major depressive disorder  -     Ambulatory referral/consult to Psychiatry; Future; Expected date: 06/14/2023    He may be minimizing symptoms somewhat.     The only sig finding is flat affect.   Ddx includes neurologic process but I still think in the absence of any other findings it is more likely depression related. Already on full strength SSRI + Wellbutrin.   He is open to seeing a psychiatrist which I think might be helpful for diagnostic clarity. Appreciate assessment.          Terrance Plata MD   Internal Medicine    I spent a total of 24 minutes on the day of the visit.  This includes face-to-face time and non face-to-face time preparing to see the patient (e.g., review of tests) , obtaining and/or reviewing separately obtain history, documenting clinical information in the electronic or other health record, independently interpreting results and communicating results to the patient/family/caregiver, or care coordinator.

## 2023-06-08 ENCOUNTER — TELEPHONE (OUTPATIENT)
Dept: PSYCHIATRY | Facility: CLINIC | Age: 73
End: 2023-06-08
Payer: MEDICARE

## 2023-06-08 NOTE — TELEPHONE ENCOUNTER
Pt called to schedule appt. Advised him that I see referral was placed yesterday. He will be added to wait list and we will call once we get to his name. Explained it may be several months. Pt verbalized understanding. No further questions.

## 2023-06-16 ENCOUNTER — LAB VISIT (OUTPATIENT)
Dept: LAB | Facility: HOSPITAL | Age: 73
End: 2023-06-16
Attending: UROLOGY
Payer: MEDICARE

## 2023-06-16 DIAGNOSIS — R97.20 ELEVATED PSA: ICD-10-CM

## 2023-06-16 PROCEDURE — 36415 COLL VENOUS BLD VENIPUNCTURE: CPT | Mod: PO | Performed by: UROLOGY

## 2023-06-16 PROCEDURE — 84153 ASSAY OF PSA TOTAL: CPT | Performed by: UROLOGY

## 2023-06-19 LAB
PROSTATE SPECIFIC ANTIGEN, TOTAL: 4.9 NG/ML (ref 0–4)
PSA FREE MFR SERPL: 18.98 %
PSA FREE SERPL-MCNC: 0.93 NG/ML (ref 0–1.5)

## 2023-06-25 NOTE — PROGRESS NOTES
[facility] Urology Progress Note     Kulwinder Hogue Jr. is a 72 y.o. male with history of BPH and R hydrocele who presents for annual follow up     Hx pathologic BPH since prostate biopsy and cystoscopy 10/25/16 with Dr Mancia: 65.7cc gland and evidence of GUILLORY from lateral lobes with slight median lobe and grade 3 trabecs in bladder.  3T MRI at [facility] 5/25/17: Prostate size 63 mL.  Intravesical protrusion of the prostate 10 mm. PIRAD 2 only, BPH, prostatitis. psa on 10/12/18 of 4.3 (21.16% free). Confirm mdx of biopsy negative. FU Cysto/TRUS 12/4/18: 91.5cm3 with sig trilobar obstruction and only slit-like opening at bladder neck from middle lobe obst.   Also noted curvature with erections for about 1 year up 30°, and can get erections.     TURP 2/13/19 uncomplicated. 12.4g BPH. Clot retention on 2/15/19 requiring extensive efforts at manual irrigation and overnight observation with CBI. PVR 0cc 1 month postop   2 mos postop had er visit with concern for retention but voided and day later passed a stone (? Prostatic calc 80% CaPhos, 10% CaOxDi) and 1 week later returned to ER for dribbling and feeling of obstruction in his urethra. White urethral calculus/sediment over the distal penile urethra removed with sterile forceps and patient voided approximately 150 cc of clear yellow urine and felt significantly improved. And on f/u noted split streaming, though voiding better. No gross external meatal stenosis. Blue meatal dilator appreciated some fossa navicularis narrowing/meatal stenosis, passively dilated and unobstructed, feeling it breakthrough some scar tissue. Given taper protocol. No stones, only prostate calcs, on CT  6/25/19 : AUA SS: 4/3 (2: nocturia, 1: frequency, urgency). Good stream no split/spray, good  Water intake, still NTF 1-2x good volumes, never pursued sleep study, active  12/30/19: AUA SS 3/2,  but not bothered by LUTS. Advised try dilator use again and fu routine     5/2020 concern of scrotal  swelling 1-2 months, noticeable as wears tight briefs. US scrotum with large 6.2cm R hydrocele and a few intratesticular subcm cysts  Discussed sleep study with dr kramer, decided not needed no daytime sleepiness etc but started melatonin for better sleep. Still good strong steam w/o intermitt or spraying  No narrowing of meatus. No urgency. No dribbling. No hematuria dysuria.  Uroflow 7/15/20: good bell shaped unobstructed voiding curve, though average is affected by some trailing off towards end of stream. He did void 537cc with initial pvr of 259cc which likely represents over-filling, as was then able to void a 2nd time with PVR of 87cc which is resonable. Qm 35cc/s, Qa 11.7cc/s.     Last seen 12/2020. psa 2.6 on 12/4/20, AUA SS 6/3 (2: nocturia; 1: frequency, urgency, weak stream, straining). Good stream. PVR 42cc  45 degree dorsal curvature on his own, now some trouble getting and/or keeping erection. 5mg melatonin. May get up 1-2x.   no visble meatal stenosis, mild R hydrocele, dorsal plaque. --> Started viagra, planned PD eval with dr gaytan 3/21 (canceled)     Last seen 4/12/22  PSA 4.6 on 12/15/21, as ordered by primary care. Normal T 403 in 6/21. R hydrocele stable, no increase in size, hasnt bothered him  Still has dorsal curve but not bothering him nor affecting erections or intercourse at this time. AUA SS: 3/2, mostly sat (2 intermittency, 1 straining). PVR 0cc  No split stream, emptying well. Udip small blood.     In interim 5/3/22 mri prostate 38cc pirad2  PSA trend   Latest Reference Range & Units 04/12/22 09:50 06/02/22 11:47 12/07/22 09:00 06/16/23 08:06   PSA Total 0.00 - 4.00  4.8 (H) 5.2 (H) 4.5 (H) 4.9 (H)   PSA, Free 0.00 - 1.50  0.96 0.90 1.07 0.93   PSA, Free % Not est % 20.00 17.31 23.78 18.98   He returns today noting  AUA SS: 2/1 pleased (1: frequency, sleeping)  Recent workup and management by pcp for MDD vs neuro process  No gross hematuria    Focused Physical Exam:    Vitals:     06/26/23 0847   BP: 129/86   Pulse: 69     Body mass index is 21.89 kg/m². Weight: 73.2 kg (161 lb 6 oz) Height: 6' (182.9 cm)     Abdomen: Soft, non-tender, nondistended, no CVA tenderness  :  no meatal stenosis, trace right hydrocele - bilat near symmetry due to small hydrocele size      Recent Results (from the past 336 hour(s))   PSA, Total and Free    Collection Time: 06/16/23  8:06 AM   Result Value Ref Range    PSA Total 4.9 (H) 0.00 - 4.00 ng/mL    PSA, Free 0.93 0.00 - 1.50 ng/mL    PSA, Free % 18.98 Not established %   POCT Urinalysis(Instrument)    Collection Time: 06/26/23  9:00 AM   Result Value Ref Range    Color, POC UA Yellow Yellow, Straw, Colorless    Clarity, POC UA Clear Clear    Glucose, POC UA Negative Negative    Bilirubin, POC UA Negative Negative    Ketones, POC UA Trace (A) Negative    Spec Grav POC UA 1.025 1.005 - 1.030    Blood, POC UA Small (A) Negative    pH, POC UA 6.0 5.0 - 8.0    Protein, POC UA 30 (A) Negative    Urobilinogen, POC UA 0.2 <=1.0    Nitrite, POC UA Negative Negative    WBC, POC UA Negative Negative       ASSESSMENT   1. BPH with elevated PSA  POCT Urinalysis(Instrument)    PSA, Total and Free      2. BPH without obstruction/lower urinary tract symptoms        3. Right hydrocele            Plan    Overall doing well and stable from urologic standpoint.  Reviewed interim PSAs noting that his PSA has been stable in the 4-5 range, even post TURP, with stable equivocal free PSA.  PSA is not continue to rise, and again with negative prostate biopsy prior to BPH intervention, negative pathology, negative confirm MDX, negative MRI, would just continue to trend once or twice more.  At this time can repeat in 1 year prior to follow-up with free and total PSA.  Urinalysis dipstick did demonstrate small blood again today, however it did so at our last visit with only 1 red blood cell per high-power field and he is had no gross hematuria or urinary symptoms.  Will continue to  monitor subsequent visits.  His hydrocele has not enlarged, in fact on exam it seems a little bit smaller, and will continue to observe and maintain scrotal support. Deferred PD eval - not bothersome.  RTC 1 year with PSA free and total prior.      Total time spent in/on encounter today, including face to face time with patient, counseling, medical record review, interpretation of tests/results, , and treatment plan coordination: 30 minutes

## 2023-06-26 ENCOUNTER — OFFICE VISIT (OUTPATIENT)
Dept: UROLOGY | Facility: CLINIC | Age: 73
End: 2023-06-26
Payer: MEDICARE

## 2023-06-26 VITALS
SYSTOLIC BLOOD PRESSURE: 129 MMHG | WEIGHT: 161.38 LBS | HEIGHT: 72 IN | BODY MASS INDEX: 21.86 KG/M2 | HEART RATE: 69 BPM | DIASTOLIC BLOOD PRESSURE: 86 MMHG

## 2023-06-26 DIAGNOSIS — R97.20 BPH WITH ELEVATED PSA: Primary | ICD-10-CM

## 2023-06-26 DIAGNOSIS — N40.0 BPH WITH ELEVATED PSA: Primary | ICD-10-CM

## 2023-06-26 DIAGNOSIS — N40.0 BPH WITHOUT OBSTRUCTION/LOWER URINARY TRACT SYMPTOMS: ICD-10-CM

## 2023-06-26 DIAGNOSIS — N43.3 RIGHT HYDROCELE: ICD-10-CM

## 2023-06-26 LAB
BILIRUBIN, UA POC OHS: NEGATIVE
BLOOD, UA POC OHS: ABNORMAL
CLARITY, UA POC OHS: CLEAR
COLOR, UA POC OHS: YELLOW
GLUCOSE, UA POC OHS: NEGATIVE
KETONES, UA POC OHS: ABNORMAL
LEUKOCYTES, UA POC OHS: NEGATIVE
NITRITE, UA POC OHS: NEGATIVE
PH, UA POC OHS: 6
PROTEIN, UA POC OHS: 30
SPECIFIC GRAVITY, UA POC OHS: 1.02
UROBILINOGEN, UA POC OHS: 0.2

## 2023-06-26 PROCEDURE — 3079F PR MOST RECENT DIASTOLIC BLOOD PRESSURE 80-89 MM HG: ICD-10-PCS | Mod: CPTII,S$GLB,, | Performed by: UROLOGY

## 2023-06-26 PROCEDURE — 3074F PR MOST RECENT SYSTOLIC BLOOD PRESSURE < 130 MM HG: ICD-10-PCS | Mod: CPTII,S$GLB,, | Performed by: UROLOGY

## 2023-06-26 PROCEDURE — 1160F PR REVIEW ALL MEDS BY PRESCRIBER/CLIN PHARMACIST DOCUMENTED: ICD-10-PCS | Mod: CPTII,S$GLB,, | Performed by: UROLOGY

## 2023-06-26 PROCEDURE — 4010F ACE/ARB THERAPY RXD/TAKEN: CPT | Mod: CPTII,S$GLB,, | Performed by: UROLOGY

## 2023-06-26 PROCEDURE — 3079F DIAST BP 80-89 MM HG: CPT | Mod: CPTII,S$GLB,, | Performed by: UROLOGY

## 2023-06-26 PROCEDURE — 99999 PR PBB SHADOW E&M-EST. PATIENT-LVL III: ICD-10-PCS | Mod: PBBFAC,,, | Performed by: UROLOGY

## 2023-06-26 PROCEDURE — 1126F AMNT PAIN NOTED NONE PRSNT: CPT | Mod: CPTII,S$GLB,, | Performed by: UROLOGY

## 2023-06-26 PROCEDURE — 3288F PR FALLS RISK ASSESSMENT DOCUMENTED: ICD-10-PCS | Mod: CPTII,S$GLB,, | Performed by: UROLOGY

## 2023-06-26 PROCEDURE — 3288F FALL RISK ASSESSMENT DOCD: CPT | Mod: CPTII,S$GLB,, | Performed by: UROLOGY

## 2023-06-26 PROCEDURE — 1160F RVW MEDS BY RX/DR IN RCRD: CPT | Mod: CPTII,S$GLB,, | Performed by: UROLOGY

## 2023-06-26 PROCEDURE — 1159F PR MEDICATION LIST DOCUMENTED IN MEDICAL RECORD: ICD-10-PCS | Mod: CPTII,S$GLB,, | Performed by: UROLOGY

## 2023-06-26 PROCEDURE — 1101F PT FALLS ASSESS-DOCD LE1/YR: CPT | Mod: CPTII,S$GLB,, | Performed by: UROLOGY

## 2023-06-26 PROCEDURE — 99214 PR OFFICE/OUTPT VISIT, EST, LEVL IV, 30-39 MIN: ICD-10-PCS | Mod: S$GLB,,, | Performed by: UROLOGY

## 2023-06-26 PROCEDURE — 1101F PR PT FALLS ASSESS DOC 0-1 FALLS W/OUT INJ PAST YR: ICD-10-PCS | Mod: CPTII,S$GLB,, | Performed by: UROLOGY

## 2023-06-26 PROCEDURE — 1126F PR PAIN SEVERITY QUANTIFIED, NO PAIN PRESENT: ICD-10-PCS | Mod: CPTII,S$GLB,, | Performed by: UROLOGY

## 2023-06-26 PROCEDURE — 3074F SYST BP LT 130 MM HG: CPT | Mod: CPTII,S$GLB,, | Performed by: UROLOGY

## 2023-06-26 PROCEDURE — 3008F BODY MASS INDEX DOCD: CPT | Mod: CPTII,S$GLB,, | Performed by: UROLOGY

## 2023-06-26 PROCEDURE — 81003 URINALYSIS AUTO W/O SCOPE: CPT | Mod: QW,S$GLB,, | Performed by: UROLOGY

## 2023-06-26 PROCEDURE — 3008F PR BODY MASS INDEX (BMI) DOCUMENTED: ICD-10-PCS | Mod: CPTII,S$GLB,, | Performed by: UROLOGY

## 2023-06-26 PROCEDURE — 99214 OFFICE O/P EST MOD 30 MIN: CPT | Mod: S$GLB,,, | Performed by: UROLOGY

## 2023-06-26 PROCEDURE — 4010F PR ACE/ARB THEARPY RXD/TAKEN: ICD-10-PCS | Mod: CPTII,S$GLB,, | Performed by: UROLOGY

## 2023-06-26 PROCEDURE — 81003 POCT URINALYSIS(INSTRUMENT): ICD-10-PCS | Mod: QW,S$GLB,, | Performed by: UROLOGY

## 2023-06-26 PROCEDURE — 99999 PR PBB SHADOW E&M-EST. PATIENT-LVL III: CPT | Mod: PBBFAC,,, | Performed by: UROLOGY

## 2023-06-26 PROCEDURE — 1159F MED LIST DOCD IN RCRD: CPT | Mod: CPTII,S$GLB,, | Performed by: UROLOGY

## 2023-07-21 DIAGNOSIS — I10 ESSENTIAL HYPERTENSION: ICD-10-CM

## 2023-07-21 RX ORDER — OLMESARTAN MEDOXOMIL 40 MG/1
TABLET ORAL
Qty: 90 TABLET | Refills: 4 | Status: SHIPPED | OUTPATIENT
Start: 2023-07-21

## 2023-07-24 DIAGNOSIS — I10 ESSENTIAL HYPERTENSION: ICD-10-CM

## 2023-07-24 RX ORDER — AMLODIPINE BESYLATE 5 MG/1
5 TABLET ORAL NIGHTLY
Qty: 90 TABLET | Refills: 4 | Status: SHIPPED | OUTPATIENT
Start: 2023-07-24 | End: 2024-07-23

## 2023-07-24 NOTE — TELEPHONE ENCOUNTER
Care Due:                  Date            Visit Type   Department     Provider  --------------------------------------------------------------------------------                                EP -                              PRIMARY      [facility] FAMILY    Terrance Plascencia  Last Visit: 06-      CARE ([facility])   MEDICINE       Sherlyn  Next Visit: None Scheduled  None         None Found                                                            Last  Test          Frequency    Reason                     Performed    Due Date  --------------------------------------------------------------------------------    CMP.........  12 months..  hydroCHLOROthiazide,       10-   10-                             olmesartan, pravastatin..    Lipid Panel.  12 months..  pravastatin..............  12-   12-    [employer] Embedded Care Due Messages. Reference number: [id number].   7/24/2023 10:05:38 AM CDT

## 2023-08-01 DIAGNOSIS — I10 ESSENTIAL HYPERTENSION: ICD-10-CM

## 2023-08-01 DIAGNOSIS — E78.5 HYPERLIPIDEMIA, UNSPECIFIED HYPERLIPIDEMIA TYPE: Chronic | ICD-10-CM

## 2023-08-01 RX ORDER — PRAVASTATIN SODIUM 40 MG/1
40 TABLET ORAL DAILY
Qty: 90 TABLET | Refills: 3 | Status: SHIPPED | OUTPATIENT
Start: 2023-08-01 | End: 2023-10-30

## 2023-08-01 NOTE — TELEPHONE ENCOUNTER
No care due was identified.  [facility] Embedded Care Due Messages. Reference number: [id number].   8/01/2023 2:04:07 PM CDT

## 2023-08-14 DIAGNOSIS — I10 HTN, GOAL BELOW 130/80: ICD-10-CM

## 2023-08-14 RX ORDER — CARVEDILOL 25 MG/1
25 TABLET ORAL 2 TIMES DAILY WITH MEALS
Qty: 180 TABLET | Refills: 3 | Status: SHIPPED | OUTPATIENT
Start: 2023-08-14

## 2023-08-14 NOTE — TELEPHONE ENCOUNTER
No care due was identified.  [facility] Embedded Care Due Messages. Reference number: [id number].   8/14/2023 3:46:39 PM CDT

## 2023-08-29 DIAGNOSIS — I10 HTN, GOAL BELOW 130/80: ICD-10-CM

## 2023-08-29 RX ORDER — HYDROCHLOROTHIAZIDE 12.5 MG/1
12.5 TABLET ORAL DAILY
Qty: 30 TABLET | Refills: 2 | Status: SHIPPED | OUTPATIENT
Start: 2023-08-29

## 2023-08-29 NOTE — TELEPHONE ENCOUNTER
No care due was identified.  [facility] Embedded Care Due Messages. Reference number: [id number].   8/29/2023 3:40:36 AM CDT

## 2023-08-29 NOTE — TELEPHONE ENCOUNTER
Refill Decision Note   Kulwinder Hogue  is requesting a refill authorization.  Brief Assessment and Rationale for Refill:  Approve     Medication Therapy Plan:       Medication Reconciliation Completed: No    Comments:     No Care Gaps recommended.     Note composed:2:03 PM 08/29/2023

## 2023-10-28 DIAGNOSIS — E78.5 HYPERLIPIDEMIA, UNSPECIFIED HYPERLIPIDEMIA TYPE: Chronic | ICD-10-CM

## 2023-10-28 DIAGNOSIS — I10 ESSENTIAL HYPERTENSION: ICD-10-CM

## 2023-10-28 DIAGNOSIS — I10 PRIMARY HYPERTENSION: Primary | ICD-10-CM

## 2023-10-28 NOTE — TELEPHONE ENCOUNTER
Care Due:                  Date            Visit Type   Department     Provider  --------------------------------------------------------------------------------                                EP -                              PRIMARY      [facility] FAMILY    Terrance Plascencia  Last Visit: 06-      CARE ([facility])   MEDICINE       Sherlyn  Next Visit: None Scheduled  None         None Found                                                            Last  Test          Frequency    Reason                     Performed    Due Date  --------------------------------------------------------------------------------    CMP.........  12 months..  hydroCHLOROthiazide,       10-   10-                             olmesartan, pravastatin..    Lipid Panel.  12 months..  pravastatin..............  12-   12-    [employer] Embedded Care Due Messages. Reference number: [id number].   10/28/2023 3:39:33 AM CDT

## 2023-10-29 NOTE — TELEPHONE ENCOUNTER
Refill Routing Note   Medication(s) are not appropriate for processing by [facility] for the following reason(s):      Required labs outdated: CMP, Lipid panel    [facility] action(s):  Defer Care Due:  Labs due   Medication Therapy Plan:    Pended labs utilizing BestPracticeAdvisor (BPA) tab due to extra training from LPN      Appointments  past 12m or future 3m with PCP    Date Provider   Last Visit   6/7/2023 Terrance Plata MD   Next Visit   Visit date not found Terrance Plata MD   ED visits in past 90 days: 0        Note composed:8:39 PM 10/28/2023

## 2023-10-30 RX ORDER — PRAVASTATIN SODIUM 40 MG/1
40 TABLET ORAL DAILY
Qty: 90 TABLET | Refills: 0 | Status: SHIPPED | OUTPATIENT
Start: 2023-10-30

## 2023-11-06 ENCOUNTER — LAB VISIT (OUTPATIENT)
Dept: LAB | Facility: HOSPITAL | Age: 73
End: 2023-11-06
Attending: PHYSICIAN ASSISTANT
Payer: MEDICARE

## 2023-11-06 DIAGNOSIS — E78.5 HYPERLIPIDEMIA, UNSPECIFIED HYPERLIPIDEMIA TYPE: Chronic | ICD-10-CM

## 2023-11-06 DIAGNOSIS — I10 ESSENTIAL HYPERTENSION: ICD-10-CM

## 2023-11-06 DIAGNOSIS — I10 PRIMARY HYPERTENSION: ICD-10-CM

## 2023-11-06 LAB
ALBUMIN SERPL BCP-MCNC: 3.7 G/DL (ref 3.5–5.2)
ALP SERPL-CCNC: 70 U/L (ref 55–135)
ALT SERPL W/O P-5'-P-CCNC: 9 U/L (ref 10–44)
ANION GAP SERPL CALC-SCNC: 8 MMOL/L (ref 8–16)
AST SERPL-CCNC: 18 U/L (ref 10–40)
BILIRUB SERPL-MCNC: 1.2 MG/DL (ref 0.1–1)
BUN SERPL-MCNC: 18 MG/DL (ref 8–23)
CALCIUM SERPL-MCNC: 9 MG/DL (ref 8.7–10.5)
CHLORIDE SERPL-SCNC: 101 MMOL/L (ref 95–110)
CHOLEST SERPL-MCNC: 216 MG/DL (ref 120–199)
CHOLEST/HDLC SERPL: 3.8 {RATIO} (ref 2–5)
CO2 SERPL-SCNC: 32 MMOL/L (ref 23–29)
CREAT SERPL-MCNC: 0.9 MG/DL (ref 0.5–1.4)
EST. GFR  (NO RACE VARIABLE): >60 ML/MIN/1.73 M^2
GLUCOSE SERPL-MCNC: 90 MG/DL (ref 70–110)
HDLC SERPL-MCNC: 57 MG/DL (ref 40–75)
HDLC SERPL: 26.4 % (ref 20–50)
LDLC SERPL CALC-MCNC: 145.4 MG/DL (ref 63–159)
NONHDLC SERPL-MCNC: 159 MG/DL
POTASSIUM SERPL-SCNC: 3.2 MMOL/L (ref 3.5–5.1)
PROT SERPL-MCNC: 7 G/DL (ref 6–8.4)
SODIUM SERPL-SCNC: 141 MMOL/L (ref 136–145)
TRIGL SERPL-MCNC: 68 MG/DL (ref 30–150)

## 2023-11-06 PROCEDURE — 36415 COLL VENOUS BLD VENIPUNCTURE: CPT | Mod: PO | Performed by: PHYSICIAN ASSISTANT

## 2023-11-06 PROCEDURE — 80053 COMPREHEN METABOLIC PANEL: CPT | Performed by: PHYSICIAN ASSISTANT

## 2023-11-06 PROCEDURE — 80061 LIPID PANEL: CPT | Performed by: PHYSICIAN ASSISTANT

## 2024-04-02 ENCOUNTER — TELEPHONE (OUTPATIENT)
Dept: PSYCHIATRY | Facility: CLINIC | Age: 74
End: 2024-04-02
Payer: MEDICARE

## 2024-04-02 NOTE — TELEPHONE ENCOUNTER
Spoke to patient regarding the vm and the referral on the wait list. Patient states he is not interested in scheduling at this time and will call back if he changes his mind

## 2024-04-02 NOTE — TELEPHONE ENCOUNTER
Called patient to schedule a new patient Medication management appointment from the referral on the wait list. No answer, left voice message, sent my chart message

## 2024-05-07 ENCOUNTER — OFFICE VISIT (OUTPATIENT)
Dept: URGENT CARE | Facility: CLINIC | Age: 74
End: 2024-05-07
Payer: MEDICARE

## 2024-05-07 VITALS
BODY MASS INDEX: 21.81 KG/M2 | SYSTOLIC BLOOD PRESSURE: 116 MMHG | RESPIRATION RATE: 19 BRPM | WEIGHT: 161 LBS | OXYGEN SATURATION: 96 % | TEMPERATURE: 99 F | HEART RATE: 96 BPM | DIASTOLIC BLOOD PRESSURE: 79 MMHG | HEIGHT: 72 IN

## 2024-05-07 DIAGNOSIS — N39.0 URINARY TRACT INFECTION WITHOUT HEMATURIA, SITE UNSPECIFIED: Primary | ICD-10-CM

## 2024-05-07 DIAGNOSIS — R39.9 UTI SYMPTOMS: ICD-10-CM

## 2024-05-07 LAB
BILIRUB UR QL STRIP: POSITIVE
GLUCOSE UR QL STRIP: NEGATIVE
KETONES UR QL STRIP: NEGATIVE
LEUKOCYTE ESTERASE UR QL STRIP: POSITIVE
PH, POC UA: 6
POC BLOOD, URINE: NEGATIVE
POC NITRATES, URINE: NEGATIVE
PROT UR QL STRIP: POSITIVE
SP GR UR STRIP: 1.03 (ref 1–1.03)
UROBILINOGEN UR STRIP-ACNC: ABNORMAL (ref 0.3–2.2)

## 2024-05-07 PROCEDURE — 81003 URINALYSIS AUTO W/O SCOPE: CPT | Mod: QW,S$GLB,, | Performed by: NURSE PRACTITIONER

## 2024-05-07 PROCEDURE — 99214 OFFICE O/P EST MOD 30 MIN: CPT | Mod: S$GLB,,, | Performed by: NURSE PRACTITIONER

## 2024-05-07 RX ORDER — CEPHALEXIN 500 MG/1
500 CAPSULE ORAL EVERY 6 HOURS
Qty: 28 CAPSULE | Refills: 0 | Status: SHIPPED | OUTPATIENT
Start: 2024-05-07 | End: 2024-05-14

## 2024-05-07 RX ORDER — CARBIDOPA AND LEVODOPA 25; 100 MG/1; MG/1
1 TABLET, EXTENDED RELEASE ORAL 2 TIMES DAILY
COMMUNITY

## 2024-05-07 NOTE — PATIENT INSTRUCTIONS
Increase clear fluid intake.    Take Keflex as ordered.  Take each dose with food decrease GI upset.  May take tylenol or ibuprofen over the counter as needed for pain or fever  Go directly to the emergency room for any worsening, development of loss of function or sensation, shortness breast, chest pain, worsening altered mental status, or other emergent concerns  Follow up with PCP  Return to clinic for new symptoms

## 2024-05-07 NOTE — PROGRESS NOTES
Subjective:      Patient ID: Kulwinder Hogue Jr. is a 73 y.o. male.    Vitals:  height is 6' (1.829 m) and weight is 73 kg (161 lb). His oral temperature is 98.5 °F (36.9 °C). His blood pressure is 116/79 and his pulse is 96. His respiration is 19 and oxygen saturation is 96%.     Chief Complaint: Altered Mental Status    73-year-old male with history of Parkinson's seen today for reported change in mental status by his wife.  She states that over the last week he has had several episodes of seeing people that isn't there and becoming disoriented.  She reports calling his PCP and he advised taking him to the urgent care for urinalysis.  Patient does have strong smell of urine on him and his wife reports chronic incontinence.    Altered Mental Status  This is a new problem. The problem occurs constantly. The problem has been waxing and waning. Pertinent negatives include no abdominal pain, chest pain, chills, coughing, fever, nausea, rash or vomiting.       Constitution: Negative for chills and fever.   Cardiovascular:  Negative for chest pain, palpitations and sob on exertion.   Respiratory:  Negative for cough and shortness of breath.    Gastrointestinal:  Negative for abdominal pain, nausea, vomiting and diarrhea.   Musculoskeletal:  Negative for back pain.   Skin:  Negative for rash.   Neurological:  Positive for altered mental status. Negative for dizziness, light-headedness, passing out, coordination disturbances and disorientation.   Psychiatric/Behavioral:  Positive for altered mental status. Negative for disorientation and confusion.       Objective:     Physical Exam   Constitutional: He is oriented to person, place, and time. He appears well-developed. He is cooperative.  Non-toxic appearance. He does not appear ill. No distress.   HENT:   Head: Normocephalic and atraumatic.   Ears:   Right Ear: External ear normal.   Left Ear: External ear normal.   Nose: Nose normal.   Mouth/Throat: Oropharynx is clear  and moist and mucous membranes are normal. Mucous membranes are moist.   Eyes: Conjunctivae and lids are normal. Pupils are equal, round, and reactive to light. No scleral icterus. Extraocular movement intact vision grossly intact gaze aligned appropriately   Neck: Trachea normal and phonation normal. Neck supple.   Cardiovascular: Normal rate, regular rhythm, normal heart sounds and normal pulses.   Pulmonary/Chest: Effort normal and breath sounds normal. No stridor. No respiratory distress.   Abdominal: Normal appearance and bowel sounds are normal. He exhibits no distension and no mass. Soft. There is no abdominal tenderness. There is no guarding, no left CVA tenderness and no right CVA tenderness.   Musculoskeletal:         General: No deformity.   Lymphadenopathy:     He has no cervical adenopathy.   Neurological: no focal deficit. He is alert and oriented to person, place, and time. He has normal sensation, normal strength and normal reflexes. He displays no weakness, facial symmetry and no dysarthria. No sensory deficit. He exhibits normal muscle tone. Coordination normal. GCS eye subscore is 4. GCS verbal subscore is 4. GCS motor subscore is 6.      Comments: Equal  strength.  Possible altered facial symmetry, however wife states this may be his normal smile.   Skin: Skin is warm, dry, intact and not diaphoretic. Capillary refill takes 2 to 3 seconds.   Psychiatric: His speech is normal and behavior is normal. Judgment and thought content normal.   Nursing note and vitals reviewed.      Assessment:     1. Urinary tract infection without hematuria, site unspecified    2. UTI symptoms        Plan:       Urinary tract infection without hematuria, site unspecified  -     cephALEXin (KEFLEX) 500 MG capsule; Take 1 capsule (500 mg total) by mouth every 6 (six) hours. for 7 days  Dispense: 28 capsule; Refill: 0  -     CULTURE, URINE    UTI symptoms  -     POCT Urinalysis, Dipstick, Automated, W/O Scope      The  test results and physical exam findings were discussed with the patient and his wife and all questions answered.  The patient is awake alert and oriented to person and time, however he believes that he is in a clinic in [facility].  He has an otherwise favorable physical exam with no evidence of distress.  The wife and I discussed the multiple possibilities of altered mental status and the limited testing capabilities in the urgent care setting.  I advised her that he would be better served to be seen in the emergency room, however at this time she states that she just wants to treat the UTI and will follow-up with his physician tomorrow morning.  We discussed strict symptom monitoring with ER precautions for any worsening, conservative care methods, medication use, and follow up orders.  They verbalized understanding and agreement with the plan of care.  He was ambulatory from clinic without incident

## 2024-05-14 LAB
BACTERIA UR CULT: ABNORMAL
BACTERIA UR CULT: ABNORMAL
OTHER ANTIBIOTIC SUSC ISLT: ABNORMAL

## 2024-07-09 ENCOUNTER — PATIENT MESSAGE (OUTPATIENT)
Dept: ADMINISTRATIVE | Facility: HOSPITAL | Age: 74
End: 2024-07-09
Payer: MEDICARE

## 2024-07-12 ENCOUNTER — PATIENT OUTREACH (OUTPATIENT)
Dept: ADMINISTRATIVE | Facility: HOSPITAL | Age: 74
End: 2024-07-12
Payer: MEDICARE

## 2024-07-12 ENCOUNTER — PATIENT MESSAGE (OUTPATIENT)
Dept: ADMINISTRATIVE | Facility: HOSPITAL | Age: 74
End: 2024-07-12
Payer: MEDICARE

## 2024-07-12 NOTE — PROGRESS NOTES
Population Health Chart Review & Patient Outreach Details      Additional [facility] Health Notes:      Uncontrolled BP REPORT  BP Readings from Last 3 Encounters:   05/07/24 116/79   06/26/23 129/86   06/07/23 130/76       Non-compliant report chart audits for HYPERTENSION MANAGEMENT     Outreach to patient in reference to hypertension management       BLOOD PRESSURE FOLLOW UP NEEDED WITH A CARE TEAM MEMBER    ACTIVE PORTAL MESSAGE OR LETTER SENT                       Updates Requested / Reviewed:        Health Maintenance Topics Overdue:        VBHM Score: 1     Colon Cancer Screening    Shingles/Zoster Vaccine  RSV Vaccine                      Health Maintenance Topic(s) Outreach Outcomes & Actions Taken:    Blood Pressure - Outreach Outcomes & Actions Taken  : msg    Primary Care Appt - Outreach Outcomes & Actions Taken  : msg

## 2024-07-31 ENCOUNTER — TELEPHONE (OUTPATIENT)
Dept: FAMILY MEDICINE | Facility: CLINIC | Age: 74
End: 2024-07-31
Payer: MEDICARE

## 2024-08-30 ENCOUNTER — TELEPHONE (OUTPATIENT)
Dept: FAMILY MEDICINE | Facility: CLINIC | Age: 74
End: 2024-08-30
Payer: MEDICARE

## 2024-08-30 DIAGNOSIS — G20.A1 PARKINSON'S DISEASE, UNSPECIFIED WHETHER DYSKINESIA PRESENT, UNSPECIFIED WHETHER MANIFESTATIONS FLUCTUATE: Primary | ICD-10-CM

## 2024-08-30 NOTE — TELEPHONE ENCOUNTER
Received the following message:  What is the request in detail: Pt is requesting a referral to see a neurologist. Pt need referral faxed to Dr. Leroy Rey at [phone].    Additional information needed regarding request.  Call placed to patient's wife (Ashanti) at number indicated in attached message below. No answer received. Left message requesting return call to office.

## 2024-08-30 NOTE — TELEPHONE ENCOUNTER
Patient's wife (Ashanti) requesting neurology referral to Dr. Leroy Rey related to Parkinson's Disease.  Fax number for provider noted in original message from  Isabel.  Please advise. Thank you.

## 2024-08-30 NOTE — TELEPHONE ENCOUNTER
Yuli Willig Staff     ----- Message from Yuli Will sent at 8/30/2024 11:54 AM CDT -----  Name of Who is Calling: Pt wife is calling on behalf of  BRITTNEY NAIR JR. [[id number]]        What is the request in detail: Pt is requesting a referral to see a neurologist. Pt need referral faxed to Dr. Leroy Rey at [phone]. Please assist.           Can the clinic reply by [facility]: No          What Number [facility] Call Back [facility]:  [phone]

## 2024-08-30 NOTE — TELEPHONE ENCOUNTER
Referral faxed to Dr. Rey's office at fax # provided by patient's wife (Ashanti)  [phone].  Call placed to Mrs. Burrell for notification.  Mrs. Burrell verbalized understanding.       Terrance Plata MD [facility] Staff19 minutes ago (3:51 PM)       Referral signed, thanks

## 2024-09-20 ENCOUNTER — TELEPHONE (OUTPATIENT)
Dept: FAMILY MEDICINE | Facility: CLINIC | Age: 74
End: 2024-09-20
Payer: MEDICARE

## 2024-09-20 NOTE — TELEPHONE ENCOUNTER
Kalli Marcial Terrance Staff     ----- Message from Kalli Marcial sent at 9/20/2024 12:48 PM CDT -----  Name of Who is calling :  BRITTNEY NAIR JR. [[id number]]      What is the request in detail:  Not allowing me to schedule from referral . Please assist       Can the clinic reply by [facility]:no            What [facility]: [phone]

## 2024-09-20 NOTE — TELEPHONE ENCOUNTER
Patient's wife (Ashanti) states Dr. Rey's office has not received neurology referral.  Requesting to re-fax referral to fax number [phone].  Referral faxed as per request.  Please view routing history in miscellaneous reports of patient record for reference of fax transmission.

## 2024-09-20 NOTE — TELEPHONE ENCOUNTER
Jessica Belcher Staff     ----- Message from Jessica Belcher sent at 9/20/2024  2:51 PM CDT -----  Regarding: advise  Contact: patient  Type: Needs Medical Advice  Who Called:  patient  Symptoms (please be specific):    How long has patient had these symptoms:    Pharmacy name and phone #:    Best Call Back Number: [phone]    Additional Information: mj olsen pt wife is seeking to speak with u are u available to advise MRN: [id number] BRITTNEY NAIR

## 2024-09-27 NOTE — PROGRESS NOTES
Last 5 Patient Entered Readings                                      Current 30 Day Average: 136/86     Recent Readings 2/7/2018 2/6/2018 2/5/2018 2/4/2018 2/3/2018    SBP (mmHg) 124 135 137 139 144    DBP (mmHg) 83 84 86 87 89    Pulse 65 69 65 66 68        2/7-Intro attempt .Patient answered requesting a phone call on 2/9.  2/9-Intro attempt #2. Sent Mipso message Will call again on 2/14.     Patient states he will be in [place] from 3/1 thru 3/12. Placed on hiatus during that time.    Hypertension Digital Medicine Program (HDMP): Health  Introduction    Introduced Mr. Kulwinder Castrodelisa Kyle to HDMP. Discussed health  role and recommended lifestyle modifications.    Diet (i.e. Low sodium, food labels): Patient reports having oatmeal with advocado and berries with a dash of cinnamon. Patient reports trying to limit his kimble to one piece. Patient reports eating pancakes on the weekend at time. He states eating out for lunch frequently. Patient reports eating red beans with a salad. Patient reports trying his best to stay away from processed meats. Patient reports missing dinner if he has a late dinner. Patient reports eating a baked chicken with broccoli frequently. Patient reports BBQ'ing chicken or salmon on the weekend. Patient reports not consuming diet sodas or sodas in general. Patient is aware of high sodium content in foods prepared at restaurants. Patient expresses the importance of eating lunch with his coworker. He states eating salads at establishments with his coworker is a good goal to work towards.  Exercise: Patient reports trying to walk a couple miles early in the morning. He expressed having success with walking 3-4x week prior to weather change. Patient expressed a desire to join a gym membership soon.   Alcohol/Tobacco: Patient reports consuming 2-3 beers on the weekend. Patient reports not drinking alcoholic beverages during the week.  Medication Adherence: has been compliant with  Simon the medicaiton regimen Patient reports no abnormal signs or symptoms with BP medication.   Other goals:    Reviewed AHA recommendations:  Limit sodium intake to <2000mg/day  Perform 150 minutes of physical activity per week    Patient is aware of the importance of taking daily BP readings at various times of the day  Patient aware that the health  can be used as a resource for lifestyle modifications to help reduce or maintain a healthy BP  Patient is aware of the importance of medication adherence.  [facility]. Patient should call 911 or [facility] if one arises.

## 2024-10-23 ENCOUNTER — PATIENT MESSAGE (OUTPATIENT)
Dept: ADMINISTRATIVE | Facility: HOSPITAL | Age: 74
End: 2024-10-23
Payer: MEDICARE

## 2024-11-26 ENCOUNTER — TELEPHONE (OUTPATIENT)
Dept: FAMILY MEDICINE | Facility: CLINIC | Age: 74
End: 2024-11-26
Payer: MEDICARE

## 2024-11-26 NOTE — TELEPHONE ENCOUNTER
Left message for patient to return call regarding scheduling an annual exam with either Dr. Plata or PATRICIO Servin, and to schedule a nurse visit for BP check.

## 2024-12-02 ENCOUNTER — TELEPHONE (OUTPATIENT)
Dept: FAMILY MEDICINE | Facility: CLINIC | Age: 74
End: 2024-12-02
Payer: MEDICARE

## 2024-12-02 DIAGNOSIS — I10 ESSENTIAL HYPERTENSION: ICD-10-CM

## 2024-12-02 NOTE — TELEPHONE ENCOUNTER
Carmelita Roblero LPN P [facility] Staff     ----- Message from Nurse Carmelita sent at 11/25/2024  2:54 PM CST -----  Regarding: Needs Office visit  Good afternoon,    Will you please try to get this patient in for a visit?  He has not been seen in over a year.    Thank you,  Carmelita

## 2024-12-02 NOTE — TELEPHONE ENCOUNTER
Call placed to patient. No answer received. Left detailed message requesting return call to office to schedule annual visit.      [phone] --no answer, left message   [phone] --no answer, left message

## 2024-12-04 ENCOUNTER — HOSPITAL ENCOUNTER (EMERGENCY)
Facility: HOSPITAL | Age: 74
Discharge: HOME | End: 2024-12-04
Attending: EMERGENCY MEDICINE
Payer: MEDICARE

## 2024-12-04 VITALS
SYSTOLIC BLOOD PRESSURE: 178 MMHG | RESPIRATION RATE: 16 BRPM | TEMPERATURE: 98 F | OXYGEN SATURATION: 99 % | HEART RATE: 112 BPM | DIASTOLIC BLOOD PRESSURE: 95 MMHG
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
## 2024-12-04 PROCEDURE — 99285 EMERGENCY DEPT VISIT HI MDM: CPT | Mod: 25

## 2024-12-05 NOTE — TELEPHONE ENCOUNTER
Follow up call placed to patient. No answer received. Left detailed message requesting return call to office.  Writer attempted to reach patient on both contact numbers listed in medical record.  Also sent [facility] message to patient at this time.

## 2024-12-05 NOTE — ED PROVIDER NOTES
Encounter Date: 12/4/2024       History     Chief Complaint   Patient presents with    Fall     Pt well, was found outside in the yard.     Patient presents emergency department via EMS reportedly found down in his backyard family reported that patient missing from the house proximally 6:00 p.m. they went looking for patient as he is room down the street in the past they could not find them ultimately found him in the back yd just prior to arrival where he had apparently fallen he does have an abrasion on his forehead but family reports that this was from a previous fall they did not see any new trauma he does complain of left shoulder pain remainder review of systems is limited secondary to history of dementia        Review of patient's allergies indicates:   Allergen Reactions    Percocet [oxycodone-acetaminophen] Itching     Past Medical History:   Diagnosis Date    Elevated PSA     Hyperlipemia     Hypertension     Wears glasses      Past Surgical History:   Procedure Laterality Date    COLONOSCOPY N/A 5/2/2018    Procedure: COLONOSCOPY;  Surgeon: Joe Millan MD;  Location: [facility];  Service: Endoscopy;  Laterality: N/A;    COLONOSCOPY W/ POLYPECTOMY  3/2013    Tubular adenoma    CYSTOSCOPY  10/25/2016    Dr. Mancia    CYSTOSCOPY N/A 12/4/2018    Procedure: CYSTOSCOPY;  Surgeon: Jamal Jain MD;  Location: [facility] OR;  Service: Urology;  Laterality: N/A;    LIPOMA RESECTION  8-    lipoma posterior neck    PROSTATE BIOPSY  10/25/2016    Dr. Mancia    TRANSRECTAL ULTRASOUND EXAMINATION N/A 12/4/2018    Procedure: ULTRASOUND, RECTAL APPROACH;  Surgeon: Jamal Jain MD;  Location: [facility] OR;  Service: Urology;  Laterality: N/A;    TRANSURETHRAL RESECTION OF PROSTATE N/A 2/14/2019    Procedure: TURP (TRANSURETHRAL RESECTION OF PROSTATE);  Surgeon: Jamal aJin MD;  Location: [facility] OR;  Service: Urology;  Laterality: N/A;    VASECTOMY       Family History   Problem Relation Name Age of Onset     Cancer Mother          breast    Kidney cancer Neg Hx      Prostate cancer Neg Hx       Social History     Tobacco Use    Smoking status: Never    Smokeless tobacco: Never   Substance Use Topics    Alcohol use: Yes     Comment: WEEKEND    Drug use: No     Review of Systems   Unable to perform ROS: Dementia       Physical Exam     Initial Vitals [12/04/24 2014]   BP Pulse Resp Temp SpO2   (!) 173/100 107 17 98.4 °F (36.9 °C) 100 %      MAP       --         Physical Exam    Constitutional: He appears well-developed and well-nourished. No distress.   HENT:   Head: Normocephalic.   Right Ear: External ear normal.   Left Ear: External ear normal. Mouth/Throat: Oropharynx is clear and moist.   Abrasion to the forehead   Eyes: EOM are normal. Pupils are equal, round, and reactive to light.   Neck: Neck supple.   Normal range of motion.  Cardiovascular:  Normal rate, regular rhythm, S1 normal, S2 normal, normal heart sounds and intact distal pulses.           Pulmonary/Chest: Breath sounds normal.   Abdominal: Abdomen is soft. Bowel sounds are normal. There is no abdominal tenderness.   Musculoskeletal:         General: Normal range of motion.      Cervical back: Normal range of motion and neck supple.     Neurological: He is alert and oriented to person, place, and time. He has normal strength. GCS score is 15. GCS eye subscore is 4. GCS verbal subscore is 5. GCS motor subscore is 6.   Skin: Skin is warm and dry. No rash noted.   Abrasion to forehead   Psychiatric: He has a normal mood and affect. His behavior is normal.         ED Course   Procedures  Labs Reviewed - No data to display       Imaging Results              CT Cervical Spine Without Contrast (Final result)  Result time 12/04/24 21:31:07      Final result by Seb Burton MD (12/04/24 21:31:07)                   Impression:      Negative CT of the brain.    Negative CT of the cervical spine allowing for spondylosis..      Electronically signed  by: Seb Burton  Date:    12/04/2024  Time:    21:31               Narrative:    EXAMINATION:  CT HEAD WITHOUT CONTRAST; CT CERVICAL SPINE WITHOUT CONTRAST    CLINICAL HISTORY:  Head trauma, minor (Age >= 65y);; Neck trauma (Age >= 65y);    TECHNIQUE:  Low dose axial images were obtained through the head, cervical spine .  Coronal and sagittal reformations were also performed. Contrast was not administered.    COMPARISON:  Of 12/13/2022    FINDINGS:  BRAIN:    Brain parenchyma appears stable in attenuation with normal gray-white matter differentiation.  Involutional changes remain.  No mass, mass effect or pathologic fluid collection.    Ventricles and basilar cisterns appear stable.    Calvarium intact.  Mild mucosal thickening in the maxillary sinuses left greater than right.  Otherwise clear.  Mastoids and middle ears unremarkable..  Orbits and orbital contents unremarkable.    CERVICAL SPINE:    Alignment appears anatomic.  Atypical spondylosis with disc space narrowing and sclerosis of the endplates at the C3-4 and C4-5 with osteophytes.  Moderate spondylosis at C5-6 with disc space narrowing and osteophytes.    There is no evidence of cortical fracture, subluxation or surrounding soft tissue swelling or hematoma.  The cervical cranial and cervicothoracic junctions are maintained.  No soft tissue swelling.  Visceral spaces unremarkable.  Lung apices appear clear.                                       CT Head Without Contrast (Final result)  Result time 12/04/24 21:31:07      Final result by Seb Burton MD (12/04/24 21:31:07)                   Impression:      Negative CT of the brain.    Negative CT of the cervical spine allowing for spondylosis..      Electronically signed by: Seb Burton  Date:    12/04/2024  Time:    21:31               Narrative:    EXAMINATION:  CT HEAD WITHOUT CONTRAST; CT CERVICAL SPINE WITHOUT CONTRAST    CLINICAL HISTORY:  Head trauma, minor (Age >= 65y);; Neck trauma  (Age >= 65y);    TECHNIQUE:  Low dose axial images were obtained through the head, cervical spine .  Coronal and sagittal reformations were also performed. Contrast was not administered.    COMPARISON:  Of 12/13/2022    FINDINGS:  BRAIN:    Brain parenchyma appears stable in attenuation with normal gray-white matter differentiation.  Involutional changes remain.  No mass, mass effect or pathologic fluid collection.    Ventricles and basilar cisterns appear stable.    Calvarium intact.  Mild mucosal thickening in the maxillary sinuses left greater than right.  Otherwise clear.  Mastoids and middle ears unremarkable..  Orbits and orbital contents unremarkable.    CERVICAL SPINE:    Alignment appears anatomic.  Atypical spondylosis with disc space narrowing and sclerosis of the endplates at the C3-4 and C4-5 with osteophytes.  Moderate spondylosis at C5-6 with disc space narrowing and osteophytes.    There is no evidence of cortical fracture, subluxation or surrounding soft tissue swelling or hematoma.  The cervical cranial and cervicothoracic junctions are maintained.  No soft tissue swelling.  Visceral spaces unremarkable.  Lung apices appear clear.                                       X-Ray Shoulder Trauma Left (Final result)  Result time 12/04/24 21:56:17      Final result by Seb Burton MD (12/04/24 21:56:17)                   Impression:      No acute findings evident the left shoulder.      Electronically signed by: Seb Burton  Date:    12/04/2024  Time:    21:56               Narrative:    EXAMINATION:  XR SHOULDER TRAUMA 3 VIEW LEFT    CLINICAL HISTORY:  Unspecified fall, initial encounter    TECHNIQUE:  Three views of the left shoulder were performed.    COMPARISON  None    FINDINGS:  Shoulder appears intact without fracture or dislocation.  Mild degenerative changes.  Adjacent chest wall unremarkable.                                       Medications - No data to display  Medical Decision  Making  CT scan of the head and cervical spine showed no evidence of acute injury radiographs of the shoulder showed no evidence of fracture dislocation will release with fall precautions outpatient follow-up primary care provider return to ER for any worsened symptoms or new symptoms    Amount and/or Complexity of Data Reviewed  Radiology: ordered. Decision-making details documented in ED Course.                                      Clinical Impression:  Final diagnoses:  [W19.XXXA] Fall          ED Disposition Condition    Discharge Stable          ED Prescriptions    None       Follow-up Information       Follow up With Specialties Details Why Contact Info    Terrance Plata MD Family Medicine In 1 day for re-examination of your symptoms [id number] [place] [address]  [phone]               Jesus King MD  12/04/24 [id number]

## 2024-12-05 NOTE — ED NOTES
Bed: 13  Expected date:   Expected time:   Means of arrival: Ambulance Service  Comments:  Hold ems fall

## 2024-12-13 ENCOUNTER — OFFICE VISIT (OUTPATIENT)
Dept: FAMILY MEDICINE | Facility: CLINIC | Age: 74
End: 2024-12-13
Payer: MEDICARE

## 2024-12-13 DIAGNOSIS — R30.0 DYSURIA: Primary | ICD-10-CM

## 2024-12-13 RX ORDER — NITROFURANTOIN 25; 75 MG/1; MG/1
100 CAPSULE ORAL 2 TIMES DAILY
Qty: 10 CAPSULE | Refills: 0 | Status: SHIPPED | OUTPATIENT
Start: 2024-12-13 | End: 2024-12-18

## 2024-12-13 NOTE — PROGRESS NOTES
The patient location is: [place], [place]  The chief complaint leading to consultation is: urine changes    Visit type: audiovisual    Face to Face time with patient: 7 minutes  10 minutes of total time spent on the encounter, which includes face to face time and non-face to face time preparing to see the patient (eg, review of tests), Obtaining and/or reviewing separately obtained history, Documenting clinical information in the electronic or other health record, Independently interpreting results (not separately reported) and communicating results to the patient/family/caregiver, or Care coordination (not separately reported).         Each patient to whom he or she provides medical services by telemedicine is:  (1) informed of the relationship between the physician and patient and the respective role of any other health care provider with respect to management of the patient; and (2) notified that he or she may decline to receive medical services by telemedicine and may withdraw from such care at any time.    Notes:   [facility] MEDICINE CLINIC NOTE    Patient Name: Kulwinder Hogue Jr.  YOB: 1950    PRESENTING HISTORY     History of Present Illness:  Mr. Kulwinder Hogue Jr. is a 74 y.o. male here for urine changes.     Strong odor, dark urine.   Some dysuria.   Trace visible blood.   Onset a few days ago.   No fevers, nausea, systemic symptoms.     He tries not to drink too much due to diapers. Probably 2.5 bottles daily.     Last UTI in May, culture reviewed.       ROS      OBJECTIVE:   Vital Signs:  There were no vitals filed for this visit.       Physical Exam: Nontoxic appearance.     Physical Exam    ASSESSMENT & PLAN:     Dysuria  -     nitrofurantoin, macrocrystal-monohydrate, (MACROBID) 100 MG capsule; Take 1 capsule (100 mg total) by mouth 2 (two) times daily. for 5 days  Dispense: 10 capsule; Refill: 0     Will treat empirically. Flush the bladder with lots of fluids.   If doesn't  improve, culture.     Terrance Plata  Internal Medicine            Answers submitted by the patient for this visit:  Painful Urination Questionnaire (Submitted on 12/13/2024)  Chief Complaint: Dysuria  Chronicity: new  Onset: in the past 7 days  Frequency: intermittently  Progression since onset: unchanged  Pain quality: burning  Pain - numeric: 5/10  Fever: no fever  Sexually active?: No  History of pyelonephritis?: No  discharge: Yes  hesitancy: Yes  possible pregnancy: No  sweats: No  withholding: No  behavior changes: Yes  Treatments tried: increased fluids  Improvement on treatment: no relief  Pain severity: mild  catheterization: Yes  diabetes insipidus: No  diabetes mellitus: No  genitourinary reflux: No  hypertension: Yes  recurrent UTIs: No  single kidney: No  STD: No  urinary stasis: No  urological procedure: No  kidney stones: No

## 2025-01-01 ENCOUNTER — RESULTS FOLLOW-UP (OUTPATIENT)
Dept: FAMILY MEDICINE | Facility: CLINIC | Age: 75
End: 2025-01-01

## 2025-01-01 ENCOUNTER — RESULTS FOLLOW-UP (OUTPATIENT)
Dept: UROLOGY | Facility: CLINIC | Age: 75
End: 2025-01-01

## 2025-01-01 DIAGNOSIS — E87.6 HYPOKALEMIA: Primary | ICD-10-CM

## 2025-01-01 RX ORDER — POTASSIUM CHLORIDE 20 MEQ/1
20 TABLET, EXTENDED RELEASE ORAL 2 TIMES DAILY
Qty: 120 TABLET | Refills: 0 | Status: SHIPPED | OUTPATIENT
Start: 2025-01-01

## 2025-02-03 ENCOUNTER — TELEPHONE (OUTPATIENT)
Dept: PRIMARY CARE CLINIC | Facility: CLINIC | Age: 75
End: 2025-02-03
Payer: MEDICARE

## 2025-02-03 ENCOUNTER — OFFICE VISIT (OUTPATIENT)
Dept: FAMILY MEDICINE | Facility: CLINIC | Age: 75
End: 2025-02-03
Payer: MEDICARE

## 2025-02-03 DIAGNOSIS — L03.116 CELLULITIS OF LEFT LOWER EXTREMITY: ICD-10-CM

## 2025-02-03 DIAGNOSIS — G20.A1 PARKINSON'S DISEASE, UNSPECIFIED WHETHER DYSKINESIA PRESENT, UNSPECIFIED WHETHER MANIFESTATIONS FLUCTUATE: ICD-10-CM

## 2025-02-03 DIAGNOSIS — L89.621 PRESSURE INJURY OF LEFT HEEL, STAGE 1: Primary | ICD-10-CM

## 2025-02-03 PROCEDURE — 1159F MED LIST DOCD IN RCRD: CPT | Mod: CPTII,95,,

## 2025-02-03 PROCEDURE — 1160F RVW MEDS BY RX/DR IN RCRD: CPT | Mod: CPTII,95,,

## 2025-02-03 PROCEDURE — 98016 BRIEF COMUNICAJ TECH-BSD SVC: CPT | Mod: 95,,,

## 2025-02-03 RX ORDER — MUPIROCIN 20 MG/G
OINTMENT TOPICAL 3 TIMES DAILY
Qty: 22 G | Refills: 0 | Status: SHIPPED | OUTPATIENT
Start: 2025-02-03 | End: 2025-02-10

## 2025-02-03 RX ORDER — CEPHALEXIN 500 MG/1
500 CAPSULE ORAL 4 TIMES DAILY
Qty: 20 CAPSULE | Refills: 0 | Status: SHIPPED | OUTPATIENT
Start: 2025-02-03 | End: 2025-02-08

## 2025-02-03 NOTE — PROGRESS NOTES
[facility]     Subjective:       Patient ID:  [id number]     Chief Complaint: Wound    The patient location is: [place]  The chief complaint leading to consultation is: Wound    Visit type: audio only    Face to Face time with patient: 10  20 minutes of total time spent on the encounter, which includes face to face time and non-face to face time preparing to see the patient (eg, review of tests), Obtaining and/or reviewing separately obtained history, Documenting clinical information in the electronic or other health record, Independently interpreting results (not separately reported) and communicating results to the patient/family/caregiver, or Care coordination (not separately reported).       Each patient to whom he or she provides medical services by telemedicine is:  (1) informed of the relationship between the physician and patient and the respective role of any other health care provider with respect to management of the patient; and (2) notified that he or she may decline to receive medical services by telemedicine and may withdraw from such care at any time.    Notes:     Kulwinder Hogue Jr. is a 74 y.o. male with a past medical history significant for parkinson's disease, HTN, and HLD who presents to the clinic for wound.     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 She mentions that the patient received new shoes a couple of weeks ago, and she noticed a blister forming on his left heel. The blister subsequently popped, causing a skin defect. The wife reports that the patient remains primarily on his back due to being bedbound. She denies any fever or chills but notes that the wound appears slightly infected. The patient is not currently receiving home health care.    Past Medical History:   Diagnosis Date    Elevated PSA     Hyperlipemia     Hypertension     Wears glasses       Review of patient's  allergies indicates:   Allergen Reactions    Percocet [oxycodone-acetaminophen] Itching       Lab Results   Component Value Date    WBC 6.80 06/02/2022    HGB 15.5 06/02/2022    HCT 45.4 06/02/2022     06/02/2022    CHOL 216 (H) 11/06/2023    TRIG 68 11/06/2023    HDL 57 11/06/2023    ALT 9 (L) 11/06/2023    AST 18 11/06/2023     11/06/2023    K 3.2 (L) 11/06/2023     11/06/2023    CREATININE 0.9 11/06/2023    BUN 18 11/06/2023    CO2 32 (H) 11/06/2023    TSH 2.546 10/13/2022    PSA 4.6 (H) 12/15/2021    INR 1.0 02/08/2019       Review of Systems   Constitutional:  Negative for activity change, chills, fever and unexpected weight change.   HENT:  Negative for hearing loss, rhinorrhea and trouble swallowing.    Eyes:  Negative for discharge and visual disturbance.   Respiratory:  Negative for chest tightness and wheezing.    Cardiovascular:  Negative for chest pain and palpitations.   Gastrointestinal:  Negative for blood in stool, constipation, diarrhea and vomiting.   Endocrine: Negative for polydipsia and polyuria.   Genitourinary:  Negative for difficulty urinating, hematuria and urgency.   Musculoskeletal:  Negative for arthralgias, joint swelling and neck pain.   Skin:  Positive for wound.   Neurological:  Negative for weakness and headaches.   Psychiatric/Behavioral:  Positive for confusion. Negative for dysphoric mood.          Objective:        Deferred as visit on audio only.     Pictures provided by patient depicted below                      Assessment:       1. Pressure injury of left heel, stage 1    2. Parkinson's disease, unspecified whether dyskinesia present, unspecified whether manifestations fluctuate    3. Cellulitis of left lower extremity          Plan:       Diagnoses and all orders for this visit:    - Reviewed images which are likely consistent with pressure ulcer. Discussed proper rotation of patient.   - Patient currently not followed by home health, discussed referral  as patient bed bound. Discussed benefits of home health and possible resources they are able to provide including wound care and physical therapy.   - Will provide keflex for possible cellulitis associated with pressure ulcer   - Return precautions discussed with care giver.     Pressure injury of left heel, stage 1  -     Ambulatory referral/consult to Home Health; Future  -     cephALEXin (KEFLEX) 500 MG capsule; Take 1 capsule (500 mg total) by mouth 4 (four) times daily. for 5 days  -     mupirocin (BACTROBAN) 2 % ointment; Apply topically 3 (three) times daily. for 7 days  -    Discussed rotating patient every two hours to relieve pressure. Discussed adequate wound hygiene.   -    Referral to wound care via home health placed today.     Parkinson's disease, unspecified whether dyskinesia present, unspecified whether manifestations fluctuate  -     Ambulatory referral/consult to Home Health; Future    Cellulitis of left lower extremity  -     cephALEXin (KEFLEX) 500 MG capsule; Take 1 capsule (500 mg total) by mouth 4 (four) times daily. for 5 days  -     mupirocin (BACTROBAN) 2 % ointment; Apply topically 3 (three) times daily. for 7 days           Follow up for if symptoms are not improved.    Veena Moffett PA-C  Family Medicine Physician Assistant         Tests to Keep You Healthy    Colon Cancer Screening: DUE  Last Blood Pressure <= 139/89 (6/7/2023): NO       I spent a total of 20 minutes on the day of the visit.This includes face to face time and non-face to face time preparing to see the patient (eg, review of tests), obtaining and/or reviewing separately obtained history, documenting clinical information in the electronic or other health record, independently interpreting results and communicating results to the patient/family/caregiver, or care coordinator.

## 2025-02-05 ENCOUNTER — HOSPITAL ENCOUNTER (EMERGENCY)
Facility: HOSPITAL | Age: 75
Discharge: HOME | End: 2025-02-05
Attending: EMERGENCY MEDICINE
Payer: MEDICARE

## 2025-02-05 VITALS
SYSTOLIC BLOOD PRESSURE: 164 MMHG | TEMPERATURE: 99 F | DIASTOLIC BLOOD PRESSURE: 73 MMHG | BODY MASS INDEX: 21.67 KG/M2 | RESPIRATION RATE: 16 BRPM | HEART RATE: 80 BPM | HEIGHT: 72 IN | WEIGHT: 160 LBS | OXYGEN SATURATION: 99 %
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
## 2025-02-05 DIAGNOSIS — S09.90XA INJURY OF HEAD, INITIAL ENCOUNTER: Primary | ICD-10-CM

## 2025-02-05 PROCEDURE — 25000003 PHARM REV CODE 250

## 2025-02-05 PROCEDURE — 99284 EMERGENCY DEPT VISIT MOD MDM: CPT | Mod: 25

## 2025-02-05 RX ORDER — MUPIROCIN 20 MG/G
1 OINTMENT TOPICAL
Status: COMPLETED | OUTPATIENT
Start: 2025-02-05 | End: 2025-02-05

## 2025-02-05 RX ADMIN — MUPIROCIN 1 TUBE: 20 OINTMENT TOPICAL at 08:02

## 2025-02-05 NOTE — FIRST PROVIDER EVALUATION
Emergency Department TeleTriage Encounter Note      CHIEF COMPLAINT    Chief Complaint   Patient presents with    Head Injury     Fell off bed hitting head -- laceration to head. No LOC.        VITAL SIGNS   Initial Vitals [02/05/25 1719]   BP Pulse Resp Temp SpO2   (!) 188/98 89 17 98.5 °F (36.9 °C) 100 %      MAP       --            ALLERGIES    Review of patient's allergies indicates:   Allergen Reactions    Percocet [oxycodone-acetaminophen] Itching       PROVIDER TRIAGE NOTE  Family reports a fall from the bed due to rolling off bed. Has a wound to the back of the head. States he woke up on the floor because he rolled out of the bed. Wife states no change in behavior/speech.     Limited physical exam via telehealth: The patient is awake, alert, answering questions appropriately and is not in respiratory distress.  As the Teletriage provider, I performed an initial assessment and ordered appropriate labs and imaging studies, if any, to facilitate the patient's care once placed in the ED. Once a room is available, care and a full evaluation will be completed by an alternate ED provider.  Any additional orders and the final disposition will be determined by that provider.  All imaging and labs will not be followed-up by the Teletriage Team, including myself.           ORDERS  Labs Reviewed - No data to display    ED Orders (720h ago, onward)      Start Ordered     Status Ordering Provider    02/05/25 1732 02/05/25 1731  CT Head Without Contrast  1 time imaging         Ordered MONTY LOPES              Virtual Visit Note: The provider triage portion of this emergency department evaluation and documentation was performed via [employer], a HIPAA-compliant telemedicine application, in concert with a tele-presenter in the room. A face to face patient evaluation with one of my colleagues will occur once the patient is placed in an emergency department room.      DISCLAIMER: This note was prepared with M*Modal  voice recognition transcription software. Garbled syntax, mangled pronouns, and other bizarre constructions may be attributed to that software system.

## 2025-02-06 NOTE — ED PROVIDER NOTES
Encounter Date: 2/5/2025       History     Chief Complaint   Patient presents with    Head Injury     Fell off bed hitting head -- laceration to head. No LOC.      Patient presents emergency department status post fall out of bed striking his head patient complains of pain to his right ankle feels like he may have abraded it turned fall he denies any headache nausea or blurred vision        Review of patient's allergies indicates:   Allergen Reactions    Percocet [oxycodone-acetaminophen] Itching     Past Medical History:   Diagnosis Date    Elevated PSA     Hyperlipemia     Hypertension     Wears glasses      Past Surgical History:   Procedure Laterality Date    COLONOSCOPY N/A 5/2/2018    Procedure: COLONOSCOPY;  Surgeon: Joe Millan MD;  Location: [facility];  Service: Endoscopy;  Laterality: N/A;    COLONOSCOPY W/ POLYPECTOMY  3/2013    Tubular adenoma    CYSTOSCOPY  10/25/2016    Dr. Mancia    CYSTOSCOPY N/A 12/4/2018    Procedure: CYSTOSCOPY;  Surgeon: Jamal Jain MD;  Location: [facility] OR;  Service: Urology;  Laterality: N/A;    LIPOMA RESECTION  8-    lipoma posterior neck    PROSTATE BIOPSY  10/25/2016    Dr. Mancia    TRANSRECTAL ULTRASOUND EXAMINATION N/A 12/4/2018    Procedure: ULTRASOUND, RECTAL APPROACH;  Surgeon: Jamal Jain MD;  Location: [facility] OR;  Service: Urology;  Laterality: N/A;    TRANSURETHRAL RESECTION OF PROSTATE N/A 2/14/2019    Procedure: TURP (TRANSURETHRAL RESECTION OF PROSTATE);  Surgeon: Jamal Jain MD;  Location: [facility] OR;  Service: Urology;  Laterality: N/A;    VASECTOMY       Family History   Problem Relation Name Age of Onset    Cancer Mother          breast    Kidney cancer Neg Hx      Prostate cancer Neg Hx       Social History     Tobacco Use    Smoking status: Never    Smokeless tobacco: Never   Substance Use Topics    Alcohol use: Yes     Comment: WEEKEND    Drug use: No     Review of Systems   Gastrointestinal:  Negative for nausea.    Musculoskeletal:  Positive for arthralgias. Negative for neck pain.   Skin:  Positive for wound.   Neurological:  Negative for headaches.   All other systems reviewed and are negative.      Physical Exam     Initial Vitals [02/05/25 1719]   BP Pulse Resp Temp SpO2   (!) 188/98 89 17 98.5 °F (36.9 °C) 100 %      MAP       --         Physical Exam    Constitutional: He appears well-developed and well-nourished. No distress.   HENT:   Head: Normocephalic.   Right Ear: External ear normal.   Left Ear: External ear normal. Mouth/Throat: Oropharynx is clear and moist.   2 cm skin tear to the midline vertex scalp no active bleeding   Eyes: Pupils are equal, round, and reactive to light.   Neck: Neck supple.   Normal range of motion.  Cardiovascular:  Normal rate, regular rhythm, S1 normal, S2 normal, normal heart sounds and intact distal pulses.           Pulmonary/Chest: Breath sounds normal.   Abdominal: Abdomen is soft. Bowel sounds are normal. There is no abdominal tenderness.   Musculoskeletal:         General: Normal range of motion.      Cervical back: Normal range of motion and neck supple.      Comments: Bilateral lower extremity edema no palpable crepitance or deformity 2+ dorsalis pedis posterior tibial pulses     Neurological: He is alert and oriented to person, place, and time. He has normal strength. No cranial nerve deficit. GCS score is 15. GCS eye subscore is 4. GCS verbal subscore is 5. GCS motor subscore is 6.   Skin: Skin is warm and dry. No rash noted.   Psychiatric: He has a normal mood and affect. His behavior is normal.         ED Course   Procedures  Labs Reviewed - No data to display       Imaging Results              X-Ray Ankle Complete Right (Final result)  Result time 02/05/25 20:06:09      Final result by Lino Lam DO (02/05/25 20:06:09)                   Impression:      No acute osseous abnormality of the ankle.      Electronically signed by: Lino  Carole  Date:    02/05/2025  Time:    20:06               Narrative:    EXAMINATION:  XR ANKLE COMPLETE 3 VIEW RIGHT    CLINICAL HISTORY:  Unspecified fall, initial encounter    TECHNIQUE:  AP, lateral, and oblique images of the right ankle were performed.    COMPARISON:  03/29/2018.    FINDINGS:  No acute fracture or dislocation. Alignment is normal. The ankle mortise is congruent. The talar dome is intact. Joint spaces are preserved. No effusion.                                       CT Cervical Spine Without Contrast (Final result)  Result time 02/05/25 19:50:40      Final result by Lino Lam DO (02/05/25 19:50:40)                   Impression:      No acute fracture or subluxation of the cervical spine.    Degenerative changes, similar to prior.      Electronically signed by: Lino Lam  Date:    02/05/2025  Time:    19:50               Narrative:    EXAMINATION:  CT CERVICAL SPINE WITHOUT CONTRAST    CLINICAL HISTORY:  Neck trauma (Age >= 65y);    TECHNIQUE:  Low dose axial images, sagittal and coronal reformations were performed though the cervical spine without intravenous contrast.    COMPARISON:  CT cervical spine from 12/04/2024.    FINDINGS:  Alignment: Unremarkable.    Vertebra: There is no acute fracture or subluxation of the cervical spine.  The vertebral body heights are maintained.    Discs: There is moderate disc height loss at C3-C4 and C4-C5.    Degenerative changes: There are multilevel degenerative changes of the cervical spine, similar to the prior study.    Miscellaneous: The soft tissues of the neck are unremarkable.  The visualized lung apices are clear.  There is elongation of the styloid processes, which can be seen with Sharp syndrome.                                       CT Head Without Contrast (Final result)  Result time 02/05/25 19:47:12      Final result by Lino Lam DO (02/05/25 19:47:12)                   Impression:      No acute intracranial  abnormality.      Electronically signed by: Lino Lam  Date:    02/05/2025  Time:    19:47               Narrative:    EXAMINATION:  CT HEAD WITHOUT CONTRAST    CLINICAL HISTORY:  Head trauma, minor (Age >= 65y);    TECHNIQUE:  Low dose axial CT images obtained throughout the head without intravenous contrast. Sagittal and coronal reconstructions were performed.    COMPARISON:  CT head from 12/04/2024.    FINDINGS:  Ventricles and sulci are normal in size for age without evidence of hydrocephalus. No extra-axial blood or fluid collections.  There is a stable left high posterior frontal extra-axial calcified lesion, likely a calcified meningioma.  There are chronic microvascular ischemic changes.  No parenchymal mass, hemorrhage, edema or major vascular distribution infarct.    No calvarial fracture.  The scalp is unremarkable.  There is a small mucous retention cyst in the left maxillary sinus.  The remaining paranasal sinuses and mastoid air cells are clear.                                       Medications   mupirocin 2 % ointment 1 Tube (1 Tube Topical (Top) Given 2/5/25 2028)     Medical Decision Making  CT scan of the head and cervical spine were obtained which showed no evidence of acute intracranial injury no evidence of cervical spine fracture was again demonstrated degenerative changes radiographs of the ankle showed no evidence of fracture dislocation patient is ambulating without significant difficulty will discharge with wound care precautions outpatient follow-up with primary care provider head injury precautions fall precautions    Amount and/or Complexity of Data Reviewed  Radiology: ordered. Decision-making details documented in ED Course.    Risk  Prescription drug management.                                      Clinical Impression:  Final diagnoses:  [W19.XXXA] Fall  [S09.90XA] Injury of head, initial encounter (Primary)          ED Disposition Condition    Discharge Stable          ED  Prescriptions    None       Follow-up Information       Follow up With Specialties Details Why Contact Info    Terrance Plata MD Family Medicine In 1 day for further evaluation and treatment [address]  [place] [address]  [phone]               Jesus King MD  02/05/25 2043

## 2025-02-18 ENCOUNTER — EXTERNAL HOME HEALTH (OUTPATIENT)
Dept: HOME HEALTH SERVICES | Facility: HOSPITAL | Age: 75
End: 2025-02-18
Payer: MEDICARE

## 2025-02-18 NOTE — PROGRESS NOTES
Pt called to get his recent Blood and Urinalysis result's from .Please call and go over them with him pt's # [phone].    Subjective:      Patient ID: Kulwinder Hogue Jr. is a 66 y.o. male.    Chief Complaint: Routine Foot Care    Kulwinder is a 66 y.o. male who presents to the clinic for evaluation and treatment of high risk feet. Kulwinder has a past medical history of Elevated PSA; Hyperlipemia; Hypertension; and Wears glasses. The patient's chief complaint is long, thick toenails with pain at skin borders both hallux and callus both big toes also.  Both conditions, Gradual onset, worsening over past several weeks, aggravated by increased weight bearing, shoe gear, pressure.  Periodic debridement helps symptoms.  Denies trauma, signs infection.    PCP: Henrique Lugo MD    Date Last Seen by PCP:   Chief Complaint   Patient presents with    Routine Foot Care         Current shoe gear:  Affected Foot: Casual shoes     Unaffected Foot: Casual shoes    Last encounter in this department: Visit date not found    No results found for: HGBA1C      Review of Systems   Constitution: Negative for chills, diaphoresis, fever, malaise/fatigue and night sweats.   Cardiovascular: Negative for claudication, cyanosis, leg swelling and syncope.   Skin: Positive for nail changes and suspicious lesions. Negative for color change, dry skin, rash and unusual hair distribution.   Musculoskeletal: Negative for falls, joint pain, joint swelling, muscle cramps, muscle weakness and stiffness.   Gastrointestinal: Negative for constipation, diarrhea, nausea and vomiting.   Neurological: Negative for brief paralysis, disturbances in coordination, focal weakness, numbness, paresthesias, sensory change and tremors.           Objective:      Physical Exam   Constitutional: He appears well-developed and well-nourished. He is cooperative. No distress.   Cardiovascular:   Pulses:       Popliteal pulses are 2+ on the right side, and 2+ on the left side.        Dorsalis pedis pulses are 2+ on the right side, and 2+ on the left side.        Posterior tibial pulses are 2+ on  the right side, and 2+ on the left side.   Capillary refill 3 seconds all toes/distal feet, all toes/both feet warm to touch.      Negative lymphadenopathy bilateral popliteal fossa and tarsal tunnel.      Negavie lower extremity edema bilateral.     Musculoskeletal:        Right ankle: Normal. He exhibits normal range of motion, no swelling, no ecchymosis, no deformity, no laceration and normal pulse. Achilles tendon normal. Achilles tendon exhibits no pain, no defect and normal Aparicio's test results.   Normal angle, base, station of gait. All ten toes without clubbing, cyanosis, or signs of ischemia.  No pain to palpation bilateral lower extremities.  Range of motion, stability, muscle strength, and muscle tone normal bilateral feet and legs.     Lymphadenopathy: No inguinal adenopathy noted on the right or left side.   Negative lymphadenopathy bilateral popliteal fossa and tarsal tunnel.   Neurological: He is alert. He has normal strength. He displays no atrophy and no tremor. No sensory deficit. He exhibits normal muscle tone. He displays no seizure activity. Gait normal.   Reflex Scores:       Patellar reflexes are 2+ on the right side and 2+ on the left side.       Achilles reflexes are 2+ on the right side and 2+ on the left side.  Negative tinel sign to percussion sural, superficial peroneal, deep peroneal, saphenous, and posterior tibial nerves right and left ankles and feet.     Skin: Skin is warm, dry and intact. No abrasion, no bruising, no burn, no ecchymosis, no laceration, no lesion and no rash noted. He is not diaphoretic. No cyanosis or erythema. No pallor. Nails show no clubbing.     Visible and palpable ingrowth of toenail lateral and medial border right and left hallux with pain to palpation,  without ulceration, drainage, pus, tracking, fluctuance, malodor, or cardinal signs infection.    Focal hyperkeratotic lesion consisting entirely of hyperkeratotic tissue without open skin, drainage, pus,  fluctuance, malodor, or signs of infection medial hallux ipj both feet.    Otherwise, Skin is normal age and health appropriate color, turgor, texture, and temperature bilateral lower extremities without ulceration, hyperpigmentation, discoloration, masses nodules or cords palpated.  No ecchymosis, erythema, edema, or cardinal signs of infection bilateral lower extremities.               Assessment:       Encounter Diagnoses   Name Primary?    Ingrown nail Yes    Corn or callus     Toe pain, bilateral          Plan:       Kulwinder was seen today for routine foot care.    Diagnoses and all orders for this visit:    Ingrown nail    Corn or callus    Toe pain, bilateral      I counseled the patient on his conditions, their implications and medical management.        - Shoe inspection. Patient instructed on proper foot hygeine. We discussed wearing proper shoe gear, daily foot inspections, never walking without protective shoe gear, never putting sharp instruments to feet, routine podiatric visits prn.      - With patient's permission, nails were aggressively reduced and debrided x 2 (both hallux) to their soft tissue attachment mechanically and with electric , removing all offending nail and debris. Patient relates relief following the procedure. He will continue to monitor the areas daily, inspect his feet, wear protective shoe gear when ambulatory, moisturizer to maintain skin integrity and follow in this office p.r.n.    With the patient's permission, I debrided hyperkeratotic lesion(s) as above totaling      2          to, not  Including dermis with sterile #15 blade.  Patient tolerated the procedure well and related significant relief.              Return if symptoms worsen or fail to improve.

## 2025-02-24 ENCOUNTER — OFFICE VISIT (OUTPATIENT)
Dept: FAMILY MEDICINE | Facility: CLINIC | Age: 75
End: 2025-02-24
Payer: MEDICARE

## 2025-02-24 DIAGNOSIS — R30.0 DYSURIA: Primary | ICD-10-CM

## 2025-02-24 PROCEDURE — 1159F MED LIST DOCD IN RCRD: CPT | Mod: CPTII,95,, | Performed by: PHYSICIAN ASSISTANT

## 2025-02-24 PROCEDURE — 1160F RVW MEDS BY RX/DR IN RCRD: CPT | Mod: CPTII,95,, | Performed by: PHYSICIAN ASSISTANT

## 2025-02-24 PROCEDURE — 98005 SYNCH AUDIO-VIDEO EST LOW 20: CPT | Mod: 95,,, | Performed by: PHYSICIAN ASSISTANT

## 2025-02-24 RX ORDER — NITROFURANTOIN 25; 75 MG/1; MG/1
100 CAPSULE ORAL 2 TIMES DAILY
Qty: 14 CAPSULE | Refills: 0 | Status: SHIPPED | OUTPATIENT
Start: 2025-02-24 | End: 2025-03-03

## 2025-02-24 NOTE — PROGRESS NOTES
Telemedicine Visit    Patient ID: Kulwinder Hogue Jr. is a 74 y.o. male    Patient identified and consents to telemedicine video appointment.       Interactive audio and video were available for the duration of the visit.    Patient/Family members identity was confirmed and confidentiality/privacy confirmed prior to visit. Verbal informed consent was obtained from the patient's legal guardian or patient when appropriate to conduct this virtual visit. They authorized me to provide medical care and voiced understanding of the risks, benefits, and alternatives of virtual care. Guardian understands the limitations of a virtual visit, that they may choose to be seen in person if desired or needed, and that they may halt the virtual visit at any time for any reason.     Patient location: Home in [place], [place]    SUBJECTIVE:  No chief complaint on file.      HPI:  Patient presenting via telemedicine with wife who provides history due to patient's history of Parkinson's with dementia. She reports patient's urine had a strong odor and is dark yellow in color which started about 3 days ago. Patient typically does not mention or complain of symptoms or pain. She took his temperature yesterday which was normal. She denies blood in the urine. He has no known antibiotic allergies. She has been trying to have patient drink more water and cranberry juice.     Past Medical History:   Diagnosis Date    Elevated PSA     Hyperlipemia     Hypertension     Wears glasses        Review of patient's allergies indicates:   Allergen Reactions    Percocet [oxycodone-acetaminophen] Itching       Review of Systems   Unable to perform ROS: Dementia   Constitutional:  Negative for fever.       OBJECTIVE:    There were no vitals taken for this visit. - not performed due to telemedicine visit      Current Outpatient Medications:     amLODIPine (NORVASC) 5 MG tablet, Take 1 tablet (5 mg total) by mouth every evening. (for blood pressure)  (Patient not taking: Reported on 5/7/2024), Disp: 90 tablet, Rfl: 4    aspirin 81 mg Tab, Take 81 mg by mouth once daily. Every day (Patient not taking: Reported on 5/7/2024), Disp: , Rfl:     benzocaine-menthoL 6-10 mg lozenge, Take 1 lozenge by mouth every 2 (two) hours as needed for Pain. (Patient not taking: Reported on 5/7/2024), Disp: 18 tablet, Rfl: 0    buPROPion (WELLBUTRIN) 75 MG tablet, Take 1 tablet (75 mg total) by mouth 2 (two) times daily. (Patient not taking: Reported on 5/7/2024), Disp: 60 tablet, Rfl: 11    carbidopa-levodopa  mg (SINEMET CR)  mg TbSR, Take 1 tablet by mouth 2 (two) times daily., Disp: , Rfl:     carvediloL (COREG) 25 MG tablet, Take 1 tablet (25 mg total) by mouth 2 (two) times daily with meals. (Patient not taking: Reported on 5/7/2024), Disp: 180 tablet, Rfl: 3    EScitalopram oxalate (LEXAPRO) 20 MG tablet, Take 1 tablet (20 mg total) by mouth every evening. (Patient not taking: Reported on 5/7/2024), Disp: 30 tablet, Rfl: 11    fish oil-omega-3 fatty acids 300-1,000 mg capsule, Take 2 g by mouth once daily. (Patient not taking: Reported on 5/7/2024), Disp: , Rfl:     folic acid (FOLVITE) 1 MG tablet, TAKE 1 TABLET(1 MG) BY MOUTH EVERY DAY (Patient not taking: Reported on 5/7/2024), Disp: 90 tablet, Rfl: 3    hydroCHLOROthiazide (HYDRODIURIL) 12.5 MG Tab, Take 1 tablet (12.5 mg total) by mouth once daily. (Patient not taking: Reported on 5/7/2024), Disp: 30 tablet, Rfl: 2    melatonin 5 mg TbIE, Take 1 tablet by mouth every evening. (Patient not taking: Reported on 5/7/2024), Disp: , Rfl:     multivitamin capsule, Take 1 capsule by mouth once daily. (Patient not taking: Reported on 5/7/2024), Disp: , Rfl:     nitrofurantoin, macrocrystal-monohydrate, (MACROBID) 100 MG capsule, Take 1 capsule (100 mg total) by mouth 2 (two) times daily. for 7 days, Disp: 14 capsule, Rfl: 0    olmesartan (BENICAR) 40 MG tablet, TAKE 1 TABLET(40 MG) BY MOUTH EVERY DAY (Patient not  taking: Reported on 5/7/2024), Disp: 90 tablet, Rfl: 4    potassium chloride SA (K-DUR,KLOR-CON) 20 MEQ tablet, TAKE 1 TABLET(20 MEQ) BY MOUTH TWICE DAILY (Patient not taking: Reported on 5/7/2024), Disp: 180 tablet, Rfl: 3    pravastatin (PRAVACHOL) 40 MG tablet, Take 1 tablet (40 mg total) by mouth once daily. (Patient not taking: Reported on 5/7/2024), Disp: 90 tablet, Rfl: 0    sildenafiL (VIAGRA) 100 MG tablet, Take 1 tablet (100 mg total) by mouth as needed. (Patient not taking: Reported on 5/7/2024), Disp: 6 tablet, Rfl: 6    Physical Exam  Constitutional:       General: He is not in acute distress.     Appearance: He is not ill-appearing.   HENT:      Head: Normocephalic and atraumatic.         Assessment/Plan:    Problem List Items Addressed This Visit       Dysuria - Primary    Patient with strong urine odor and dark in color starting about 3 days ago. No apparent systemic symptoms at this time, patient afebrile on last temperature check yesterday. Patient has difficulty coming in office due to history of Parkinson's. Reviewed urinalysis with culture from May 2024. Treating UTI empirically with Macrobid twice daily for 7 days. Gave ED precautions. Advised to follow up if symptoms do no improve, urine culture would be recommend at that time.         Relevant Medications    nitrofurantoin, macrocrystal-monohydrate, (MACROBID) 100 MG capsule       Patient understands instructions. All questions were answered.    Health Maintenance Due   Topic Date Due    RSV Vaccine (Age 60+ and Pregnant patients) (1 - Risk 60-74 years 1-dose series) Never done    Shingles Vaccine (1 of 2) 11/05/2013    COVID-19 Vaccine (3 - Pfizer risk series) 05/31/2021    Colorectal Cancer Screening  05/02/2023    Influenza Vaccine (1) 09/01/2024    Lipid Panel  11/06/2024       Follow up if symptoms worsen or fail to improve.    Elena Cormier PA-C  Family Medicine Physician Assistant

## 2025-02-24 NOTE — ASSESSMENT & PLAN NOTE
Patient with strong urine odor and dark in color starting about 3 days ago. No apparent systemic symptoms at this time, patient afebrile on last temperature check yesterday. Patient has difficulty coming in office due to history of Parkinson's. Reviewed urinalysis with culture from May 2024. Treating UTI empirically with Macrobid twice daily for 7 days. Gave ED precautions. Advised to follow up if symptoms do no improve, urine culture would be recommend at that time.

## 2025-02-28 ENCOUNTER — PATIENT MESSAGE (OUTPATIENT)
Dept: FAMILY MEDICINE | Facility: CLINIC | Age: 75
End: 2025-02-28
Payer: MEDICARE

## 2025-02-28 ENCOUNTER — TELEPHONE (OUTPATIENT)
Dept: FAMILY MEDICINE | Facility: CLINIC | Age: 75
End: 2025-02-28
Payer: MEDICARE

## 2025-02-28 NOTE — TELEPHONE ENCOUNTER
----- Message from [employer] sent at 2/28/2025  4:01 PM CST -----  Type:  Needs Medical AdviceWho Called: Ashanti SpouseSymptoms (please be specific):UTIHow long has patient had these symptoms:  Pharmacy name and phone #:  [id number] - [place] - [address]  [place] [address]Wxnat: [phone] Fax: [phone] the patient rather a call back or a response via [facility]? call back/Best Call Back Number: [phone] Additional Information: Spouse states rx for UTI is no longer working please adviseThanks

## 2025-02-28 NOTE — TELEPHONE ENCOUNTER
Patient wife called states Mrs. Cormier sent  Nitrofurantoin to pharmacy on 02/24/25 states it is not working. She states the last antibiotic you called in did work. Please advise

## 2025-03-01 ENCOUNTER — OFFICE VISIT (OUTPATIENT)
Dept: URGENT CARE | Facility: CLINIC | Age: 75
End: 2025-03-01
Payer: MEDICARE

## 2025-03-01 VITALS
TEMPERATURE: 98 F | HEIGHT: 72 IN | SYSTOLIC BLOOD PRESSURE: 129 MMHG | DIASTOLIC BLOOD PRESSURE: 88 MMHG | RESPIRATION RATE: 17 BRPM | OXYGEN SATURATION: 96 % | BODY MASS INDEX: 21.67 KG/M2 | WEIGHT: 160 LBS | HEART RATE: 83 BPM

## 2025-03-01 DIAGNOSIS — R35.0 FREQUENT URINATION: Primary | ICD-10-CM

## 2025-03-01 LAB
BILIRUB UR QL STRIP: NEGATIVE
GLUCOSE UR QL STRIP: NEGATIVE
KETONES UR QL STRIP: NEGATIVE
LEUKOCYTE ESTERASE UR QL STRIP: NEGATIVE
PH, POC UA: 6
POC BLOOD, URINE: POSITIVE
POC NITRATES, URINE: NEGATIVE
PROT UR QL STRIP: POSITIVE
SP GR UR STRIP: 1.02 (ref 1–1.03)
UROBILINOGEN UR STRIP-ACNC: NEGATIVE (ref 0.3–2.2)

## 2025-03-01 PROCEDURE — 81003 URINALYSIS AUTO W/O SCOPE: CPT | Mod: QW,S$GLB,, | Performed by: NURSE PRACTITIONER

## 2025-03-01 PROCEDURE — 99214 OFFICE O/P EST MOD 30 MIN: CPT | Mod: S$GLB,,, | Performed by: NURSE PRACTITIONER

## 2025-03-01 NOTE — PROGRESS NOTES
Subjective:      Patient ID: Kulwinder Hogue Jr. is a 74 y.o. male.    Vitals:  height is 6' (1.829 m) and weight is 72.6 kg (160 lb). His oral temperature is 97.5 °F (36.4 °C). His blood pressure is 129/88 and his pulse is 83. His respiration is 17 and oxygen saturation is 96%.     Chief Complaint: Urinary Tract Infection (Started Macrobid on Monday 2/24/2025, Wife feels meds are not helping due to patients behavior and not sleeping for 24 hours.)    Urinary Tract Infection   This is a new problem. Episode onset: Monday 2/24/2025. The problem has been gradually worsening. There has been no fever. Associated symptoms include behavior changes and hesitancy. He has tried antibiotics for the symptoms. The treatment provided no relief. His past medical history is significant for recurrent UTIs. BPH     ROS defer. Dementia. Wife gives the history.   Objective:     Physical Exam   HENT:   Head: Normocephalic and atraumatic.   Pulmonary/Chest: Effort normal.   Abdominal: Normal appearance.   Neurological: He is alert. Gait abnormal. Coordination normal.   Vitals reviewed.      Assessment:     1. Frequent urination        Plan:       Frequent urination  -     POCT Urinalysis, Dipstick, Manual, W/O Scope                Poct urinalysis is normal.   Called report to [facility] at 11:07 a.m..  Patient needs septic workup.  Point of service urinalysis here unremarkable.  Given his behavior change in the last 24-48 hours needs full workup.  Moderate probability of admit.

## 2025-03-10 ENCOUNTER — LAB VISIT (OUTPATIENT)
Dept: LAB | Facility: HOSPITAL | Age: 75
End: 2025-03-10
Payer: MEDICARE

## 2025-03-10 ENCOUNTER — OFFICE VISIT (OUTPATIENT)
Dept: FAMILY MEDICINE | Facility: CLINIC | Age: 75
End: 2025-03-10
Payer: MEDICARE

## 2025-03-10 VITALS
TEMPERATURE: 98 F | HEART RATE: 60 BPM | WEIGHT: 141.13 LBS | SYSTOLIC BLOOD PRESSURE: 110 MMHG | HEIGHT: 72 IN | BODY MASS INDEX: 19.12 KG/M2 | DIASTOLIC BLOOD PRESSURE: 76 MMHG

## 2025-03-10 DIAGNOSIS — I10 ESSENTIAL HYPERTENSION: ICD-10-CM

## 2025-03-10 DIAGNOSIS — N13.8 BPH WITH OBSTRUCTION/LOWER URINARY TRACT SYMPTOMS: ICD-10-CM

## 2025-03-10 DIAGNOSIS — R97.20 ABNORMAL PSA: ICD-10-CM

## 2025-03-10 DIAGNOSIS — E78.2 MIXED HYPERLIPIDEMIA: ICD-10-CM

## 2025-03-10 DIAGNOSIS — G20.A1 PARKINSON'S DISEASE, UNSPECIFIED WHETHER DYSKINESIA PRESENT, UNSPECIFIED WHETHER MANIFESTATIONS FLUCTUATE: Primary | ICD-10-CM

## 2025-03-10 DIAGNOSIS — N40.1 BPH WITH OBSTRUCTION/LOWER URINARY TRACT SYMPTOMS: ICD-10-CM

## 2025-03-10 LAB
ALBUMIN SERPL BCP-MCNC: 3.6 G/DL (ref 3.5–5.2)
ALP SERPL-CCNC: 93 U/L (ref 40–150)
ALT SERPL W/O P-5'-P-CCNC: 8 U/L (ref 10–44)
ANION GAP SERPL CALC-SCNC: 11 MMOL/L (ref 8–16)
AST SERPL-CCNC: 58 U/L (ref 10–40)
BASOPHILS # BLD AUTO: 0.04 K/UL (ref 0–0.2)
BASOPHILS NFR BLD: 0.4 % (ref 0–1.9)
BILIRUB SERPL-MCNC: 1.3 MG/DL (ref 0.1–1)
BUN SERPL-MCNC: 16 MG/DL (ref 8–23)
CALCIUM SERPL-MCNC: 9.4 MG/DL (ref 8.7–10.5)
CHLORIDE SERPL-SCNC: 96 MMOL/L (ref 95–110)
CHOLEST SERPL-MCNC: 234 MG/DL (ref 120–199)
CHOLEST/HDLC SERPL: 3.3 {RATIO} (ref 2–5)
CO2 SERPL-SCNC: 31 MMOL/L (ref 23–29)
CREAT SERPL-MCNC: 0.9 MG/DL (ref 0.5–1.4)
DIFFERENTIAL METHOD BLD: ABNORMAL
EOSINOPHIL # BLD AUTO: 0 K/UL (ref 0–0.5)
EOSINOPHIL NFR BLD: 0.3 % (ref 0–8)
ERYTHROCYTE [DISTWIDTH] IN BLOOD BY AUTOMATED COUNT: 13.4 % (ref 11.5–14.5)
EST. GFR  (NO RACE VARIABLE): >60 ML/MIN/1.73 M^2
GLUCOSE SERPL-MCNC: 85 MG/DL (ref 70–110)
HCT VFR BLD AUTO: 44.6 % (ref 40–54)
HDLC SERPL-MCNC: 71 MG/DL (ref 40–75)
HDLC SERPL: 30.3 % (ref 20–50)
HGB BLD-MCNC: 15.1 G/DL (ref 14–18)
IMM GRANULOCYTES # BLD AUTO: 0.04 K/UL (ref 0–0.04)
IMM GRANULOCYTES NFR BLD AUTO: 0.4 % (ref 0–0.5)
LDLC SERPL CALC-MCNC: 141 MG/DL (ref 63–159)
LYMPHOCYTES # BLD AUTO: 1.8 K/UL (ref 1–4.8)
LYMPHOCYTES NFR BLD: 18.4 % (ref 18–48)
MCH RBC QN AUTO: 32.3 PG (ref 27–31)
MCHC RBC AUTO-ENTMCNC: 33.9 G/DL (ref 32–36)
MCV RBC AUTO: 96 FL (ref 82–98)
MONOCYTES # BLD AUTO: 0.8 K/UL (ref 0.3–1)
MONOCYTES NFR BLD: 7.8 % (ref 4–15)
NEUTROPHILS # BLD AUTO: 7 K/UL (ref 1.8–7.7)
NEUTROPHILS NFR BLD: 72.7 % (ref 38–73)
NONHDLC SERPL-MCNC: 163 MG/DL
NRBC BLD-RTO: 0 /100 WBC
PLATELET # BLD AUTO: 383 K/UL (ref 150–450)
PMV BLD AUTO: 9.2 FL (ref 9.2–12.9)
POTASSIUM SERPL-SCNC: 2.7 MMOL/L (ref 3.5–5.1)
PROT SERPL-MCNC: 7.6 G/DL (ref 6–8.4)
RBC # BLD AUTO: 4.67 M/UL (ref 4.6–6.2)
SODIUM SERPL-SCNC: 138 MMOL/L (ref 136–145)
TRIGL SERPL-MCNC: 110 MG/DL (ref 30–150)
WBC # BLD AUTO: 9.67 K/UL (ref 3.9–12.7)

## 2025-03-10 PROCEDURE — 99999 PR PBB SHADOW E&M-EST. PATIENT-LVL IV: CPT | Mod: PBBFAC,,,

## 2025-03-10 PROCEDURE — 99214 OFFICE O/P EST MOD 30 MIN: CPT | Mod: S$GLB,,,

## 2025-03-10 PROCEDURE — 1159F MED LIST DOCD IN RCRD: CPT | Mod: CPTII,S$GLB,,

## 2025-03-10 PROCEDURE — 1126F AMNT PAIN NOTED NONE PRSNT: CPT | Mod: CPTII,S$GLB,,

## 2025-03-10 PROCEDURE — 3074F SYST BP LT 130 MM HG: CPT | Mod: CPTII,S$GLB,,

## 2025-03-10 PROCEDURE — 1101F PT FALLS ASSESS-DOCD LE1/YR: CPT | Mod: CPTII,S$GLB,,

## 2025-03-10 PROCEDURE — 84154 ASSAY OF PSA FREE: CPT

## 2025-03-10 PROCEDURE — 36415 COLL VENOUS BLD VENIPUNCTURE: CPT | Mod: PO

## 2025-03-10 PROCEDURE — 85025 COMPLETE CBC W/AUTO DIFF WBC: CPT

## 2025-03-10 PROCEDURE — 3078F DIAST BP <80 MM HG: CPT | Mod: CPTII,S$GLB,,

## 2025-03-10 PROCEDURE — 80053 COMPREHEN METABOLIC PANEL: CPT

## 2025-03-10 PROCEDURE — 3008F BODY MASS INDEX DOCD: CPT | Mod: CPTII,S$GLB,,

## 2025-03-10 PROCEDURE — 3288F FALL RISK ASSESSMENT DOCD: CPT | Mod: CPTII,S$GLB,,

## 2025-03-10 PROCEDURE — 1160F RVW MEDS BY RX/DR IN RCRD: CPT | Mod: CPTII,S$GLB,,

## 2025-03-10 PROCEDURE — 80061 LIPID PANEL: CPT

## 2025-03-10 RX ORDER — MIRTAZAPINE 7.5 MG/1
7.5 TABLET, FILM COATED ORAL NIGHTLY
Qty: 90 TABLET | Refills: 0 | Status: SHIPPED | OUTPATIENT
Start: 2025-03-10

## 2025-03-10 NOTE — PROGRESS NOTES
[facility]     Subjective:       Patient ID:  [id number]     Chief Complaint: Follow-up (/)    Kulwinder Hogue Jr. is a 74 y.o. male with a past medical history significant for HTN, HLD, parkinson's disease, and BPH who presents to the clinic for follow up.     The patient is accompanied by his wife today, who provides the majority of his history. She reports that he has been doing well overall. He was previously followed by [facility] for a pressure ulcer, which has since improved, and has now been discharged from their care. However, she requests a referral to a home health agency that specializes in Parkinsons care. She notes that he experiences difficulty sleeping almost every night and inquires about the possibility of a sleep medication. Additionally, she mentions his recurrent urinary tract infections, which have been evaluated by urgent care and a provider in our clinic. She is interested in discussing UTI prophylaxis. She also reports some urinary retention and a weak stream. The patient has a history of BPH and elevated PSA levels but has not followed up with urology since last year. He has no other complaints at this time.    She brings a blood pressure log from [facility], which shows that his blood pressure has been within normal limits. She asks about restarting his previous blood pressure medication, though his blood pressure remains within normal limits today. We also discussed his care gaps. He is due for colorectal cancer screening; however, based on his current health status, I believe the risks outweigh the benefits at this time.    Past Medical History:   Diagnosis Date    Elevated PSA     Hyperlipemia     Hypertension     Wears glasses       Review of patient's allergies indicates:   Allergen Reactions    Percocet [oxycodone-acetaminophen] Itching       Lab Results   Component Value Date    WBC 6.80 06/02/2022    HGB 15.5 06/02/2022    HCT 45.4 06/02/2022     06/02/2022     CHOL 216 (H) 11/06/2023    TRIG 68 11/06/2023    HDL 57 11/06/2023    ALT 9 (L) 11/06/2023    AST 18 11/06/2023     11/06/2023    K 3.2 (L) 11/06/2023     11/06/2023    CREATININE 0.9 11/06/2023    BUN 18 11/06/2023    CO2 32 (H) 11/06/2023    TSH 2.546 10/13/2022    PSA 4.6 (H) 12/15/2021    INR 1.0 02/08/2019       Review of Systems   Constitutional:         Unable to obtain         Objective:      Physical Exam  Constitutional:       General: He is not in acute distress.     Appearance: Normal appearance. He is not toxic-appearing.   HENT:      Head: Normocephalic and atraumatic.      Nose: Nose normal.   Cardiovascular:      Rate and Rhythm: Normal rate and regular rhythm.      Pulses: Normal pulses.      Heart sounds: No murmur heard.     No friction rub.      Comments: DP and PT difficult to assess, bilateral feet cold, with 2-3 capillary refill (do not have doppler) - Will continue to monitor, no significant wounds or skin color changes appreciated   Pulmonary:      Effort: Pulmonary effort is normal. No respiratory distress.      Breath sounds: Normal breath sounds. No wheezing.   Musculoskeletal:      Cervical back: Normal range of motion.      Right lower leg: No edema.      Left lower leg: No edema.   Skin:     General: Skin is warm and dry.      Capillary Refill: Capillary refill takes 2 to 3 seconds.      Comments: Left lateral ankle with pressure wound that is healing properly.    Neurological:      General: No focal deficit present.      Mental Status: He is alert.      Gait: Gait abnormal.   Psychiatric:         Mood and Affect: Affect is flat.         Speech: He is noncommunicative.           Assessment:       1. Parkinson's disease, unspecified whether dyskinesia present, unspecified whether manifestations fluctuate    2. Mixed hyperlipidemia    3. Essential hypertension    4. BPH with obstruction/lower urinary tract symptoms    5. Abnormal PSA          Plan:       Kulwinder was seen  today for follow-up.    Diagnoses and all orders for this visit:    Parkinson's disease, unspecified whether dyskinesia present, unspecified whether manifestations fluctuate  Comments:  Followed by external provider. Continue medication as prescribed.  Wife reports difficulty sleeping, will trial Remeron.   Orders:  -     Ambulatory referral/consult to Home Health; Future  -     mirtazapine (REMERON) 7.5 MG Tab; Take 1 tablet (7.5 mg total) by mouth every evening.    Mixed hyperlipidemia  Comments:  Currently not taking pravastatin. Will obtain labs today, recommendations pending results.  Orders:  -     Lipid Panel; Future    Essential hypertension  Comments:  Stable and currently not on medication. Will continue to monitor.  Orders:  -     Comprehensive Metabolic Panel; Future  -     CBC Auto Differential; Future    BPH with obstruction/lower urinary tract symptoms  Comments:  Seems like patient has been having LUTS, discussed following up with urology.  Orders:  -     PSA, TOTAL AND FREE; Future    Abnormal PSA  Comments:  Patient to follow up with urologist.  Orders:  -     PSA, TOTAL AND FREE; Future             Follow up in about 4 weeks (around 4/7/2025).    Veena Moffett PA-C  Family Medicine Physician Assistant     Future Appointments       Date Provider Specialty Appt Notes    3/10/2025  Lab nf    4/7/2025 Veena Moffett PA-C Family Medicine  Arrive at: Telehealth 4 weeks          Tests to Keep You Healthy    Colon Cancer Screening: DUE  Last Blood Pressure <= 139/89 (3/10/2025): Yes       I spent a total of 30 minutes on the day of the visit.This includes face to face time and non-face to face time preparing to see the patient (eg, review of tests), obtaining and/or reviewing separately obtained history, documenting clinical information in the electronic or other health record, independently interpreting results and communicating results to the patient/family/caregiver, or care  coordinator.

## 2025-03-11 ENCOUNTER — RESULTS FOLLOW-UP (OUTPATIENT)
Dept: FAMILY MEDICINE | Facility: CLINIC | Age: 75
End: 2025-03-11

## 2025-03-11 DIAGNOSIS — G20.A1 PARKINSON'S DISEASE, UNSPECIFIED WHETHER DYSKINESIA PRESENT, UNSPECIFIED WHETHER MANIFESTATIONS FLUCTUATE: Primary | ICD-10-CM

## 2025-03-11 DIAGNOSIS — E87.6 HYPOKALEMIA: Primary | ICD-10-CM

## 2025-03-11 LAB
PROSTATE SPECIFIC ANTIGEN, TOTAL: 6 NG/ML (ref 0–4)
PSA FREE MFR SERPL: 27 %
PSA FREE SERPL-MCNC: 1.62 NG/ML (ref 0–1.5)

## 2025-03-11 RX ORDER — POTASSIUM CHLORIDE 750 MG/1
10 TABLET, EXTENDED RELEASE ORAL 2 TIMES DAILY
Qty: 60 TABLET | Refills: 0 | Status: SHIPPED | OUTPATIENT
Start: 2025-03-11

## 2025-03-11 RX ORDER — POTASSIUM CHLORIDE 750 MG/1
10 TABLET, EXTENDED RELEASE ORAL 2 TIMES DAILY
COMMUNITY
End: 2025-03-11 | Stop reason: SDUPTHER

## 2025-03-11 RX ORDER — POTASSIUM CHLORIDE 750 MG/1
TABLET, EXTENDED RELEASE ORAL
Qty: 60 TABLET | Status: SHIPPED | OUTPATIENT
Start: 2025-03-11 | End: 2025-03-11 | Stop reason: SDUPTHER

## 2025-03-11 RX ORDER — POTASSIUM CHLORIDE 750 MG/1
TABLET, EXTENDED RELEASE ORAL
Qty: 60 TABLET | Status: SHIPPED | OUTPATIENT
Start: 2025-03-11

## 2025-03-11 NOTE — TELEPHONE ENCOUNTER
Attempted to contact patient regarding low-potassium.  No answer.  Left message to return my call.

## 2025-03-11 NOTE — TELEPHONE ENCOUNTER
Pt wife advised potassium low 2.5. pt not on any potassium medicine. Sent potassium 10 meq #60 take 2 tablets qd. Repeat Bmp in 7 days

## 2025-03-12 ENCOUNTER — TELEPHONE (OUTPATIENT)
Dept: FAMILY MEDICINE | Facility: CLINIC | Age: 75
End: 2025-03-12
Payer: MEDICARE

## 2025-03-12 NOTE — TELEPHONE ENCOUNTER
----- Message from Veena Moffett PA-C sent at 3/11/2025  4:12 PM CDT -----  Hi Mr. Hogue/,     I have reviewed your lab results, and here is a summary: your blood count is stable, but your cholesterol is slightly elevated, with a calculated risk score for heart attack and stroke over 7.5%.   Given your comorbidities and elevated liver enzymes, I recommend starting with a low-fat diet for now, and we will continue to monitor your progress.     Your PSA is also elevated compared to your baseline, which may be contributing to your urinary symptoms, so I recommend follow-up with urology as we discussed in the clinic.     Additionally, we received a critical lab result for your potassium last night, and I see that our on-call doctor has prescribed potassium supplementation, with plans to repeat the lab in one week to   assess improvement. Please let me know if you have any issues scheduling this follow-up.     One of your liver enzymes are slightly elevated we will monitor this closely. If it persists, we may need to do further investigation. I also recommend following up with Dr. Plata in three months   to ensure we are on track with your care. My staff can schedule this for you. I will place updated orders for you to complete prior to your appointment with Dr. Plata. Please feel free to reach out   if you have any further questions or concerns.    Veena Moffett PA-C  ----- Message -----  From: Esteban, [employer] Lab Interface  Sent: 3/10/2025   9:04 PM CDT  To: Veena Moffett PA-C

## 2025-03-13 ENCOUNTER — TELEPHONE (OUTPATIENT)
Dept: FAMILY MEDICINE | Facility: CLINIC | Age: 75
End: 2025-03-13
Payer: MEDICARE

## 2025-03-13 NOTE — TELEPHONE ENCOUNTER
----- Message from Veena Moffett PA-C sent at 3/11/2025  4:12 PM CDT -----  Hi Mr. Hogue/,     I have reviewed your lab results, and here is a summary: your blood count is stable, but your cholesterol is slightly elevated, with a calculated risk score for heart attack and stroke over 7.5%.   Given your comorbidities and elevated liver enzymes, I recommend starting with a low-fat diet for now, and we will continue to monitor your progress.     Your PSA is also elevated compared to your baseline, which may be contributing to your urinary symptoms, so I recommend follow-up with urology as we discussed in the clinic.     Additionally, we received a critical lab result for your potassium last night, and I see that our on-call doctor has prescribed potassium supplementation, with plans to repeat the lab in one week to   assess improvement. Please let me know if you have any issues scheduling this follow-up.     One of your liver enzymes are slightly elevated we will monitor this closely. If it persists, we may need to do further investigation. I also recommend following up with Dr. Plata in three months   to ensure we are on track with your care. My staff can schedule this for you. I will place updated orders for you to complete prior to your appointment with Dr. Plata. Please feel free to reach out   if you have any further questions or concerns.    Veena Moffett PA-C  ----- Message -----  From: Esteban, [employer] Lab Interface  Sent: 3/10/2025   9:04 PM CDT  To: Veena Moffett PA-C

## 2025-03-14 ENCOUNTER — TELEPHONE (OUTPATIENT)
Dept: FAMILY MEDICINE | Facility: CLINIC | Age: 75
End: 2025-03-14
Payer: MEDICARE

## 2025-03-14 NOTE — TELEPHONE ENCOUNTER
Spoken with patient's wife. Patient's wife has already reviewed lab results. Lab appt was scheduled and urology appt was scheduled.

## 2025-03-14 NOTE — TELEPHONE ENCOUNTER
----- Message from Veena Moffett PA-C sent at 3/11/2025  4:12 PM CDT -----  Hi Mr. Hogue/,     I have reviewed your lab results, and here is a summary: your blood count is stable, but your cholesterol is slightly elevated, with a calculated risk score for heart attack and stroke over 7.5%.   Given your comorbidities and elevated liver enzymes, I recommend starting with a low-fat diet for now, and we will continue to monitor your progress.     Your PSA is also elevated compared to your baseline, which may be contributing to your urinary symptoms, so I recommend follow-up with urology as we discussed in the clinic.     Additionally, we received a critical lab result for your potassium last night, and I see that our on-call doctor has prescribed potassium supplementation, with plans to repeat the lab in one week to   assess improvement. Please let me know if you have any issues scheduling this follow-up.     One of your liver enzymes are slightly elevated we will monitor this closely. If it persists, we may need to do further investigation. I also recommend following up with Dr. Plata in three months   to ensure we are on track with your care. My staff can schedule this for you. I will place updated orders for you to complete prior to your appointment with Dr. Plata. Please feel free to reach out   if you have any further questions or concerns.    Veena Moffett PA-C  ----- Message -----  From: Esteban, [facility] Interface  Sent: 3/10/2025   9:04 PM CDT  To: Veena Moffett PA-C

## 2025-03-28 ENCOUNTER — OFFICE VISIT (OUTPATIENT)
Dept: UROLOGY | Facility: CLINIC | Age: 75
End: 2025-03-28
Payer: MEDICARE

## 2025-03-28 DIAGNOSIS — N40.0 BPH WITHOUT OBSTRUCTION/LOWER URINARY TRACT SYMPTOMS: ICD-10-CM

## 2025-03-28 DIAGNOSIS — G20.A1 PARKINSON'S DISEASE, UNSPECIFIED WHETHER DYSKINESIA PRESENT, UNSPECIFIED WHETHER MANIFESTATIONS FLUCTUATE: ICD-10-CM

## 2025-03-28 DIAGNOSIS — R97.20 BPH WITH ELEVATED PSA: Primary | ICD-10-CM

## 2025-03-28 DIAGNOSIS — N30.01 ACUTE CYSTITIS WITH HEMATURIA: ICD-10-CM

## 2025-03-28 DIAGNOSIS — N40.0 BPH WITH ELEVATED PSA: Primary | ICD-10-CM

## 2025-03-28 DIAGNOSIS — N43.3 RIGHT HYDROCELE: ICD-10-CM

## 2025-03-28 LAB
BILIRUBIN, UA POC OHS: NEGATIVE
BLOOD, UA POC OHS: ABNORMAL
CLARITY, UA POC OHS: ABNORMAL
COLOR, UA POC OHS: YELLOW
GLUCOSE, UA POC OHS: NEGATIVE
KETONES, UA POC OHS: NEGATIVE
LEUKOCYTES, UA POC OHS: ABNORMAL
NITRITE, UA POC OHS: NEGATIVE
PH, UA POC OHS: 6.5
PROTEIN, UA POC OHS: 30
SPECIFIC GRAVITY, UA POC OHS: 1.02
UROBILINOGEN, UA POC OHS: 0.2

## 2025-03-28 PROCEDURE — 87086 URINE CULTURE/COLONY COUNT: CPT | Performed by: NURSE PRACTITIONER

## 2025-03-28 PROCEDURE — 99999 PR PBB SHADOW E&M-EST. PATIENT-LVL III: CPT | Mod: PBBFAC,,, | Performed by: NURSE PRACTITIONER

## 2025-03-28 RX ORDER — CIPROFLOXACIN 500 MG/1
500 TABLET ORAL 2 TIMES DAILY
Qty: 60 TABLET | Refills: 0 | Status: SHIPPED | OUTPATIENT
Start: 2025-03-28 | End: 2025-04-27

## 2025-03-28 NOTE — PROGRESS NOTES
[facility] Note  Staff: KAIT Villareal-C    PCP: ROSA Plata    Chief Complaint: F/UP-BPH, Hx Right Hydrocele    Subjective:        HPI: Kulwinder Hogue Jr. is a 74 y.o. male presents today for annual f/up.  Pt was last evaluated by Dr. Jain on 6/26/23.    Pt with history of BPH and R hydrocele who presents for annual follow up  Hx pathologic BPH since prostate biopsy and cystoscopy 10/25/16 with Dr Mancia: 65.7cc gland and evidence of GUILLORY from lateral lobes with slight median lobe and grade 3 trabecs in bladder.  3T MRI at [facility] 5/25/17: Prostate size 63 mL.  Intravesical protrusion of the prostate 10 mm. PIRAD 2 only, BPH, prostatitis. psa on 10/12/18 of 4.3 (21.16% free). Confirm mdx of biopsy negative. FU Cysto/TRUS 12/4/18: 91.5cm3 with sig trilobar obstruction and only slit-like opening at bladder neck from middle lobe obst.   Also noted curvature with erections for about 1 year up 30°, and can get erections.     TURP 2/13/19 uncomplicated. 12.4g BPH. Clot retention on 2/15/19 requiring extensive efforts at manual irrigation and overnight observation with CBI. PVR 0cc 1 month postop   2 mos postop had er visit with concern for retention but voided and day later passed a stone (? Prostatic calc 80% CaPhos, 10% CaOxDi) and 1 week later returned to ER for dribbling and feeling of obstruction in his urethra. White urethral calculus/sediment over the distal penile urethra removed with sterile forceps and patient voided approximately 150 cc of clear yellow urine and felt significantly improved. And on f/u noted split streaming, though voiding better. No gross external meatal stenosis. Blue meatal dilator appreciated some fossa navicularis narrowing/meatal stenosis, passively dilated and unobstructed, feeling it breakthrough some scar tissue. Given taper protocol. No stones, only prostate calcs, on CT  6/25/19 : AUA SS: 4/3 (2: nocturia, 1: frequency, urgency). Good stream no  split/spray, good  Water intake, still NTF 1-2x good volumes, never pursued sleep study, active  12/30/19: AUA SS 3/2,  but not bothered by LUTS. Advised try dilator use again and fu routine     5/2020 concern of scrotal swelling 1-2 months, noticeable as wears tight briefs. US scrotum with large 6.2cm R hydrocele and a few intratesticular subcm cysts  Discussed sleep study with dr kramer, decided not needed no daytime sleepiness etc but started melatonin for better sleep. Still good strong steam w/o intermitt or spraying  No narrowing of meatus. No urgency. No dribbling. No hematuria dysuria.  Uroflow 7/15/20: good bell shaped unobstructed voiding curve, though average is affected by some trailing off towards end of stream. He did void 537cc with initial pvr of 259cc which likely represents over-filling, as was then able to void a 2nd time with PVR of 87cc which is resonable. Qm 35cc/s, Qa 11.7cc/s.     Last seen 12/2020. psa 2.6 on 12/4/20, AUA SS 6/3 (2: nocturia; 1: frequency, urgency, weak stream, straining). Good stream. PVR 42cc  45 degree dorsal curvature on his own, now some trouble getting and/or keeping erection. 5mg melatonin. May get up 1-2x.   no visble meatal stenosis, mild R hydrocele, dorsal plaque. --> Started viagra, planned PD eval with dr gaytan 3/21 (canceled)     Last seen 4/12/22  PSA 4.6 on 12/15/21, as ordered by primary care. Normal T 403 in 6/21. R hydrocele stable, no increase in size, hasnt bothered him  Still has dorsal curve but not bothering him nor affecting erections or intercourse at this time. AUA SS: 3/2, mostly sat (2 intermittency, 1 straining). PVR 0cc  No split stream, emptying well. Udip small blood.      In interim 5/3/22 mri prostate 38cc pirad2  PSA trend    Latest Reference Range & Units 04/12/22 09:50 06/02/22 11:47 12/07/22 09:00 06/16/23 08:06   PSA Total 0.00 - 4.00  4.8 (H) 5.2 (H) 4.5 (H) 4.9 (H)   PSA, Free 0.00 - 1.50  0.96 0.90 1.07 0.93   PSA, Free % Not  est % 20.00 17.31 23.78 18.98   He returns today noting  AUA SS: 2/1 pleased (1: frequency, sleeping)  Recent workup and management by pcp for MDD vs neuro process  No gross hematuria     NP-O'Jess  3/28/25:  Pt in office today with wife for annual f/up.  UA today showed Large Leuks, Small Blood, 30 protein, 6.5, 1.020.  PVR is 85 mL  Recent elevated PSA level compared to last OV 2 years ago but may be due to abnormal urine results that are reviewed during ov today.  No gross hematuria.      REVIEW OF SYSTEMS:  A comprehensive 10 system review was performed and is negative except as noted above in HPI    PMHx:  Past Medical History:   Diagnosis Date    Elevated PSA     Hyperlipemia     Hypertension     Wears glasses      PSHx:  Past Surgical History:   Procedure Laterality Date    COLONOSCOPY N/A 5/2/2018    Procedure: COLONOSCOPY;  Surgeon: Joe Millan MD;  Location: [facility];  Service: Endoscopy;  Laterality: N/A;    COLONOSCOPY W/ POLYPECTOMY  3/2013    Tubular adenoma    CYSTOSCOPY  10/25/2016    Dr. Mancia    CYSTOSCOPY N/A 12/4/2018    Procedure: CYSTOSCOPY;  Surgeon: Jamal Jain MD;  Location: [facility] OR;  Service: Urology;  Laterality: N/A;    LIPOMA RESECTION  8-    lipoma posterior neck    PROSTATE BIOPSY  10/25/2016    Dr. Mancia    TRANSRECTAL ULTRASOUND EXAMINATION N/A 12/4/2018    Procedure: ULTRASOUND, RECTAL APPROACH;  Surgeon: Jamal Jain MD;  Location: [facility] OR;  Service: Urology;  Laterality: N/A;    TRANSURETHRAL RESECTION OF PROSTATE N/A 2/14/2019    Procedure: TURP (TRANSURETHRAL RESECTION OF PROSTATE);  Surgeon: Jamal Jain MD;  Location: [facility] OR;  Service: Urology;  Laterality: N/A;    VASECTOMY         Allergies:  Percocet [oxycodone-acetaminophen]    Medications: reviewed     Objective:   There were no vitals filed for this visit.    General:WDWN in NAD  Eyes: PERRLA, normal conjunctiva  Respiratory: no increased work on breathing, clear to  auscultation  Cardiovascular: regular rate and rhythm. No obvious extremity edema.  GI: palpation of masses. No tenderness. No hepatosplenomegaly to palpation.  Musculoskeletal: normal range of motion of bilateral upper extremities. Normal muscle strength and tone.  Skin: no obvious rashes or lesions. No tightening of skin noted.  Neurologic: CN grossly normal. Normal sensation.   Psychiatric: awake, alert and oriented x 3. Mood and affect normal. Cooperative.    Assessment:       1. BPH with elevated PSA    2. BPH without obstruction/lower urinary tract symptoms    3. Right hydrocele    4. Parkinson's disease, unspecified whether dyskinesia present, unspecified whether manifestations fluctuate    5. Acute cystitis with hematuria          Plan:     Urine to be sent for Urine Culture at this time.  Cipro 500 mg BID prescribed for UTI at this time.  Med prescribed to pt today as trial to see if med improves pt's current LUTS.  Benefits, risks and side affects were thoroughly explained to pt today in office with all questions answered.    PSA, Total and Free to be repeated in 1.5-2 months for recheck after treated with antibiotics.    F/u YUE Mike  Visit today is associated with current or anticipated ongoing medical care related to this patient's single serious condition/complex condition.

## 2025-04-01 ENCOUNTER — TELEPHONE (OUTPATIENT)
Dept: UROLOGY | Facility: CLINIC | Age: 75
End: 2025-04-01
Payer: MEDICARE

## 2025-04-01 NOTE — TELEPHONE ENCOUNTER
----- Message from Veena sent at 4/1/2025  1:38 PM CDT -----  Type:  Patient Returning CallWho Called:pt wife Who Left Message for Patient:unsure Does the patient know what this is regarding?:test results Would the patient rather a call back or a response via [facility]? Please call Best Call Back Number:[phone] Additional Information: pt wife was calling in regards to  test results    Please call back to advise. Thanks!

## 2025-04-04 ENCOUNTER — TELEPHONE (OUTPATIENT)
Dept: FAMILY MEDICINE | Facility: CLINIC | Age: 75
End: 2025-04-04
Payer: MEDICARE

## 2025-04-04 NOTE — TELEPHONE ENCOUNTER
Spoken with patient's caregiver Ashanti. Caregiver stated that he saw Urology and was prescribed Cipro which patient needs to take it for 3 weeks. The urologist wasn't exactly sure if the infection was coming from patient's bladder or prostate. Patient's caregiver stated that patient had been doing great for the first 8 days while taking cipro. Now the caregiver stated that patient has been having bad insomnia since Wednesday where its making him delirious. Patient's caregiver stated that patient has be taking mirtazapine but feel like patient needs something alittle bit stronger. Please advise.

## 2025-04-04 NOTE — TELEPHONE ENCOUNTER
Hi,     If patient is having worsening mental status symptoms, I recommend an in-person assessment to ensure infection has resolved. We can also increase mirtazapine if needed for insomnia. We can try doubling the medication at night to see if this helps.     Veena Moffett PA-C

## 2025-04-07 ENCOUNTER — LAB VISIT (OUTPATIENT)
Dept: LAB | Facility: HOSPITAL | Age: 75
End: 2025-04-07
Attending: FAMILY MEDICINE
Payer: MEDICARE

## 2025-04-07 ENCOUNTER — OFFICE VISIT (OUTPATIENT)
Dept: FAMILY MEDICINE | Facility: CLINIC | Age: 75
End: 2025-04-07
Payer: MEDICARE

## 2025-04-07 VITALS
TEMPERATURE: 98 F | HEIGHT: 72 IN | DIASTOLIC BLOOD PRESSURE: 80 MMHG | WEIGHT: 141.75 LBS | BODY MASS INDEX: 19.2 KG/M2 | SYSTOLIC BLOOD PRESSURE: 110 MMHG

## 2025-04-07 DIAGNOSIS — E87.6 HYPOKALEMIA: ICD-10-CM

## 2025-04-07 DIAGNOSIS — G20.A1 PARKINSON'S DISEASE, UNSPECIFIED WHETHER DYSKINESIA PRESENT, UNSPECIFIED WHETHER MANIFESTATIONS FLUCTUATE: Primary | ICD-10-CM

## 2025-04-07 DIAGNOSIS — E78.2 MIXED HYPERLIPIDEMIA: Chronic | ICD-10-CM

## 2025-04-07 DIAGNOSIS — R97.20 ABNORMAL PSA: ICD-10-CM

## 2025-04-07 DIAGNOSIS — N40.1 BPH WITH OBSTRUCTION/LOWER URINARY TRACT SYMPTOMS: ICD-10-CM

## 2025-04-07 DIAGNOSIS — I10 ESSENTIAL HYPERTENSION: ICD-10-CM

## 2025-04-07 DIAGNOSIS — N13.8 BPH WITH OBSTRUCTION/LOWER URINARY TRACT SYMPTOMS: ICD-10-CM

## 2025-04-07 DIAGNOSIS — G20.A1 PARKINSON'S DISEASE, UNSPECIFIED WHETHER DYSKINESIA PRESENT, UNSPECIFIED WHETHER MANIFESTATIONS FLUCTUATE: ICD-10-CM

## 2025-04-07 LAB
ABSOLUTE EOSINOPHIL (OHS): 0.03 K/UL
ABSOLUTE MONOCYTE (OHS): 0.54 K/UL (ref 0.3–1)
ABSOLUTE NEUTROPHIL COUNT (OHS): 6.18 K/UL (ref 1.8–7.7)
ALBUMIN SERPL BCP-MCNC: 3.1 G/DL (ref 3.5–5.2)
ALP SERPL-CCNC: 99 UNIT/L (ref 40–150)
ALT SERPL W/O P-5'-P-CCNC: <5 UNIT/L (ref 10–44)
ANION GAP (OHS): 13 MMOL/L (ref 8–16)
AST SERPL-CCNC: 22 UNIT/L (ref 11–45)
BASOPHILS # BLD AUTO: 0.04 K/UL
BASOPHILS NFR BLD AUTO: 0.5 %
BILIRUB SERPL-MCNC: 1.6 MG/DL (ref 0.1–1)
BUN SERPL-MCNC: 15 MG/DL (ref 8–23)
CALCIUM SERPL-MCNC: 8.7 MG/DL (ref 8.7–10.5)
CHLORIDE SERPL-SCNC: 101 MMOL/L (ref 95–110)
CO2 SERPL-SCNC: 27 MMOL/L (ref 23–29)
CREAT SERPL-MCNC: 0.8 MG/DL (ref 0.5–1.4)
ERYTHROCYTE [DISTWIDTH] IN BLOOD BY AUTOMATED COUNT: 12.8 % (ref 11.5–14.5)
GFR SERPLBLD CREATININE-BSD FMLA CKD-EPI: >60 ML/MIN/1.73/M2
GLUCOSE SERPL-MCNC: 118 MG/DL (ref 70–110)
HCT VFR BLD AUTO: 45 % (ref 40–54)
HGB BLD-MCNC: 14.9 GM/DL (ref 14–18)
IMM GRANULOCYTES # BLD AUTO: 0.02 K/UL (ref 0–0.04)
IMM GRANULOCYTES NFR BLD AUTO: 0.2 % (ref 0–0.5)
LYMPHOCYTES # BLD AUTO: 2.02 K/UL (ref 1–4.8)
MCH RBC QN AUTO: 32.2 PG (ref 27–31)
MCHC RBC AUTO-ENTMCNC: 33.1 G/DL (ref 32–36)
MCV RBC AUTO: 97 FL (ref 82–98)
NUCLEATED RBC (/100WBC) (OHS): 0 /100 WBC
PLATELET # BLD AUTO: 426 K/UL (ref 150–450)
PMV BLD AUTO: 9.1 FL (ref 9.2–12.9)
POTASSIUM SERPL-SCNC: 3 MMOL/L (ref 3.5–5.1)
PROT SERPL-MCNC: 7.5 GM/DL (ref 6–8.4)
RBC # BLD AUTO: 4.63 M/UL (ref 4.6–6.2)
RELATIVE EOSINOPHIL (OHS): 0.3 %
RELATIVE LYMPHOCYTE (OHS): 22.9 % (ref 18–48)
RELATIVE MONOCYTE (OHS): 6.1 % (ref 4–15)
RELATIVE NEUTROPHIL (OHS): 70 % (ref 38–73)
SODIUM SERPL-SCNC: 141 MMOL/L (ref 136–145)
WBC # BLD AUTO: 8.83 K/UL (ref 3.9–12.7)

## 2025-04-07 PROCEDURE — 1126F AMNT PAIN NOTED NONE PRSNT: CPT | Mod: CPTII,S$GLB,,

## 2025-04-07 PROCEDURE — 82040 ASSAY OF SERUM ALBUMIN: CPT

## 2025-04-07 PROCEDURE — 1101F PT FALLS ASSESS-DOCD LE1/YR: CPT | Mod: CPTII,S$GLB,,

## 2025-04-07 PROCEDURE — 3288F FALL RISK ASSESSMENT DOCD: CPT | Mod: CPTII,S$GLB,,

## 2025-04-07 PROCEDURE — 3008F BODY MASS INDEX DOCD: CPT | Mod: CPTII,S$GLB,,

## 2025-04-07 PROCEDURE — 99214 OFFICE O/P EST MOD 30 MIN: CPT | Mod: S$GLB,,,

## 2025-04-07 PROCEDURE — 1159F MED LIST DOCD IN RCRD: CPT | Mod: CPTII,S$GLB,,

## 2025-04-07 PROCEDURE — 3079F DIAST BP 80-89 MM HG: CPT | Mod: CPTII,S$GLB,,

## 2025-04-07 PROCEDURE — 85025 COMPLETE CBC W/AUTO DIFF WBC: CPT

## 2025-04-07 PROCEDURE — 99999 PR PBB SHADOW E&M-EST. PATIENT-LVL III: CPT | Mod: PBBFAC,,,

## 2025-04-07 PROCEDURE — 3074F SYST BP LT 130 MM HG: CPT | Mod: CPTII,S$GLB,,

## 2025-04-07 PROCEDURE — 36415 COLL VENOUS BLD VENIPUNCTURE: CPT | Mod: PO

## 2025-04-07 RX ORDER — MIRTAZAPINE 15 MG/1
15 TABLET, FILM COATED ORAL NIGHTLY
Qty: 60 TABLET | Refills: 0 | Status: SHIPPED | OUTPATIENT
Start: 2025-04-07

## 2025-04-07 NOTE — PROGRESS NOTES
[facility]     Subjective:       Patient ID:  [id number]     Chief Complaint: Follow-up    Kulwinder Hogue Jr. is a 74 y.o. male with a past medical history significant for HTN, HLD, parkinson's disease, and BPH who presents to the clinic for follow up.     The patient was seen in the clinic on 3/10 for complaints of difficulty sleeping, at which time he was prescribed Remeron 7.5 mg. During the visit, his wife also mentioned concerns about recurrent urinary tract infections and a weak urinary stream. The patient followed up with urology on 3/28 with UA consistent with UTI, and he was started on ciprofloxacin at that time. He presents today for a follow-up appointment.    The patient presents today with his wife, who provides the history. Since his last visit, the patient has been doing significantly better. His wife reports that since starting Remeron, he has had more restful nights and more lucid days. However, she mentions that he experienced a single episode of delirium on Thursday night, where he was up throughout the night, attempting to exit the door, and removed the blinds from the window sill, along with turning over his nightstand. Once he was able to fall asleep, there have been no further episodes. She also notes that he was beginning to experience some difficulty sleeping again, so the clinic instructed her to increase his Remeron dose to 15 mg nightly. Since the dose increase, she reports that both his appetite and sleep have improved.    In addition, his wife reports improvement in his urinary symptoms since starting ciprofloxacin. They have a follow-up appointment with urology in one month to confirm that the urinary symptoms were due to a urinary tract infection. Of note, his PSA level was elevated compared to prior labs.    Overall, the patient has been doing well since his last visit. His wife mentions that a home health agency evaluated him, but they were dissatisfied with the  quality of care provided. Palliative care options were discussed with the patient, considering his current baseline.    Past Medical History:   Diagnosis Date    Elevated PSA     Hyperlipemia     Hypertension     Wears glasses       Review of patient's allergies indicates:   Allergen Reactions    Percocet [oxycodone-acetaminophen] Itching       Lab Results   Component Value Date    WBC 9.67 03/10/2025    HGB 15.1 03/10/2025    HCT 44.6 03/10/2025     03/10/2025    CHOL 234 (H) 03/10/2025    TRIG 110 03/10/2025    HDL 71 03/10/2025    ALT 8 (L) 03/10/2025    AST 58 (H) 03/10/2025     03/10/2025    K 2.7 (LL) 03/10/2025    CL 96 03/10/2025    CREATININE 0.9 03/10/2025    BUN 16 03/10/2025    CO2 31 (H) 03/10/2025    TSH 2.546 10/13/2022    PSA 4.6 (H) 12/15/2021    INR 1.0 02/08/2019       Review of Systems   Reason unable to perform ROS: patient unable to provide ROS.         Objective:      Physical Exam  Constitutional:       General: He is not in acute distress.     Appearance: Normal appearance. He is not ill-appearing or toxic-appearing.   HENT:      Head: Normocephalic and atraumatic.      Nose: Nose normal.   Cardiovascular:      Rate and Rhythm: Normal rate and regular rhythm.      Pulses: Normal pulses.      Heart sounds: Normal heart sounds. No murmur heard.     No friction rub.   Pulmonary:      Effort: Pulmonary effort is normal. No respiratory distress.      Breath sounds: Normal breath sounds. No wheezing.   Abdominal:      General: Abdomen is flat. Bowel sounds are normal. There is no distension.      Tenderness: There is no abdominal tenderness. There is no guarding or rebound.   Musculoskeletal:      Cervical back: Normal range of motion.      Right lower leg: No edema.      Left lower leg: No edema.   Skin:     General: Skin is warm and dry.      Capillary Refill: Capillary refill takes less than 2 seconds.   Neurological:      General: No focal deficit present.      Mental Status: He  is alert. Mental status is at baseline.      Cranial Nerves: No cranial nerve deficit.   Psychiatric:         Mood and Affect: Mood normal. Affect is flat.         Speech: He is noncommunicative.           Assessment:       1. Parkinson's disease, unspecified whether dyskinesia present, unspecified whether manifestations fluctuate    2. BPH with obstruction/lower urinary tract symptoms    3. Abnormal PSA    4. Essential hypertension    5. Mixed hyperlipidemia    6. Hypokalemia          Plan:       Kulwinder was seen today for follow-up.    Diagnoses and all orders for this visit:    Parkinson's disease, unspecified whether dyskinesia present, unspecified whether manifestations fluctuate  Comments:  Followed by external provider. Continue medication as prescribed. Discussed pallative care with care giver, brochure for [facility] provided.  Remeron increased to 15mg.   Orders:  -     mirtazapine (REMERON) 15 MG tablet; Take 1 tablet (15 mg total) by mouth every evening.    BPH with obstruction/lower urinary tract symptoms  Comments:  Followed by urology. Currently on antibiotic for UTI.    Abnormal PSA  Comments:  Followed by urology.    Essential hypertension  Comments:  Stable. Currently not on medication.    Mixed hyperlipidemia  Comments:  Continue low fat and high fiber diet.  Given comorbidity, patient not currently on cholesterol lowering medication   Will continue to monitor.     The 10-year ASCVD risk score (Nadine DK, et al., 2019) is: 16.1%    Values used to calculate the score:      Age: 74 years      Sex: Male      Is Non- : No      Diabetic: No      Tobacco smoker: No      Systolic Blood Pressure: 110 mmHg      Is BP treated: No      HDL Cholesterol: 71 mg/dL      Total Cholesterol: 234 mg/dL    Hypokalemia  Comments:  Last labs consistent with hypokalemia. Patient currently on potassium supplement. Repeat labs pending.        Follow up for scheduled appointment.    Veena Moffett  ALINA  Family Medicine Physician Assistant     Future Appointments       Date Provider Specialty Appt Notes    6/3/2025 Hannah León, [facility] Urology 3-4 month f/u    6/5/2025 Terrance Plata MD Family Medicine 3 month  follow up             Tests to Keep You Healthy    Colon Cancer Screening: DUE  Last Blood Pressure <= 139/89 (4/7/2025): Yes       I spent a total of 20 minutes on the day of the visit.This includes face to face time and non-face to face time preparing to see the patient (eg, review of tests), obtaining and/or reviewing separately obtained history, documenting clinical information in the electronic or other health record, independently interpreting results and communicating results to the patient/family/caregiver, or care coordinator.

## 2025-04-08 ENCOUNTER — TELEPHONE (OUTPATIENT)
Dept: FAMILY MEDICINE | Facility: CLINIC | Age: 75
End: 2025-04-08
Payer: MEDICARE

## 2025-04-08 NOTE — TELEPHONE ENCOUNTER
----- Message from Veena Moffett PA-C sent at 4/8/2025  8:28 AM CDT -----  I have reviewed Mr. Hogue's lab results. His potassium levels have slightly improved but remain on the lower end of normal. I recommend increasing his potassium supplementation to 20 meq twice   daily, and we will recheck his levels in a couple of weeks to ensure continued improvement. I sent the new medication to his pharmacy.     Additionally, his liver enzymes have shown improvement. His blood count remains stable, and there are no signs of an elevated white blood cell count, which is a positive sign.    Please let me know if you have any further questions. My staff can also link the repeat potassium lab to his PSA lab scheduled for 5/12.    Veena Moffett PA-C  ----- Message -----  From: Lab, Background User  Sent: 4/7/2025   6:38 PM CDT  To: Veena Moffett PA-C

## 2025-04-09 ENCOUNTER — TELEPHONE (OUTPATIENT)
Dept: FAMILY MEDICINE | Facility: CLINIC | Age: 75
End: 2025-04-09
Payer: MEDICARE

## 2025-04-21 ENCOUNTER — TELEPHONE (OUTPATIENT)
Dept: FAMILY MEDICINE | Facility: CLINIC | Age: 75
End: 2025-04-21
Payer: MEDICARE

## 2025-04-21 NOTE — TELEPHONE ENCOUNTER
Spoke with Ashanti.   She is requesting orders for Hospice.  Patient is refusing to take medications, eat and drink, since Friday 4/18/2025.   Please advise and select Hospice    Ashanti is requesting to speak with you   Please call her

## 2025-04-22 ENCOUNTER — TELEPHONE (OUTPATIENT)
Dept: FAMILY MEDICINE | Facility: CLINIC | Age: 75
End: 2025-04-22
Payer: MEDICARE

## 2025-04-22 NOTE — TELEPHONE ENCOUNTER
----- Message from Phyllis sent at 4/22/2025 10:44 AM CDT -----  Type: Needs Medical AdviceWho Called:  patricia Fatima Call Back Number: [phone] Additional Information: requesting a call back regarding hospice for PT

## 2025-04-23 DIAGNOSIS — G20.A1 PARKINSON'S DISEASE, UNSPECIFIED WHETHER DYSKINESIA PRESENT, UNSPECIFIED WHETHER MANIFESTATIONS FLUCTUATE: Primary | ICD-10-CM

## 2025-04-23 NOTE — TELEPHONE ENCOUNTER
Hi,     I am sorry to hear this. We are in the process of trying to figure out how to place the referral. Once the referral is placed, I will update you.     Veena Moffett PA-C

## 2025-06-06 DIAGNOSIS — G20.A1 PARKINSON'S DISEASE, UNSPECIFIED WHETHER DYSKINESIA PRESENT, UNSPECIFIED WHETHER MANIFESTATIONS FLUCTUATE: ICD-10-CM

## 2025-06-06 RX ORDER — MIRTAZAPINE 7.5 MG/1
7.5 TABLET, FILM COATED ORAL NIGHTLY
Qty: 90 TABLET | Refills: 0 | OUTPATIENT
Start: 2025-06-06

## 2025-06-11 DIAGNOSIS — G20.A1 PARKINSON'S DISEASE, UNSPECIFIED WHETHER DYSKINESIA PRESENT, UNSPECIFIED WHETHER MANIFESTATIONS FLUCTUATE: ICD-10-CM

## 2025-06-11 DIAGNOSIS — E87.6 HYPOKALEMIA: ICD-10-CM

## 2025-06-11 RX ORDER — MIRTAZAPINE 15 MG/1
15 TABLET, FILM COATED ORAL
Qty: 60 TABLET | Refills: 0 | OUTPATIENT
Start: 2025-06-11

## 2025-06-11 RX ORDER — POTASSIUM CHLORIDE 20 MEQ/1
20 TABLET, EXTENDED RELEASE ORAL 2 TIMES DAILY
Qty: 120 TABLET | Refills: 0 | OUTPATIENT
Start: 2025-06-11

## (undated) DEVICE — SEE MEDLINE ITEM 152651

## (undated) DEVICE — SHEET DRAPE MEDIUM

## (undated) DEVICE — GLOVE SURG ULTRA TOUCH 7

## (undated) DEVICE — Device

## (undated) DEVICE — SET CYSTO IRRIGATION UNIV SPIK

## (undated) DEVICE — JELLY LUBRICATING STERILE 5 GR

## (undated) DEVICE — DRAPE MINOR PROCEDURE

## (undated) DEVICE — SPONGE GAUZE 16PLY 4X4

## (undated) DEVICE — SEE L#120831

## (undated) DEVICE — WATER STERILE INJ 500ML BAG